# Patient Record
Sex: FEMALE | Race: WHITE | Employment: OTHER | URBAN - METROPOLITAN AREA
[De-identification: names, ages, dates, MRNs, and addresses within clinical notes are randomized per-mention and may not be internally consistent; named-entity substitution may affect disease eponyms.]

---

## 2017-01-02 ENCOUNTER — HOSPITAL ENCOUNTER (OUTPATIENT)
Dept: CARDIAC REHAB | Facility: CLINIC | Age: 72
End: 2017-01-02
Attending: INTERNAL MEDICINE
Payer: MEDICARE

## 2017-01-02 PROCEDURE — 93798 PHYS/QHP OP CAR RHAB W/ECG: CPT

## 2017-01-02 PROCEDURE — 40000116 ZZH STATISTIC OP CR VISIT

## 2017-01-04 ENCOUNTER — HOSPITAL ENCOUNTER (OUTPATIENT)
Dept: CARDIAC REHAB | Facility: CLINIC | Age: 72
End: 2017-01-04
Attending: INTERNAL MEDICINE
Payer: MEDICARE

## 2017-01-04 PROCEDURE — 40000116 ZZH STATISTIC OP CR VISIT

## 2017-01-04 PROCEDURE — 93798 PHYS/QHP OP CAR RHAB W/ECG: CPT

## 2017-01-05 ENCOUNTER — TELEPHONE (OUTPATIENT)
Dept: CARDIOLOGY | Facility: CLINIC | Age: 72
End: 2017-01-05

## 2017-01-05 DIAGNOSIS — Z98.61 STATUS POST CORONARY ANGIOPLASTY: Primary | ICD-10-CM

## 2017-01-05 NOTE — TELEPHONE ENCOUNTER
Called [facility] care carmen to inquire about brilinta fax that was sent to team 4. [facility] care [facility]men stated that it was approved.     Called pt to inform her that Brilinta was approved per [facility] care carmen. Pt verbalized understanding. Pt states she will call pharmacy to see if price has decreased. No further questions at this time.

## 2017-01-05 NOTE — TELEPHONE ENCOUNTER
[facility] care carmen called regarding a tier changefor brilinta. Per [facility] care [facility] pt is requesting that her co-pay is decreased for this medication. Per [facility] care carmen pt does not need a prior authorization for this process. Per [facility] care carmen and fax will be sent over to fill out in order to start this process.     Pt called wondering if [facility] was in contact with Dr. Lozada's office. Called pt back to inform that we are working on this process. Pt stated that she is leaving for [place] next week and would like this completed prior to then. Pt stated that [facility] care [facility] stated that it will take 72 hours. Writer instructed pt that we will try our hardest to get it completed. Pt verbalized understanding. No further questions.

## 2017-01-05 NOTE — TELEPHONE ENCOUNTER
Pt said she needs a new script for her brilinta sent to her [facility] pharmacy. She is trying to find out cost of it with her insurance this year and pharmacy requested new script. She will call 's nursing team if she has any issues with cost of medication. She is leaving for [place] for two months so is trying to get a 3 month supply of her medications prior to leaving. Refill for brilinta 90 mg BID sent to pharmacy. Alberto ESTEBAN

## 2017-01-06 ENCOUNTER — HOSPITAL ENCOUNTER (OUTPATIENT)
Dept: CARDIAC REHAB | Facility: CLINIC | Age: 72
End: 2017-01-06
Attending: INTERNAL MEDICINE
Payer: MEDICARE

## 2017-01-06 DIAGNOSIS — E78.5 HYPERLIPIDEMIA LDL GOAL <70: ICD-10-CM

## 2017-01-06 PROCEDURE — 84460 ALANINE AMINO (ALT) (SGPT): CPT | Performed by: NURSE PRACTITIONER

## 2017-01-06 PROCEDURE — 93798 PHYS/QHP OP CAR RHAB W/ECG: CPT | Performed by: OCCUPATIONAL THERAPIST

## 2017-01-06 PROCEDURE — 80061 LIPID PANEL: CPT | Performed by: NURSE PRACTITIONER

## 2017-01-06 PROCEDURE — 40000116 ZZH STATISTIC OP CR VISIT: Performed by: OCCUPATIONAL THERAPIST

## 2017-01-06 PROCEDURE — 36415 COLL VENOUS BLD VENIPUNCTURE: CPT | Performed by: NURSE PRACTITIONER

## 2017-01-07 LAB
ALT SERPL W P-5'-P-CCNC: 30 U/L (ref 0–50)
CHOLEST SERPL-MCNC: 164 MG/DL
HDLC SERPL-MCNC: 69 MG/DL
LDLC SERPL CALC-MCNC: 73 MG/DL
NONHDLC SERPL-MCNC: 95 MG/DL
TRIGL SERPL-MCNC: 109 MG/DL

## 2017-01-09 ENCOUNTER — HOSPITAL ENCOUNTER (OUTPATIENT)
Dept: CARDIAC REHAB | Facility: CLINIC | Age: 72
End: 2017-01-09
Attending: INTERNAL MEDICINE
Payer: MEDICARE

## 2017-01-09 DIAGNOSIS — E78.5 HYPERLIPIDEMIA LDL GOAL <70: Primary | ICD-10-CM

## 2017-01-09 PROCEDURE — 40000116 ZZH STATISTIC OP CR VISIT: Performed by: REHABILITATION PRACTITIONER

## 2017-01-09 PROCEDURE — 93798 PHYS/QHP OP CAR RHAB W/ECG: CPT | Performed by: REHABILITATION PRACTITIONER

## 2017-01-09 NOTE — PROGRESS NOTES
Notes Recorded by Aylin Acosta, NIKI CNP on 1/9/2017 at 9:25 AM  LDL is very close to goal, but not quite there. I think we can keep her on the Lipitor 40 mg daily and recheck when she returns from [place]. Thanks Aylin    LDL ordered for April when patient due for f/u

## 2017-01-10 ENCOUNTER — HOSPITAL ENCOUNTER (OUTPATIENT)
Dept: CARDIAC REHAB | Facility: CLINIC | Age: 72
End: 2017-01-10
Attending: INTERNAL MEDICINE
Payer: MEDICARE

## 2017-01-10 VITALS — HEIGHT: 60 IN | BODY MASS INDEX: 32.59 KG/M2 | WEIGHT: 166 LBS

## 2017-01-10 PROCEDURE — 40000116 ZZH STATISTIC OP CR VISIT: Performed by: REHABILITATION PRACTITIONER

## 2017-01-10 PROCEDURE — 93798 PHYS/QHP OP CAR RHAB W/ECG: CPT | Performed by: REHABILITATION PRACTITIONER

## 2017-01-10 PROCEDURE — 93797 PHYS/QHP OP CAR RHAB WO ECG: CPT | Mod: 59 | Performed by: REHABILITATION PRACTITIONER

## 2017-01-10 PROCEDURE — 40000575 ZZH STATISTIC OP CARDIAC VISIT #2: Performed by: REHABILITATION PRACTITIONER

## 2017-01-10 ASSESSMENT — 6 MINUTE WALK TEST (6MWT)
FEMALE CALC: 1348.65
TOTAL DISTANCE WALKED (FT): 1459
TOTAL DISTANCE WALKED (FT): 1150
MALE CALC: 1182.34
PREDICTED: 1189.54
GENDER SELECTION: FEMALE

## 2017-01-10 NOTE — PROGRESS NOTES
01/10/17 1500   Session  Physician cosignature/electronic signature indicates approval of this ITP document. I have established, reviewed and made necessary changes to the individualized treatment plan and exercise prescription for this patient.  Shantelle CYNTHIA Georges  1945         Session Discharge Note   Certified through this date 02/03/17   Cardiac Rehab Assessment   Cardiac Rehab Assessment Pt. has had significant medical issues starting in October 2016 with vaginal bleeding. Pt. had procedure to find bleeding, and physicians were concerned with her EKG rhythm while under for surgery. Pt. underwent a stress test on 11/22/16 which showed a large anterior ischemia. Pt. underwent an angiogram which showed severe proximal LAD stenosis which was treated with a stent. During recovery, Pt. developed sudden onset of chest pain and vomited, and EKG showed anterior ST elevation. Pt. 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 Pt. underwent a thrombectomy as well as angioplasty and was treated with another stent in the ostial LAD. Since discharge, pt. has felt well and has been chest pain free. Pt. and her  have a trip planned for 8 weeks in [place] starting on 1/12/17.12/5/2016. PT has been attending rehab for 4 sessions without any symptoms or complaints. PT reports a good appetite and is sleeping well. PT reports that she feels better than she did prior to PCI. PT is eager to be cleared to go to [employer] in January. PT will start walking at the mall 1-2 days per week for at least 30 minutes. PT also plans on going to the dietician classes and will possibly meet 1:1 with a dietician. PT is already watching fat and sodium intake as well as decreasing portion control. PT continues to benefit from structured, monitored exercise, and risk factor modification counseling to decrease cardiac risk.    12/19/16  ITP completed today.  PT reports that she has been more tired  "since her stent.  She reports SOB with stairs.  She desires to have approval to go to [place] in January.  PT continues to benefit from OPCR for ongoing progression of MET level to tolerate her trip to [place] walking on the beaches. She is benefiting from the discussions regarding dietary changes in order to control her BG and decrease risk of future heart events.  PT also is benefiting from Cardiac rehab for stress management.  She reports she is working on herself and her children are aware of this and are not \"dumping\" on her as often.  The PHQ9 redo she scored a 1 vs 11 on her initial evaluation. PT to see Dr. Lozada tomorrow and will give us feedback after that session. Pt made significant gains in exercise tolerance. Initially patient tolerated 30 minutes at 2.4 METs, now tolerating 35 minutes at 4.4 METs.  Your PT plans to continue with a home walking program 4 to 5 days per week. She will leave for [place] on 1/12/17 and will continue with a home walking program while she is there.    General Information   Treatment Diagnosis Stent   Date of Treatment Diagnosis 11/23/16   Significant Past CV History None   Comorbidities DM   Lead up symptoms chest pain during stress test   Hospital Location [facility]   Hospital Discharge Date 11/25/16   Signs and Symptoms Post Hospital Discharge None   Outpatient Cardiac Rehab Start Date 11/28/16   Primary Physician Dr. eVga   Primary Physician Follow Up Scheduled   Cardiologist Dr. Up   Cardiologist Follow Up Advised to schedule appointment   Ejection Fraction 55-60   Risk Stratification Low   Summary of Cath Report   Summary of Cath Report Available   Date Performed 11/23/16   LAD (filling defect in prox. LAD, 40-50% prox. diag., )   LCX 20%   Ramus 10-20%   Living and Work Status    Living Arrangements and Social Status house   Support System Live with an adult   Return to Employment Retired   Preventative Medications   CMS recommended medications " Antiplatelets;Beta Blocker;Lipid Lowering;Influenza vaccination;Pneumonia vaccination   Falls Screen   Have you fallen two or more times in the past year? No   Have you fallen and had an injury in the past year? No   Referral Initiated to Physical Therapy No   Pain   Patient Currently in Pain Denies   Physical Assessments   Incisions WNL   Edema None   Right Lung Sounds not assessed   Left Lung Sounds not assessed   Limitations No limitations   Individualized Treatment Plan   Monitored Sessions Scheduled 24   Monitored Sessions Attended 19   Oxygen   Supplemental Oxygen needed No   Nutrition Management - Weight Management   Assessment Re-assessment   Age 71   Weight 75.297 kg (166 lb)   Height 1.524 m (5')   BMI (Calculated) 32.49   Initial Rate Your Plate Score. Dietary tool to assess eating patterns. Scores range from 24 to 72. The higher the score the healthier the eating pattern. 48   Nutrition Management - Lipids   Lipids Labs Available   Date 12/05/16   Total Cholesterol 178   Triglycerides 157   HDL 64   LDL 83   Prescribed Lipid Medication Yes   Statin Intensity High Intensity   Lipid Comments 12/196/16  Did not discuss today.  Therapist found values for CHOL after patient had left will discuss next session.    Nutrition Management - Diabetes   Diabetes Type II   Do you Monitor BS at Home? Yes   Diabetes Medication Prescribed Yes, Oral Medication   Hb A1C Date: 11/24/16   Hb A1C Result: 6.4   Diabetes Comments 12/19/16  PT reports that she feels that she knows what she can eat and what she cant.  She states that her BG does not let her hide her mistakes in eating.    Nutrition Management Summary   Dietary Recommendations Low Sodium   Stages of Change for Diet Compliance Contemplation   Interventions Planned Attend Nutrition Education Class(es)   Interventions In Progress or Completed (gave patient DASH approaches handout. )   Nutrition Summary Comments 11/28/16 Pt. does not currently have any interest in  diet changes.12/5/2016. PT has decreased portions as well as sugar intake. PT is more ware of salt and fat in certain foods and has switched to whole grain bread, pasta, and rice. PT lives with daughter and son-in-law, so certain changes have been difficult, but has been self motivated to keep good habits. PT is planning on attending the Nutrition classes and possible meet 1:1 with a dietician.   12/19/16 PT was encouraged to do a 3 day food log if she were interested in meeting with dietician in order to use the best use of their time.    Nutrition Target Outcome Weight loss .5-1 lb/week (if BMI > 25);Total Chol < 150, HDL > 40 (M), HDL > 50 (W), LDL < 70, Trig < 150;Hb A1C < 7.0   Psychosocial Management   Psychosocial Assessment Re-assessment   Is there history of clinical depression or increased risk of depression? No previous history   Current Level of Stress per Patient Report Moderate    Current Coping Skills Uses Stress Management/Relaxation Techniques;Has Positive Support System   Initial Patient Health Questionnaire -9 Score (PHQ-9) for depression. 5-9 Minimal symptoms, 10-14 Minor depression, 15-19 Major depression, moderately severe, > 20 Major depression, severe  11   Reassessment PHQ-9 Score for Depression 0  (Repeated 12/19/16)   Initial [employer] COOP Survey score.  Quality of Life:   If total score > 25 review individual areas where patient rated a 4 or 5.  Consider patients current medical condition and what role that plays on the score.   Adjust treatment protocol to improve areas of concern.  Consider the following:  PHQ9 score, DASI, and re-assessment within the next 30 days to assist with developing treatments.  11   Stages of Change Preparation   Interventions Planned Patient to verbalize understanding of behavioral assessment results;Patient to verbalize understanding of negative impact of stress to personal health;Reassess PHQ-9 and/or [employer] COOP Surveys if outside of defined limits    Interventions In Progress or Completed Patient verbalizes understanding of behavioral assessment scores;Patient verbalizes understanding of negative impact of stress to personal health;Pt recognizes signs and symptoms of depression   Psychosocial Comments 12/5/2016. PT reports that her stress mainly comes from her children. PT has been incorporating relaxation techniques as well as responded to stress differently. PT is looking forward to going to [place] for 8 weeks as this is a big stress reliever.   12/19/16  PT reports that she is working on herself.  She reports telling her family that they cant dump on her.  She has found this to be a blessing.  She does admit to her health being of stress to her.  We discussed attendance at the relaxation class.    Psychosocial Target Outcome Identify absence or presence of depression using valid screening tool   Other Core Components - Hypertension   History of or Diagnosis of Hypertension Yes   Currently taking Anti-Hypertensive's Yes;Beta blocker   Hypertension Comments 12/5/2016. Blood pressure has been in well control.    Other Core Components - Tobacco   History of Tobacco Use Yes   Quit Date or Planned Quit Date 11/28/87   Tobacco Use Status Former (Quit > 6 mo ago)   Tobacco Habit Cigarettes   Years of Tobacco Use 15 years   Stages of Change Maintenance   Other Core Components Summary   Interventions Planned Attend education class(es) on Nutrition   Interventions In Progress or Completed Educated on importance of maintaining low sodium diet;Instructed on DASH diet   Activity/Exercise History   Activity/Exercise Assessment Re-assessment   Activity/Exercise Status prior to event? Sedentary   Number of Days Currently participating in Moderate Physical Activity? 3-4   Number of Days Currently performing  Aerobic Exercise (including rehab)? 3  (in rehab)   Number of Minutes per Session Currently of Aerobic Exercise (average)? 35   Current Stage of Change (Physical  Activity) Preparation   Current Stage of Change (Aerobic Exercise) Action   Patient Goals Goal #1   Goal #1 Description Pt. will regain strength by attending OPCR 3x per week and participating in weights/stretching.   Goal #1 Target Date 01/28/17   Goal #1 Date Met 01/10/17   Goal #1 Progress Towards Goal 12/5/2016. PT has been attending cardiac rehab 3 times per week and plans to incorporate mall walking for at least 30 minutes 1-2 additional days during the week. Due to wrist procedure site, PT will initiate weights next week when site has healed. PT reports improvement in endurance and energy every day. PT is pleased with progress and states she feels better than before.   12/19/16  PT has been walking at the mall 2x per week and then if she wants to shop she does that after her walk.  She does still need to stop and rest after one lap at the mall.  1/10/17 PT had initiated strength training, but discontinued due to pulling a muscle. She has been walking at the mall, and endurance continues to improve.    Activity/Exercise Target Outcome An Accumulation of 150  Minutes of Aerobic Activity per Week   Exercise Assessment   6 Minute Walk Predicted - Gender Selection Female   6 Minute Walk Predicted (Male) 1182.34   6 Minute Walk Predicted (Female) 1348.65   Initial 6 Minute Walk Distance (Feet) 1150 ft   Discharge 6 Minute Walk Distance (Feet) 1459   Resting HR 59 bpm   Exercise HR 92 bpm   Post Exercise HR 87 bpm   Resting /64 mmHg   Exercise /78 mmHg   Post Exercise /64 mmHg   Pre  mg/dL  (1 granola bar)   Post  mg/dL   Effort Rating 5   Current MET Level 4.4   MET Level Goal 4-4.5   ECG Rhythm Sinus rhythm   Ectopy PAC's  (frequent)   Current Symptoms Dyspnea  (with exercise)   Limitations/Restrictions None   Exercise Prescription   Mode Treadmill;NuStep   Duration/Time 30-45 min   Frequency 3 days week   THR (85% of age predicted max HR) 127.5   OMNI Effort Rating (0-10 Scale)  4-6/10   Progression Continuous bouts;Total exercise time of 20-30 minutes;Progress peak intensity by 1/2 MET per week;Aerobic exercise to OMNI rating of 6 or below and at or below THR   Recommended Home Exercise   Type of Exercise Walking   Frequency (days per week) 4 to 5 days per week   Duration (minutes per session) 30-45 min   Effort Rating Recommended 4-6/10   30 Day Exercise Plan Pt. was encouraged to begin an at home exercise program starting with 5-10 minutes. 12/5/2016. PT will be going to the mall to walk for at least 30 minutes 1-2 days per week.   12/19/16  PT to continue to exercise 4-6 days per week including her days in rehab.    Current Home Exercise   Type of Exercise Walking   Frequency (days per week) 2   Duration (minutes per session) 30   Follow-up/On-going Support   Provider follow-up needed on the following Other (see comments)  (PT to find a MD in [place])   Comments 11/28/16 Pt. will be meeting with an electrophysiologist on 12/1/16.  12/19/16  PT to see cardiologist tomorrow. Progress update sent.    Learning Assessment   Learner Patient   Primary Language English   Preferred Learning Style Listening;Reading;Demonstration;Pictures/Video   Barriers to Learning No barriers noted   Patient Education   Education recommended Medication Overview;Anatomy and Physiology of the Heart

## 2017-03-08 ENCOUNTER — HOSPITAL ENCOUNTER (OUTPATIENT)
Dept: MAMMOGRAPHY | Facility: CLINIC | Age: 72
Discharge: HOME | End: 2017-03-08
Attending: INTERNAL MEDICINE | Admitting: INTERNAL MEDICINE
Payer: MEDICARE

## 2017-03-08 DIAGNOSIS — Z12.31 VISIT FOR SCREENING MAMMOGRAM: ICD-10-CM

## 2017-03-08 PROCEDURE — G0202 SCR MAMMO BI INCL CAD: HCPCS

## 2017-03-09 ENCOUNTER — TRANSFERRED RECORDS (OUTPATIENT)
Dept: HEALTH INFORMATION MANAGEMENT | Facility: CLINIC | Age: 72
End: 2017-03-09

## 2017-03-16 ENCOUNTER — OFFICE VISIT (OUTPATIENT)
Dept: CARDIOLOGY | Facility: CLINIC | Age: 72
End: 2017-03-16
Attending: INTERNAL MEDICINE
Payer: COMMERCIAL

## 2017-03-16 VITALS
HEART RATE: 80 BPM | DIASTOLIC BLOOD PRESSURE: 75 MMHG | WEIGHT: 165 LBS | HEIGHT: 60 IN | OXYGEN SATURATION: 96 % | BODY MASS INDEX: 32.39 KG/M2 | SYSTOLIC BLOOD PRESSURE: 126 MMHG

## 2017-03-16 DIAGNOSIS — I25.10 ASCVD (ARTERIOSCLEROTIC CARDIOVASCULAR DISEASE): ICD-10-CM

## 2017-03-16 DIAGNOSIS — Z98.61 POSTSURGICAL PERCUTANEOUS TRANSLUMINAL CORONARY ANGIOPLASTY STATUS: ICD-10-CM

## 2017-03-16 DIAGNOSIS — I25.118 CORONARY ARTERY DISEASE OF NATIVE ARTERY OF NATIVE HEART WITH STABLE ANGINA PECTORIS (H): Primary | ICD-10-CM

## 2017-03-16 DIAGNOSIS — I47.10 PAROXYSMAL SUPRAVENTRICULAR TACHYCARDIA (H): ICD-10-CM

## 2017-03-16 DIAGNOSIS — E78.5 HYPERLIPIDEMIA LDL GOAL <70: ICD-10-CM

## 2017-03-16 LAB
ALT SERPL W P-5'-P-CCNC: <5 U/L (ref 5–30)
CHOLEST SERPL-MCNC: 160 MG/DL
HDLC SERPL-MCNC: 61 MG/DL
LDLC SERPL CALC-MCNC: 65 MG/DL
NONHDLC SERPL-MCNC: 99 MG/DL
TRIGL SERPL-MCNC: 168 MG/DL

## 2017-03-16 PROCEDURE — 36415 COLL VENOUS BLD VENIPUNCTURE: CPT | Performed by: NURSE PRACTITIONER

## 2017-03-16 PROCEDURE — 84460 ALANINE AMINO (ALT) (SGPT): CPT | Performed by: NURSE PRACTITIONER

## 2017-03-16 PROCEDURE — 99214 OFFICE O/P EST MOD 30 MIN: CPT | Performed by: INTERNAL MEDICINE

## 2017-03-16 PROCEDURE — 80061 LIPID PANEL: CPT | Performed by: NURSE PRACTITIONER

## 2017-03-16 NOTE — MR AVS SNAPSHOT
After Visit Summary   3/16/2017    Shantelle Su    MRN: [id number]           Patient Information     Date Of Birth          1945        Visit Information        Provider Department      3/16/2017 9:45 AM Gee Lozada MD [facility]        Today's Diagnoses     Coronary artery disease of native artery of native heart with stable angina pectoris (H)    -  1    ASCVD (arteriosclerotic cardiovascular disease)        Postsurgical percutaneous transluminal coronary angioplasty status        Paroxysmal supraventricular tachycardia (H)           Follow-ups after your visit        Additional Services     Follow-Up with Cardiologist                 Future tests that were ordered for you today     Open Future Orders        Priority Expected Expires Ordered    Follow-Up with Cardiologist Routine 9/12/2017 3/16/2018 3/16/2017    Exercise Stress Echocardiogram Routine 3/23/2017 3/16/2018 3/16/2017            Who to contact     If you have questions or need follow up information about today's clinic visit or your schedule please contact [facility] directly at [phone].  Normal or non-critical lab and imaging results will be communicated to you by [employer], letter or phone within 4 business days after the clinic has received the results. If you do not hear from us within 7 days, please contact the clinic through [employer] or phone. If you have a critical or abnormal lab result, we will notify you by phone as soon as possible.  Submit refill requests through [employer] or call your pharmacy and they will forward the refill request to us. Please allow 3 business days for your refill to be completed.          Additional Information About Your Visit        [employer] Information     [employer] gives you secure access to your electronic health record. If you see a primary care provider, you can also send messages to your care team and  make appointments. If you have questions, please call your primary care clinic.  If you do not have a primary care provider, please call [phone] and they will assist you.        Care EveryWhere ID     This is your Care EveryWhere ID. This could be used by other organizations to access your [place] medical records  [id number]        Your Vitals Were     Pulse Height BMI (Body Mass Index)             80 1.524 m (5') 32.22 kg/m2          Blood Pressure from Last 3 Encounters:   03/16/17 126/75   12/20/16 137/65   12/07/16 118/70    Weight from Last 3 Encounters:   03/16/17 74.8 kg (165 lb)   01/10/17 75.3 kg (166 lb)   12/20/16 75.3 kg (166 lb)              We Performed the Following     Follow-Up with Cardiologist          Today's Medication Changes          These changes are accurate as of: 3/16/17 10:41 AM.  If you have any questions, ask your nurse or doctor.               These medicines have changed or have updated prescriptions.        Dose/Directions    ACCU-CHEK VI Kit   This may have changed:    - when to take this  - additional instructions   Used for:  Hyperglycemia        2 times daily. With routine lancets as well as alcohol swabs, dispense 1 month   Quantity:  1 kit   Refills:  0       blood glucose monitoring lancets   This may have changed:    - when to take this  - additional instructions   Used for:  Hyperglycemia        3 times daily. BG testing 3 times a day   Quantity:  100 each   Refills:  11       blood glucose monitoring test strip   Commonly known as:  no brand specified   This may have changed:    - when to take this  - additional instructions   Used for:  Hyperglycemia        Accucheck Vi Plus  3 times daily   Quantity:  300 strip   Refills:  11                Primary Care Provider Office Phone # Fax #    Klever Vega [phone]       [facility] [address] [facility] [address]        Thank you!     Thank you for choosing [facility]  [facility] [place]  for your care. Our goal is always to provide you with excellent care. Hearing back from our patients is one way we can continue to improve our services. Please take a few minutes to complete the written survey that you may receive in the mail after your visit with us. Thank you!             Your Updated Medication List - Protect others around you: Learn how to safely use, store and throw away your medicines at www.disposemymeds.org.          This list is accurate as of: 3/16/17 10:41 AM.  Always use your most recent med list.                   Brand Name Dispense Instructions for use    ACCU-CHEK VI Kit     1 kit    2 times daily. With routine lancets as well as alcohol swabs, dispense 1 month       acetaminophen 325 MG tablet    TYLENOL    100 tablet    Take 2 tablets (650 mg) by mouth every 4 hours as needed for other (mild pain)       aspirin 81 MG EC tablet     90 tablet    Take 1 tablet (81 mg) by mouth daily       atorvastatin 40 MG tablet    LIPITOR    90 tablet    Take 1 tablet (40 mg) by mouth daily       blood glucose calibration solution     3 Bottle    Use as directed       blood glucose monitoring lancets     100 each    3 times daily. BG testing 3 times a day       blood glucose monitoring test strip    no brand specified    300 strip    Accucheck Vi Plus  3 times daily       metFORMIN 500 MG tablet    GLUCOPHAGE    90 tablet    Take 1 tablet (500 mg) by mouth daily (with dinner)       metoprolol 50 MG 24 hr tablet    TOPROL XL    90 tablet    Take 1 tablet (50 mg) by mouth daily       nitroglycerin 0.4 MG sublingual tablet    NITROSTAT    25 tablet    As needed for anginal chest pain. Maximum is 3 doses in 15 minutes (one every 5 minutes).       NORVASC 5 MG tablet   Generic drug:  amLODIPine      Take 5 mg by mouth daily       omeprazole 20 MG CR capsule    priLOSEC    90 capsule    Take 1 capsule (20 mg) by mouth daily       STOOL SOFTENER PO      Take 1  capsule by mouth 2 times daily       ticagrelor 90 MG tablet    BRILINTA    180 tablet    Take 1 tablet (90 mg) by mouth every 12 hours

## 2017-03-16 NOTE — LETTER
3/16/2017    Klever Vega MD  [facility]   [address] [facility] [address]    RE: Shantelle Su       Dear Colleague,    I again had the pleasure of seeing your patient, Shantelle Su, at [facility] for evaluation of coronary artery disease and right scapular back pain.  This patient is a delightful 72-year-old female accompanied by her  today who is status post percutaneous coronary intervention on 11/23/2016 after an abnormal stress test indicated cardiac ischemia.  The patient was originally seen by Dr. Camacho Up for palpitations.  She has a history of PAT and an ejection fraction of 50%-55% with grade I diastolic dysfunction.  In evaluation of her PACs and PAT a stress echocardiogram was performed with evidence of ischemia in the anteroseptal and apical wall accompanied by 4/10 chest burning.  Coronary angiography from 11/23 found a proximal 50% LAD stenosis followed by an 80% stenosis with a mid 40%-50% stenosis.  Successful PCI with drug-eluting stent was accomplished.  Two hours later the patient became symptomatic with an EKG concerning for acute stent thrombosis and anterior ST elevation.  OCT was performed showing a proximal stent edge dissection.  Thrombectomy was performed and an additional overlapping drug-eluting stent was placed near the ostium of the LAD.  The patient was placed on aspirin and Brilinta.  She participated in outpatient cardiac rehabilitation free of angina.  She denies any significant palpitations since she saw Dr. Jacobo in 12/2016.  He left her on her beta blocker and we have not pursued further evaluation for her arrhythmia.  While she wintered in [place] she noted some shortness of breath and right scapular burning back pain.  This has been intermittently present over the last several weeks especially while walking.  She feels quite weak and tired also.  She relates this as similar to her previous pain from 2001 when she had  pulmonary emboli.  She denies a cough.  She has shortness of breath at rest but no back pain at rest.  She denies peripheral edema.  No hemoptysis.  Her palpitations are present but fairly low key.  A fasting lipid profile today shows total cholesterol 160, HDL 61, LDL 65 and triglycerides 168.  We again discussed a weight loss diet and calories.      PHYSICAL EXAMINATION:   VITAL SIGNS:  Current blood pressure is 126/75, pulse is 80 and regular, weight is 165 pounds, BMI is 32.  Oxygen saturation on room air at rest is 96% with a pulse of 80.  With walking oxygen saturations are 98% on room air with a pulse of 96.   CHEST:  Clear to auscultation.  She is somewhat sore to palpation around the left scapula but not the right.   CARDIAC:  Regular rate and rhythm, normal S1 and S2 without gallop or murmur.  No JVD or HJR.  Pulses are all intact without bruits.   ABDOMEN:  Benign and mildly obese.  No bruits.   EXTREMITIES:  Without cyanosis, clubbing or edema or lower extremity cords.     Outpatient Encounter Prescriptions as of 3/16/2017   Medication Sig Dispense Refill     ticagrelor (BRILINTA) 90 MG tablet Take 1 tablet (90 mg) by mouth every 12 hours 180 tablet 3     metoprolol (TOPROL XL) 50 MG 24 hr tablet Take 1 tablet (50 mg) by mouth daily 90 tablet 3     atorvastatin (LIPITOR) 40 MG tablet Take 1 tablet (40 mg) by mouth daily 90 tablet 3     nitroglycerin (NITROSTAT) 0.4 MG SL tablet As needed for anginal chest pain. Maximum is 3 doses in 15 minutes (one every 5 minutes). 25 tablet 3     aspirin EC 81 MG EC tablet Take 1 tablet (81 mg) by mouth daily 90 tablet 3     amLODIPine (NORVASC) 5 MG tablet Take 5 mg by mouth daily       acetaminophen (TYLENOL) 325 MG tablet Take 2 tablets (650 mg) by mouth every 4 hours as needed for other (mild pain) 100 tablet 0     metFORMIN (GLUCOPHAGE) 500 MG tablet Take 1 tablet (500 mg) by mouth daily (with dinner) 90 tablet 3     omeprazole (PRILOSEC) 20 MG capsule Take 1  capsule (20 mg) by mouth daily 90 capsule 3     Docusate Calcium (STOOL SOFTENER PO) Take 1 capsule by mouth 2 times daily        glucose blood VI test strips strip Accucheck Kristine Plus  3 times daily (Patient taking differently: daily Accucheck Kristine Plus  3 times daily) 300 strip 11     ACCU-CHEK MULTICLIX LANCETS MISC 3 times daily. BG testing 3 times a day (Patient taking differently: daily 3 times daily. BG testing 3 times a day) 100 each 11     Blood Glucose Calibration (ACCU-CHEK KRISTINE) SOLN Use as directed 3 Bottle 11     Blood Glucose Monitoring Suppl (ACCU-CHEK KRISTINE) KIT 2 times daily. With routine lancets as well as alcohol swabs, dispense 1 month (Patient taking differently: daily With routine lancets as well as alcohol swabs, dispense 1 month) 1 kit 0     No facility-administered encounter medications on file as of 3/16/2017.       ASSESSMENT:   1.  Shantelle Su is a delightful 72-year-old female status post LAD angioplasty and stenting with acute in-stent thrombosis requiring a second stent placed for edge dissection.  Her back symptoms are mostly suggestive of musculoskeletal pain.  She has no sequelae of pulmonary emboli such as oxygen desaturation, tachypnea, tachycardia or hemoptysis.  There is no evidence of lower extremity DVT.  I cannot, however, rule out cardiac ischemia.  We would like to see how she does with a stress echocardiogram to be sure we are not dealing with cardiac ischemia.   2.  Hyperlipidemia, currently under excellent control.  Her triglycerides are a bit elevated and we talked about diet and exercise.   3.  Frequent premature atrial contractions which are currently reasonably well controlled.      It is my pleasure to assist in the care of Shantelle Su.  We will perform a stress echocardiogram in the next week.  It is not clear that her back pain represents aortic dissection, pulmonary emboli or other pulmonary issues.  I suspect that this is likely musculoskeletal.         It is my pleasure to assist in the care of Shantelle Su.  I will see her again in 6 months if her stress echocardiogram is normal.  All her questions were answered to her satisfaction.     Sincerely,    Gee Lozada MD     [facility]

## 2017-03-16 NOTE — PROGRESS NOTES
HISTORY OF PRESENT ILLNESS:  I again had the pleasure of seeing your patient, Shantelle Su, at [facility] for evaluation of coronary artery disease and right scapular back pain.  This patient is a delightful 72-year-old female accompanied by her  today who is status post percutaneous coronary intervention on 11/23/2016 after an abnormal stress test indicated cardiac ischemia.  The patient was originally seen by Dr. Camacho Up for palpitations.  She has a history of PAT and an ejection fraction of 50%-55% with grade I diastolic dysfunction.  In evaluation of her PACs and PAT a stress echocardiogram was performed with evidence of ischemia in the anteroseptal and apical wall accompanied by 4/10 chest burning.  Coronary angiography from 11/23 found a proximal 50% LAD stenosis followed by an 80% stenosis with a mid 40%-50% stenosis.  Successful PCI with drug-eluting stent was accomplished.  Two hours later the patient became symptomatic with an EKG concerning for acute stent thrombosis and anterior ST elevation.  OCT was performed showing a proximal stent edge dissection.  Thrombectomy was performed and an additional overlapping drug-eluting stent was placed near the ostium of the LAD.  The patient was placed on aspirin and Brilinta.  She participated in outpatient cardiac rehabilitation free of angina.  She denies any significant palpitations since she saw Dr. Jacobo in 12/2016.  He left her on her beta blocker and we have not pursued further evaluation for her arrhythmia.  While she wintered in [place] she noted some shortness of breath and right scapular burning back pain.  This has been intermittently present over the last several weeks especially while walking.  She feels quite weak and tired also.  She relates this as similar to her previous pain from 2001 when she had pulmonary emboli.  She denies a cough.  She has shortness of breath at rest but no back pain at rest.  She denies  peripheral edema.  No hemoptysis.  Her palpitations are present but fairly low key.  A fasting lipid profile today shows total cholesterol 160, HDL 61, LDL 65 and triglycerides 168.  We again discussed a weight loss diet and calories.      PHYSICAL EXAMINATION:   VITAL SIGNS:  Current blood pressure is 126/75, pulse is 80 and regular, weight is 165 pounds, BMI is 32.  Oxygen saturation on room air at rest is 96% with a pulse of 80.  With walking oxygen saturations are 98% on room air with a pulse of 96.   CHEST:  Clear to auscultation.  She is somewhat sore to palpation around the left scapula but not the right.   CARDIAC:  Regular rate and rhythm, normal S1 and S2 without gallop or murmur.  No JVD or HJR.  Pulses are all intact without bruits.   ABDOMEN:  Benign and mildly obese.  No bruits.   EXTREMITIES:  Without cyanosis, clubbing or edema or lower extremity cords.      ASSESSMENT:   1.  Shantelle Su is a delightful 72-year-old female status post LAD angioplasty and stenting with acute in-stent thrombosis requiring a second stent placed for edge dissection.  Her back symptoms are mostly suggestive of musculoskeletal pain.  She has no sequelae of pulmonary emboli such as oxygen desaturation, tachypnea, tachycardia or hemoptysis.  There is no evidence of lower extremity DVT.  I cannot, however, rule out cardiac ischemia.  We would like to see how she does with a stress echocardiogram to be sure we are not dealing with cardiac ischemia.   2.  Hyperlipidemia, currently under excellent control.  Her triglycerides are a bit elevated and we talked about diet and exercise.   3.  Frequent premature atrial contractions which are currently reasonably well controlled.      It is my pleasure to assist in the care of Shantelle uS.  We will perform a stress echocardiogram in the next week.  It is not clear that her back pain represents aortic dissection, pulmonary emboli or other pulmonary issues.  I suspect that this is  likely musculoskeletal.        It is my pleasure to assist in the care of Rodney Hoyos.  I will see her again in 6 months if her stress echocardiogram is normal.  All her questions were answered to her satisfaction.      Lilly Brian MD      cc:   Klever Vega MD   [address]  [address]         LILLY BRIAN MD, [facility]             D: 2017 11:01   T: 2017 14:57   MT: VD      Name:     RODNEY HOYOS   MRN:      [id number]        Account:      [id number]   :      1945           Service Date: 2017      Document: [id number]

## 2017-03-22 ENCOUNTER — TELEPHONE (OUTPATIENT)
Dept: CARDIOLOGY | Facility: CLINIC | Age: 72
End: 2017-03-22

## 2017-03-22 ENCOUNTER — HOSPITAL ENCOUNTER (OUTPATIENT)
Dept: CARDIOLOGY | Facility: CLINIC | Age: 72
Discharge: HOME | End: 2017-03-22
Attending: INTERNAL MEDICINE | Admitting: INTERNAL MEDICINE
Payer: MEDICARE

## 2017-03-22 DIAGNOSIS — I25.118 CORONARY ARTERY DISEASE OF NATIVE ARTERY OF NATIVE HEART WITH STABLE ANGINA PECTORIS (H): ICD-10-CM

## 2017-03-22 PROCEDURE — 93016 CV STRESS TEST SUPVJ ONLY: CPT | Performed by: INTERNAL MEDICINE

## 2017-03-22 PROCEDURE — 93325 DOPPLER ECHO COLOR FLOW MAPG: CPT | Mod: 26 | Performed by: INTERNAL MEDICINE

## 2017-03-22 PROCEDURE — 93018 CV STRESS TEST I&R ONLY: CPT | Performed by: INTERNAL MEDICINE

## 2017-03-22 PROCEDURE — 93350 STRESS TTE ONLY: CPT | Mod: 26 | Performed by: INTERNAL MEDICINE

## 2017-03-22 PROCEDURE — 93321 DOPPLER ECHO F-UP/LMTD STD: CPT | Mod: 26 | Performed by: INTERNAL MEDICINE

## 2017-03-22 PROCEDURE — 93325 DOPPLER ECHO COLOR FLOW MAPG: CPT | Mod: TC

## 2017-03-22 NOTE — TELEPHONE ENCOUNTER
Stress echo performed today.   Interpretation Summary  The patient exercised 10:45 mm:ss on standard mod Asael protocol.  The patient did not exhibit any symptoms during exercise.  A moderately-high workload was achieved.  There was a normal BP response to exercise.  The EKG portion of this stress test was negative for inducible ischemia (see  echo results below).  Normal resting wall motion and no stress-induced wall motion abnormality    Notes Recorded by Gee Lozada MD on 3/22/2017 at 2:44 PM  Normal stress echo.  F/U with me in 6 months.  Gee Lozada MD, [facility]  March 22, 2017 2:43 PM    Reviewed stress echo results and Dr. Lozada's recommendations with patient over the phone. Patient had no questions or concerns. Patient to f/u with Dr. Lozada in September.     TGarbers RN  [facility]

## 2017-03-28 ENCOUNTER — OFFICE VISIT (OUTPATIENT)
Dept: INTERNAL MEDICINE | Facility: CLINIC | Age: 72
End: 2017-03-28
Payer: COMMERCIAL

## 2017-03-28 ENCOUNTER — RADIANT APPOINTMENT (OUTPATIENT)
Dept: GENERAL RADIOLOGY | Facility: CLINIC | Age: 72
End: 2017-03-28
Attending: INTERNAL MEDICINE
Payer: COMMERCIAL

## 2017-03-28 VITALS
TEMPERATURE: 97.7 F | OXYGEN SATURATION: 98 % | DIASTOLIC BLOOD PRESSURE: 76 MMHG | WEIGHT: 165.7 LBS | HEART RATE: 77 BPM | SYSTOLIC BLOOD PRESSURE: 146 MMHG | BODY MASS INDEX: 32.36 KG/M2

## 2017-03-28 DIAGNOSIS — R07.9 RIGHT-SIDED CHEST PAIN: Primary | ICD-10-CM

## 2017-03-28 DIAGNOSIS — Z71.89 ADVANCED DIRECTIVES, COUNSELING/DISCUSSION: Chronic | ICD-10-CM

## 2017-03-28 DIAGNOSIS — R07.9 RIGHT-SIDED CHEST PAIN: ICD-10-CM

## 2017-03-28 LAB — D DIMER PPP FEU-MCNC: 0.3 UG/ML FEU (ref 0–0.5)

## 2017-03-28 PROCEDURE — 71020 XR CHEST 2 VW: CPT

## 2017-03-28 PROCEDURE — 99213 OFFICE O/P EST LOW 20 MIN: CPT | Performed by: INTERNAL MEDICINE

## 2017-03-28 PROCEDURE — 36415 COLL VENOUS BLD VENIPUNCTURE: CPT | Performed by: INTERNAL MEDICINE

## 2017-03-28 PROCEDURE — 85379 FIBRIN DEGRADATION QUANT: CPT | Performed by: INTERNAL MEDICINE

## 2017-03-28 NOTE — MR AVS SNAPSHOT
After Visit Summary   3/28/2017    Shantelle Su    MRN: [id number]           Patient Information     Date Of Birth          1945        Visit Information        Provider Department      3/28/2017 10:20 AM Ruben Hernandez MD [facility]        Today's Diagnoses     Right-sided chest pain    -  1    Advanced directives, counseling/discussion           Follow-ups after your visit        Future tests that were ordered for you today     Open Future Orders        Priority Expected Expires Ordered    XR Chest 2 Views Routine 3/28/2017 3/28/2018 3/28/2017            Who to contact     If you have questions or need follow up information about today's clinic visit or your schedule please contact [facility] directly at [phone].  Normal or non-critical lab and imaging results will be communicated to you by MyChart, letter or phone within 4 business days after the clinic has received the results. If you do not hear from us within 7 days, please contact the clinic through [facility] or phone. If you have a critical or abnormal lab result, we will notify you by phone as soon as possible.  Submit refill requests through [employer] or call your pharmacy and they will forward the refill request to us. Please allow 3 business days for your refill to be completed.          Additional Information About Your Visit        MyChart Information     [employer] gives you secure access to your electronic health record. If you see a primary care provider, you can also send messages to your care team and make appointments. If you have questions, please call your primary care clinic.  If you do not have a primary care provider, please call [phone] and they will assist you.        Care EveryWhere ID     This is your Care EveryWhere ID. This could be used by other organizations to access your [facility] medical records  [id number]        Your Vitals Were     Pulse  Temperature Pulse Oximetry BMI (Body Mass Index)          77 97.7  F (36.5  C) (Oral) 98% 32.36 kg/m2         Blood Pressure from Last 3 Encounters:   03/28/17 146/76   03/16/17 126/75   12/20/16 137/65    Weight from Last 3 Encounters:   03/28/17 165 lb 11.2 oz (75.2 kg)   03/16/17 165 lb (74.8 kg)   01/10/17 166 lb (75.3 kg)              We Performed the Following     D dimer, quantitative          Today's Medication Changes          These changes are accurate as of: 3/28/17 10:55 AM.  If you have any questions, ask your nurse or doctor.               These medicines have changed or have updated prescriptions.        Dose/Directions    ACCU-CHEK VI Kit   This may have changed:    - when to take this  - additional instructions   Used for:  Hyperglycemia        2 times daily. With routine lancets as well as alcohol swabs, dispense 1 month   Quantity:  1 kit   Refills:  0       acetaminophen 325 MG tablet   Commonly known as:  TYLENOL   This may have changed:  how much to take   Used for:  Post-operative state        Dose:  650 mg   Take 2 tablets (650 mg) by mouth every 4 hours as needed for other (mild pain)   Quantity:  100 tablet   Refills:  0       blood glucose monitoring lancets   This may have changed:    - when to take this  - additional instructions   Used for:  Hyperglycemia        3 times daily. BG testing 3 times a day   Quantity:  100 each   Refills:  11       blood glucose monitoring test strip   Commonly known as:  no brand specified   This may have changed:    - when to take this  - additional instructions   Used for:  Hyperglycemia        Accucheck Vi Plus  3 times daily   Quantity:  300 strip   Refills:  11                Primary Care Provider Office Phone # Fax #    Klever Vega [phone]       [facility] [address] [facility] [address]        Thank you!     Thank you for choosing [facility]  for your care. Our goal is  always to provide you with excellent care. Hearing back from our patients is one way we can continue to improve our services. Please take a few minutes to complete the written survey that you may receive in the mail after your visit with us. Thank you!             Your Updated Medication List - Protect others around you: Learn how to safely use, store and throw away your medicines at www.disposemymeds.org.          This list is accurate as of: 3/28/17 10:55 AM.  Always use your most recent med list.                   Brand Name Dispense Instructions for use    ACCU-CHEK VI Kit     1 kit    2 times daily. With routine lancets as well as alcohol swabs, dispense 1 month       acetaminophen 325 MG tablet    TYLENOL    100 tablet    Take 2 tablets (650 mg) by mouth every 4 hours as needed for other (mild pain)       aspirin 81 MG EC tablet     90 tablet    Take 1 tablet (81 mg) by mouth daily       atorvastatin 40 MG tablet    LIPITOR    90 tablet    Take 1 tablet (40 mg) by mouth daily       blood glucose calibration solution     3 Bottle    Use as directed       blood glucose monitoring lancets     100 each    3 times daily. BG testing 3 times a day       blood glucose monitoring test strip    no brand specified    300 strip    Accucheck Vi Plus  3 times daily       metFORMIN 500 MG tablet    GLUCOPHAGE    90 tablet    Take 1 tablet (500 mg) by mouth daily (with dinner)       metoprolol 50 MG 24 hr tablet    TOPROL XL    90 tablet    Take 1 tablet (50 mg) by mouth daily       nitroglycerin 0.4 MG sublingual tablet    NITROSTAT    25 tablet    As needed for anginal chest pain. Maximum is 3 doses in 15 minutes (one every 5 minutes).       NORVASC 5 MG tablet   Generic drug:  amLODIPine      Take 5 mg by mouth daily       omeprazole 20 MG CR capsule    priLOSEC    90 capsule    Take 1 capsule (20 mg) by mouth daily       STOOL SOFTENER PO      Take 1 capsule by mouth 2 times daily       ticagrelor 90 MG tablet     BRILINTA    180 tablet    Take 1 tablet (90 mg) by mouth every 12 hours

## 2017-03-28 NOTE — PROGRESS NOTES
SUBJECTIVE:                                                    Shantelle Su is a 72 year old female who presents to clinic today for the following health issues:    Concern - SOB - feels like it is her right lung has a history of PE     Onset: 2 months    Description:   Patient feels SOB sometimes and feels it is in her right lung and has a history of PE    Intensity: mild    Progression of Symptoms:  same    Accompanying Signs & Symptoms:  none       Previous history of similar problem:   none    Precipitating factors:   Worsened by: none    Alleviating factors:  Improved by: none       Therapies Tried and outcome: Has seen cardiology and was cleared    While she wintered in [place] she noted some shortness of breath and right scapular burning back pain. This has been intermittently present over the last several weeks especially while walking. She feels quite weak and tired also. She relates this as similar to her previous pain from 2001 when she had pulmonary emboli. She denies a cough. She has shortness of breath at rest but no back pain at rest. She denies peripheral edema.  She saw here cardiologist and just underwent a stress test that was normal a few days ago.    She just says it feels like there is something restricting her R lung when she breathes.    Problem list and histories reviewed & adjusted, as indicated.  Additional history: as documented    Labs reviewed in EPIC    Reviewed and updated as needed this visit by clinical staff  Tobacco  Allergies  Med Hx       Reviewed and updated as needed this visit by Provider  Med Hx         ROS:  Constitutional, HEENT, cardiovascular, pulmonary, gi and gu systems are negative, except as otherwise noted.    OBJECTIVE:                                                    /76  Pulse 77  Temp 97.7  F (36.5  C) (Oral)  Wt 165 lb 11.2 oz (75.2 kg)  SpO2 98%  BMI 32.36 kg/m2  Body mass index is 32.36 kg/(m^2).  GENERAL APPEARANCE: alert, no distress and  over weight  HENT: nose and mouth without ulcers or lesions  NECK: no adenopathy, no asymmetry, masses, or scars and thyroid normal to palpation  RESP: lungs clear to auscultation - no rales, rhonchi or wheezes  CV: regular rates and rhythm, normal S1 S2, no S3 or S4 and no murmur, click or rub    Diagnostic test results:  Results for orders placed or performed in visit on 03/28/17 (from the past 24 hour(s))   D dimer, quantitative   Result Value Ref Range    D Dimer 0.3 0.0 - 0.50 ug/ml FEU      CXR: normal     ASSESSMENT/PLAN:                                                    1. Right-sided chest pain  I'm extremely doubtful of any thromboemboli disease. W/above I don't think a CT is warranted  i recommended she consider PFTs given her smoking hx on a f/u w/her PCP if sx persist  - XR Chest 2 Views; Future  - D dimer, quantitative    Follow up with Provider - as above     Ruben Hernandez MD  [facility]

## 2017-03-28 NOTE — NURSING NOTE
Chief Complaint   Patient presents with     Shortness of Breath     Pain in right side lung       Initial /76  Pulse 77  Temp 97.7  F (36.5  C) (Oral)  Wt 165 lb 11.2 oz (75.2 kg)  SpO2 98%  BMI 32.36 kg/m2 Estimated body mass index is 32.36 kg/(m^2) as calculated from the following:    Height as of 3/16/17: 5' (1.524 m).    Weight as of this encounter: 165 lb 11.2 oz (75.2 kg).  Medication Reconciliation: complete

## 2017-03-28 NOTE — ASSESSMENT & PLAN NOTE
Advance Care Planning 3/28/2017: ACP Review of Chart / Resources Provided:  Reviewed chart for advance care plan.  Shantelle Su has been provided information and resources to begin or update their advance care plan.  Added by Leyla Pearce

## 2017-04-23 ENCOUNTER — APPOINTMENT (OUTPATIENT)
Dept: GENERAL RADIOLOGY | Facility: CLINIC | Age: 72
End: 2017-04-23
Attending: PHYSICIAN ASSISTANT
Payer: MEDICARE

## 2017-04-23 ENCOUNTER — HOSPITAL ENCOUNTER (OUTPATIENT)
Facility: CLINIC | Age: 72
Setting detail: OBSERVATION
Discharge: HOME | End: 2017-04-24
Attending: EMERGENCY MEDICINE | Admitting: INTERNAL MEDICINE
Payer: MEDICARE

## 2017-04-23 ENCOUNTER — TELEPHONE (OUTPATIENT)
Dept: NURSING | Facility: CLINIC | Age: 72
End: 2017-04-23

## 2017-04-23 ENCOUNTER — APPOINTMENT (OUTPATIENT)
Dept: CT IMAGING | Facility: CLINIC | Age: 72
End: 2017-04-23
Attending: EMERGENCY MEDICINE
Payer: MEDICARE

## 2017-04-23 DIAGNOSIS — R19.7 VOMITING AND DIARRHEA: ICD-10-CM

## 2017-04-23 DIAGNOSIS — R11.10 VOMITING AND DIARRHEA: ICD-10-CM

## 2017-04-23 DIAGNOSIS — R07.9 CHEST PAIN, UNSPECIFIED TYPE: ICD-10-CM

## 2017-04-23 DIAGNOSIS — R55 VASOVAGAL SYNCOPE: ICD-10-CM

## 2017-04-23 LAB
ALBUMIN SERPL-MCNC: 3.8 G/DL (ref 3.4–5)
ALBUMIN UR-MCNC: NEGATIVE MG/DL
ALP SERPL-CCNC: 101 U/L (ref 40–150)
ALT SERPL W P-5'-P-CCNC: 28 U/L (ref 0–50)
ANION GAP SERPL CALCULATED.3IONS-SCNC: 6 MMOL/L (ref 3–14)
APPEARANCE UR: ABNORMAL
AST SERPL W P-5'-P-CCNC: 21 U/L (ref 0–45)
BACTERIA #/AREA URNS HPF: ABNORMAL /HPF
BASOPHILS # BLD AUTO: 0 10E9/L (ref 0–0.2)
BASOPHILS NFR BLD AUTO: 0.1 %
BILIRUB DIRECT SERPL-MCNC: <0.1 MG/DL (ref 0–0.2)
BILIRUB SERPL-MCNC: 0.3 MG/DL (ref 0.2–1.3)
BILIRUB UR QL STRIP: NEGATIVE
BUN SERPL-MCNC: 17 MG/DL (ref 7–30)
CALCIUM SERPL-MCNC: 8.5 MG/DL (ref 8.5–10.1)
CHLORIDE SERPL-SCNC: 105 MMOL/L (ref 94–109)
CO2 SERPL-SCNC: 27 MMOL/L (ref 20–32)
COLOR UR AUTO: YELLOW
CREAT SERPL-MCNC: 0.62 MG/DL (ref 0.52–1.04)
D DIMER PPP FEU-MCNC: 0.7 UG/ML FEU (ref 0–0.5)
DIFFERENTIAL METHOD BLD: ABNORMAL
EOSINOPHIL # BLD AUTO: 0 10E9/L (ref 0–0.7)
EOSINOPHIL NFR BLD AUTO: 0.2 %
ERYTHROCYTE [DISTWIDTH] IN BLOOD BY AUTOMATED COUNT: 13.7 % (ref 10–15)
FLUAV+FLUBV AG SPEC QL: NEGATIVE
FLUAV+FLUBV AG SPEC QL: NORMAL
FLUAV+FLUBV RNA SPEC QL NAA+PROBE: ABNORMAL
FLUAV+FLUBV RNA SPEC QL NAA+PROBE: ABNORMAL
GFR SERPL CREATININE-BSD FRML MDRD: ABNORMAL ML/MIN/1.7M2
GLUCOSE BLDC GLUCOMTR-MCNC: 138 MG/DL (ref 70–99)
GLUCOSE SERPL-MCNC: 167 MG/DL (ref 70–99)
GLUCOSE UR STRIP-MCNC: NEGATIVE MG/DL
HBA1C MFR BLD: 6.8 % (ref 4.3–6)
HCT VFR BLD AUTO: 40.7 % (ref 35–47)
HGB BLD-MCNC: 13.2 G/DL (ref 11.7–15.7)
HGB UR QL STRIP: NEGATIVE
IMM GRANULOCYTES # BLD: 0.1 10E9/L (ref 0–0.4)
IMM GRANULOCYTES NFR BLD: 0.4 %
INR PPP: 0.9 (ref 0.86–1.14)
INTERPRETATION ECG - MUSE: NORMAL
INTERPRETATION ECG - MUSE: NORMAL
KETONES UR STRIP-MCNC: NEGATIVE MG/DL
LACTATE BLD-SCNC: 1.2 MMOL/L (ref 0.7–2.1)
LACTATE BLD-SCNC: 2.7 MMOL/L (ref 0.7–2.1)
LACTATE SERPL-SCNC: 3.2 MMOL/L (ref 0.4–2)
LEUKOCYTE ESTERASE UR QL STRIP: ABNORMAL
LIPASE SERPL-CCNC: 151 U/L (ref 73–393)
LYMPHOCYTES # BLD AUTO: 0.3 10E9/L (ref 0.8–5.3)
LYMPHOCYTES NFR BLD AUTO: 1.7 %
MCH RBC QN AUTO: 31 PG (ref 26.5–33)
MCHC RBC AUTO-ENTMCNC: 32.4 G/DL (ref 31.5–36.5)
MCV RBC AUTO: 96 FL (ref 78–100)
MONOCYTES # BLD AUTO: 1.2 10E9/L (ref 0–1.3)
MONOCYTES NFR BLD AUTO: 8 %
MUCOUS THREADS #/AREA URNS LPF: PRESENT /LPF
NEUTROPHILS # BLD AUTO: 13.9 10E9/L (ref 1.6–8.3)
NEUTROPHILS NFR BLD AUTO: 89.6 %
NITRATE UR QL: NEGATIVE
NRBC # BLD AUTO: 0 10*3/UL
NRBC BLD AUTO-RTO: 0 /100
PH UR STRIP: 5 PH (ref 5–7)
PLATELET # BLD AUTO: 306 10E9/L (ref 150–450)
POTASSIUM SERPL-SCNC: 4.5 MMOL/L (ref 3.4–5.3)
PROT SERPL-MCNC: 7.3 G/DL (ref 6.8–8.8)
RBC # BLD AUTO: 4.26 10E12/L (ref 3.8–5.2)
RBC #/AREA URNS AUTO: 2 /HPF (ref 0–2)
RSV RNA SPEC NAA+PROBE: ABNORMAL
SODIUM SERPL-SCNC: 138 MMOL/L (ref 133–144)
SP GR UR STRIP: 1.02 (ref 1–1.03)
SPECIMEN SOURCE: ABNORMAL
SPECIMEN SOURCE: NORMAL
SQUAMOUS #/AREA URNS AUTO: 5 /HPF (ref 0–1)
TROPONIN I SERPL-MCNC: NORMAL UG/L (ref 0–0.04)
URN SPEC COLLECT METH UR: ABNORMAL
UROBILINOGEN UR STRIP-MCNC: 0 MG/DL (ref 0–2)
WBC # BLD AUTO: 15.6 10E9/L (ref 4–11)
WBC #/AREA URNS AUTO: 3 /HPF (ref 0–2)

## 2017-04-23 PROCEDURE — 84484 ASSAY OF TROPONIN QUANT: CPT | Performed by: EMERGENCY MEDICINE

## 2017-04-23 PROCEDURE — 85610 PROTHROMBIN TIME: CPT | Performed by: EMERGENCY MEDICINE

## 2017-04-23 PROCEDURE — 71020 XR CHEST 2 VW: CPT

## 2017-04-23 PROCEDURE — 36415 COLL VENOUS BLD VENIPUNCTURE: CPT | Performed by: PHYSICIAN ASSISTANT

## 2017-04-23 PROCEDURE — 96361 HYDRATE IV INFUSION ADD-ON: CPT

## 2017-04-23 PROCEDURE — 83605 ASSAY OF LACTIC ACID: CPT | Performed by: INTERNAL MEDICINE

## 2017-04-23 PROCEDURE — 96374 THER/PROPH/DIAG INJ IV PUSH: CPT

## 2017-04-23 PROCEDURE — 25000128 H RX IP 250 OP 636: Performed by: EMERGENCY MEDICINE

## 2017-04-23 PROCEDURE — 87804 INFLUENZA ASSAY W/OPTIC: CPT | Performed by: PHYSICIAN ASSISTANT

## 2017-04-23 PROCEDURE — 80048 BASIC METABOLIC PNL TOTAL CA: CPT | Performed by: EMERGENCY MEDICINE

## 2017-04-23 PROCEDURE — 83036 HEMOGLOBIN GLYCOSYLATED A1C: CPT | Performed by: EMERGENCY MEDICINE

## 2017-04-23 PROCEDURE — 85379 FIBRIN DEGRADATION QUANT: CPT | Performed by: EMERGENCY MEDICINE

## 2017-04-23 PROCEDURE — 87631 RESP VIRUS 3-5 TARGETS: CPT | Performed by: PHYSICIAN ASSISTANT

## 2017-04-23 PROCEDURE — 83690 ASSAY OF LIPASE: CPT | Performed by: EMERGENCY MEDICINE

## 2017-04-23 PROCEDURE — 25000128 H RX IP 250 OP 636: Performed by: PHYSICIAN ASSISTANT

## 2017-04-23 PROCEDURE — 99220 ZZC INITIAL OBSERVATION CARE,LEVL III: CPT | Performed by: PHYSICIAN ASSISTANT

## 2017-04-23 PROCEDURE — 83605 ASSAY OF LACTIC ACID: CPT | Mod: 91 | Performed by: PHYSICIAN ASSISTANT

## 2017-04-23 PROCEDURE — 70450 CT HEAD/BRAIN W/O DYE: CPT

## 2017-04-23 PROCEDURE — 93005 ELECTROCARDIOGRAM TRACING: CPT | Mod: 76

## 2017-04-23 PROCEDURE — 25000132 ZZH RX MED GY IP 250 OP 250 PS 637: Mod: GY | Performed by: EMERGENCY MEDICINE

## 2017-04-23 PROCEDURE — 87040 BLOOD CULTURE FOR BACTERIA: CPT | Mod: 91 | Performed by: PHYSICIAN ASSISTANT

## 2017-04-23 PROCEDURE — 85025 COMPLETE CBC W/AUTO DIFF WBC: CPT | Performed by: EMERGENCY MEDICINE

## 2017-04-23 PROCEDURE — A9270 NON-COVERED ITEM OR SERVICE: HCPCS | Mod: GY | Performed by: PHYSICIAN ASSISTANT

## 2017-04-23 PROCEDURE — 80076 HEPATIC FUNCTION PANEL: CPT | Performed by: EMERGENCY MEDICINE

## 2017-04-23 PROCEDURE — 25000132 ZZH RX MED GY IP 250 OP 250 PS 637: Mod: GY | Performed by: PHYSICIAN ASSISTANT

## 2017-04-23 PROCEDURE — 25000125 ZZHC RX 250: Performed by: EMERGENCY MEDICINE

## 2017-04-23 PROCEDURE — 72125 CT NECK SPINE W/O DYE: CPT

## 2017-04-23 PROCEDURE — 93005 ELECTROCARDIOGRAM TRACING: CPT

## 2017-04-23 PROCEDURE — 81001 URINALYSIS AUTO W/SCOPE: CPT | Performed by: PHYSICIAN ASSISTANT

## 2017-04-23 PROCEDURE — A9270 NON-COVERED ITEM OR SERVICE: HCPCS | Mod: GY | Performed by: EMERGENCY MEDICINE

## 2017-04-23 PROCEDURE — 36415 COLL VENOUS BLD VENIPUNCTURE: CPT | Performed by: INTERNAL MEDICINE

## 2017-04-23 PROCEDURE — 87086 URINE CULTURE/COLONY COUNT: CPT | Performed by: PHYSICIAN ASSISTANT

## 2017-04-23 PROCEDURE — G0378 HOSPITAL OBSERVATION PER HR: HCPCS

## 2017-04-23 PROCEDURE — 00000146 ZZHCL STATISTIC GLUCOSE BY METER IP

## 2017-04-23 PROCEDURE — 99285 EMERGENCY DEPT VISIT HI MDM: CPT | Mod: 25

## 2017-04-23 RX ORDER — LIDOCAINE 40 MG/G
CREAM TOPICAL
Status: DISCONTINUED | OUTPATIENT
Start: 2017-04-23 | End: 2017-04-23

## 2017-04-23 RX ORDER — SODIUM CHLORIDE 9 MG/ML
1000 INJECTION, SOLUTION INTRAVENOUS CONTINUOUS
Status: DISCONTINUED | OUTPATIENT
Start: 2017-04-23 | End: 2017-04-23

## 2017-04-23 RX ORDER — DOCUSATE SODIUM 100 MG/1
100 CAPSULE, LIQUID FILLED ORAL 2 TIMES DAILY
COMMUNITY
End: 2020-07-15

## 2017-04-23 RX ORDER — PROCHLORPERAZINE 25 MG
12.5 SUPPOSITORY, RECTAL RECTAL EVERY 12 HOURS PRN
Status: DISCONTINUED | OUTPATIENT
Start: 2017-04-23 | End: 2017-04-24 | Stop reason: HOSPADM

## 2017-04-23 RX ORDER — SODIUM CHLORIDE 9 MG/ML
INJECTION, SOLUTION INTRAVENOUS CONTINUOUS
Status: DISCONTINUED | OUTPATIENT
Start: 2017-04-23 | End: 2017-04-24

## 2017-04-23 RX ORDER — AMOXICILLIN 250 MG
1-2 CAPSULE ORAL 2 TIMES DAILY
Status: DISCONTINUED | OUTPATIENT
Start: 2017-04-23 | End: 2017-04-24 | Stop reason: HOSPADM

## 2017-04-23 RX ORDER — NALOXONE HYDROCHLORIDE 0.4 MG/ML
.1-.4 INJECTION, SOLUTION INTRAMUSCULAR; INTRAVENOUS; SUBCUTANEOUS
Status: DISCONTINUED | OUTPATIENT
Start: 2017-04-23 | End: 2017-04-24 | Stop reason: HOSPADM

## 2017-04-23 RX ORDER — METOPROLOL SUCCINATE 25 MG/1
50 TABLET, EXTENDED RELEASE ORAL DAILY
Status: DISCONTINUED | OUTPATIENT
Start: 2017-04-24 | End: 2017-04-24 | Stop reason: HOSPADM

## 2017-04-23 RX ORDER — PROCHLORPERAZINE MALEATE 5 MG
5 TABLET ORAL EVERY 6 HOURS PRN
Status: DISCONTINUED | OUTPATIENT
Start: 2017-04-23 | End: 2017-04-24 | Stop reason: HOSPADM

## 2017-04-23 RX ORDER — OXYCODONE HYDROCHLORIDE 5 MG/1
5 TABLET ORAL
Status: DISCONTINUED | OUTPATIENT
Start: 2017-04-23 | End: 2017-04-24 | Stop reason: HOSPADM

## 2017-04-23 RX ORDER — DEXTROSE MONOHYDRATE 25 G/50ML
25-50 INJECTION, SOLUTION INTRAVENOUS
Status: DISCONTINUED | OUTPATIENT
Start: 2017-04-23 | End: 2017-04-24 | Stop reason: HOSPADM

## 2017-04-23 RX ORDER — DOCUSATE SODIUM 100 MG/1
100 CAPSULE, LIQUID FILLED ORAL 2 TIMES DAILY
Status: DISCONTINUED | OUTPATIENT
Start: 2017-04-23 | End: 2017-04-24 | Stop reason: HOSPADM

## 2017-04-23 RX ORDER — ASPIRIN 81 MG/1
81 TABLET ORAL DAILY
Status: DISCONTINUED | OUTPATIENT
Start: 2017-04-24 | End: 2017-04-24 | Stop reason: HOSPADM

## 2017-04-23 RX ORDER — AMLODIPINE BESYLATE 5 MG/1
5 TABLET ORAL DAILY
Status: DISCONTINUED | OUTPATIENT
Start: 2017-04-24 | End: 2017-04-24 | Stop reason: HOSPADM

## 2017-04-23 RX ORDER — NITROGLYCERIN 0.4 MG/1
0.4 TABLET SUBLINGUAL ONCE
Status: COMPLETED | OUTPATIENT
Start: 2017-04-23 | End: 2017-04-23

## 2017-04-23 RX ORDER — ATORVASTATIN CALCIUM 40 MG/1
40 TABLET, FILM COATED ORAL EVERY EVENING
Status: DISCONTINUED | OUTPATIENT
Start: 2017-04-23 | End: 2017-04-24 | Stop reason: HOSPADM

## 2017-04-23 RX ORDER — ONDANSETRON 2 MG/ML
4 INJECTION INTRAMUSCULAR; INTRAVENOUS EVERY 6 HOURS PRN
Status: DISCONTINUED | OUTPATIENT
Start: 2017-04-23 | End: 2017-04-24 | Stop reason: HOSPADM

## 2017-04-23 RX ORDER — ACETAMINOPHEN 325 MG/1
650 TABLET ORAL EVERY 4 HOURS PRN
Status: DISCONTINUED | OUTPATIENT
Start: 2017-04-23 | End: 2017-04-23

## 2017-04-23 RX ORDER — ACETAMINOPHEN 325 MG/1
975 TABLET ORAL EVERY 8 HOURS PRN
Status: DISCONTINUED | OUTPATIENT
Start: 2017-04-23 | End: 2017-04-24 | Stop reason: HOSPADM

## 2017-04-23 RX ORDER — ONDANSETRON 2 MG/ML
4 INJECTION INTRAMUSCULAR; INTRAVENOUS EVERY 30 MIN PRN
Status: DISCONTINUED | OUTPATIENT
Start: 2017-04-23 | End: 2017-04-23

## 2017-04-23 RX ORDER — NITROGLYCERIN 0.4 MG/1
0.4 TABLET SUBLINGUAL EVERY 5 MIN PRN
Status: DISCONTINUED | OUTPATIENT
Start: 2017-04-23 | End: 2017-04-24 | Stop reason: HOSPADM

## 2017-04-23 RX ORDER — NICOTINE POLACRILEX 4 MG
15-30 LOZENGE BUCCAL
Status: DISCONTINUED | OUTPATIENT
Start: 2017-04-23 | End: 2017-04-24 | Stop reason: HOSPADM

## 2017-04-23 RX ORDER — ONDANSETRON 4 MG/1
4 TABLET, ORALLY DISINTEGRATING ORAL EVERY 6 HOURS PRN
Status: DISCONTINUED | OUTPATIENT
Start: 2017-04-23 | End: 2017-04-24 | Stop reason: HOSPADM

## 2017-04-23 RX ADMIN — SODIUM CHLORIDE 1000 ML: 9 INJECTION, SOLUTION INTRAVENOUS at 08:12

## 2017-04-23 RX ADMIN — NITROGLYCERIN 0.4 MG: 0.4 TABLET SUBLINGUAL at 09:56

## 2017-04-23 RX ADMIN — TICAGRELOR 90 MG: 90 TABLET ORAL at 21:09

## 2017-04-23 RX ADMIN — SODIUM CHLORIDE: 9 INJECTION, SOLUTION INTRAVENOUS at 21:58

## 2017-04-23 RX ADMIN — ONDANSETRON 4 MG: 2 INJECTION INTRAMUSCULAR; INTRAVENOUS at 08:12

## 2017-04-23 RX ADMIN — ATORVASTATIN CALCIUM 40 MG: 40 TABLET, FILM COATED ORAL at 21:09

## 2017-04-23 RX ADMIN — SODIUM CHLORIDE 500 ML: 9 INJECTION, SOLUTION INTRAVENOUS at 14:45

## 2017-04-23 RX ADMIN — LIDOCAINE HYDROCHLORIDE 30 ML: 20 SOLUTION ORAL; TOPICAL at 09:24

## 2017-04-23 RX ADMIN — SODIUM CHLORIDE 1000 ML: 9 INJECTION, SOLUTION INTRAVENOUS at 17:12

## 2017-04-23 RX ADMIN — ACETAMINOPHEN 975 MG: 325 TABLET, FILM COATED ORAL at 16:08

## 2017-04-23 RX ADMIN — SODIUM CHLORIDE 500 ML: 9 INJECTION, SOLUTION INTRAVENOUS at 12:20

## 2017-04-23 RX ADMIN — OXYCODONE HYDROCHLORIDE 5 MG: 5 TABLET ORAL at 21:17

## 2017-04-23 ASSESSMENT — PAIN DESCRIPTION - DESCRIPTORS
DESCRIPTORS: ACHING
DESCRIPTORS: ACHING

## 2017-04-23 ASSESSMENT — ENCOUNTER SYMPTOMS
VOMITING: 1
BLOOD IN STOOL: 0
WOUND: 1
NECK PAIN: 1
DIARRHEA: 1
WEAKNESS: 1
LIGHT-HEADEDNESS: 0
DIZZINESS: 0

## 2017-04-23 NOTE — PLAN OF CARE
Problem: Discharge Planning  Goal: Discharge Planning (Adult, OB, Behavioral, Peds)  PRIMARY DIAGNOSIS: SYNCOPE/Nausea, vomiting, diarrhea   OUTPATIENT/OBSERVATION GOALS TO BE MET BEFORE DISCHARGE:     1. Diagnostic testing complete & at baseline neurologic function:   2. Cleared by consultants (if involved): N/A  3. ADLs back to baseline? No - SBA d/t syncopal episode  4. Activity and level of assistance: Up with standby assistance.  5. Pain status: Pain free.  6. Barriers to discharge noted: No  7. Orthostatic symptoms (BP decrease or HR increase with patient upright)? No - not orthostatic  8. Documented urine output: Yes  9. Tolerating PO fluid: No - will try clears and advance diet as tolerated  10. Nausea/Vomiting/Diarrhea (if present) symptoms improved: Yes - no diarrhea or vomiting since 6am  11. Tele per tele tech: SR with inverted Ts and HR 90s     Pt A&Ox4. Pt reports int nausea after jell-o. No vomiting or diarrhea - last diarrhea 0230 this AM. Pts orthostatic negative. Lactic acid 2.7->3.2 after 500cc bolus. Temp 102.3, and pt c/o 5/10 HA, PRN tylenol given. Bruising on R side of face. Pt experiencing some shakes and chills. Will continue to monitor.

## 2017-04-23 NOTE — ED NOTES
Bed: ED01  Expected date: 4/23/17  Expected time: 7:26 AM  Means of arrival: Ambulance  Comments:  James 593- 72y, F, syncope

## 2017-04-23 NOTE — IP AVS SNAPSHOT
MRN:[id number]                      After Visit Summary   4/23/2017    Shantelle Su    MRN: [id number]           Thank you!     Thank you for choosing [facility] for your care. Our goal is always to provide you with excellent care. Hearing back from our patients is one way we can continue to improve our services. Please take a few minutes to complete the written survey that you may receive in the mail after you visit. If you would like to speak to someone directly about your visit please contact Patient Relations at [phone]. Thank you!          Patient Information     Date Of Birth          1945        About your hospital stay     You were admitted on:  April 23, 2017 You last received care in the:  [facility] Observation Department    You were discharged on:  April 24, 2017        Reason for your hospital stay       You were admitted for concerns of syncope. We suspect you passed out because of the diarrhea and dehydration. We monitored your heart overnight with no abnormal findings. You did not have an echocardiogram done because you just had one done recently that was normal. You did not have a bowel movement while here so we were unable to test you for a stool infection, but our suspicion is very low given you aren't having any more.    We recommend you drink lots of fluid, use Zofran for nausea, and take it easy for a couple of days.                  Who to Call     For medical emergencies, please call 911.  For non-urgent questions about your medical care, please call your primary care provider or clinic, [phone]          Attending Provider     Provider Specialty    Kris Morejon MD Emergency Medicine    Carli Cruz DO Internal Medicine       Primary Care Provider Office Phone # Fax #    Klever Vega [phone]       [facility] [address] [facility] [address]        After Care  Instructions     Activity       Your activity upon discharge: activity as tolerated            Diet       Follow this diet upon discharge: Advance diet as tolerated                  Follow-up Appointments     Follow-up and recommended labs and tests        Follow up as needed if you still have symptoms.                             Pending Results     Date and Time Order Name Status Description    4/23/2017 1639 Urine Culture Aerobic Bacterial Preliminary     4/23/2017 1203 Blood culture Preliminary     4/23/2017 1203 Blood culture Preliminary             Statement of Approval     Ordered          04/24/17 0943  I have reviewed and agree with all the recommendations and orders detailed in this document.  EFFECTIVE NOW     Approved and electronically signed by:  Emeli Su PA-C             Admission Information     Date & Time Provider Department Dept. Phone    4/23/2017 Carli Cruz DO [facility] Observation Department [phone]      Your Vitals Were     Blood Pressure Pulse Temperature Respirations Height Weight    129/40 (BP Location: Left arm) 87 99.7  F (37.6  C) (Oral) 16 1.524 m (5') 73.9 kg (163 lb)    Pulse Oximetry BMI (Body Mass Index)                98% 31.83 kg/m2          [facility] Information     [facility] gives you secure access to your electronic health record. If you see a primary care provider, you can also send messages to your care team and make appointments. If you have questions, please call your primary care clinic.  If you do not have a primary care provider, please call [phone] and they will assist you.        Care EveryWhere ID     This is your Care EveryWhere ID. This could be used by other organizations to access your [facility] medical records  [id number]           Review of your medicines      START taking        Dose / Directions    ondansetron 4 MG ODT tab   Commonly known as:  ZOFRAN-ODT        Dose:  4 mg   Take 1 tablet (4 mg) by mouth  every 6 hours as needed for nausea   Quantity:  20 tablet   Refills:  0         CONTINUE these medicines which may have CHANGED, or have new prescriptions. If we are uncertain of the size of tablets/capsules you have at home, strength may be listed as something that might have changed.        Dose / Directions    ACCU-CHEK VI Kit   This may have changed:    - when to take this  - additional instructions   Used for:  Hyperglycemia        2 times daily. With routine lancets as well as alcohol swabs, dispense 1 month   Quantity:  1 kit   Refills:  0       acetaminophen 325 MG tablet   Commonly known as:  TYLENOL   This may have changed:  how much to take   Used for:  Post-operative state        Dose:  650 mg   Take 2 tablets (650 mg) by mouth every 4 hours as needed for other (mild pain)   Quantity:  100 tablet   Refills:  0       blood glucose monitoring lancets   This may have changed:    - when to take this  - additional instructions   Used for:  Hyperglycemia        3 times daily. BG testing 3 times a day   Quantity:  100 each   Refills:  11       blood glucose monitoring test strip   Commonly known as:  no brand specified   This may have changed:    - when to take this  - additional instructions   Used for:  Hyperglycemia        Accucheck Vi Plus  3 times daily   Quantity:  300 strip   Refills:  11         CONTINUE these medicines which have NOT CHANGED        Dose / Directions    aspirin 81 MG EC tablet   Used for:  Angina pectoris, unspecified (H), Status post coronary angioplasty        Dose:  81 mg   Take 1 tablet (81 mg) by mouth daily   Quantity:  90 tablet   Refills:  3       atorvastatin 40 MG tablet   Commonly known as:  LIPITOR   Used for:  Hyperlipidemia LDL goal <130        Dose:  40 mg   Take 1 tablet (40 mg) by mouth daily   Quantity:  90 tablet   Refills:  3       blood glucose calibration solution   Used for:  Metabolic syndrome        Use as directed   Quantity:  3 Bottle   Refills:  11        docusate sodium 100 MG capsule   Commonly known as:  COLACE        Dose:  100 mg   Take 100 mg by mouth 2 times daily   Refills:  0       metFORMIN 500 MG tablet   Commonly known as:  GLUCOPHAGE        Dose:  500 mg   Take 1 tablet (500 mg) by mouth daily (with dinner)   Quantity:  90 tablet   Refills:  3       metoprolol 50 MG 24 hr tablet   Commonly known as:  TOPROL XL   Used for:  Paroxysmal supraventricular tachycardia (H), Abnormal electrocardiogram        Dose:  50 mg   Take 1 tablet (50 mg) by mouth daily   Quantity:  90 tablet   Refills:  3       nitroglycerin 0.4 MG sublingual tablet   Commonly known as:  NITROSTAT   Used for:  Status post coronary angioplasty        As needed for anginal chest pain. Maximum is 3 doses in 15 minutes (one every 5 minutes).   Quantity:  25 tablet   Refills:  3       NORVASC 5 MG tablet   Generic drug:  amLODIPine        Dose:  5 mg   Take 5 mg by mouth daily   Refills:  0       omeprazole 20 MG CR capsule   Commonly known as:  priLOSEC   Used for:  Gastroesophageal reflux disease without esophagitis        Dose:  20 mg   Take 1 capsule (20 mg) by mouth daily   Quantity:  90 capsule   Refills:  3       ticagrelor 90 MG tablet   Commonly known as:  BRILINTA   Used for:  Status post coronary angioplasty        Dose:  90 mg   Take 1 tablet (90 mg) by mouth every 12 hours   Quantity:  180 tablet   Refills:  3            Where to get your medicines      Some of these will need a paper prescription and others can be bought over the counter. Ask your nurse if you have questions.     Bring a paper prescription for each of these medications     ondansetron 4 MG ODT tab                Protect others around you: Learn how to safely use, store and throw away your medicines at www.disposemymeds.org.             Medication List: This is a list of all your medications and when to take them. Check marks below indicate your daily home schedule. Keep this list as a reference.       Medications           Morning Afternoon Evening Bedtime As Needed    ACCU-CHEK VI Kit   2 times daily. With routine lancets as well as alcohol swabs, dispense 1 month                                acetaminophen 325 MG tablet   Commonly known as:  TYLENOL   Take 2 tablets (650 mg) by mouth every 4 hours as needed for other (mild pain)   Last time this was given:  975 mg on 4/24/2017  8:21 AM                                aspirin 81 MG EC tablet   Take 1 tablet (81 mg) by mouth daily   Last time this was given:  81 mg on 4/24/2017  8:09 AM                                atorvastatin 40 MG tablet   Commonly known as:  LIPITOR   Take 1 tablet (40 mg) by mouth daily   Last time this was given:  40 mg on 4/23/2017  9:09 PM                                blood glucose calibration solution   Use as directed                                blood glucose monitoring lancets   3 times daily. BG testing 3 times a day                                blood glucose monitoring test strip   Commonly known as:  no brand specified   Accucheck Vi Plus  3 times daily                                docusate sodium 100 MG capsule   Commonly known as:  COLACE   Take 100 mg by mouth 2 times daily   Last time this was given:  100 mg on 4/24/2017  8:10 AM                                metFORMIN 500 MG tablet   Commonly known as:  GLUCOPHAGE   Take 1 tablet (500 mg) by mouth daily (with dinner)                                metoprolol 50 MG 24 hr tablet   Commonly known as:  TOPROL XL   Take 1 tablet (50 mg) by mouth daily   Last time this was given:  50 mg on 4/24/2017  8:08 AM                                nitroglycerin 0.4 MG sublingual tablet   Commonly known as:  NITROSTAT   As needed for anginal chest pain. Maximum is 3 doses in 15 minutes (one every 5 minutes).   Last time this was given:  0.4 mg on 4/23/2017  9:56 AM                                NORVASC 5 MG tablet   Take 5 mg by mouth daily   Last time this was given:  5  mg on 4/24/2017  8:09 AM   Generic drug:  amLODIPine                                omeprazole 20 MG CR capsule   Commonly known as:  priLOSEC   Take 1 capsule (20 mg) by mouth daily   Last time this was given:  20 mg on 4/24/2017  8:09 AM                                ondansetron 4 MG ODT tab   Commonly known as:  ZOFRAN-ODT   Take 1 tablet (4 mg) by mouth every 6 hours as needed for nausea                                ticagrelor 90 MG tablet   Commonly known as:  BRILINTA   Take 1 tablet (90 mg) by mouth every 12 hours   Last time this was given:  90 mg on 4/24/2017  8:10 AM

## 2017-04-23 NOTE — ED NOTES
Observation Brochure and Video   Patient informed of observation status based on provider's order. Observation Brochure was given and video watched.  Betina Montelongo RN  .

## 2017-04-23 NOTE — PLAN OF CARE
Problem: Discharge Planning  Goal: Discharge Planning (Adult, OB, Behavioral, Peds)  Outcome: No Change  PRIMARY DIAGNOSIS: SYNCOPE/Nausea, vomiting, diarrhea   OUTPATIENT/OBSERVATION GOALS TO BE MET BEFORE DISCHARGE:     1. Diagnostic testing complete & at baseline neurologic function: No - waiting for chest xray results, and flu PCR  2. Cleared by consultants (if involved): N/A  3. ADLs back to baseline?  No - SBA because of syncope  4. Activity and level of assistance: Up with standby assistance.  5. Pain status: Pain free.  6. Barriers to discharge noted No  7. Orthostatic symptoms (BP decrease or HR increase with patient upright)?  No - not orthostatic  8. Documented urine output: Yes  9. Tolerating PO fluid: No - will try clears and advance diet as tolerated  10. Nausea/Vomiting/Diarrhea (if present) symptoms improved: Yes - no diarrhea or vomiting since 6am  11. Tele per tele tech: SR with inverted Ts and HR 90s     Pt. Denies nausea.  No vomiting or diarrhea - last diarrhea since 0600. Pt.s orthostatic negative. Lactic acid 2.7, 500cc bolus given, will get another and recheck at 1600

## 2017-04-23 NOTE — ED PROVIDER NOTES
History     Chief Complaint:  Syncope       HPI   Shantelle Su is a 72 year old female who presents to the emergency department today for evaluation of syncope.  The patient woke up at 0230 this morning with severe diarrhea and vomiting. She reports dark, non-bloody loose stool. Patient experienced 1 episode of syncope while in the bathroom. Patient was able to stand and ambulate after episode. She did not feel dizzy or lightheaded before the episode. Patient obtained an abrasion to the right side of her face. Patient reports feeling weak. She also reports that her vomiting has stopped but she now has dry heaves and neck pain. Last known well time was yesterday. Patient denies recent antibiotic use or travel.      Allergies:  Lisinopril    Medications:    ticagrelor (BRILINTA) 90 MG tablet  metoprolol (TOPROL XL) 50 MG 24 hr tablet  atorvastatin (LIPITOR) 40 MG tablet  nitroglycerin (NITROSTAT) 0.4 MG SL tablet  aspirin EC 81 MG EC tablet  amLODIPine (NORVASC) 5 MG tablet  metFORMIN (GLUCOPHAGE) 500 MG tablet  omeprazole (PRILOSEC) 20 MG capsule  glucose blood VI test strips strip  ACCU-CHEK MULTICLIX LANCETS MISC  Blood Glucose Calibration (ACCU-CHEK VI) SOLN  Blood Glucose Monitoring Suppl (ACCU-CHEK VI) KIT  Docusate Calcium (STOOL SOFTENER PO)     Past Medical History:    Acute upper respiratory infection   CAD (coronary artery disease)   Diabetes mellitus (H)   Enterocele   Essential hypertension, benign   GERD (gastroesophageal reflux disease)   Hiatal hernia   Hyperlipidemia LDL goal <130   Other pulmonary embolism and infarction   PAC (premature atrial contraction)   Rectocele    Past Surgical History:    ABDOMEN SURGERY   C NONSPECIFIC PROCEDURE    COLONOSCOPY   COLPORRHAPY POSTERIOR, CYSTOSCOPY, COMBINED   Procedure:COMBINED COLPORRHAPY POSTERIOR, CYSTOSCOPY; POSTERIOR MESH AUGMENTED REPAIR WITH ELEVATE MESH , cystoscopy; Surgeon:DIANDRA,  DILATION AND CURETTAGE, HYSTEROSCOPY DIAGNOSTIC,  COMBINED   DIAGNOSTIC;  Surgeon: Andre Up MD;  Location: [facility] OR  HC DILATION/CURETTAGE DIAG/THER NON OB   HC LEFT HEART CATHETERIZATION-  HC TOOTH EXTRACTION W/FORCEP    Family History:    Daughter: Gynecology  Father: Heart disease, CAD, Hyperlipidemia   Mother: Heart disease, Diabetes, CAD, HTN, Hyperlipidemia   Brother: Heart disease  Sister: Cerebrovascular disease, Depression     Social History:  The patient was accompanied to the ED by EMS.  Smoking Status: Former smoker  Smokeless Tobacco: Never used  Alcohol Use: Yes  Marital Status:   [2]     Review of Systems   Gastrointestinal: Positive for diarrhea and vomiting. Negative for blood in stool.   Musculoskeletal: Positive for neck pain.   Skin: Positive for wound.   Neurological: Positive for syncope and weakness. Negative for dizziness and light-headedness.   All other systems reviewed and are negative.    Physical Exam   Vitals:  Patient Vitals for the past 24 hrs:   BP Temp Temp src Pulse Resp SpO2 Height Weight   04/23/17 0740 131/69 98.7  F (37.1  C) Oral 100 20 93 % 1.524 m (5') 73.9 kg (163 lb)       Physical Exam   Constitutional: She is oriented to person, place, and time. She appears well-developed.   HENT:   Head: Normocephalic and atraumatic.   Right Ear: External ear normal.   Mouth/Throat: Oropharynx is clear and moist.   Eyes: Conjunctivae and EOM are normal. Pupils are equal, round, and reactive to light.   Neck: Normal range of motion. Neck supple. No JVD present.   Cardiovascular: Normal rate, regular rhythm and normal heart sounds.    Pulmonary/Chest: Effort normal and breath sounds normal.   Abdominal: Soft. Bowel sounds are normal. She exhibits no distension. There is no tenderness. There is no rebound.   Musculoskeletal: Normal range of motion.   Lymphadenopathy:     She has no cervical adenopathy.   Neurological: She is alert and oriented to person, place, and time. She displays normal reflexes. No cranial nerve deficit.  She exhibits normal muscle tone. Coordination normal.   Skin: Skin is warm and dry.   Psychiatric: She has a normal mood and affect. Her behavior is normal. Judgment normal.   Nursing note and vitals reviewed.    Emergency Department Course     ECG:  ECG taken at 0742, ECG read at 0742  Normal sinus rhythm  Nonspecific ST and T wave abnormality   Abnormal ECG  Rate 99 bpm. TX interval 176. QRS duration 80. QT/QTc 346/444. P-R-T axes 75 -17 66.    ECG taken at 0919, ECG read at 0932  Sinus tachycardia   Low voltage QRS  Nonspecific T wave abnormality   Abnormal ECG  Rate 104 bpm. TX interval *. QRS duration 76. QT/QTc 524/689. P-R-T axes * -26 55.    Imaging:  Radiology findings were communicated with the patient who voiced understanding of the findings.    Cervical spine CT w/o Contrast:  Degenerative changes. No evidence for fracture or  Malalignment.  Reading per radiology      Head CT w/o Contrast:   Mild cerebral atrophy. No evidence for intracranial  hemorrhage or any acute process.  Reading per radiology      Laboratory:  Laboratory findings were communicated with the patient who voiced understanding of the findings.    CBC: WBC: 15.6(H) o/w WNL. (HGB 13.2, )     INR: 0.90    BMP: Glucose: 167(H)    Troponin (Collected 0800): <0.015    D Dimer (Collected 0800): 0.7(H)    Interventions:  0812- NS 1000 mL IV   0812- Zofran 4 mg IV  0924- Xylocaine 30 mL Oral   0956- Nitrostat 0.4 mg Sublingual     Emergency Department Course:  Nursing notes and vitals reviewed.  I performed an exam of the patient as documented above.       IV was inserted and blood was drawn for laboratory testing, results above.    The patient was sent for a CT while in the emergency department, results above.     At 0910 the patient was rechecked and she complained of chest pressure. Nausea has improved.    I discussed the treatment plan with the patient. They expressed understanding of this plan and consented to admission. I discussed  the patient with Dr. Cruz, who will admit the patient to a monitored bed for further evaluation and treatment.    I personally reviewed the laboratory results with the Patient and answered all related questions prior to admission .    Impression & Plan      Medical Decision Making:  Patient presents with syncope. Initially patient did mention that she felt there was no dizziness prior but then as she's been here she is mentioned that maybe she thinks there was. The lack of prodrome initailly concerned me though then patient developed chest pain while she was here in the ED. She has a history of significant CAD. Her EKG and troponin are negative at this time. Pain improved after Nitroglycerin. I will however recommend admission for observation telemetry monitoring and serial cardiac enzymes. Of note, patient did have a slightly elevated d dimer of 0.7 but with age adjusted d dimer, I would not recommend CT angio of the chest as my clinical suspicion for PE are low and that her chest pain likely related to vomiting.      Diagnosis:    ICD-10-CM    1. Vasovagal syncope R55    2. Chest pain, unspecified type R07.9    3. Vomiting and diarrhea R11.10     R19.7          Disposition:   The patient was admitted to telemetry bed.    Scribe Disclosure:  Isa BERNARD, am serving as a scribe at 7:37 AM on 4/23/2017 to document services personally performed by Kris Morejon MD, based on my observations and the provider's statements to me.    4/23/2017   [facility] EMERGENCY DEPARTMENT       Kris Morejon MD  04/28/17 2042

## 2017-04-23 NOTE — IP AVS SNAPSHOT
[facility] Observation Department    [address]    [facility] [address]    Phone:  [phone]                                       After Visit Summary   4/23/2017    Shantelle Su    MRN: [id number]           After Visit Summary Signature Page     I have received my discharge instructions, and my questions have been answered. I have discussed any challenges I see with this plan with the nurse or doctor.    ..........................................................................................................................................  Patient/Patient Representative Signature      ..........................................................................................................................................  Patient Representative Print Name and Relationship to Patient    ..................................................               ................................................  Date                                            Time    ..........................................................................................................................................  Reviewed by Signature/Title    ...................................................              ..............................................  Date                                                            Time

## 2017-04-23 NOTE — PHARMACY-ADMISSION MEDICATION HISTORY
Admission medication history interview status for this patient is complete. See [facility] admission navigator for allergy information, prior to admission medications and immunization status.     Medication history interview source(s):Patient  Medication history resources (including written lists, pill bottles, clinic record): Weekly pill box.  Primary pharmacy: [facility] in [place], [place]    Changes made to PTA medication list:  Added: None  Deleted: None  Changed: None    Actions taken by pharmacist (provider contacted, etc):  Used pill identification tools to identify the medications within the patient pill box.      Additional medication history information:None    Medication reconciliation/reorder completed by provider prior to medication history? Yes    For patients on insulin therapy: No (Yes/No)  Lantus/levemir/NPH/Mix 70/30 dose:  _____   in AM/PM  or twice daily   Sliding scale Novolog Y/N  If Yes, do you have a baseline novolog pre-meal dose:  ______units with meals   Patients eat three meals a day:   Y/N     Any Barriers to therapy:  cost of medications/comfortable with giving injections (if applicable)/ comfortable and confident with current diabetes regimen       Prior to Admission medications    Medication Sig Last Dose Taking? Auth Provider   docusate sodium (COLACE) 100 MG capsule Take 100 mg by mouth 2 times daily 4/22/2017 at 2200 Yes Unknown, Entered By History   ticagrelor (BRILINTA) 90 MG tablet Take 1 tablet (90 mg) by mouth every 12 hours 4/23/2017 at 0900 Yes Gee Lozada MD   metoprolol (TOPROL XL) 50 MG 24 hr tablet Take 1 tablet (50 mg) by mouth daily 4/23/2017 at 0900 Yes Gee Lozada MD   atorvastatin (LIPITOR) 40 MG tablet Take 1 tablet (40 mg) by mouth daily 4/22/2017 at 2200 Yes Aylin Acosta APRN CNP   aspirin EC 81 MG EC tablet Take 1 tablet (81 mg) by mouth daily 4/23/2017 at 0900 Yes Gama Bradley MD   amLODIPine (NORVASC) 5 MG tablet Take 5 mg by mouth  daily 4/23/2017 at 0900 Yes Reported, Patient   metFORMIN (GLUCOPHAGE) 500 MG tablet Take 1 tablet (500 mg) by mouth daily (with dinner) 4/22/2017 at 1900 Yes Klever Vega MD   omeprazole (PRILOSEC) 20 MG capsule Take 1 capsule (20 mg) by mouth daily 4/23/2017 at 0900 Yes Klever Vega MD   nitroglycerin (NITROSTAT) 0.4 MG SL tablet As needed for anginal chest pain. Maximum is 3 doses in 15 minutes (one every 5 minutes).   Klever Vega MD   acetaminophen (TYLENOL) 325 MG tablet Take 2 tablets (650 mg) by mouth every 4 hours as needed for other (mild pain)  Patient taking differently: Take 325 mg by mouth every 4 hours as needed for other (mild pain)  4/21/2017 at Unknown time  Andre Up MD   glucose blood VI test strips strip Accucheck Kristine Plus  3 times daily  Patient taking differently: daily Accucheck Kristine Plus  3 times daily   Klever Vega MD   ACCU-CHEK MULTICLIX LANCETS MISC 3 times daily. BG testing 3 times a day  Patient taking differently: daily 3 times daily. BG testing 3 times a day   Klever Vega MD   Blood Glucose Calibration (ACCU-CHEK KRISTINE) SOLN Use as directed   Klever Vega MD   Blood Glucose Monitoring Suppl (ACCU-CHEK KRISTINE) KIT 2 times daily. With routine lancets as well as alcohol swabs, dispense 1 month  Patient taking differently: daily With routine lancets as well as alcohol swabs, dispense 1 month   Klever Vega MD

## 2017-04-23 NOTE — PROGRESS NOTES
[facility]    Sepsis Evaluation Progress Note    Date of Service: 04/23/2017    I was called to see Shantelle Su due to abnormal vital signs triggering the Sepsis SIRS screening alert. She is not known to have an infection.     Physical Exam    Vital Signs:  Temp: 102.3  F (39.1  C) Temp src: Axillary BP: 129/42 Pulse: 100 Heart Rate: 98 Resp: 16 SpO2: 96 % O2 Device: None (Room air)      Lab:  Lactic Acid   Date Value Ref Range Status   04/23/2017 3.2 (H) 0.4 - 2.0 mmol/L Final       The patient is at baseline mental status.    The rest of their physical exam is significant for right head contusion after fall, mild sinus congestion but clear lungs, normal heart exam. No rash. No abd pain. O/w see H and p from 30 mins ago.     Assessment and Plan    The SIRS and exam findings are likely due to   sepsis.     ID: The patient is currently on the following antibiotics:  Anti-infectives     None        Current antibiotic coverage no antibiotics are indicated at this time as there are no clinical signs of infection.  So far work up all NTD. Will add LFTs, lipase for completeness sake but doubt intra-abd source given no pain. C  C/o nasal congestion and chills after I saw her initially, so raising suspicion more for viral etiology vs influenza.   Influenza PCR pending.     Fluid: Fluid bolus ordered.    Lab: Repeat lactic acid ordered for 2 hours from now.    Disposition: The patient will remain on the current unit. We will continue to monitor this patient closely.    Vesta Ríos PA-C

## 2017-04-23 NOTE — H&P
Full H and P Dictated    71 yo here after a syncopal episode when she was in the bathroom for diarrhea and dry heaving. She hit her head and has contusion of the right forehead. While in ED pt c/o left sided CP, initial EKG and Trop all negative.   Upon arrival to floor pt spiked fever of 101, and LA elevated at 2.4. UA and CXR all negative. Influenza rapid negative PCR pending.     Brethren to OBS for suspected viral illness, syndrome. No source of infection found.   Follow trops for completeness sake but suspect CP 2/2 falling to the floor  Treat supportively   Likely d/c tomorrow if sxs improves or unless something declares itself.

## 2017-04-23 NOTE — H&P
PRIMARY CARE PROVIDER:  Klever Vega MD.      CHIEF COMPLAINT:  Syncopal episode.      HISTORY OF PRESENT ILLNESS:  Ms. Shantelle Su is a very pleasant 72-year-old female with a past medical history significant for coronary artery disease status post stenting, hypertension, hyperlipidemia, diabetes mellitus and GERD who presents to the emergency room after a syncopal episode.  History is obtained by speaking with the ER physician, the patient as well as chart review.  The patient has been in normal state of health yesterday, she had eaten a new taco at [employer] along with some leftovers.  In the middle of night around 3 or 4 o'clock she started having severe abdominal pain with nausea.  She subsequently had 4 episodes of profuse diarrhea and dry heaving.  On her last visit to the bathroom she was sitting on the toilet having a bowel movement and dry heaving when she then later on realized she ended up on the floor.  She did not remember any specific prodrome except that she was feeling quite ill.  When she came to she was lying on the floor of the bathroom, she must have hit her head as when she looked at the mirror she had a contusion of her left forehead.  She also had loose stools scattered along the bathroom floor.  She was able to clean herself back up and then subsequently came into the emergency room for further evaluation.  By the time she came to the emergency room her abdominal symptoms have improved.  She was not vomiting but she did feel somewhat nauseated.  She also described some mild chest pressure on the left side.  Initial laboratory results showed a negative troponin.  EKG performed showed sinus rhythm with nonspecific ST-T wave changes but no signs of ischemia.  The rest of her labs are fairly unremarkable with the exception of mild leukocytosis at 15.6000 thought likely related to the trauma of her fall.  She was recommended for admission to the hospital under observation for fluid support as  well as rule out with troponin.  However upon arrival to the murmur to the floor she became febrile, she had a fever of 101.2, blood pressure remained stable in the 110s to 120s.  Orthostatics performed was negative, lactic acid performed was elevated at 2.7, rapid influenza screen was negative although PCR is currently pending, blood cultures x2 were obtained along with a urinalysis that did not show any obvious infection.  Chest x-ray performed was also negative for any acute infiltrate.  Note that she did get a CT scan of the head and cervical spine in the emergency room after her fall that was also negative.  She was recommended for admission to the hospital for further evaluation and treatment.      PAST MEDICAL HISTORY:   1.  History of coronary artery disease.  She is status post stenting in the proximal LAD complicated by acute stent thrombosis and anterior ST elevation, she subsequently underwent thrombectomy and additional overlapping drug-eluting stent in the ostium of the LAD.  She has been maintained on aspirin and Brilinta, this was a 11/2016.  She was most recently seen by Dr. Lozada about a month ago, at that time had a repeat stress echocardiogram that was negative for acute ischemia.   2.  History of premature atrial contraction.     3.  History of PEs with infarction back in 2002 after starting hormone therapy.   4.  Hyperlipidemia.     5.  Hiatal hernia.   6.  GERD.   7.  Hypertension.   8.  Diabetes mellitus.      PAST SURGICAL HISTORY:  Includes;   1.  Left heart catheterization as described above, in 11/2016.   2.  D&C.   3.  Colonoscopy in 2011.      MEDICATIONS:   1.  Colace 100 mg p.o. b.i.d.   2.  Brilinta 90 mg b.i.d.   3.  Toprol-XL 50 mg p.o. q. day.   4.  Atorvastatin 40 mg p.o. q. day.   5.  Aspirin 81 mg daily.   6.  Norvasc 5 mg daily.   7.  Metformin 500 mg each day at bedtime with dinner.   8.  Prilosec 20 mg p.o. daily.   9.  Nitroglycerin as needed.   10.  Tylenol as needed  for pain.      ALLERGIES TO MEDICATIONS:  Includes lisinopril.      FAMILY HISTORY:  Mother with heart disease, diabetes, coronary artery disease.  Father also with coronary artery disease, hyperlipidemia.  She had a sister with a history of blood clot, stroke.  A brother with coronary artery disease.      SOCIAL HISTORY:  The patient is , she lives independently in her own home with her .  Her daughter is currently beside her.  She is a nonsmoker, nonalcohol user.      REVIEW OF SYSTEMS:  Negative for any recent fevers or chills, no nausea, no vomiting, no complaints of chest pain, shortness of breath.  She was completely in normal state of health prior to in the middle of night when she started with diarrhea.  She did travel to [place] recently on 4/10 through 4/13.  She was very active, he was able to walk approximately 15 miles a day.  She had no complaints of chest pain, shortness of breath, no orthopnea, PND or complaints of lower extremity swelling.  Otherwise 12-point system reviewed and all are negative beyond those stated in HPI.      PHYSICAL EXAMINATION:   VITAL SIGNS:  T-max of 102.3 again, heart rate is 98, blood pressure 129/42, respirations 16 and saturating 96% on room air.   GENERAL APPEARANCE:  The patient is alert, oriented, she appears to be somewhat fatigued, but nontoxic appearing.   HEENT:  Pupils round, react to light, extraocular movements are intact, sclerae are nonicteric,  conjunctiva is pink.  Oral mucosa is pink and moist.   NECK:  Supple with no cervical lymphadenopathy or thyromegaly.  Trachea is midline.   CARDIAC EXAM:  Regular rate and rhythm, normal S1, S2 with no significant murmur, rubs, gallops appreciated.   PULMONARY:  Exam is clear to auscultation bilaterally, no wheezing, rales or rhonchi, no use of accessory muscles or intercostal retraction.   ABDOMEN:  Bowel sounds are present, soft, nontender, nondistended, no hepatomegaly.   EXTREMITIES:  Reveals no  clubbing, cyanosis or edema, she has some bruising over her right cheeks as well as her right temple but no open skin.   NEURO EXAM:  Cranial nerves II-XII is intact, she has bilateral symmetric upper and lower extremity strength, sensation is intact distally, she has no focal deficits.   PSYCH:  Mood and affect are appropriate.      Laboratory results again as described.      ASSESSMENT AND PLAN:  Ms. Shantelle Su is a 72-year-old female with a past medical history significant for coronary artery disease, hypertension, hyperlipidemia and diabetes mellitus who presents to the emergency room today after a syncopal episode in the setting of nausea, her labs shows a fairly normal BMP, she does have some mild leukocytosis suspect related to fall.  She have very minimal elevation in her D-dimer of 0.7 although with no pulmonary complaints.  Given her elevated lactic acid at 2.7 upon arrival to the floor and fever, she has been started on IV fluid hydration and further supportive care.   1.  Syncopal episode -- Certainly this sounds vasovagal in relation to her intractable nausea and diarrhea.  She had a recent stress echo that showed no significant cardiac anomaly or significant valvular issues, initial orthostatics were negative.  We will continue her on telemetry for completeness sake but she will be continued on IV fluid hydration, I will keep her on a clear liquid diet and advance as tolerated.   2.  Chest pain -- Initial troponin was not detected, the chest pain is likely related to her fall onto the bathroom floor.  Given her cardiac history I will get another troponin for completeness sake but I do not think her pain represents a ACS.   3.  Febrile illness -- So far no obvious infectious etiology identified.  She had a negative chest x-ray, negative urinalysis, negative rapid influenza although her PCR is currently pending.  She has no abdominal pain currently, no localized abdominal pain to suggest an  intraabdominal source.  No neck tenderness or stiffness to suggest meningitis, I suspect this is likely of syndrome.  We will treat her supportively with IV fluids, antiemetics and antipyretics; we follow her with lactic acid.  Blood culture and urine culture are currently pending, I will also add hepatic and lipase panel just for completeness sake.  I will hold off on potential Tamiflu or antibiotics for now, I suspect this is just a viral; we will continue to monitor and start her on medication as her illness presents itself.   4.  Diabetes mellitus -- This is stable, I will hold her metformin for dinner for now given decreased p.o. intake; we will assess her blood glucose b.i.d. while she is here in the hospital.   5.  History of coronary artery disease -- Stable, she had a recent visit with Dr. Lozada a month ago and a recent stress echocardiogram that was negative for any inducible ischemia.  Continue home medications including aspirin and Brilinta, Toprol-XL, her statin and Norvasc.      This patient will be admitted under observation status.         BRIANNA GARCIA DO       As dictated by CHRISTIAN DANIEL            D: 2017 16:41   T: 2017 18:46   MT: [id number]      Name:     RODNEY HOYOS   MRN:      [id number]        Account:      [id number]   :      1945           Admitted:     [id number]      Document: [id number]

## 2017-04-23 NOTE — ED NOTES
ABCs intact. Pt c/o n/v/d since last night. Pt had a + LOC. Unknown time. Pt has bruising to R side of face. Pt is on Brillinta d/t stents placed in November. Pt BIBA. Pt given Zofran ODT en route to hospital

## 2017-04-23 NOTE — TELEPHONE ENCOUNTER
"Call Type: Triage Call    Presenting Problem: Onset 0200 hrs of \"Vomting and diarrhea.\" Patien  states she was in the bathroom and fainted, hitting her head.  Triage Note:  Guideline Title: Head Injury  Recommended Disposition: Activate   Original Inclination: Would have called ED  Override Disposition:  Intended Action: Call 911  Physician Contacted: No  Unconscious now or within last hour OR for more than 5 minutes at time of injury ?  YES  Physician Instructions:  Care Advice: Do not give the patient anything to eat or drink.  IMMEDIATE ACTION  Write down provider's name. List or place the following in a bag for  transport with the patient: current prescription and/or nonprescription  medications  alternative treatments, therapies and medications  and street drugs.  An adult should stay with the patient, preferably one trained in CPR. If  the person is not trained in CPR, then he or she should provide hands-only  (compression-only) CPR as recommended by the [employer] Association.  "

## 2017-04-24 VITALS
SYSTOLIC BLOOD PRESSURE: 111 MMHG | OXYGEN SATURATION: 96 % | HEIGHT: 60 IN | WEIGHT: 163 LBS | BODY MASS INDEX: 32 KG/M2 | TEMPERATURE: 98.4 F | RESPIRATION RATE: 16 BRPM | DIASTOLIC BLOOD PRESSURE: 65 MMHG | HEART RATE: 87 BPM

## 2017-04-24 LAB
ANION GAP SERPL CALCULATED.3IONS-SCNC: 6 MMOL/L (ref 3–14)
BACTERIA SPEC CULT: NORMAL
BUN SERPL-MCNC: 7 MG/DL (ref 7–30)
CALCIUM SERPL-MCNC: 7.3 MG/DL (ref 8.5–10.1)
CHLORIDE SERPL-SCNC: 110 MMOL/L (ref 94–109)
CO2 SERPL-SCNC: 23 MMOL/L (ref 20–32)
CREAT SERPL-MCNC: 0.57 MG/DL (ref 0.52–1.04)
ERYTHROCYTE [DISTWIDTH] IN BLOOD BY AUTOMATED COUNT: 13.9 % (ref 10–15)
GFR SERPL CREATININE-BSD FRML MDRD: ABNORMAL ML/MIN/1.7M2
GLUCOSE BLDC GLUCOMTR-MCNC: 110 MG/DL (ref 70–99)
GLUCOSE BLDC GLUCOMTR-MCNC: 124 MG/DL (ref 70–99)
GLUCOSE SERPL-MCNC: 115 MG/DL (ref 70–99)
HCT VFR BLD AUTO: 33.4 % (ref 35–47)
HGB BLD-MCNC: 10.4 G/DL (ref 11.7–15.7)
INTERPRETATION ECG - MUSE: NORMAL
Lab: NORMAL
MCH RBC QN AUTO: 30.2 PG (ref 26.5–33)
MCHC RBC AUTO-ENTMCNC: 31.1 G/DL (ref 31.5–36.5)
MCV RBC AUTO: 97 FL (ref 78–100)
MICRO REPORT STATUS: NORMAL
PLATELET # BLD AUTO: 204 10E9/L (ref 150–450)
POTASSIUM SERPL-SCNC: 3.6 MMOL/L (ref 3.4–5.3)
RBC # BLD AUTO: 3.44 10E12/L (ref 3.8–5.2)
SODIUM SERPL-SCNC: 139 MMOL/L (ref 133–144)
SPECIMEN SOURCE: NORMAL
WBC # BLD AUTO: 8.8 10E9/L (ref 4–11)

## 2017-04-24 PROCEDURE — 00000146 ZZHCL STATISTIC GLUCOSE BY METER IP

## 2017-04-24 PROCEDURE — 80048 BASIC METABOLIC PNL TOTAL CA: CPT | Performed by: PHYSICIAN ASSISTANT

## 2017-04-24 PROCEDURE — 40000275 ZZH STATISTIC RCP TIME EA 10 MIN

## 2017-04-24 PROCEDURE — 25000128 H RX IP 250 OP 636: Performed by: PHYSICIAN ASSISTANT

## 2017-04-24 PROCEDURE — A9270 NON-COVERED ITEM OR SERVICE: HCPCS | Mod: GY | Performed by: INTERNAL MEDICINE

## 2017-04-24 PROCEDURE — A9270 NON-COVERED ITEM OR SERVICE: HCPCS | Mod: GY | Performed by: PHYSICIAN ASSISTANT

## 2017-04-24 PROCEDURE — G0378 HOSPITAL OBSERVATION PER HR: HCPCS

## 2017-04-24 PROCEDURE — 93005 ELECTROCARDIOGRAM TRACING: CPT

## 2017-04-24 PROCEDURE — 25000132 ZZH RX MED GY IP 250 OP 250 PS 637: Mod: GY | Performed by: PHYSICIAN ASSISTANT

## 2017-04-24 PROCEDURE — 99217 ZZC OBSERVATION CARE DISCHARGE: CPT | Performed by: PHYSICIAN ASSISTANT

## 2017-04-24 PROCEDURE — 36415 COLL VENOUS BLD VENIPUNCTURE: CPT | Performed by: PHYSICIAN ASSISTANT

## 2017-04-24 PROCEDURE — 96361 HYDRATE IV INFUSION ADD-ON: CPT

## 2017-04-24 PROCEDURE — 93010 ELECTROCARDIOGRAM REPORT: CPT | Performed by: INTERNAL MEDICINE

## 2017-04-24 PROCEDURE — 85027 COMPLETE CBC AUTOMATED: CPT | Performed by: PHYSICIAN ASSISTANT

## 2017-04-24 PROCEDURE — 25000132 ZZH RX MED GY IP 250 OP 250 PS 637: Mod: GY | Performed by: INTERNAL MEDICINE

## 2017-04-24 RX ORDER — ONDANSETRON 4 MG/1
4 TABLET, ORALLY DISINTEGRATING ORAL EVERY 6 HOURS PRN
Qty: 20 TABLET | Refills: 0 | Status: SHIPPED | OUTPATIENT
Start: 2017-04-24 | End: 2017-05-03

## 2017-04-24 RX ORDER — CALCIUM CARBONATE 500 MG/1
500-1000 TABLET, CHEWABLE ORAL
Status: DISCONTINUED | OUTPATIENT
Start: 2017-04-24 | End: 2017-04-24 | Stop reason: HOSPADM

## 2017-04-24 RX ADMIN — CALCIUM CARBONATE (ANTACID) CHEW TAB 500 MG 1000 MG: 500 CHEW TAB at 00:23

## 2017-04-24 RX ADMIN — DOCUSATE SODIUM 100 MG: 100 CAPSULE, LIQUID FILLED ORAL at 08:10

## 2017-04-24 RX ADMIN — TICAGRELOR 90 MG: 90 TABLET ORAL at 08:10

## 2017-04-24 RX ADMIN — AMLODIPINE BESYLATE 5 MG: 5 TABLET ORAL at 08:09

## 2017-04-24 RX ADMIN — ACETAMINOPHEN 975 MG: 325 TABLET, FILM COATED ORAL at 08:21

## 2017-04-24 RX ADMIN — OMEPRAZOLE 20 MG: 20 CAPSULE, DELAYED RELEASE ORAL at 08:09

## 2017-04-24 RX ADMIN — SODIUM CHLORIDE: 9 INJECTION, SOLUTION INTRAVENOUS at 08:21

## 2017-04-24 RX ADMIN — ONDANSETRON 4 MG: 2 INJECTION INTRAMUSCULAR; INTRAVENOUS at 00:18

## 2017-04-24 RX ADMIN — METOPROLOL SUCCINATE 50 MG: 25 TABLET, EXTENDED RELEASE ORAL at 08:08

## 2017-04-24 RX ADMIN — ASPIRIN 81 MG: 81 TABLET, COATED ORAL at 08:09

## 2017-04-24 ASSESSMENT — PAIN DESCRIPTION - DESCRIPTORS: DESCRIPTORS: DULL

## 2017-04-24 NOTE — PLAN OF CARE
Problem: Goal Outcome Summary  Goal: Goal Outcome Summary  Outcome: No Change  Outcome: Improving  PRIMARY DIAGNOSIS: SYNCOPE/Nausea, vomiting, diarrhea   OUTPATIENT/OBSERVATION GOALS TO BE MET BEFORE DISCHARGE:      1. Diagnostic testing complete & at baseline neurologic function: n/a  2. Cleared by consultants (if involved): N/A  3. ADLs back to baseline? No - SBA d/t syncopal episode  4. Activity and level of assistance: SBA  5. Pain status: Pt c/o pain to low right back.  Denies need for pharmacological interventions.  Ice applied.   6. Barriers to discharge noted: No  7. Orthostatic symptoms (BP decrease or HR increase with patient upright)? No - not orthostatic  8. Documented urine output: Yes  9. Tolerating PO fluid:Yes, will try clears, applesauce, saltines.    10. Nausea/Vomiting/Diarrhea (if present) symptoms improved: Yes.  Did c/o some indigestion / gerd discomfort tonight that was causing nausea.  Resolved with tums and Zofran.   11. Tele per tele tech: SR with inverted / flat  Ts and HR 90s      Pt A&Ox4. No nausea or emesis since zofran and tums given.  Pt tolerating clears as well as crackers and applesauce. No vomiting or diarrhea - last diarrhea 0230 this AM. Lactic acid at 2330 1.2.  Temp max 100.8. Bruising on R side of face. Will continue to monitor.

## 2017-04-24 NOTE — PLAN OF CARE
Problem: Discharge Planning  Goal: Discharge Planning (Adult, OB, Behavioral, Peds)  Outcome: Adequate for Discharge Date Met:  04/24/17  Patient's After Visit Summary was reviewed with patient.  Patient verbalized understanding of After Visit Summary, recommended follow up and was given an opportunity to ask questions.   Discharge medications sent home with patient/family: YES, script sent with patient   Discharged with significant other       OBSERVATION patient END time: 1048     Patient was stable at discharge. Patient was provided wheelchair transportation at discharge.  provided transportation.

## 2017-04-24 NOTE — PLAN OF CARE
Problem: Discharge Planning  Goal: Discharge Planning (Adult, OB, Behavioral, Peds)  Outcome: Improving  PRIMARY DIAGNOSIS: SYNCOPE  OUTPATIENT/OBSERVATION GOALS TO BE MET BEFORE DISCHARGE:     1. Diagnostic testing complete & at baseline neurologic function: Yes  2. Cleared by consultants (if involved): N/A  3. ADLs back to baseline?  Yes  4. Activity and level of assistance: Ambulating independently.  5. Pain status: Improved-controlled with oral pain medications.  6. Barriers to discharge noted No     Patient is alert and oriented. Patient's neuros are intact. VSS except low grade temp 99F, Patient's denies dizziness/lightheadedness. Patient's flu neg but PCR pending. Patient was running SR 1st degree-ST depression, with occasional PVCs rate 86. Patient is on droplet and enteric precautions d/t n/v/d. Patient says she has not had any GI symptoms since yesterday morning. Patient has labs pending. Patient is up independent in room. Will continue to monitor.

## 2017-04-24 NOTE — PROGRESS NOTES
PRIMARY DIAGNOSIS: SYNCOPE/Nausea, vomiting, diarrhea   OUTPATIENT/OBSERVATION GOALS TO BE MET BEFORE DISCHARGE:      1. Diagnostic testing complete & at baseline neurologic function: n/a  2. Cleared by consultants (if involved): N/A  3. ADLs back to baseline? No - SBA d/t syncopal episode  4. Activity and level of assistance: Up with standby assistance.  5. Pain status: Pain to right low back / side.  Denies need for pharmacological interventions.  Ice applied.   6. Barriers to discharge noted: No  7. Orthostatic symptoms (BP decrease or HR increase with patient upright)? NA  8. Documented urine output: Yes  9. Tolerating PO fluid:Yes  10. Nausea/Vomiting/Diarrhea (if present) symptoms improved: Yes - no diarrhea or vomiting since 6am yesterday.   11. Tele per tele tech: SR with inverted / flat Ts and HR 90s

## 2017-04-24 NOTE — DISCHARGE SUMMARY
[facility] Outpatient / Observation Unit  Discharge Summary        Shantelle Su MRN# [id number]   YOB: 1945 Age: 72 year old     Date of Admission:  4/23/2017  Date of Discharge:  4/24/2017 10:54 AM  Admitting Physician:  Carli Cruz, DO  Discharge Physician: Emeli Su PA-C  Discharging Service: Hospitalist      Primary Provider: Klever Vega  Primary Care Physician Phone Number: [phone]         Primary Discharge Diagnoses:    Shantelle Su was admitted on 4/23/2017 for episode of syncope.     1. Syncopal Episode: Suspect vaso-vagal in nature related to acute onset of vomiting and diarrhea. Became febrile here with elevated lactic acid but no source of infection could be found. Lactic acid improved with fluids. No further stool to collect sample, and blood cultures pending. Symptoms now improved with no recurrence even after eating. Recently had stress echo done with normal echocardiogram so this was not repeated. Patient was well enough to return home with prescription for zofran.    2. DM II- Restarted home meds.          Secondary Discharge Diagnoses:     Past Medical History:   Diagnosis Date     Acute upper respiratory infection 9/30/2002     CAD (coronary artery disease) 11/23/2016    sp proximal LAD stenting     Diabetes mellitus (H)      Enterocele 9/12/2011     Essential hypertension, benign      GERD (gastroesophageal reflux disease) 10/13/2008     Hiatal hernia      Hyperlipidemia LDL goal <130 10/31/2010     Other pulmonary embolism and infarction 9/30/2002    after starting hormone therapy     PAC (premature atrial contraction)      Rectocele 10/15/2007                Code Status:      Full Code        Brief Hospital Summary:        Reason for your hospital stay       You were admitted for concerns of syncope. We suspect you passed out   because of the diarrhea and dehydration. We monitored your heart overnight   with no abnormal findings. You did not have  an echocardiogram done because   you just had one done recently that was normal. You did not have a bowel   movement while here so we were unable to test you for a stool infection,   but our suspicion is very low given you aren't having any more.    We recommend you drink lots of fluid, use Zofran for nausea, and take it   easy for a couple of days.                    Please refer to initial admission history and physical for further details.   Briefly, Shantelle Su was admitted on 4/23/2017 for episode of syncope.  Initial work up in the ED did not reveal evidence of significant cardiac arrhthymias or neurologic abnormalities.  Pt was registered to the Observation Unit for further evaluation.      Pt was placed on telemetry and started on IVF hydration.  Labs reviewed and significant results addressed. On the day of discharge, pt has not had any further episodes of syncope, no significant arrhythmias detected. Vitals were stable and pt was deemed safe for discharge. Medications were reviewed and adjustments made as necessary. Pt is instructed to follow up as below.           Significant Lab During Hospitalization:        Recent Labs  Lab 04/24/17  0545 04/23/17  0800   WBC 8.8 15.6*   HGB 10.4* 13.2   HCT 33.4* 40.7   MCV 97 96    306       Recent Labs  Lab 04/24/17  0545 04/23/17  0800    138   POTASSIUM 3.6 4.5   CHLORIDE 110* 105   CO2 23 27   ANIONGAP 6 6   * 167*   BUN 7 17   CR 0.57 0.62   GFRESTIMATED >90Non  GFR Calc >90Non  GFR Calc   GFRESTBLACK >90African American GFR Calc >90African American GFR Calc   RAFAEL 7.3* 8.5       Recent Labs  Lab 04/23/17  2325 04/23/17  1604 04/23/17  1218   LACT 1.2 3.2* 2.7*       Recent Labs  Lab 04/23/17  1455   COLOR Yellow   APPEARANCE Slightly Cloudy   URINEGLC Negative   URINEBILI Negative   URINEKETONE Negative   SG 1.018   UBLD Negative   URINEPH 5.0   PROTEIN Negative   NITRITE Negative   LEUKEST Trace*   RBCU 2    WBCU 3*                Significant Imaging During Hospitalization:      No results found for this or any previous visit (from the past 24 hour(s)).           Pending Results:        Unresulted Labs Ordered in the Past 30 Days of this Admission     Date and Time Order Name Status Description    4/23/2017 1639 Urine Culture Aerobic Bacterial Preliminary     4/23/2017 1203 Blood culture Preliminary     4/23/2017 1203 Blood culture Preliminary               Consultations This Hospital Stay:      No consultations were requested during this admission         Discharge Instructions and Follow-Up:      Follow-up Appointments     Follow-up and recommended labs and tests        Follow up as needed if you still have symptoms.                          Discharge Disposition:      Discharged to home         Discharge Medications:        Current Discharge Medication List      START taking these medications    Details   ondansetron (ZOFRAN-ODT) 4 MG ODT tab Take 1 tablet (4 mg) by mouth every 6 hours as needed for nausea  Qty: 20 tablet, Refills: 0    Associated Diagnoses: Vomiting and diarrhea         CONTINUE these medications which have NOT CHANGED    Details   docusate sodium (COLACE) 100 MG capsule Take 100 mg by mouth 2 times daily      ticagrelor (BRILINTA) 90 MG tablet Take 1 tablet (90 mg) by mouth every 12 hours  Qty: 180 tablet, Refills: 3    Associated Diagnoses: Status post coronary angioplasty      metoprolol (TOPROL XL) 50 MG 24 hr tablet Take 1 tablet (50 mg) by mouth daily  Qty: 90 tablet, Refills: 3    Associated Diagnoses: Paroxysmal supraventricular tachycardia (H); Abnormal electrocardiogram      atorvastatin (LIPITOR) 40 MG tablet Take 1 tablet (40 mg) by mouth daily  Qty: 90 tablet, Refills: 3    Associated Diagnoses: Hyperlipidemia LDL goal <130      aspirin EC 81 MG EC tablet Take 1 tablet (81 mg) by mouth daily  Qty: 90 tablet, Refills: 3    Associated Diagnoses: Angina pectoris, unspecified (H); Status  post coronary angioplasty      amLODIPine (NORVASC) 5 MG tablet Take 5 mg by mouth daily      metFORMIN (GLUCOPHAGE) 500 MG tablet Take 1 tablet (500 mg) by mouth daily (with dinner)  Qty: 90 tablet, Refills: 3      omeprazole (PRILOSEC) 20 MG capsule Take 1 capsule (20 mg) by mouth daily  Qty: 90 capsule, Refills: 3    Associated Diagnoses: Gastroesophageal reflux disease without esophagitis      nitroglycerin (NITROSTAT) 0.4 MG SL tablet As needed for anginal chest pain. Maximum is 3 doses in 15 minutes (one every 5 minutes).  Qty: 25 tablet, Refills: 3    Associated Diagnoses: Status post coronary angioplasty      acetaminophen (TYLENOL) 325 MG tablet Take 2 tablets (650 mg) by mouth every 4 hours as needed for other (mild pain)  Qty: 100 tablet, Refills: 0    Associated Diagnoses: Post-operative state      glucose blood VI test strips strip Accucheck Kristine Plus  3 times daily  Qty: 300 strip, Refills: 11    Associated Diagnoses: Hyperglycemia      ACCU-CHEK MULTICLIX LANCETS MISC 3 times daily. BG testing 3 times a day  Qty: 100 each, Refills: 11    Associated Diagnoses: Hyperglycemia      Blood Glucose Calibration (ACCU-CHEK KRISTINE) SOLN Use as directed  Qty: 3 Bottle, Refills: 11    Associated Diagnoses: Metabolic syndrome      Blood Glucose Monitoring Suppl (ACCU-CHEK KRISTINE) KIT 2 times daily. With routine lancets as well as alcohol swabs, dispense 1 month  Qty: 1 kit, Refills: 0    Associated Diagnoses: Hyperglycemia                 Allergies:         Allergies   Allergen Reactions     Lisinopril      bp increased           Condition and Physical on Discharge:      Discharge condition: Stable   Vitals: Blood pressure 129/40, pulse 87, temperature 99.7  F (37.6  C), temperature source Oral, resp. rate 16, height 1.524 m (5'), weight 73.9 kg (163 lb), SpO2 98 %.  163 lbs 0 oz      GENERAL:  Comfortable.  PSYCH: pleasant, oriented, No acute distress.  HEENT:  PERRLA. Normal conjunctiva, normal hearing, nasal  mucosa and Oropharynx are normal.  NECK:  Supple, no neck vein distention, adenopathy or bruits, normal thyroid.  HEART:  Normal S1, S2 with no murmur, no pericardial rub, gallops or S3 or S4.  LUNGS:  Clear to auscultation, normal Respiratory effort. No wheezing, rales or ronchi.  ABDOMEN:  Soft, no hepatosplenomegaly, normal bowel sounds. Non-tender, non distended.   EXTREMITIES:  No pedal edema, +2 pulses bilateral and equal.  SKIN:  Dry to touch, No rash, wound or ulcerations.  NEUROLOGIC:  CN 2-12 grossly intact,  sensation is intact with no focal deficits.

## 2017-04-24 NOTE — PLAN OF CARE
Problem: Discharge Planning  Goal: Discharge Planning (Adult, OB, Behavioral, Peds)  Outcome: Improving  PRIMARY DIAGNOSIS: SYNCOPE/Nausea, vomiting, diarrhea   OUTPATIENT/OBSERVATION GOALS TO BE MET BEFORE DISCHARGE:      1. Diagnostic testing complete & at baseline neurologic function: n/a  2. Cleared by consultants (if involved): N/A  3. ADLs back to baseline? No - SBA d/t syncopal episode  4. Activity and level of assistance: Up with standby assistance.  5. Pain status: Pain free.  6. Barriers to discharge noted: No  7. Orthostatic symptoms (BP decrease or HR increase with patient upright)? No - not orthostatic  8. Documented urine output: Yes  9. Tolerating PO fluid:Yes, will try clears, applesauce, saltines  10. Nausea/Vomiting/Diarrhea (if present) symptoms improved: Yes - no diarrhea or vomiting since 6am  11. Tele per tele tech: SR with inverted Ts and HR 90s      Pt A&Ox4. Pt denies nausea at this time, has tolerated crackers and applesauce this evening. No vomiting or diarrhea - last diarrhea 0230 this AM. Lactic acid 2.7->3.2, provider aware. Temp 98.1. Pt c/o 4/10 HA, PRN oxy given. Bruising on R side of face. Pt reports she is feeling better. Will continue to monitor.

## 2017-04-25 ENCOUNTER — TELEPHONE (OUTPATIENT)
Dept: INTERNAL MEDICINE | Facility: CLINIC | Age: 72
End: 2017-04-25

## 2017-04-25 NOTE — TELEPHONE ENCOUNTER
"Hospital/TCU/ED for chronic condition Discharge Protocol    \"Hi, my name is Kristy Pacheco, a registered nurse, and I am calling from [facility].  I am calling to follow up and see how things are going for you after your recent emergency visit/hospital/TCU stay.\"    Tell me how you are doing now that you are home?\" Patient stated, \"I'm feeling better, stronger.\"        Discharge Instructions    \"Let's review your discharge instructions.  What is/are the follow-up recommendations?  Pt. Response: Follow up with Dr. Vega     \"Has an appointment with your primary care provider been scheduled?\"   No (schedule appointment)  Appointment made for 4/27/17 at 1140     \"When you see the provider, I would recommend that you bring your medications with you.\"    Medications    \"Tell me what changed about your medicines when you discharged?\"    Changes to chronic meds?    0-1    \"What questions do you have about your medications?\"    None     New diagnoses of heart failure, COPD, diabetes, or MI?    No        Medication reconciliation completed? Yes    Was MTM referral placed (*Make sure to put transitions as reason for referral)?   No    Call Summary    \"What questions or concerns do you have about your recent visit and your follow-up care?\"     none    \"If you have questions or things don't continue to improve, we encourage you contact us through the main clinic number (give number).  Even if the clinic is not open, triage nurses are available 24/7 to help you.     We would like you to know that our clinic has extended hours (provide information).  We also have urgent care (provide details on closest location and hours/contact info)\"      \"Thank you for your time and take care!\"             "

## 2017-04-27 ENCOUNTER — OFFICE VISIT (OUTPATIENT)
Dept: INTERNAL MEDICINE | Facility: CLINIC | Age: 72
End: 2017-04-27
Payer: COMMERCIAL

## 2017-04-27 VITALS
WEIGHT: 163.8 LBS | OXYGEN SATURATION: 97 % | SYSTOLIC BLOOD PRESSURE: 124 MMHG | BODY MASS INDEX: 31.99 KG/M2 | HEART RATE: 75 BPM | TEMPERATURE: 97.9 F | DIASTOLIC BLOOD PRESSURE: 76 MMHG

## 2017-04-27 DIAGNOSIS — R55 VASOVAGAL SYNCOPE: Primary | ICD-10-CM

## 2017-04-27 DIAGNOSIS — I10 ESSENTIAL HYPERTENSION, BENIGN: ICD-10-CM

## 2017-04-27 PROCEDURE — 99214 OFFICE O/P EST MOD 30 MIN: CPT | Performed by: INTERNAL MEDICINE

## 2017-04-27 NOTE — MR AVS SNAPSHOT
After Visit Summary   4/27/2017    Shantelle Su    MRN: [id number]           Patient Information     Date Of Birth          1945        Visit Information        Provider Department      4/27/2017 11:40 AM Klever Vega MD [facility]        Today's Diagnoses     Vasovagal syncope    -  1    Essential hypertension, benign           Follow-ups after your visit        Who to contact     If you have questions or need follow up information about today's clinic visit or your schedule please contact [facility] directly at [phone].  Normal or non-critical lab and imaging results will be communicated to you by Yieldrhart, letter or phone within 4 business days after the clinic has received the results. If you do not hear from us within 7 days, please contact the clinic through Yieldrhart or phone. If you have a critical or abnormal lab result, we will notify you by phone as soon as possible.  Submit refill requests through AnaBios or call your pharmacy and they will forward the refill request to us. Please allow 3 business days for your refill to be completed.          Additional Information About Your Visit        MyChart Information     AnaBios gives you secure access to your electronic health record. If you see a primary care provider, you can also send messages to your care team and make appointments. If you have questions, please call your primary care clinic.  If you do not have a primary care provider, please call [phone] and they will assist you.        Care EveryWhere ID     This is your Care EveryWhere ID. This could be used by other organizations to access your [facility] medical records  [id number]        Your Vitals Were     Pulse Temperature Pulse Oximetry BMI (Body Mass Index)          75 97.9  F (36.6  C) (Oral) 97% 31.99 kg/m2         Blood Pressure from Last 3 Encounters:   04/27/17 124/76   04/24/17 111/65   03/28/17 146/76     Weight from Last 3 Encounters:   04/27/17 163 lb 12.8 oz (74.3 kg)   04/23/17 163 lb (73.9 kg)   03/28/17 165 lb 11.2 oz (75.2 kg)              Today, you had the following     No orders found for display         Today's Medication Changes          These changes are accurate as of: 4/27/17 11:57 AM.  If you have any questions, ask your nurse or doctor.               These medicines have changed or have updated prescriptions.        Dose/Directions    ACCU-CHEK VI Kit   This may have changed:    - when to take this  - additional instructions   Used for:  Hyperglycemia        2 times daily. With routine lancets as well as alcohol swabs, dispense 1 month   Quantity:  1 kit   Refills:  0       acetaminophen 325 MG tablet   Commonly known as:  TYLENOL   This may have changed:  how much to take   Used for:  Post-operative state        Dose:  650 mg   Take 2 tablets (650 mg) by mouth every 4 hours as needed for other (mild pain)   Quantity:  100 tablet   Refills:  0       blood glucose monitoring lancets   This may have changed:    - when to take this  - additional instructions   Used for:  Hyperglycemia        3 times daily. BG testing 3 times a day   Quantity:  100 each   Refills:  11       blood glucose monitoring test strip   Commonly known as:  no brand specified   This may have changed:    - when to take this  - additional instructions   Used for:  Hyperglycemia        Accucheck Vi Plus  3 times daily   Quantity:  300 strip   Refills:  11                Primary Care Provider Office Phone # Fax #    Klever Vega [phone]       [facility] [address] [place] [phone]        Thank you!     Thank you for choosing [facility]  for your care. Our goal is always to provide you with excellent care. Hearing back from our patients is one way we can continue to improve our services. Please take a few minutes to complete the written survey that you may  receive in the mail after your visit with us. Thank you!             Your Updated Medication List - Protect others around you: Learn how to safely use, store and throw away your medicines at www.disposemymeds.org.          This list is accurate as of: 4/27/17 11:57 AM.  Always use your most recent med list.                   Brand Name Dispense Instructions for use    ACCU-CHEK VI Kit     1 kit    2 times daily. With routine lancets as well as alcohol swabs, dispense 1 month       acetaminophen 325 MG tablet    TYLENOL    100 tablet    Take 2 tablets (650 mg) by mouth every 4 hours as needed for other (mild pain)       aspirin 81 MG EC tablet     90 tablet    Take 1 tablet (81 mg) by mouth daily       atorvastatin 40 MG tablet    LIPITOR    90 tablet    Take 1 tablet (40 mg) by mouth daily       blood glucose calibration solution     3 Bottle    Use as directed       blood glucose monitoring lancets     100 each    3 times daily. BG testing 3 times a day       blood glucose monitoring test strip    no brand specified    300 strip    Accucheck Vi Plus  3 times daily       docusate sodium 100 MG capsule    COLACE     Take 100 mg by mouth 2 times daily       metFORMIN 500 MG tablet    GLUCOPHAGE    90 tablet    Take 1 tablet (500 mg) by mouth daily (with dinner)       metoprolol 50 MG 24 hr tablet    TOPROL XL    90 tablet    Take 1 tablet (50 mg) by mouth daily       nitroglycerin 0.4 MG sublingual tablet    NITROSTAT    25 tablet    As needed for anginal chest pain. Maximum is 3 doses in 15 minutes (one every 5 minutes).       NORVASC 5 MG tablet   Generic drug:  amLODIPine      Take 5 mg by mouth daily       omeprazole 20 MG CR capsule    priLOSEC    90 capsule    Take 1 capsule (20 mg) by mouth daily       ondansetron 4 MG ODT tab    ZOFRAN-ODT    20 tablet    Take 1 tablet (4 mg) by mouth every 6 hours as needed for nausea       ticagrelor 90 MG tablet    BRILINTA    180 tablet    Take 1 tablet (90 mg) by  mouth every 12 hours

## 2017-04-27 NOTE — PROGRESS NOTES
SUBJECTIVE:                                                    Shantelle Su is a 72 year old female who presents to clinic today for the following health issues:    Hospital Follow-up Visit:    Hospital/Nursing Home/IP Rehab Facility: [facility]  Date of Admission: 4/23/17  Date of Discharge: 4/24/17  Reason(s) for Admission: Syncope             Problems taking medications regularly:  None       Medication changes since discharge: None       Problems adhering to non-medication therapy:  None    Summary of hospitalization:  [facility] discharge summary reviewed  Diagnostic Tests/Treatments reviewed.  Follow up needed: none  Other Healthcare Providers Involved in Patient s Care:         None  Update since discharge: stable.     Post Discharge Medication Reconciliation: discharge medications reconciled, continue medications without change.  Plan of care communicated with patient     Coding guidelines for this visit:  Type of Medical   Decision Making Face-to-Face Visit       within 7 Days of discharge Face-to-Face Visit        within 14 days of discharge   Moderate Complexity [id number]   High Complexity [id number]          Primary Discharge Diagnoses:    Shantelle Su was admitted on 4/23/2017 for episode of syncope.      1. Syncopal Episode: Suspect vaso-vagal in nature related to acute onset of vomiting and diarrhea. Became febrile here with elevated lactic acid but no source of infection could be found. Lactic acid improved with fluids. No further stool to collect sample, and blood cultures pending. Symptoms now improved with no recurrence even after eating. Recently had stress echo done with normal echocardiogram so this was not repeated. Patient was well enough to return home with prescription for zofran.     2. DM II- Restarted home meds.       Problem list and histories reviewed & adjusted, as indicated.  Additional history: as documented    Patient Active Problem List   Diagnosis      Pulmonary embolism and infarction (H)     Essential hypertension, benign     Rectocele     Metabolic syndrome     Advanced directives, counseling/discussion     Gastroesophageal reflux disease without esophagitis     Fatty liver     Postmenopausal bleeding     Heart palpitations     Angina pectoris, unspecified (H)     ASCVD (arteriosclerotic cardiovascular disease)     Postsurgical percutaneous transluminal coronary angioplasty status     Paroxysmal supraventricular tachycardia (H)     Abnormal electrocardiogram     Hyperlipidemia LDL goal <70     Coronary artery disease of native artery of native heart with stable angina pectoris (H)     Syncope     Past Surgical History:   Procedure Laterality Date     ABDOMEN SURGERY      Bladder sling     C NONSPECIFIC PROCEDURE  2009    [place] mesh augmented anterior colporrhaphy and vault suspension, tension-free vaginal tape sling with a transobturator approach usingthe Obtryx device and cystoscopy.       COLONOSCOPY      Scheduled October 2016     COLPORRHAPY POSTERIOR, CYSTOSCOPY, COMBINED  12/7/2011    Procedure:COMBINED COLPORRHAPY POSTERIOR, CYSTOSCOPY; POSTERIOR MESH AUGMENTED REPAIR WITH ELEVATE MESH , cystoscopy; Surgeon:ANDRE UP; Location:[facility] OR     DILATION AND CURETTAGE, HYSTEROSCOPY DIAGNOSTIC, COMBINED N/A 10/4/2016    Procedure: COMBINED DILATION AND CURETTAGE, HYSTEROSCOPY DIAGNOSTIC;  Surgeon: Andre Up MD;  Location: [facility] OR     HC DILATION/CURETTAGE DIAG/THER NON OB      after sab     HC LEFT HEART CATHETERIZATION  11/23/16    PCI with drug-eluting stent placement in the proximal LAD     HC TOOTH EXTRACTION W/FORCEP         Social History   Substance Use Topics     Smoking status: Former Smoker     Packs/day: 0.75     Years: 20.00     Quit date: 11/5/1987     Smokeless tobacco: Never Used     Alcohol use Yes      Comment: 2 per month     Family History   Problem Relation Age of Onset     Gynecology Daughter      HEART DISEASE Father      D  AGE 50     Coronary Artery Disease Father      Hyperlipidemia Father      HEART DISEASE Mother      D AGE 70     DIABETES Mother      Coronary Artery Disease Mother      Hypertension Mother      Hyperlipidemia Mother      HEART DISEASE Brother      B AGE 52 HEART SURGERY     Family History Negative Brother      B AGE 47     Hypertension Brother      CEREBROVASCULAR DISEASE Sister      B AGE 54 BLOOD CLOTS     Family History Negative Sister      B AGE 60     Depression Sister          Current Outpatient Prescriptions   Medication Sig Dispense Refill     ondansetron (ZOFRAN-ODT) 4 MG ODT tab Take 1 tablet (4 mg) by mouth every 6 hours as needed for nausea 20 tablet 0     docusate sodium (COLACE) 100 MG capsule Take 100 mg by mouth 2 times daily       ticagrelor (BRILINTA) 90 MG tablet Take 1 tablet (90 mg) by mouth every 12 hours 180 tablet 3     metoprolol (TOPROL XL) 50 MG 24 hr tablet Take 1 tablet (50 mg) by mouth daily 90 tablet 3     atorvastatin (LIPITOR) 40 MG tablet Take 1 tablet (40 mg) by mouth daily 90 tablet 3     nitroglycerin (NITROSTAT) 0.4 MG SL tablet As needed for anginal chest pain. Maximum is 3 doses in 15 minutes (one every 5 minutes). 25 tablet 3     aspirin EC 81 MG EC tablet Take 1 tablet (81 mg) by mouth daily 90 tablet 3     amLODIPine (NORVASC) 5 MG tablet Take 5 mg by mouth daily       acetaminophen (TYLENOL) 325 MG tablet Take 2 tablets (650 mg) by mouth every 4 hours as needed for other (mild pain) (Patient taking differently: Take 325 mg by mouth every 4 hours as needed for other (mild pain) ) 100 tablet 0     metFORMIN (GLUCOPHAGE) 500 MG tablet Take 1 tablet (500 mg) by mouth daily (with dinner) 90 tablet 3     omeprazole (PRILOSEC) 20 MG capsule Take 1 capsule (20 mg) by mouth daily 90 capsule 3     glucose blood VI test strips strip Accucheck Kristine Plus  3 times daily (Patient taking differently: daily Accucheck Kristine Plus  3 times daily) 300 strip 11     ACCU-CHEK MULTICLIX LANCETS  MISC 3 times daily. BG testing 3 times a day (Patient taking differently: daily 3 times daily. BG testing 3 times a day) 100 each 11     Blood Glucose Calibration (ACCU-CHEK VI) SOLN Use as directed 3 Bottle 11     Blood Glucose Monitoring Suppl (ACCU-CHEK VI) KIT 2 times daily. With routine lancets as well as alcohol swabs, dispense 1 month (Patient taking differently: daily With routine lancets as well as alcohol swabs, dispense 1 month) 1 kit 0     Allergies   Allergen Reactions     Lisinopril      bp increased     BP Readings from Last 3 Encounters:   04/24/17 111/65   03/28/17 146/76   03/16/17 126/75    Wt Readings from Last 3 Encounters:   04/23/17 163 lb (73.9 kg)   03/28/17 165 lb 11.2 oz (75.2 kg)   03/16/17 165 lb (74.8 kg)           Reviewed and updated as needed this visit by clinical staff  Tobacco  Allergies  Med Hx  Surg Hx  Fam Hx  Soc Hx      Reviewed and updated as needed this visit by Provider       ROS:  C: NEGATIVE for fever, chills, change in weight  E/M: NEGATIVE for ear, mouth and throat problems  R: NEGATIVE for significant cough or SOB  CV: NEGATIVE for chest pain, palpitations or peripheral edema  GI: NEGATIVE for nausea, abdominal pain, heartburn, or change in bowel habits  : NEGATIVE for frequency, dysuria, or hematuria  M: NEGATIVE for significant arthralgias or myalgia  H: NEGATIVE for bleeding problems  P: NEGATIVE for changes in mood or affect    OBJECTIVE:                                                    /76  Pulse 75  Temp 97.9  F (36.6  C) (Oral)  Wt 163 lb 12.8 oz (74.3 kg)  SpO2 97%  BMI 31.99 kg/m2  Body mass index is 31.99 kg/(m^2).  GENERAL: healthy, alert and no distress with bruise to right forehead  EYES: Eyes grossly normal to inspection, extraocular movements - intact, and PERRL  HENT: ear canals- normal; TMs- normal; Nose- normal; Mouth- no ulcers, no lesions  NECK: no tenderness, no adenopathy, no asymmetry, no masses, no stiffness; thyroid-  normal to palpation  RESP: lungs clear to auscultation - no rales, no rhonchi, no wheezes  CV: regular rates and rhythm, normal S1 S2, no S3 or S4 and no click or rub -  MS: extremities- no gross deformities noted  NEURO: no focal changes  PSYCH: Alert and oriented times 3; speech- coherent , normal rate and volume; able to articulate logical thoughts, able to abstract reason, no tangential thoughts, no hallucinations or delusions, affect- normal     ASSESSMENT/PLAN:                                                      (R55) Vasovagal syncope  (primary encounter diagnosis)  Comment: appears stable, no current complaints, resolving clinical course  Plan:     (I10) Essential hypertension, benign  Comment: at goal on therapy  Plan: no changes to therapy      See Patient Instructions    Klever Vega MD  [facility]    THE MEDICATION LIST HAS BEEN FULLY RECONCILED BY THE M.D. AND THE NURSING STAFF.

## 2017-04-27 NOTE — NURSING NOTE
Chief Complaint   Patient presents with     Hospital F/U       Initial /76  Pulse 75  Temp 97.9  F (36.6  C) (Oral)  Wt 163 lb 12.8 oz (74.3 kg)  SpO2 97%  BMI 31.99 kg/m2 Estimated body mass index is 31.99 kg/(m^2) as calculated from the following:    Height as of 4/23/17: 5' (1.524 m).    Weight as of this encounter: 163 lb 12.8 oz (74.3 kg).  Medication Reconciliation: complete   Laisha Trevizo, CMA

## 2017-04-29 LAB
BACTERIA SPEC CULT: NO GROWTH
BACTERIA SPEC CULT: NO GROWTH
Lab: NORMAL
MICRO REPORT STATUS: NORMAL
MICRO REPORT STATUS: NORMAL
SPECIMEN SOURCE: NORMAL
SPECIMEN SOURCE: NORMAL

## 2017-05-01 ENCOUNTER — TELEPHONE (OUTPATIENT)
Dept: INTERNAL MEDICINE | Facility: CLINIC | Age: 72
End: 2017-05-01

## 2017-05-01 NOTE — TELEPHONE ENCOUNTER
Called patient and advised per Dr. Vega's recommendations.  Patient stated understanding.  Advised patient to call back if symptoms persist or worsen.

## 2017-05-01 NOTE — TELEPHONE ENCOUNTER
Etiology unclear, suggest aggressive hydration but only suggest clear liquids, if symptoms persist then SBS for another look

## 2017-05-01 NOTE — TELEPHONE ENCOUNTER
Pt states calling and states that she was admitted to hospital recently for vomiting and diarrhea, pt has passed out and fell and hit head.  Pt was seen by PCP last week and symptoms had resolved. Pt is now concerned, states starting last night, had vomiting and diarrhea again.  Has been about one hour since last episode of vomiting.  Pt is trying to keep hydrated.  Denies any other symptoms.  Afebrile.    Questions what could be causing symptoms.

## 2017-05-03 ENCOUNTER — APPOINTMENT (OUTPATIENT)
Dept: GENERAL RADIOLOGY | Facility: CLINIC | Age: 72
End: 2017-05-03
Attending: PHYSICIAN ASSISTANT
Payer: MEDICARE

## 2017-05-03 ENCOUNTER — HOSPITAL ENCOUNTER (OUTPATIENT)
Facility: CLINIC | Age: 72
Setting detail: OBSERVATION
Discharge: HOME | End: 2017-05-04
Attending: EMERGENCY MEDICINE | Admitting: HOSPITALIST
Payer: MEDICARE

## 2017-05-03 DIAGNOSIS — R19.7 NAUSEA VOMITING AND DIARRHEA: ICD-10-CM

## 2017-05-03 DIAGNOSIS — R11.2 NAUSEA VOMITING AND DIARRHEA: ICD-10-CM

## 2017-05-03 PROBLEM — A08.11 ENTERITIS DUE TO NOROVIRUS: Status: ACTIVE | Noted: 2017-05-03

## 2017-05-03 LAB
ALBUMIN SERPL-MCNC: 3.7 G/DL (ref 3.4–5)
ALP SERPL-CCNC: 88 U/L (ref 40–150)
ALT SERPL W P-5'-P-CCNC: 67 U/L (ref 0–50)
ANION GAP SERPL CALCULATED.3IONS-SCNC: 10 MMOL/L (ref 3–14)
AST SERPL W P-5'-P-CCNC: 38 U/L (ref 0–45)
BASOPHILS # BLD AUTO: 0 10E9/L (ref 0–0.2)
BASOPHILS NFR BLD AUTO: 0.3 %
BILIRUB SERPL-MCNC: 0.4 MG/DL (ref 0.2–1.3)
BUN SERPL-MCNC: 10 MG/DL (ref 7–30)
C DIFF TOX B STL QL: NORMAL
CALCIUM SERPL-MCNC: 8.8 MG/DL (ref 8.5–10.1)
CAMPYLOBACTER GROUP BY NAT: NOT DETECTED
CHLORIDE SERPL-SCNC: 107 MMOL/L (ref 94–109)
CO2 SERPL-SCNC: 23 MMOL/L (ref 20–32)
CREAT SERPL-MCNC: 0.58 MG/DL (ref 0.52–1.04)
DIFFERENTIAL METHOD BLD: NORMAL
ENTERIC PATHOGEN COMMENT: ABNORMAL
EOSINOPHIL # BLD AUTO: 0 10E9/L (ref 0–0.7)
EOSINOPHIL NFR BLD AUTO: 0.3 %
ERYTHROCYTE [DISTWIDTH] IN BLOOD BY AUTOMATED COUNT: 14.4 % (ref 10–15)
GFR SERPL CREATININE-BSD FRML MDRD: ABNORMAL ML/MIN/1.7M2
GLUCOSE BLDC GLUCOMTR-MCNC: 82 MG/DL (ref 70–99)
GLUCOSE BLDC GLUCOMTR-MCNC: 85 MG/DL (ref 70–99)
GLUCOSE SERPL-MCNC: 124 MG/DL (ref 70–99)
HCT VFR BLD AUTO: 40.8 % (ref 35–47)
HGB BLD-MCNC: 13.1 G/DL (ref 11.7–15.7)
IMM GRANULOCYTES # BLD: 0 10E9/L (ref 0–0.4)
IMM GRANULOCYTES NFR BLD: 0.5 %
LACTATE SERPL-SCNC: 1.5 MMOL/L (ref 0.4–2)
LIPASE SERPL-CCNC: 144 U/L (ref 73–393)
LYMPHOCYTES # BLD AUTO: 1.2 10E9/L (ref 0.8–5.3)
LYMPHOCYTES NFR BLD AUTO: 19.5 %
MCH RBC QN AUTO: 30.6 PG (ref 26.5–33)
MCHC RBC AUTO-ENTMCNC: 32.1 G/DL (ref 31.5–36.5)
MCV RBC AUTO: 95 FL (ref 78–100)
MONOCYTES # BLD AUTO: 1 10E9/L (ref 0–1.3)
MONOCYTES NFR BLD AUTO: 16.2 %
NEUTROPHILS # BLD AUTO: 3.9 10E9/L (ref 1.6–8.3)
NEUTROPHILS NFR BLD AUTO: 63.2 %
NOROVIRUS I AND II BY NAT: ABNORMAL
NRBC # BLD AUTO: 0 10*3/UL
NRBC BLD AUTO-RTO: 0 /100
PLATELET # BLD AUTO: 317 10E9/L (ref 150–450)
POTASSIUM SERPL-SCNC: 3.7 MMOL/L (ref 3.4–5.3)
PROT SERPL-MCNC: 7.3 G/DL (ref 6.8–8.8)
RBC # BLD AUTO: 4.28 10E12/L (ref 3.8–5.2)
ROTAVIRUS A BY NAT: NOT DETECTED
SALMONELLA SPECIES BY NAT: NOT DETECTED
SHIGA TOXIN 1 GENE BY NAT: NOT DETECTED
SHIGA TOXIN 2 GENE BY NAT: NOT DETECTED
SHIGELLA SP+EIEC IPAH STL QL NAA+PROBE: NOT DETECTED
SODIUM SERPL-SCNC: 140 MMOL/L (ref 133–144)
SPECIMEN SOURCE: NORMAL
VIBRIO GROUP BY NAT: NOT DETECTED
WBC # BLD AUTO: 6.1 10E9/L (ref 4–11)
YERSINIA ENTEROCOLITICA BY NAT: NOT DETECTED

## 2017-05-03 PROCEDURE — 96361 HYDRATE IV INFUSION ADD-ON: CPT

## 2017-05-03 PROCEDURE — 83690 ASSAY OF LIPASE: CPT | Performed by: EMERGENCY MEDICINE

## 2017-05-03 PROCEDURE — 85025 COMPLETE CBC W/AUTO DIFF WBC: CPT | Performed by: EMERGENCY MEDICINE

## 2017-05-03 PROCEDURE — 87493 C DIFF AMPLIFIED PROBE: CPT | Mod: 91 | Performed by: EMERGENCY MEDICINE

## 2017-05-03 PROCEDURE — 80053 COMPREHEN METABOLIC PANEL: CPT | Performed by: EMERGENCY MEDICINE

## 2017-05-03 PROCEDURE — 87506 IADNA-DNA/RNA PROBE TQ 6-11: CPT | Performed by: EMERGENCY MEDICINE

## 2017-05-03 PROCEDURE — 25800025 ZZH RX 258: Performed by: PHYSICIAN ASSISTANT

## 2017-05-03 PROCEDURE — G0378 HOSPITAL OBSERVATION PER HR: HCPCS

## 2017-05-03 PROCEDURE — 74000 XR ABDOMEN 1 VW: CPT

## 2017-05-03 PROCEDURE — 25000132 ZZH RX MED GY IP 250 OP 250 PS 637: Mod: GY | Performed by: PHYSICIAN ASSISTANT

## 2017-05-03 PROCEDURE — 83605 ASSAY OF LACTIC ACID: CPT | Performed by: EMERGENCY MEDICINE

## 2017-05-03 PROCEDURE — 00000146 ZZHCL STATISTIC GLUCOSE BY METER IP

## 2017-05-03 PROCEDURE — A9270 NON-COVERED ITEM OR SERVICE: HCPCS | Mod: GY | Performed by: PHYSICIAN ASSISTANT

## 2017-05-03 PROCEDURE — 99220 ZZC INITIAL OBSERVATION CARE,LEVL III: CPT | Performed by: PHYSICIAN ASSISTANT

## 2017-05-03 PROCEDURE — 25000128 H RX IP 250 OP 636: Performed by: EMERGENCY MEDICINE

## 2017-05-03 PROCEDURE — 96360 HYDRATION IV INFUSION INIT: CPT

## 2017-05-03 PROCEDURE — 99285 EMERGENCY DEPT VISIT HI MDM: CPT | Mod: 25

## 2017-05-03 PROCEDURE — 36415 COLL VENOUS BLD VENIPUNCTURE: CPT | Performed by: EMERGENCY MEDICINE

## 2017-05-03 RX ORDER — ONDANSETRON 4 MG/1
4 TABLET, ORALLY DISINTEGRATING ORAL EVERY 6 HOURS PRN
Status: DISCONTINUED | OUTPATIENT
Start: 2017-05-03 | End: 2017-05-04 | Stop reason: HOSPADM

## 2017-05-03 RX ORDER — NICOTINE POLACRILEX 4 MG
15-30 LOZENGE BUCCAL
Status: DISCONTINUED | OUTPATIENT
Start: 2017-05-03 | End: 2017-05-04 | Stop reason: HOSPADM

## 2017-05-03 RX ORDER — ACETAMINOPHEN 325 MG/1
325 TABLET ORAL EVERY 4 HOURS PRN
Status: DISCONTINUED | OUTPATIENT
Start: 2017-05-03 | End: 2017-05-04 | Stop reason: HOSPADM

## 2017-05-03 RX ORDER — SODIUM CHLORIDE, SODIUM LACTATE, POTASSIUM CHLORIDE, CALCIUM CHLORIDE 600; 310; 30; 20 MG/100ML; MG/100ML; MG/100ML; MG/100ML
INJECTION, SOLUTION INTRAVENOUS CONTINUOUS
Status: DISCONTINUED | OUTPATIENT
Start: 2017-05-03 | End: 2017-05-04 | Stop reason: HOSPADM

## 2017-05-03 RX ORDER — DEXTROSE MONOHYDRATE 25 G/50ML
25-50 INJECTION, SOLUTION INTRAVENOUS
Status: DISCONTINUED | OUTPATIENT
Start: 2017-05-03 | End: 2017-05-04 | Stop reason: HOSPADM

## 2017-05-03 RX ORDER — ATORVASTATIN CALCIUM 40 MG/1
40 TABLET, FILM COATED ORAL DAILY
Status: DISCONTINUED | OUTPATIENT
Start: 2017-05-03 | End: 2017-05-04 | Stop reason: HOSPADM

## 2017-05-03 RX ORDER — NITROGLYCERIN 0.4 MG/1
0.4 TABLET SUBLINGUAL EVERY 5 MIN PRN
Status: DISCONTINUED | OUTPATIENT
Start: 2017-05-03 | End: 2017-05-04 | Stop reason: HOSPADM

## 2017-05-03 RX ORDER — ASPIRIN 81 MG/1
81 TABLET ORAL DAILY
Status: DISCONTINUED | OUTPATIENT
Start: 2017-05-04 | End: 2017-05-04 | Stop reason: HOSPADM

## 2017-05-03 RX ORDER — NALOXONE HYDROCHLORIDE 0.4 MG/ML
.1-.4 INJECTION, SOLUTION INTRAMUSCULAR; INTRAVENOUS; SUBCUTANEOUS
Status: DISCONTINUED | OUTPATIENT
Start: 2017-05-03 | End: 2017-05-04 | Stop reason: HOSPADM

## 2017-05-03 RX ORDER — METOPROLOL SUCCINATE 25 MG/1
50 TABLET, EXTENDED RELEASE ORAL DAILY
Status: DISCONTINUED | OUTPATIENT
Start: 2017-05-04 | End: 2017-05-04 | Stop reason: HOSPADM

## 2017-05-03 RX ORDER — AMLODIPINE BESYLATE 5 MG/1
5 TABLET ORAL DAILY
Status: DISCONTINUED | OUTPATIENT
Start: 2017-05-04 | End: 2017-05-04 | Stop reason: HOSPADM

## 2017-05-03 RX ORDER — ONDANSETRON 2 MG/ML
4 INJECTION INTRAMUSCULAR; INTRAVENOUS EVERY 6 HOURS PRN
Status: DISCONTINUED | OUTPATIENT
Start: 2017-05-03 | End: 2017-05-04 | Stop reason: HOSPADM

## 2017-05-03 RX ADMIN — TICAGRELOR 90 MG: 90 TABLET ORAL at 23:42

## 2017-05-03 RX ADMIN — ATORVASTATIN CALCIUM 40 MG: 40 TABLET, FILM COATED ORAL at 21:28

## 2017-05-03 RX ADMIN — SODIUM CHLORIDE, POTASSIUM CHLORIDE, SODIUM LACTATE AND CALCIUM CHLORIDE: 600; 310; 30; 20 INJECTION, SOLUTION INTRAVENOUS at 16:43

## 2017-05-03 RX ADMIN — SODIUM CHLORIDE 1000 ML: 9 INJECTION, SOLUTION INTRAVENOUS at 10:01

## 2017-05-03 ASSESSMENT — ENCOUNTER SYMPTOMS
VOMITING: 1
DIZZINESS: 0
BLOOD IN STOOL: 0
FEVER: 1
NAUSEA: 1
ABDOMINAL PAIN: 1
LIGHT-HEADEDNESS: 0
DIARRHEA: 1

## 2017-05-03 NOTE — ED PROVIDER NOTES
History     Chief Complaint:  Nausea, Vomiting, & Diarrhea    HPI   Shantelle Su is a 72 year old female who presents with nausea, vomiting and diarrhea. This has been ongoing for greater than one week, and this is her second presentation to the ED for the same symptoms in the past 10 days. She was admitted to observation for symptom control and fluids, and was discharged home. Two days ago, the patient began to experience severe abdominal pain, nausea, and diarrhea. Yesterday, her symptoms were more mild, though flared again this morning. She has noted intermittent fevers throughout this time. No recent antibiotic use or travel.     Allergies:  Lisinopril     Medications:    ondansetron (ZOFRAN-ODT) 4 MG ODT tab   ticagrelor (BRILINTA) 90 MG tablet   metoprolol (TOPROL XL) 50 MG 24 hr tablet   atorvastatin (LIPITOR) 40 MG tablet   nitroglycerin (NITROSTAT) 0.4 MG SL tablet   aspirin EC 81 MG EC tablet   amLODIPine (NORVASC) 5 MG tablet   acetaminophen (TYLENOL) 325 MG tablet   metFORMIN (GLUCOPHAGE) 500 MG tablet   omeprazole (PRILOSEC) 20 MG capsule   glucose blood VI test strips strip   ACCU-CHEK MULTICLIX LANCETS MISC   Blood Glucose Calibration (ACCU-CHEK VI) SOLN   Blood Glucose Monitoring Suppl (ACCU-CHEK VI) KIT   docusate sodium (COLACE) 100 MG capsule     Past Medical History:    Acute URI   CAD  DM  Enterocele  HTN  GERD  Hiatal hernia  Lipids  PE and Infarct  PAC   Rectocele     Past Surgical History:    Bladder sling  Colonoscopy  Coprorrhaphy posterior with cystoscopy  D and C  Left heart cath  Tooth extraction    Family History:    GYN  Heart disease  CAD  Lipids  Diabetes  HTN  CVD  Depression    Social History:  The patient was accompanied to the ED by .  Smoking Status: Former smoker  Smokeless Tobacco: Never used  Alcohol Use: Yes   Marital Status:   [2]     Review of Systems   Constitutional: Positive for fever.   Gastrointestinal: Positive for abdominal pain, diarrhea,  nausea and vomiting. Negative for blood in stool.   Neurological: Negative for dizziness, syncope and light-headedness.   All other systems reviewed and are negative.    Physical Exam   Vitals:  Patient Vitals for the past 24 hrs:   BP Temp Temp src Pulse Heart Rate Resp SpO2 Height Weight   05/03/17 1230 136/62 - - - - - 93 % - -   05/03/17 1215 134/63 - - - - - 96 % - -   05/03/17 1200 139/65 - - - - - 97 % - -   05/03/17 1145 145/66 - - - - - 90 % - -   05/03/17 1130 92/83 - - - - - 98 % - -   05/03/17 0945 145/68 - - - - - 96 % - -   05/03/17 0930 106/57 - - - - - 95 % - -   05/03/17 0915 122/73 - - - - - 94 % - -   05/03/17 0900 131/69 - - - - - 96 % - -   05/03/17 0847 133/74 97.3  F (36.3  C) Temporal 87 87 20 100 % 1.524 m (5') 73.9 kg (163 lb)     Physical Exam  Nursing note and vitals reviewed.  Constitutional: Cooperative.   HENT:   Mouth/Throat: Moist mucous membranes.   Eyes: EOMI, nonicteric sclera  Cardiovascular: Normal rate, regular rhythm, no murmurs, rubs, or gallops  Pulmonary/Chest: Effort normal and breath sounds normal. No respiratory distress. No wheezes. No rales.   Abdominal: Soft. Nontender, nondistended, no guarding or rigidity. BS present.   Musculoskeletal: Normal range of motion.   Neurological: Alert. Moves all extremities spontaneously.   Skin: Skin is warm and dry. No rash noted.   Psychiatric: Normal mood and affect.     Emergency Department Course     Laboratory:  Laboratory findings were communicated with the patient who voiced understanding of the findings.  CBC: AWNL. (WBC 6.1, HGB 13.1, )   CMP: Glucose: 124 (H), ALT: 67 (H) o/w WNL (Creatinine 0.58)   Lipase: 144  Lactic Acid: 1.5   Enteric Bacteria Stool: Pending  C. Diff: Pending    Interventions:  1001 Normal Saline 1000 mL IV      Emergency Department Course:  Nursing notes and vitals reviewed.  I performed an exam of the patient as documented above.   IV was inserted and blood was drawn for laboratory testing,  results above.   1300 the patient states that she has continued to feel unwell and have loose, watery stools while in the ED. She would feel more comfortable with admission.     I discussed the treatment plan with the patient. They expressed understanding of this plan and consented to admission. I discussed the patient with the PA for Dr. Tineo, who will admit the patient to a monitored bed for further evaluation and treatment.    I personally reviewed the laboratory results with the Patient and answered all related questions prior to admission.    Impression & Plan      Medical Decision Making:  Shantelle Su is a 72 year old female who presents to the emergency department today with nausea, vomiting a and diarrhea. Patient was recently admitted to the hospital for the same. Labs appear better today, though patient reports that her symptoms are much worse. Labs are overall unremarkable. We were able to obtain stool cultures today, however they did not yet return. I did have a long talk with the patient concerning home and waiting for labs to return, versus hospital admission. Patient had already called her insurance company to make sure observation status admissions are covered. She prefers to stay in the hospital given the severity of her symptoms. Ultimately, I contacted the hospitalist PA for Dr. Tineo who accept the patient for admission. Patient was in stable condition at time of admission, all of her questions were answered and she is in agreement with the plan.     Diagnosis:    ICD-10-CM    1. Nausea vomiting and diarrhea R11.2     R19.7       Disposition:   Admission    Scribe Disclosure:  I, Sid Luna, am serving as a scribe at 9:18 AM on 5/3/2017 to document services personally performed by Chcae Preciado MD, based on my observations and the provider's statements to me.   5/3/2017   [facility] EMERGENCY DEPARTMENT       Chace Preciado MD  05/03/17 3108

## 2017-05-03 NOTE — IP AVS SNAPSHOT
[facility] Observation Department    [address]    [facility] [address]    Phone:  [phone]                                       After Visit Summary   5/3/2017    Shantelle Su    MRN: [id number]           After Visit Summary Signature Page     I have received my discharge instructions, and my questions have been answered. I have discussed any challenges I see with this plan with the nurse or doctor.    ..........................................................................................................................................  Patient/Patient Representative Signature      ..........................................................................................................................................  Patient Representative Print Name and Relationship to Patient    ..................................................               ................................................  Date                                            Time    ..........................................................................................................................................  Reviewed by Signature/Title    ...................................................              ..............................................  Date                                                            Time

## 2017-05-03 NOTE — PHARMACY-ADMISSION MEDICATION HISTORY
Admission medication history interview status for this patient is complete. See [facility] admission navigator for allergy information, prior to admission medications and immunization status.     Medication history interview source(s):Patient  Medication history resources (including written lists, pill bottles, clinic record):epic med list and patient's med list  Primary pharmacy:[facility] on [facility]    Changes made to PTA medication list:  Added: none  Deleted: ondansetron  Changed: none    Actions taken by pharmacist (provider contacted, etc):None     Additional medication history information:None    Medication reconciliation/reorder completed by provider prior to medication history? No    For patients on insulin therapy: NO (Yes/No)  Lantus/levemir/NPH/Mix 70/30 dose:  _____   in AM/PM  or twice daily   Sliding scale Novolog Y/N  If Yes, do you have a baseline novolog pre-meal dose:  ______units with meals   Patients eat three meals a day:   Y/N     Any Barriers to therapy:  cost of medications/comfortable with giving injections (if applicable)/ comfortable and confident with current diabetes regimen       Prior to Admission medications    Medication Sig Last Dose Taking? Auth Provider   docusate sodium (COLACE) 100 MG capsule Take 100 mg by mouth 2 times daily 5/2/2017 at Unknown time Yes Unknown, Entered By History   ticagrelor (BRILINTA) 90 MG tablet Take 1 tablet (90 mg) by mouth every 12 hours 5/3/2017 at 1 dose Yes Gee Lozada MD   metoprolol (TOPROL XL) 50 MG 24 hr tablet Take 1 tablet (50 mg) by mouth daily 5/3/2017 at Unknown time Yes Gee Lozada MD   atorvastatin (LIPITOR) 40 MG tablet Take 1 tablet (40 mg) by mouth daily 5/2/2017 at evening Yes Aylin Acosta APRN CNP   nitroglycerin (NITROSTAT) 0.4 MG SL tablet As needed for anginal chest pain. Maximum is 3 doses in 15 minutes (one every 5 minutes).  Yes Klever Vega MD   aspirin EC 81 MG EC tablet Take 1 tablet (81 mg) by  mouth daily 5/3/2017 at Unknown time Yes Gama Bradley MD   amLODIPine (NORVASC) 5 MG tablet Take 5 mg by mouth daily 5/3/2017 at Unknown time Yes Reported, Patient   acetaminophen (TYLENOL) 325 MG tablet Take 2 tablets (650 mg) by mouth every 4 hours as needed for other (mild pain)  Patient taking differently: Take 325 mg by mouth every 4 hours as needed for other (mild pain)   Yes Andre Up MD   metFORMIN (GLUCOPHAGE) 500 MG tablet Take 1 tablet (500 mg) by mouth daily (with dinner) 5/2/2017 at dinner Yes Klever Vega MD   omeprazole (PRILOSEC) 20 MG capsule Take 1 capsule (20 mg) by mouth daily 5/3/2017 at Unknown time Yes Klever Vega MD   glucose blood VI test strips strip Accucheck Kristine Plus  3 times daily  Patient taking differently: daily Accucheck Kristine Plus  3 times daily  Yes Klever Vega MD   ACCU-CHEK MULTICLIX LANCETS MISC 3 times daily. BG testing 3 times a day  Patient taking differently: daily 3 times daily. BG testing 3 times a day  Yes Klever Vega MD   Blood Glucose Calibration (ACCU-CHEK KRISTINE) SOLN Use as directed  Yes Klever Vega MD   Blood Glucose Monitoring Suppl (ACCU-CHEK KRISTINE) KIT 2 times daily. With routine lancets as well as alcohol swabs, dispense 1 month  Patient taking differently: daily With routine lancets as well as alcohol swabs, dispense 1 month  Yes Klever Vega MD

## 2017-05-03 NOTE — IP AVS SNAPSHOT
MRN:[id number]                      After Visit Summary   5/3/2017    Shantelle Su    MRN: [id number]           Thank you!     Thank you for choosing [facility] for your care. Our goal is always to provide you with excellent care. Hearing back from our patients is one way we can continue to improve our services. Please take a few minutes to complete the written survey that you may receive in the mail after you visit. If you would like to speak to someone directly about your visit please contact Patient Relations at [phone]. Thank you!          Patient Information     Date Of Birth          1945        About your hospital stay     You were admitted on:  May 3, 2017 You last received care in the:  [facility] Observation Department    You were discharged on:  May 4, 2017        Reason for your hospital stay       You were admitted for a diarrheal illness that we identified as norovirus. This could have been the same virus that brought you here a week ago. You are doing better now and keeping down fluid and food so you can go home. You are most contagious through tomorrow. We recommend you bleach surfaces at your home and not cook food for everyone for a four weeks. You are still slightly contagious for up to 4 weeks so should avoid contact with infants and immunocompromised people. You should start a probiotic which could be picked up at the pharmacy (over the counter) or you could start Activia yogurt.    You should not take your metformin until you are eating regularly.                  Who to Call     For medical emergencies, please call 911.  For non-urgent questions about your medical care, please call your primary care provider or clinic, [phone]          Attending Provider     Provider Specialty    Chace Preciado MD Emergency Medicine    Noman, Angel Carter MD Internal Medicine       Primary Care Provider Office Phone # Fax #    Klever Vega,  [phone]       [facility] [address] [facility] [address]        After Care Instructions     Activity       Your activity upon discharge: activity as tolerated            Diet       Follow this diet upon discharge: Advance diet as tolerated                  Follow-up Appointments     Follow-up and recommended labs and tests        Follow up with primary care provider as needed.                             Pending Results     No orders found for last 3 day(s).            Statement of Approval     Ordered          05/04/17 1043  I have reviewed and agree with all the recommendations and orders detailed in this document.  EFFECTIVE NOW     Approved and electronically signed by:  Emeli Su PA-C             Admission Information     Date & Time Provider Department Dept. Phone    5/3/2017 Angel Tineo MD [facility] Observation Department [phone]      Your Vitals Were     Blood Pressure Pulse Temperature Respirations Height Weight    135/61 68 97.8  F (36.6  C) (Oral) 20 1.524 m (5') 72.1 kg (159 lb)    Pulse Oximetry BMI (Body Mass Index)                97% 31.05 kg/m2          [employer] Information     [employer] gives you secure access to your electronic health record. If you see a primary care provider, you can also send messages to your care team and make appointments. If you have questions, please call your primary care clinic.  If you do not have a primary care provider, please call [phone] and they will assist you.        Care EveryWhere ID     This is your Care EveryWhere ID. This could be used by other organizations to access your [place] medical records  [id number]           Review of your medicines      START taking        Dose / Directions    prochlorperazine 5 MG tablet   Commonly known as:  COMPAZINE   Used for:  Nausea vomiting and diarrhea        Dose:  5 mg   Take 1 tablet (5 mg) by mouth every 6 hours as needed for nausea  or vomiting   Quantity:  20 tablet   Refills:  0         CONTINUE these medicines which may have CHANGED, or have new prescriptions. If we are uncertain of the size of tablets/capsules you have at home, strength may be listed as something that might have changed.        Dose / Directions    ACCU-CHEK VI Kit   This may have changed:    - when to take this  - additional instructions   Used for:  Hyperglycemia        2 times daily. With routine lancets as well as alcohol swabs, dispense 1 month   Quantity:  1 kit   Refills:  0       acetaminophen 325 MG tablet   Commonly known as:  TYLENOL   This may have changed:  how much to take   Used for:  Post-operative state        Dose:  650 mg   Take 2 tablets (650 mg) by mouth every 4 hours as needed for other (mild pain)   Quantity:  100 tablet   Refills:  0       blood glucose monitoring lancets   This may have changed:    - when to take this  - additional instructions   Used for:  Hyperglycemia        3 times daily. BG testing 3 times a day   Quantity:  100 each   Refills:  11       blood glucose monitoring test strip   Commonly known as:  no brand specified   This may have changed:    - when to take this  - additional instructions   Used for:  Hyperglycemia        Accucheck Vi Plus  3 times daily   Quantity:  300 strip   Refills:  11         CONTINUE these medicines which have NOT CHANGED        Dose / Directions    aspirin 81 MG EC tablet   Used for:  Angina pectoris, unspecified (H), Status post coronary angioplasty        Dose:  81 mg   Take 1 tablet (81 mg) by mouth daily   Quantity:  90 tablet   Refills:  3       atorvastatin 40 MG tablet   Commonly known as:  LIPITOR   Used for:  Hyperlipidemia LDL goal <130        Dose:  40 mg   Take 1 tablet (40 mg) by mouth daily   Quantity:  90 tablet   Refills:  3       blood glucose calibration solution   Used for:  Metabolic syndrome        Use as directed   Quantity:  3 Bottle   Refills:  11       docusate sodium 100  MG capsule   Commonly known as:  COLACE        Dose:  100 mg   Take 100 mg by mouth 2 times daily   Refills:  0       metFORMIN 500 MG tablet   Commonly known as:  GLUCOPHAGE        Dose:  500 mg   Take 1 tablet (500 mg) by mouth daily (with dinner)   Quantity:  90 tablet   Refills:  3       metoprolol 50 MG 24 hr tablet   Commonly known as:  TOPROL XL   Used for:  Paroxysmal supraventricular tachycardia (H), Abnormal electrocardiogram        Dose:  50 mg   Take 1 tablet (50 mg) by mouth daily   Quantity:  90 tablet   Refills:  3       nitroglycerin 0.4 MG sublingual tablet   Commonly known as:  NITROSTAT   Used for:  Status post coronary angioplasty        As needed for anginal chest pain. Maximum is 3 doses in 15 minutes (one every 5 minutes).   Quantity:  25 tablet   Refills:  3       NORVASC 5 MG tablet   Generic drug:  amLODIPine        Dose:  5 mg   Take 5 mg by mouth daily   Refills:  0       omeprazole 20 MG CR capsule   Commonly known as:  priLOSEC   Used for:  Gastroesophageal reflux disease without esophagitis        Dose:  20 mg   Take 1 capsule (20 mg) by mouth daily   Quantity:  90 capsule   Refills:  3       ticagrelor 90 MG tablet   Commonly known as:  BRILINTA   Used for:  Status post coronary angioplasty        Dose:  90 mg   Take 1 tablet (90 mg) by mouth every 12 hours   Quantity:  180 tablet   Refills:  3            Where to get your medicines      These medications were sent to [facility]/pharmacy #[id number] - [place], [place] - [address]  [address] [address] [address]    Hours:  [facility] to [employer] Phone:  [phone]     prochlorperazine 5 MG tablet                Protect others around you: Learn how to safely use, store and throw away your medicines at www.disposemymeds.org.             Medication List: This is a list of all your medications and when to take them. Check marks below indicate your daily home schedule. Keep this list as a reference.      Medications            Morning Afternoon Evening Bedtime As Needed    ACCU-CHEK VI Kit   2 times daily. With routine lancets as well as alcohol swabs, dispense 1 month                                acetaminophen 325 MG tablet   Commonly known as:  TYLENOL   Take 2 tablets (650 mg) by mouth every 4 hours as needed for other (mild pain)                                aspirin 81 MG EC tablet   Take 1 tablet (81 mg) by mouth daily   Last time this was given:  81 mg on 5/4/2017  8:12 AM                                atorvastatin 40 MG tablet   Commonly known as:  LIPITOR   Take 1 tablet (40 mg) by mouth daily   Last time this was given:  40 mg on 5/3/2017  9:28 PM                                blood glucose calibration solution   Use as directed                                blood glucose monitoring lancets   3 times daily. BG testing 3 times a day                                blood glucose monitoring test strip   Commonly known as:  no brand specified   Accucheck Vi Plus  3 times daily                                docusate sodium 100 MG capsule   Commonly known as:  COLACE   Take 100 mg by mouth 2 times daily                                metFORMIN 500 MG tablet   Commonly known as:  GLUCOPHAGE   Take 1 tablet (500 mg) by mouth daily (with dinner)                                metoprolol 50 MG 24 hr tablet   Commonly known as:  TOPROL XL   Take 1 tablet (50 mg) by mouth daily   Last time this was given:  50 mg on 5/4/2017  8:12 AM                                nitroglycerin 0.4 MG sublingual tablet   Commonly known as:  NITROSTAT   As needed for anginal chest pain. Maximum is 3 doses in 15 minutes (one every 5 minutes).                                NORVASC 5 MG tablet   Take 5 mg by mouth daily   Last time this was given:  5 mg on 5/4/2017  8:12 AM   Generic drug:  amLODIPine                                omeprazole 20 MG CR capsule   Commonly known as:  priLOSEC   Take 1 capsule (20 mg) by mouth daily   Last time this was  given:  20 mg on 5/4/2017  8:12 AM                                prochlorperazine 5 MG tablet   Commonly known as:  COMPAZINE   Take 1 tablet (5 mg) by mouth every 6 hours as needed for nausea or vomiting                                ticagrelor 90 MG tablet   Commonly known as:  BRILINTA   Take 1 tablet (90 mg) by mouth every 12 hours   Last time this was given:  90 mg on 5/4/2017 10:45 AM

## 2017-05-03 NOTE — PLAN OF CARE
Problem: Discharge Planning  Goal: Discharge Planning (Adult, OB, Behavioral, Peds)  PRIMARY DIAGNOSIS: GASTROENTERITIS  Primary Symptoms: nausea, vomiting and diarrhea     OUTPATIENT/OBSERVATION GOALS TO BE MET BEFORE DISCHARGE:     1. Orthostatic symptoms (BP decrease or HR increase with patient upright)? N/A  2. Documented urine output: Yes  3. Tolerating PO fluid: No  4. Nausea/Vomiting/Diarrhea (if present) symptoms improved: No     Pt A&Ox4, VSS. Pt denies abd pain at this time, though feels bloated. Pt reports not eating much today. Last BM 30 min ago, still loose. Sample sent in ER, neg for c diff, enteric panel still pending. Pt denies nausea at this time, last vomit on 5/1. Will try clear liquids this evening. Up ind in room. IVF infusing. Family at bedside. Will continue to monitor.

## 2017-05-03 NOTE — ED NOTES
[facility]  ED Nurse Handoff Report    Shantelle Su is a 72 year old female   ED Chief complaint: Nausea, Vomiting, & Diarrhea  . ED Diagnosis:   Final diagnoses:   Nausea vomiting and diarrhea     Allergies:   Allergies   Allergen Reactions     Lisinopril      bp increased       Code Status: PRIOR  Activity level - Baseline/Home:  INDEPENDENT. Activity Level - Current:   ONE PERSON ASSIST. Lift room needed: NO. Bariatric: NO   Needed: NO  Isolation: YES, ENTERIC. Infection: WAITING FOR C-DIFF RESULTS    Vital Signs:   Vitals:    05/03/17 1145 05/03/17 1200 05/03/17 1215 05/03/17 1230   BP: 145/66 139/65 134/63 136/62   Pulse:       Resp:       Temp:       TempSrc:       SpO2: 90% 97% 96% 93%   Weight:       Height:         Cardiac Rhythm:   Pain level: 0-10 Pain Scale: 0  Patient confused: NO. Patient Falls Risk: NO    Patient Report - Initial Complaint: n/v/d. Focused Assessment: PT WITH DIFFUSE LOWER ABD PAIN, EQUAL BILAT.  FREQUENT LOOSE STOOLS  Tests Performed: C-DIFF, LABS. Abnormal Results:   Lactic acid Resulted Lactic Acid: 1.5 mmol/L [Ref Range: 0.4 - 2.0]  Collected: 5/3/2017 10:20  Last updated: 5/3/2017 10:51 FI     10:27 Comprehensive metabolic panel Resulted Abnormal Result -   Sodium: 140 mmol/L [Ref Range: 133 - 144]  Potassium: 3.7 mmol/L [Ref Range: 3.4 - 5.3]  Chloride: 107 mmol/L [Ref Range: 94 - 109]  Carbon Dioxide: 23 mmol/L [Ref Range: 20 - 32]  Anion Gap: 10 mmol/L [Ref Range: 3 - 14]  Glucose: 124 mg/dL [Ref Range: 70 - 99]  Urea Nitrogen: 10 mg/dL [Ref Range: 7 - 30]  Creatinine: 0.58 mg/dL [Ref Range: 0.52 - 1.04]  GFR Estimate: >90   Non African American GFR Calc   mL/min/1.7m2 [Ref Range: >60]  GFR Estimate If Black: >90   African American GFR Calc   mL/min/1.7m2 [Ref Range: >60]  Calcium: 8.8 mg/dL [Ref Range: 8.5 - 10.1]  Bilirubin Total: 0.4 mg/dL [Ref Range: 0.2 - 1.3]  Albumin: 3.7 g/dL [Ref Range: 3.4 - 5.0]  Protein Total: 7.3 g/dL [Ref Range: 6.8 -  8.8]  Alkaline Phosphatase: 88 U/L [Ref Range: 40 - 150]  ALT: 67 U/L [Ref Range: 0 - 50]  AST: 38 U/L [Ref Range: 0 - 45]  Collected: 5/3/2017 09:29  Last updated: 5/3/2017 10:27 FI    10:26 Lipase Resulted Lipase: 144 U/L [Ref Range: 73 - 393]  Collected: 5/3/2017 09:29  Last updated: 5/3/2017 10:26         Treatments provided: FLUIDS  Family Comments:   OBS brochure/video discussed/provided to patient:    ED Medications:   Medications   0.9% sodium chloride BOLUS (1,000 mLs Intravenous New Bag 5/3/17 1001)     Drips infusing:  NO     ED Nurse Name/Phone Number: Lou Sarita,   2:17 PM    RECEIVING UNIT ED HANDOFF REVIEW    Above ED Nurse Handoff Report was reviewed: Yes  Reviewed by: Suzie Dow on May 3, 2017 at 3:37 PM

## 2017-05-03 NOTE — ED NOTES
Pt. With nausea/ vomiting/diarrhea intermittent for the past couple weeks.  .Pt. Weak and dizzy.  Patient alert and oriented x3.  Airway, breathing and circulation intact.

## 2017-05-03 NOTE — PLAN OF CARE
Problem: Discharge Planning  Goal: Discharge Planning (Adult, OB, Behavioral, Peds)  ROOM # 203     Living Situation (if not independent, order SW consult): ind with family (, daughter and son-in-law)  Facility name: n/a  : Oliverio () [phone], Josias (daughter) [phone]     Activity level at baseline: ind  Activity level on admit: SBA        Patient registered to observation; given Patient Bill of Rights; given the opportunity to ask questions about observation status and their plan of care.  Patient has been oriented to the observation room, bathroom and call light is in place.     Discussed discharge goals and expectations with patient/family.

## 2017-05-03 NOTE — PROGRESS NOTES
Pt just informed that a friend that she visited at a nursing facility on Friday (4/28) was admitted to the hospital on Saturday (4/29) with the same symptoms.

## 2017-05-04 VITALS
OXYGEN SATURATION: 97 % | DIASTOLIC BLOOD PRESSURE: 61 MMHG | HEART RATE: 68 BPM | RESPIRATION RATE: 20 BRPM | BODY MASS INDEX: 31.22 KG/M2 | TEMPERATURE: 97.8 F | HEIGHT: 60 IN | SYSTOLIC BLOOD PRESSURE: 135 MMHG | WEIGHT: 159 LBS

## 2017-05-04 LAB — GLUCOSE BLDC GLUCOMTR-MCNC: 94 MG/DL (ref 70–99)

## 2017-05-04 PROCEDURE — G0378 HOSPITAL OBSERVATION PER HR: HCPCS

## 2017-05-04 PROCEDURE — 96361 HYDRATE IV INFUSION ADD-ON: CPT

## 2017-05-04 PROCEDURE — 25000132 ZZH RX MED GY IP 250 OP 250 PS 637: Mod: GY | Performed by: PHYSICIAN ASSISTANT

## 2017-05-04 PROCEDURE — 00000146 ZZHCL STATISTIC GLUCOSE BY METER IP

## 2017-05-04 PROCEDURE — 25800025 ZZH RX 258: Performed by: PHYSICIAN ASSISTANT

## 2017-05-04 PROCEDURE — A9270 NON-COVERED ITEM OR SERVICE: HCPCS | Mod: GY | Performed by: PHYSICIAN ASSISTANT

## 2017-05-04 PROCEDURE — 99217 ZZC OBSERVATION CARE DISCHARGE: CPT | Performed by: PHYSICIAN ASSISTANT

## 2017-05-04 RX ORDER — PROCHLORPERAZINE MALEATE 5 MG
5 TABLET ORAL EVERY 6 HOURS PRN
Qty: 20 TABLET | Refills: 0 | Status: SHIPPED | OUTPATIENT
Start: 2017-05-04 | End: 2017-12-07

## 2017-05-04 RX ADMIN — OMEPRAZOLE 20 MG: 20 CAPSULE, DELAYED RELEASE ORAL at 08:12

## 2017-05-04 RX ADMIN — SODIUM CHLORIDE, POTASSIUM CHLORIDE, SODIUM LACTATE AND CALCIUM CHLORIDE: 600; 310; 30; 20 INJECTION, SOLUTION INTRAVENOUS at 02:46

## 2017-05-04 RX ADMIN — TICAGRELOR 90 MG: 90 TABLET ORAL at 10:45

## 2017-05-04 RX ADMIN — ASPIRIN 81 MG: 81 TABLET, COATED ORAL at 08:12

## 2017-05-04 RX ADMIN — METOPROLOL SUCCINATE 50 MG: 25 TABLET, EXTENDED RELEASE ORAL at 08:12

## 2017-05-04 RX ADMIN — AMLODIPINE BESYLATE 5 MG: 5 TABLET ORAL at 08:12

## 2017-05-04 NOTE — PLAN OF CARE
Problem: Discharge Planning  Goal: Discharge Planning (Adult, OB, Behavioral, Peds)  PRIMARY DIAGNOSIS: GASTROENTERITIS  Primary Symptoms: nausea, vomiting and diarrhea      OUTPATIENT/OBSERVATION GOALS TO BE MET BEFORE DISCHARGE:      1. Orthostatic symptoms (BP decrease or HR increase with patient upright)? N/A  2. Documented urine output: Yes  3. Tolerating PO fluid: No  4. Nausea/Vomiting/Diarrhea (if present) symptoms improved: No      Pt A&Ox4, VSS. Pt denies abd pain at this time, though feels bloated. Pt reports not eating much today. Last BM 30 min ago, still loose. Pt positive for norovirus, pt informed. Pt denies nausea at this time, last vomit on 5/1.Tolerating clear liquids this evening. Up ind in room. IVF infusing. Will continue to monitor.

## 2017-05-04 NOTE — PLAN OF CARE
Problem: Discharge Planning  Goal: Discharge Planning (Adult, OB, Behavioral, Peds)  Outcome: Improving  RIMARY DIAGNOSIS: GASTROENTERITIS  Primary Symptoms: nausea, vomiting and diarrhea      OUTPATIENT/OBSERVATION GOALS TO BE MET BEFORE DISCHARGE:      1. Orthostatic symptoms (BP decrease or HR increase with patient upright)? N/A  2. Documented urine output: Yes  3. Tolerating PO fluid: Yes, clear liquids  4. Nausea/Vomiting/Diarrhea (if present) symptoms improved: last Diarrhea episode was in the around 8:30pm       Pt is A&Ox4, VSS. Pt denies abd pain at this time.  Patient became very agitated d/t being woke up multiple times while sleeping. Pt is positive for norovirus, last BM was in the evening shift. Denies nausea.Tolerating clear liquids. Up ind in room. IVF infusing.  Will continue to monitor, assess, and offer supportive cares.

## 2017-05-04 NOTE — PLAN OF CARE
Problem: Discharge Planning  Goal: Discharge Planning (Adult, OB, Behavioral, Peds)  Outcome: Improving  RIMARY DIAGNOSIS: GASTROENTERITIS  Primary Symptoms: nausea, vomiting and diarrhea      OUTPATIENT/OBSERVATION GOALS TO BE MET BEFORE DISCHARGE:      1. Orthostatic symptoms (BP decrease or HR increase with patient upright)? N/A  2. Documented urine output: Yes  3. Tolerating PO fluid: Yes  4. Nausea/Vomiting/Diarrhea (if present) symptoms improved: Yes      Pt is A&Ox4, VSS. Pt denies abdominal pain, nausea and vomiting. Pt has not had diarrhea since last evening. Pt tolerating regular diet. Will continue to monitor.

## 2017-05-04 NOTE — PLAN OF CARE
Problem: Discharge Planning  Goal: Discharge Planning (Adult, OB, Behavioral, Peds)  Outcome: Adequate for Discharge Date Met:  05/04/17  Patient's After Visit Summary was reviewed with patient and spouse  Patient verbalized understanding of After Visit Summary, recommended follow up and was given an opportunity to ask questions.   Discharge medications sent home with patient/family: No-Meds sent to home pharmacy   Discharged with spouse

## 2017-05-04 NOTE — H&P
[facility]    History and Physical  Hospitalist       Date of Admission:  5/3/2017  Date of Service (when I saw the patient): 5/3/17    Assessment & Plan   Shantelle Su is a 72 year old female with a hx of CAD, HTN, HLD, GERD and DM who presents with non-bloody diarrhea that started early this morning.     Summary:     1.  Gastroenteritis - potentially viral, unclear if this is a new episode or relates to prior admission, though she ceased to have stools during that time.  Stool culture and Clostridium difficile sent from the emergency room, will await those and treat pending those results.  She did have some left lower quadrant pain when i saw her, therefore AXR obtained. There were some air fluid levels, ?ileus vs early SBO, but clinically her exam is inconsistent with either as she is passing stool and is without distension. LLQ pain, fever and hx of diverticulosis noted on her prior colonoscopy makes diverticulitis a possibility. Joelle repeat a CBC if she has significant worsening of pain in addition to serial AXR or obtain CT. If she seems to be progressing to SBO, consider NG and surg consult. IVF and clears this evening. Can adv diet tomorrow if improving. Consider holding PPI a few days, as this can contribute to diarrhea.     2.  Hypertension.  Continue home cardiac medications.     3.  Diabetes - place on sliding scale insulin during admission.  Hold p.o. meds.      DVT Prophylaxis: Low Risk/Ambulatory with no VTE prophylaxis indicated  Code Status: Full Code    Disposition: Expected discharge in 1 day.    Ata Charlton PA-C    Primary Care Physician   Klever Vega    Chief Complaint   Diarrhea    History is obtained from the patient    History of Present Illness   Shantelle Su is a 72-year-old female who presents today with nausea, vomiting, and some diarrhea that started early this morning.  She had a recent hospitalization on 04/23/2017 for a vasovagal episode thought related to  some nausea, vomiting, and diarrhea she had at that time.  When she was admitted at that time, she ended up having no further stools, improved with IV fluids and was sent home.  During that admission, she had a negative head CT, negative C-spine, negative flu and her blood and urine cultures were also negative.  She states she did well for about 6 days at home before recurrence of some nausea, vomiting and diarrhea overnight and this morning.  She states she has had two large loose stools at home and two while in the ER that have been nonbloody and mostly watery. She had a temperature two days ago, she says was 100.5.  She states she had a recent colonoscopy that showed a polyp.  She has not had any issues since that time.  She states her only recent travel was to [place].  She denies any exposures.  She also denies any recent antibiotic use.  No one else that she is in contact with has been sick.  She also denies any recent medication changes.  She states she has had decreased urine output over the last day.  She says her intake has been poor overall as well    Past Medical History    I have reviewed this patient's medical history and updated it with pertinent information if needed.   Past Medical History:   Diagnosis Date     Acute upper respiratory infection 9/30/2002     CAD (coronary artery disease) 11/23/2016    sp proximal LAD stenting     Diabetes mellitus (H)      Enterocele 9/12/2011     Essential hypertension, benign      GERD (gastroesophageal reflux disease) 10/13/2008     Hiatal hernia      Hyperlipidemia LDL goal <130 10/31/2010     Other pulmonary embolism and infarction 9/30/2002    after starting hormone therapy     PAC (premature atrial contraction)      Rectocele 10/15/2007       Past Surgical History   I have reviewed this patient's surgical history and updated it with pertinent information if needed.  Past Surgical History:   Procedure Laterality Date     ABDOMEN SURGERY      Bladder sling      C NONSPECIFIC PROCEDURE      Largo mesh augmented anterior colporrhaphy and vault suspension, tension-free vaginal tape sling with a transobturator approach usingthe Obtryx device and cystoscopy.       COLONOSCOPY      Scheduled 2016     COLPORRHAPY POSTERIOR, CYSTOSCOPY, COMBINED  2011    Procedure:COMBINED COLPORRHAPY POSTERIOR, CYSTOSCOPY; POSTERIOR MESH AUGMENTED REPAIR WITH ELEVATE MESH , cystoscopy; Surgeon:ANDRE UP; Location:[facility] OR     DILATION AND CURETTAGE, HYSTEROSCOPY DIAGNOSTIC, COMBINED N/A 10/4/2016    Procedure: COMBINED DILATION AND CURETTAGE, HYSTEROSCOPY DIAGNOSTIC;  Surgeon: Andre Up MD;  Location: [facility] OR     HC DILATION/CURETTAGE DIAG/THER NON OB      after sab     HC LEFT HEART CATHETERIZATION  16    PCI with drug-eluting stent placement in the proximal LAD     HC TOOTH EXTRACTION W/FORCEP         Prior to Admission Medications   Prior to Admission Medications   Prescriptions Last Dose Informant Patient Reported? Taking?   ACCU-CHEK MULTICLIX LANCETS MISC  Self No Yes   Sig: 3 times daily. BG testing 3 times a day   Patient taking differently: daily 3 times daily. BG testing 3 times a day   Blood Glucose Calibration (ACCU-CHEK VI) SOLN  Self No Yes   Sig: Use as directed   Blood Glucose Monitoring Suppl (ACCU-CHEK VI) KIT  Self No Yes   Si times daily. With routine lancets as well as alcohol swabs, dispense 1 month   Patient taking differently: daily With routine lancets as well as alcohol swabs, dispense 1 month   acetaminophen (TYLENOL) 325 MG tablet  Self No Yes   Sig: Take 2 tablets (650 mg) by mouth every 4 hours as needed for other (mild pain)   Patient taking differently: Take 325 mg by mouth every 4 hours as needed for other (mild pain)    amLODIPine (NORVASC) 5 MG tablet 5/3/2017 at Unknown time Self Yes Yes   Sig: Take 5 mg by mouth daily   aspirin EC 81 MG EC tablet 5/3/2017 at Unknown time Self No Yes   Sig: Take 1 tablet (81 mg) by  mouth daily   atorvastatin (LIPITOR) 40 MG tablet 5/2/2017 at evening Self No Yes   Sig: Take 1 tablet (40 mg) by mouth daily   docusate sodium (COLACE) 100 MG capsule 5/2/2017 at Unknown time Self Yes Yes   Sig: Take 100 mg by mouth 2 times daily   glucose blood VI test strips strip  Self No Yes   Sig: Accucheck Kristine Plus  3 times daily   Patient taking differently: daily Accucheck Kritsine Plus  3 times daily   metFORMIN (GLUCOPHAGE) 500 MG tablet 5/2/2017 at dinner Self No Yes   Sig: Take 1 tablet (500 mg) by mouth daily (with dinner)   metoprolol (TOPROL XL) 50 MG 24 hr tablet 5/3/2017 at Unknown time Self No Yes   Sig: Take 1 tablet (50 mg) by mouth daily   nitroglycerin (NITROSTAT) 0.4 MG SL tablet  Self No Yes   Sig: As needed for anginal chest pain. Maximum is 3 doses in 15 minutes (one every 5 minutes).   omeprazole (PRILOSEC) 20 MG capsule 5/3/2017 at Unknown time Self No Yes   Sig: Take 1 capsule (20 mg) by mouth daily   ticagrelor (BRILINTA) 90 MG tablet 5/3/2017 at 1 dose Self No Yes   Sig: Take 1 tablet (90 mg) by mouth every 12 hours      Facility-Administered Medications: None     Allergies   Allergies   Allergen Reactions     Lisinopril      bp increased       Social History   I have reviewed this patient's social history and updated it with pertinent information if needed. Shantelle Su  reports that she quit smoking about 29 years ago. She has a 15.00 pack-year smoking history. She has never used smokeless tobacco. She reports that she drinks alcohol. She reports that she does not use illicit drugs.    Family History   I have reviewed this patient's family history and updated it with pertinent information if needed.   Family History   Problem Relation Age of Onset     Gynecology Daughter      HEART DISEASE Father      D AGE 50     Coronary Artery Disease Father      Hyperlipidemia Father      HEART DISEASE Mother      D AGE 70     DIABETES Mother      Coronary Artery Disease Mother       Hypertension Mother      Hyperlipidemia Mother      HEART DISEASE Brother      B AGE 52 HEART SURGERY     Family History Negative Brother      B AGE 47     Hypertension Brother      CEREBROVASCULAR DISEASE Sister      B AGE 54 BLOOD CLOTS     Family History Negative Sister      B AGE 60     Depression Sister        Review of Systems   The 10 point Review of Systems is negative other than noted in the HPI or here.     Physical Exam   Temp: 93.7  F (34.3  C) Temp src: Oral BP: 119/50 Pulse: 68 Heart Rate: 62 Resp: 16 SpO2: 95 % O2 Device: None (Room air)    Vital Signs with Ranges  Temp:  [93.7  F (34.3  C)-98.1  F (36.7  C)] 93.7  F (34.3  C)  Pulse:  [68-87] 68  Heart Rate:  [62-87] 62  Resp:  [16-20] 16  BP: ()/(41-83) 119/50  SpO2:  [90 %-100 %] 95 %  159 lbs 0 oz      General:  Patient appears comfortable and in no acute distress.   HEENT:  Head is atraumatic, normocephalic.  Pupils are equal, round and reactive to light.  No scleral icterus. Conjunctiva are without injection.   Neck: Neck is supple    Lymphatic: There is no cervical, supraclavicular adenopathy or tenderness to palpation.   Respiratory: Lungs are clear to auscultation and percussion bilaterally.   Cardiovascular:  Regular rate and rhythm.  Normal S1 and S2.  No murmurs, rubs, or gallops.  Radial, dorsalis pedis and posterior tibialis pulses are 2+ bilaterally.  No jugular venous distention present.  No pretibial edema noted.    Abdomen:   Normal to visual inspection.  Normoactive bowel sounds, no high-pitched sounds c/w SBO.  LLQ tender to palpation.  No masses or hepatosplenomegaly are appreciated.   Skin: No skin rashes or lesions to inspection or palpation.   Neurologic:  Cranial nerves II through XII are grossly intact and symmetric.   Sensation is intact to light touch in the upper and lower extremities bilaterally.   Musculoskeletal:  There is full range of motion in the upper and lower extremities bilaterally.      Psychiatric: The  patient is alert and oriented times 3.  Affect is not blunted and mood is appropriate.          Data   Data reviewed today:  I personally reviewed the abdominal x-ray image(s) showing some air fluid levels.    Recent Labs  Lab 17  0929   WBC 6.1   HGB 13.1   MCV 95         POTASSIUM 3.7   CHLORIDE 107   CO2 23   BUN 10   CR 0.58   ANIONGAP 10   RAFAEL 8.8   *   ALBUMIN 3.7   PROTTOTAL 7.3   BILITOTAL 0.4   ALKPHOS 88   ALT 67*   AST 38   LIPASE 144       Imaging:    Recent Results (from the past 24 hour(s))   XR Abdomen 1 View    Narrative    XR ABDOMEN 1 VW 5/3/2017 4:07 PM    COMPARISON: None.    HISTORY: Left lower quadrant pain.      Impression    IMPRESSION: Nonspecific bowel gas pattern with scattered air-fluid  levels, but no distended bowel, findings may represent ileus or very  early obstruction. No free air.    BRITTANY TRIMBLE MD       As dictated by DAYNA SULLIVAN PA-C            D: 2017 15:35   T: 2017 17:31   MT: [id number]      Name:     RODNEY HOYOS   MRN:      [id number]        Account:      [id number]   :      1945           Admitted:     [id number]      Document: [id number]

## 2017-05-04 NOTE — DISCHARGE SUMMARY
[facility] Outpatient / Observation Unit  Discharge Summary        Shantelle Su MRN# [id number]   YOB: 1945 Age: 72 year old     Date of Admission:  5/3/2017  Date of Discharge:  5/4/2017 11:36 AM  Admitting Physician:  Angel Tineo MD  Discharge Physician: Emeli Su PA-C  Discharging Service: Hospitalist      Primary Provider: Klever Vega  Primary Care Physician Phone Number: [phone]         Primary Discharge Diagnoses:    Shantelle Su was admitted on 5/3/2017 for concerns of nausea, vomiting and diarrhea. Consistent with acute gastroenteritis which tested positive for norovirus.     1. Acute Gastroenteritis: Norovirus positive. Interestingly, was admitted approximately 1 week ago with similar symptoms but we were unable to collect stool sample to confirm diagnosis. Patient doing better today and able to tolerate eating/drinking. Discharged home with instructions to not prepare food or encounter immunocompromised people x 4 weeks. Also, instructed to clean home with bleach, wash hands frequently, and that she should stay home through tomorrow while she is most contagious.     2. DM II- Resume metformin once eating well        Secondary Discharge Diagnoses:     Past Medical History:   Diagnosis Date     Acute upper respiratory infection 9/30/2002     CAD (coronary artery disease) 11/23/2016    sp proximal LAD stenting     Diabetes mellitus (H)      Enterocele 9/12/2011     Essential hypertension, benign      GERD (gastroesophageal reflux disease) 10/13/2008     Hiatal hernia      Hyperlipidemia LDL goal <130 10/31/2010     Other pulmonary embolism and infarction 9/30/2002    after starting hormone therapy     PAC (premature atrial contraction)      Rectocele 10/15/2007                Code Status:      Full Code        Brief Hospital Summary:        Reason for your hospital stay       You were admitted for a diarrheal illness that we identified as   norovirus. This could have  been the same virus that brought you here a   week ago. You are doing better now and keeping down fluid and food so you   can go home. You are most contagious through tomorrow. We recommend you   bleach surfaces at your home and not cook food for everyone for a four   weeks. You are still slightly contagious for up to 4 weeks so should avoid   contact with infants and immunocompromised people. You should start a   probiotic which could be picked up at the pharmacy (over the counter) or   you could start Activia yogurt.    You should not take your metformin until you are eating regularly.                    Please refer to initial admission history and physical for further details.   Briefly, Shantelle Su was admitted on 5/3/2017 for concerns of acute nausea, vomiting and diarrhea. Work up in ED did not show any evidence of bowel obstruction. Pt was registered to the Observation Unit for continued supportive therapy for acute gastroenteritis..     Pt was resuscitated with IV fluids and continued on supportive measures including anti-emetics and pain control as needed. Labs were reviewed and significant results addressed.  On the day of discharge, symptoms were resolving and pt was able to tolerate PO intake. Vitals were stable, without evidence of orthostasis. Medications were reviewed and adjustments made as necessary. Pt is instructed to follow up as below.            Significant Lab During Hospitalization:        Recent Labs  Lab 05/03/17  0929   WBC 6.1   HGB 13.1   HCT 40.8   MCV 95          Recent Labs  Lab 05/03/17  0929      POTASSIUM 3.7   CHLORIDE 107   CO2 23   ANIONGAP 10   *   BUN 10   CR 0.58   GFRESTIMATED >90Non  GFR Calc   GFRESTBLACK >90African American GFR Calc   RAFAEL 8.8   PROTTOTAL 7.3   ALBUMIN 3.7   BILITOTAL 0.4   ALKPHOS 88   AST 38   ALT 67*     No results for input(s): COLOR, APPEARANCE, URINEGLC, URINEBILI, URINEKETONE, SG, UBLD, URINEPH, PROTEIN,  UROBILINOGEN, NITRITE, LEUKEST, RBCU, WBCU in the last 168 hours.             Significant Imaging During Hospitalization:      Recent Results (from the past 24 hour(s))   XR Abdomen 1 View    Narrative    XR ABDOMEN 1 VW 5/3/2017 4:07 PM    COMPARISON: None.    HISTORY: Left lower quadrant pain.      Impression    IMPRESSION: Nonspecific bowel gas pattern with scattered air-fluid  levels, but no distended bowel, findings may represent ileus or very  early obstruction. No free air.    BRITTANY DUDLEY              Pending Results:        Unresulted Labs Ordered in the Past 30 Days of this Admission     No orders found for last 61 day(s).              Consultations This Hospital Stay:      No consultations were requested during this admission         Discharge Instructions and Follow-Up:      Follow-up Appointments     Follow-up and recommended labs and tests        Follow up with primary care provider as needed.                          Discharge Disposition:      Discharged to home         Discharge Medications:        Current Discharge Medication List      START taking these medications    Details   prochlorperazine (COMPAZINE) 5 MG tablet Take 1 tablet (5 mg) by mouth every 6 hours as needed for nausea or vomiting  Qty: 20 tablet, Refills: 0    Associated Diagnoses: Nausea vomiting and diarrhea         CONTINUE these medications which have NOT CHANGED    Details   docusate sodium (COLACE) 100 MG capsule Take 100 mg by mouth 2 times daily      ticagrelor (BRILINTA) 90 MG tablet Take 1 tablet (90 mg) by mouth every 12 hours  Qty: 180 tablet, Refills: 3    Associated Diagnoses: Status post coronary angioplasty      metoprolol (TOPROL XL) 50 MG 24 hr tablet Take 1 tablet (50 mg) by mouth daily  Qty: 90 tablet, Refills: 3    Associated Diagnoses: Paroxysmal supraventricular tachycardia (H); Abnormal electrocardiogram      atorvastatin (LIPITOR) 40 MG tablet Take 1 tablet (40 mg) by mouth daily  Qty: 90 tablet, Refills: 3     Associated Diagnoses: Hyperlipidemia LDL goal <130      nitroglycerin (NITROSTAT) 0.4 MG SL tablet As needed for anginal chest pain. Maximum is 3 doses in 15 minutes (one every 5 minutes).  Qty: 25 tablet, Refills: 3    Associated Diagnoses: Status post coronary angioplasty      aspirin EC 81 MG EC tablet Take 1 tablet (81 mg) by mouth daily  Qty: 90 tablet, Refills: 3    Associated Diagnoses: Angina pectoris, unspecified (H); Status post coronary angioplasty      amLODIPine (NORVASC) 5 MG tablet Take 5 mg by mouth daily      acetaminophen (TYLENOL) 325 MG tablet Take 2 tablets (650 mg) by mouth every 4 hours as needed for other (mild pain)  Qty: 100 tablet, Refills: 0    Associated Diagnoses: Post-operative state      metFORMIN (GLUCOPHAGE) 500 MG tablet Take 1 tablet (500 mg) by mouth daily (with dinner)  Qty: 90 tablet, Refills: 3      omeprazole (PRILOSEC) 20 MG capsule Take 1 capsule (20 mg) by mouth daily  Qty: 90 capsule, Refills: 3    Associated Diagnoses: Gastroesophageal reflux disease without esophagitis      glucose blood VI test strips strip Accucheck Kristine Plus  3 times daily  Qty: 300 strip, Refills: 11    Associated Diagnoses: Hyperglycemia      ACCU-CHEK MULTICLIX LANCETS MISC 3 times daily. BG testing 3 times a day  Qty: 100 each, Refills: 11    Associated Diagnoses: Hyperglycemia      Blood Glucose Calibration (ACCU-CHEK KRISTINE) SOLN Use as directed  Qty: 3 Bottle, Refills: 11    Associated Diagnoses: Metabolic syndrome      Blood Glucose Monitoring Suppl (ACCU-CHEK KRISTINE) KIT 2 times daily. With routine lancets as well as alcohol swabs, dispense 1 month  Qty: 1 kit, Refills: 0    Associated Diagnoses: Hyperglycemia                 Allergies:         Allergies   Allergen Reactions     Lisinopril      bp increased           Condition and Physical on Discharge:      Discharge condition: Stable   Vitals: Blood pressure 128/53, pulse 68, temperature 97.9  F (36.6  C), temperature source Oral, resp.  rate 16, height 1.524 m (5'), weight 72.1 kg (159 lb), SpO2 96 %.  159 lbs 0 oz      GENERAL:  Comfortable.  PSYCH: pleasant, oriented, No acute distress.  HEENT:  PERRLA. Normal conjunctiva, normal hearing, nasal mucosa and Oropharynx are normal.  NECK:  Supple, no neck vein distention, adenopathy or bruits, normal thyroid.  HEART:  Normal S1, S2 with no murmur, no pericardial rub, gallops or S3 or S4.  LUNGS:  Clear to auscultation, normal Respiratory effort. No wheezing, rales or ronchi.  ABDOMEN:  Soft, no hepatosplenomegaly, normal bowel sounds. Mild diffuse tenderness, non distended.   EXTREMITIES:  No pedal edema, +2 pulses bilateral and equal.  SKIN:  Dry to touch, No rash, wound or ulcerations.  NEUROLOGIC:  CN 2-12 grossly intact,  sensation is intact with no focal deficits.

## 2017-05-04 NOTE — PROGRESS NOTES
Complete H+P dictated and pending.      In short, a 71 yo female who presents for non-bloody diarrhea that started today. She had a negative C diff anf her sayra cx was + for norovirus. Her AXR suggested ileus and/or early SBO, however, she does not have any clinical findings supportive of either. Plan for IVF overnight and clears. Expect she can adv diet tomorrow and return home as this should be a self limited illness.

## 2017-05-05 ENCOUNTER — TELEPHONE (OUTPATIENT)
Dept: INTERNAL MEDICINE | Facility: CLINIC | Age: 72
End: 2017-05-05

## 2017-05-05 NOTE — TELEPHONE ENCOUNTER
ED / Discharge Outreach Protocol    Patient Contact    Attempt # 1    Was call answered?  No.  Left message on voicemail with information to call back.

## 2017-05-08 NOTE — TELEPHONE ENCOUNTER
"Hospital/TCU/ED for chronic condition Discharge Protocol    \"Hi, my name is Kristy Pacheco, a registered nurse, and I am calling from [facility].  I am calling to follow up and see how things are going for you after your recent emergency visit/hospital/TCU stay.\"    Tell me how you are doing now that you are home?\" Patient stated, \"I'm much better each day.\"      Discharge Instructions    \"Let's review your discharge instructions.  What is/are the follow-up recommendations?  Pt. Response: Follow up with Dr. Vega and lab work    \"Has an appointment with your primary care provider been scheduled?\"   No (schedule appointment)   Patient wished to call back to schedule an appointment    \"When you see the provider, I would recommend that you bring your medications with you.\"    Medications    \"Tell me what changed about your medicines when you discharged?\"    Changes to chronic meds?    0-1    \"What questions do you have about your medications?\"    None     New diagnoses of heart failure, COPD, diabetes, or MI?    No              Medication reconciliation completed? Yes  Was MTM referral placed (*Make sure to put transitions as reason for referral)?   No    Call Summary    \"What questions or concerns do you have about your recent visit and your follow-up care?\"     none    \"If you have questions or things don't continue to improve, we encourage you contact us through the main clinic number (give number).  Even if the clinic is not open, triage nurses are available 24/7 to help you.     We would like you to know that our clinic has extended hours (provide information).  We also have urgent care (provide details on closest location and hours/contact info)\"      \"Thank you for your time and take care!\"                      "

## 2017-06-19 ENCOUNTER — TELEPHONE (OUTPATIENT)
Dept: CARDIOLOGY | Facility: CLINIC | Age: 72
End: 2017-06-19

## 2017-06-19 NOTE — TELEPHONE ENCOUNTER
Pt called stating that she had a stent placed in November 2016 and is on Brilinta. Pt is going to the dentist for a cleaning. Pt wondering if she needs to stop the Brilinta. Writer informed patient that we do not like to interrupt the Brilinta for 1 year post stents unless it is emergent. Writer informed patient to let the dentist know she is on Brilinta and they may still be able to do the cleaning. If they need to do any interventions that require stopping the Brilinta, the patient will need to call team 4 back to discuss with Dr. Lozada. Pt verbalized understandng.     Pt stated that she was due for a colonoscopy in April but postponed it d/t Brilinta but is now having issues with constipations. Pt has tried laxatives. Pt stated that she is ok waiting till September when she is seeing Dr. Lozada to ask about holding the Brilinta unless things worsen. Writer agreed with plan.

## 2017-09-29 ENCOUNTER — OFFICE VISIT (OUTPATIENT)
Dept: CARDIOLOGY | Facility: CLINIC | Age: 72
End: 2017-09-29
Attending: INTERNAL MEDICINE
Payer: COMMERCIAL

## 2017-09-29 VITALS
SYSTOLIC BLOOD PRESSURE: 126 MMHG | BODY MASS INDEX: 32.06 KG/M2 | WEIGHT: 163.3 LBS | DIASTOLIC BLOOD PRESSURE: 70 MMHG | HEIGHT: 60 IN | HEART RATE: 64 BPM

## 2017-09-29 DIAGNOSIS — I25.118 CORONARY ARTERY DISEASE OF NATIVE ARTERY OF NATIVE HEART WITH STABLE ANGINA PECTORIS (H): Primary | ICD-10-CM

## 2017-09-29 DIAGNOSIS — E78.5 HYPERLIPIDEMIA LDL GOAL <70: ICD-10-CM

## 2017-09-29 DIAGNOSIS — Z98.61 POSTSURGICAL PERCUTANEOUS TRANSLUMINAL CORONARY ANGIOPLASTY STATUS: ICD-10-CM

## 2017-09-29 DIAGNOSIS — I47.10 PAROXYSMAL SUPRAVENTRICULAR TACHYCARDIA (H): ICD-10-CM

## 2017-09-29 PROCEDURE — 99214 OFFICE O/P EST MOD 30 MIN: CPT | Performed by: INTERNAL MEDICINE

## 2017-09-29 NOTE — LETTER
9/29/2017    Klever Vega MD  [address] [facility] [address]    RE: Shantelle Su       Dear Colleague,    I had the pleasure of seeing Shantelle Su in the [facility].    PRIMARY CARE PHYSICIAN:  Dr. Klever Vega.      I again had the pleasure of seeing your patient, Shantelle Su, at [facility] for evaluation of coronary artery disease.  The patient is a delightful 72-year-old female accompanied by her  today.  She is status post intracoronary stenting of her LAD on 11/23/2016 after an abnormal stress test.  She has a history of PAT with an ejection fraction generally of 50%-55% and grade I diastolic dysfunction.  Her stress echocardiogram showed ischemia in the anteroseptal and apical wall accompanied by 4/10 chest burning.  On 11/23/2016 she was found to have a 50% LAD stenosis followed by an 80% stenosis and a midvessel 40%-50% stenosis.  Two hours after her stent was placed.  EKG demonstrated possible acute myocardial infarction.  OCT was performed showing a proximal stent edge dissection.  Thrombectomy was performed and an additional overlapping drug-eluting stent was placed near the ostium of the LAD.  The patient was placed on aspirin and Brilinta and has done well.  She has reduced her exercise since she was in [place] last winter.  She denies palpitations, syncope or presyncope.  She has some shortness of breath with activities.  She denies peripheral edema.  Her lipids on 03/16/2017 showed triglycerides 168, LDL 65, HDL 61 and total cholesterol 160.  She has type 2 diabetes mellitus.  She has not had a hemoglobin A1c since last April.  We again discussed weight loss and diet.      PHYSICAL EXAMINATION:  As below.     Outpatient Encounter Prescriptions as of 9/29/2017   Medication Sig Dispense Refill     prochlorperazine (COMPAZINE) 5 MG tablet Take 1 tablet (5 mg) by mouth every 6 hours as needed for nausea or vomiting 20  tablet 0     docusate sodium (COLACE) 100 MG capsule Take 100 mg by mouth 2 times daily       ticagrelor (BRILINTA) 90 MG tablet Take 1 tablet (90 mg) by mouth every 12 hours 180 tablet 3     metoprolol (TOPROL XL) 50 MG 24 hr tablet Take 1 tablet (50 mg) by mouth daily 90 tablet 3     atorvastatin (LIPITOR) 40 MG tablet Take 1 tablet (40 mg) by mouth daily 90 tablet 3     nitroglycerin (NITROSTAT) 0.4 MG SL tablet As needed for anginal chest pain. Maximum is 3 doses in 15 minutes (one every 5 minutes). 25 tablet 3     aspirin EC 81 MG EC tablet Take 1 tablet (81 mg) by mouth daily 90 tablet 3     amLODIPine (NORVASC) 5 MG tablet Take 5 mg by mouth daily       acetaminophen (TYLENOL) 325 MG tablet Take 2 tablets (650 mg) by mouth every 4 hours as needed for other (mild pain) (Patient taking differently: Take 325 mg by mouth every 4 hours as needed for other (mild pain) ) 100 tablet 0     metFORMIN (GLUCOPHAGE) 500 MG tablet Take 1 tablet (500 mg) by mouth daily (with dinner) 90 tablet 3     omeprazole (PRILOSEC) 20 MG capsule Take 1 capsule (20 mg) by mouth daily 90 capsule 3     glucose blood VI test strips strip Accucheck Kristine Plus  3 times daily (Patient taking differently: daily Accucheck Kristine Plus  3 times daily) 300 strip 11     ACCU-CHEK MULTICLIX LANCETS MISC 3 times daily. BG testing 3 times a day (Patient taking differently: daily 3 times daily. BG testing 3 times a day) 100 each 11     Blood Glucose Calibration (ACCU-CHEK KRISTINE) SOLN Use as directed 3 Bottle 11     Blood Glucose Monitoring Suppl (ACCU-CHEK KRISTINE) KIT 2 times daily. With routine lancets as well as alcohol swabs, dispense 1 month (Patient taking differently: daily With routine lancets as well as alcohol swabs, dispense 1 month) 1 kit 0     No facility-administered encounter medications on file as of 9/29/2017.       ASSESSMENT:   1Wesly Su is a delightful 72-year-old female status post LAD angioplasty and stenting with acute  in-stent thrombosis requiring a second stent placed for edge dissection.  The patient had some back discomfort that prompted us to perform a stress echo in March of this year.  This was normal without evidence of ischemia.  She has not had recurrent angina.  We will continue to treat medically but as of 11/01 she can stop her Brilinta.  She wishes to undergo a colonoscopy in January and I think this is fine and should not be a problem off her Brilinta.   2.  Hyperlipidemia currently reasonably well controlled.  Her triglycerides are a bit elevated and we again talked about diet and exercise.   3.  History of premature atrial contractions which are well controlled at this time.      It is my pleasure to assist in the care of Shantelle Su.  I will plan on seeing her again in 1 year.  All her questions were answered to her satisfaction.   Gee Lozada MD      Sincerely,    Gee Lozada MD     [facility]

## 2017-09-29 NOTE — PROGRESS NOTES
PRIMARY CARE PHYSICIAN:  Dr. Klever Vega.      HISTORY OF PRESENT ILLNESS:  I again had the pleasure of seeing your patient, Shantelle Su, at [facility] for evaluation of coronary artery disease.  The patient is a delightful 72-year-old female accompanied by her  today.  She is status post intracoronary stenting of her LAD on 11/23/2016 after an abnormal stress test.  She has a history of PAT with an ejection fraction generally of 50%-55% and grade I diastolic dysfunction.  Her stress echocardiogram showed ischemia in the anteroseptal and apical wall accompanied by 4/10 chest burning.  On 11/23/2016 she was found to have a 50% LAD stenosis followed by an 80% stenosis and a midvessel 40%-50% stenosis.  Two hours after her stent was placed.  EKG demonstrated possible acute myocardial infarction.  OCT was performed showing a proximal stent edge dissection.  Thrombectomy was performed and an additional overlapping drug-eluting stent was placed near the ostium of the LAD.  The patient was placed on aspirin and Brilinta and has done well.  She has reduced her exercise since she was in [place] last winter.  She denies palpitations, syncope or presyncope.  She has some shortness of breath with activities.  She denies peripheral edema.  Her lipids on 03/16/2017 showed triglycerides 168, LDL 65, HDL 61 and total cholesterol 160.  She has type 2 diabetes mellitus.  She has not had a hemoglobin A1c since last April.  We again discussed weight loss and diet.      PHYSICAL EXAMINATION:  As below.      ASSESSMENT:   1.  Shantelle Su is a delightful 72-year-old female status post LAD angioplasty and stenting with acute in-stent thrombosis requiring a second stent placed for edge dissection.  The patient had some back discomfort that prompted us to perform a stress echo in March of this year.  This was normal without evidence of ischemia.  She has not had recurrent angina.  We will continue to treat  medically but as of  she can stop her Brilinta.  She wishes to undergo a colonoscopy in January and I think this is fine and should not be a problem off her Brilinta.   2.  Hyperlipidemia currently reasonably well controlled.  Her triglycerides are a bit elevated and we again talked about diet and exercise.   3.  History of premature atrial contractions which are well controlled at this time.      It is my pleasure to assist in the care of Rodney Hoyos.  I will plan on seeing her again in 1 year.  All her questions were answered to her satisfaction.   Lilly Brian MD       cc:   Klever Vega MD    [address]  [address]         LILLY BRIAN MD, [facility]             D: 2017 14:58   T: 2017 16:31   MT: YVETTE      Name:     RODNEY HOYOS   MRN:      [id number]        Account:      [id number]   :      1945           Service Date: 2017      Document: [id number]

## 2017-09-29 NOTE — MR AVS SNAPSHOT
After Visit Summary   9/29/2017    Shantelle Su    MRN: [id number]           Patient Information     Date Of Birth          1945        Visit Information        Provider Department      9/29/2017 2:15 PM Gee Lozada MD [facility]        Today's Diagnoses     Coronary artery disease of native artery of native heart with stable angina pectoris (H)        Postsurgical percutaneous transluminal coronary angioplasty status        Paroxysmal supraventricular tachycardia (H)           Follow-ups after your visit        Additional Services     Follow-Up with Cardiologist                 Future tests that were ordered for you today     Open Future Orders        Priority Expected Expires Ordered    Follow-Up with Cardiologist Routine 9/29/2018 9/30/2018 9/29/2017            Who to contact     If you have questions or need follow up information about today's clinic visit or your schedule please contact [facility] directly at [phone].  Normal or non-critical lab and imaging results will be communicated to you by Leetchihart, letter or phone within 4 business days after the clinic has received the results. If you do not hear from us within 7 days, please contact the clinic through Leetchihart or phone. If you have a critical or abnormal lab result, we will notify you by phone as soon as possible.  Submit refill requests through Petrabytes or call your pharmacy and they will forward the refill request to us. Please allow 3 business days for your refill to be completed.          Additional Information About Your Visit        MyChart Information     Petrabytes gives you secure access to your electronic health record. If you see a primary care provider, you can also send messages to your care team and make appointments. If you have questions, please call your primary care clinic.  If you do not have a primary care provider,  please call [phone] and they will assist you.        Care EveryWhere ID     This is your Care EveryWhere ID. This could be used by other organizations to access your [facility] medical records  [id number]        Your Vitals Were     Pulse Height Breastfeeding? BMI (Body Mass Index)          64 1.524 m (5') No 31.89 kg/m2         Blood Pressure from Last 3 Encounters:   09/29/17 126/70   05/04/17 135/61   04/27/17 124/76    Weight from Last 3 Encounters:   09/29/17 74.1 kg (163 lb 4.8 oz)   05/03/17 72.1 kg (159 lb)   04/27/17 74.3 kg (163 lb 12.8 oz)              We Performed the Following     Follow-Up with Cardiologist          Today's Medication Changes          These changes are accurate as of: 9/29/17  2:50 PM.  If you have any questions, ask your nurse or doctor.               These medicines have changed or have updated prescriptions.        Dose/Directions    ACCU-CHEK VI Kit   This may have changed:    - when to take this  - additional instructions   Used for:  Hyperglycemia        2 times daily. With routine lancets as well as alcohol swabs, dispense 1 month   Quantity:  1 kit   Refills:  0       acetaminophen 325 MG tablet   Commonly known as:  TYLENOL   This may have changed:  how much to take   Used for:  Post-operative state        Dose:  650 mg   Take 2 tablets (650 mg) by mouth every 4 hours as needed for other (mild pain)   Quantity:  100 tablet   Refills:  0       blood glucose monitoring lancets   This may have changed:    - when to take this  - additional instructions   Used for:  Hyperglycemia        3 times daily. BG testing 3 times a day   Quantity:  100 each   Refills:  11       blood glucose monitoring test strip   Commonly known as:  no brand specified   This may have changed:    - when to take this  - additional instructions   Used for:  Hyperglycemia        Accucheck Vi Plus  3 times daily   Quantity:  300 strip   Refills:  11                Primary Care Provider Office Phone #  Fax #    Klever Vega [phone]       [address] [place] [id number]        Equal Access to Services     LIT VASQUEZ : Prisca lilia rehman adarsh Moreauisabela, wanolanda luqrocío, qaalenata kaalmada lloyd, mireya roblero satinderkylah storm lagildardojessica ascencio. So [facility] [phone].    ATENCIÓN: Si habla español, tiene a herbert disposición servicios gratuitos de asistencia lingüística. Llame al [phone].    We comply with applicable federal civil rights laws and [place] laws. We do not discriminate on the basis of race, color, national origin, age, disability, sex, sexual orientation, or gender identity.            Thank you!     Thank you for choosing [facility] [place]  for your care. Our goal is always to provide you with excellent care. Hearing back from our patients is one way we can continue to improve our services. Please take a few minutes to complete the written survey that you may receive in the mail after your visit with us. Thank you!             Your Updated Medication List - Protect others around you: Learn how to safely use, store and throw away your medicines at www.disposemymeds.org.          This list is accurate as of: 9/29/17  2:50 PM.  Always use your most recent med list.                   Brand Name Dispense Instructions for use Diagnosis    ACCU-CHEK VI Kit     1 kit    2 times daily. With routine lancets as well as alcohol swabs, dispense 1 month    Hyperglycemia       acetaminophen 325 MG tablet    TYLENOL    100 tablet    Take 2 tablets (650 mg) by mouth every 4 hours as needed for other (mild pain)    Post-operative state       aspirin 81 MG EC tablet     90 tablet    Take 1 tablet (81 mg) by mouth daily    Angina pectoris, unspecified (H), Status post coronary angioplasty       atorvastatin 40 MG tablet    LIPITOR    90 tablet    Take 1 tablet (40 mg) by mouth daily    Hyperlipidemia LDL goal <130       blood glucose calibration solution      3 Bottle    Use as directed    Metabolic syndrome       blood glucose monitoring lancets     100 each    3 times daily. BG testing 3 times a day    Hyperglycemia       blood glucose monitoring test strip    no brand specified    300 strip    Accucheck Kristine Plus  3 times daily    Hyperglycemia       docusate sodium 100 MG capsule    COLACE     Take 100 mg by mouth 2 times daily        metFORMIN 500 MG tablet    GLUCOPHAGE    90 tablet    Take 1 tablet (500 mg) by mouth daily (with dinner)        metoprolol 50 MG 24 hr tablet    TOPROL XL    90 tablet    Take 1 tablet (50 mg) by mouth daily    Paroxysmal supraventricular tachycardia (H), Abnormal electrocardiogram       nitroGLYcerin 0.4 MG sublingual tablet    NITROSTAT    25 tablet    As needed for anginal chest pain. Maximum is 3 doses in 15 minutes (one every 5 minutes).    Status post coronary angioplasty       NORVASC 5 MG tablet   Generic drug:  amLODIPine      Take 5 mg by mouth daily        omeprazole 20 MG CR capsule    priLOSEC    90 capsule    Take 1 capsule (20 mg) by mouth daily    Gastroesophageal reflux disease without esophagitis       prochlorperazine 5 MG tablet    COMPAZINE    20 tablet    Take 1 tablet (5 mg) by mouth every 6 hours as needed for nausea or vomiting    Nausea vomiting and diarrhea       ticagrelor 90 MG tablet    BRILINTA    180 tablet    Take 1 tablet (90 mg) by mouth every 12 hours    Status post coronary angioplasty

## 2017-09-29 NOTE — PROGRESS NOTES
HPI and Plan:   See dictation:[id number]    Orders Placed This Encounter   Procedures     Follow-Up with Cardiologist       No orders of the defined types were placed in this encounter.      There are no discontinued medications.      Encounter Diagnoses   Name Primary?     Coronary artery disease of native artery of native heart with stable angina pectoris (H)      Postsurgical percutaneous transluminal coronary angioplasty status      Paroxysmal supraventricular tachycardia (H)        CURRENT MEDICATIONS:  Current Outpatient Prescriptions   Medication Sig Dispense Refill     prochlorperazine (COMPAZINE) 5 MG tablet Take 1 tablet (5 mg) by mouth every 6 hours as needed for nausea or vomiting 20 tablet 0     docusate sodium (COLACE) 100 MG capsule Take 100 mg by mouth 2 times daily       ticagrelor (BRILINTA) 90 MG tablet Take 1 tablet (90 mg) by mouth every 12 hours 180 tablet 3     metoprolol (TOPROL XL) 50 MG 24 hr tablet Take 1 tablet (50 mg) by mouth daily 90 tablet 3     atorvastatin (LIPITOR) 40 MG tablet Take 1 tablet (40 mg) by mouth daily 90 tablet 3     nitroglycerin (NITROSTAT) 0.4 MG SL tablet As needed for anginal chest pain. Maximum is 3 doses in 15 minutes (one every 5 minutes). 25 tablet 3     aspirin EC 81 MG EC tablet Take 1 tablet (81 mg) by mouth daily 90 tablet 3     amLODIPine (NORVASC) 5 MG tablet Take 5 mg by mouth daily       acetaminophen (TYLENOL) 325 MG tablet Take 2 tablets (650 mg) by mouth every 4 hours as needed for other (mild pain) (Patient taking differently: Take 325 mg by mouth every 4 hours as needed for other (mild pain) ) 100 tablet 0     metFORMIN (GLUCOPHAGE) 500 MG tablet Take 1 tablet (500 mg) by mouth daily (with dinner) 90 tablet 3     omeprazole (PRILOSEC) 20 MG capsule Take 1 capsule (20 mg) by mouth daily 90 capsule 3     glucose blood VI test strips strip Accucheck Kristine Plus  3 times daily (Patient taking differently: daily Accucheck Kristine Plus  3 times daily) 300  strip 11     ACCU-CHEK MULTICLIX LANCETS MISC 3 times daily. BG testing 3 times a day (Patient taking differently: daily 3 times daily. BG testing 3 times a day) 100 each 11     Blood Glucose Calibration (ACCU-CHEK VI) SOLN Use as directed 3 Bottle 11     Blood Glucose Monitoring Suppl (ACCU-CHEK VI) KIT 2 times daily. With routine lancets as well as alcohol swabs, dispense 1 month (Patient taking differently: daily With routine lancets as well as alcohol swabs, dispense 1 month) 1 kit 0       ALLERGIES     Allergies   Allergen Reactions     Lisinopril      bp increased       PAST MEDICAL HISTORY:  Past Medical History:   Diagnosis Date     Acute upper respiratory infection 9/30/2002     CAD (coronary artery disease) 11/23/2016    sp proximal LAD stenting     Diabetes mellitus (H)      Enterocele 9/12/2011     Essential hypertension, benign      GERD (gastroesophageal reflux disease) 10/13/2008     Hiatal hernia      Hyperlipidemia LDL goal <130 10/31/2010     Other pulmonary embolism and infarction 9/30/2002    after starting hormone therapy     PAC (premature atrial contraction)      Rectocele 10/15/2007       PAST SURGICAL HISTORY:  Past Surgical History:   Procedure Laterality Date     ABDOMEN SURGERY      Bladder sling     C NONSPECIFIC PROCEDURE  2009    Parker mesh augmented anterior colporrhaphy and vault suspension, tension-free vaginal tape sling with a transobturator approach usingthe Obtryx device and cystoscopy.       COLONOSCOPY      Scheduled October 2016     COLPORRHAPY POSTERIOR, CYSTOSCOPY, COMBINED  12/7/2011    Procedure:COMBINED COLPORRHAPY POSTERIOR, CYSTOSCOPY; POSTERIOR MESH AUGMENTED REPAIR WITH ELEVATE MESH , cystoscopy; Surgeon:HEDY UP; Location:[facility] OR     DILATION AND CURETTAGE, HYSTEROSCOPY DIAGNOSTIC, COMBINED N/A 10/4/2016    Procedure: COMBINED DILATION AND CURETTAGE, HYSTEROSCOPY DIAGNOSTIC;  Surgeon: Hedy Up MD;  Location: [facility] OR     HC DILATION/CURETTAGE  DIAG/THER NON OB      after sab     HC LEFT HEART CATHETERIZATION  11/23/16    PCI with drug-eluting stent placement in the proximal LAD     HC TOOTH EXTRACTION W/FORCEP         FAMILY HISTORY:  Family History   Problem Relation Age of Onset     Gynecology Daughter      HEART DISEASE Father      D AGE 50     Coronary Artery Disease Father      Hyperlipidemia Father      HEART DISEASE Mother      D AGE 70     DIABETES Mother      Coronary Artery Disease Mother      Hypertension Mother      Hyperlipidemia Mother      HEART DISEASE Brother      B AGE 52 HEART SURGERY     Family History Negative Brother      B AGE 47     Hypertension Brother      CEREBROVASCULAR DISEASE Sister      B AGE 54 BLOOD CLOTS     Family History Negative Sister      B AGE 60     Depression Sister        SOCIAL HISTORY:  Social History     Social History     Marital status:      Spouse name: N/A     Number of children: N/A     Years of education: N/A     Social History Main Topics     Smoking status: Former Smoker     Packs/day: 0.75     Years: 20.00     Quit date: 11/5/1987     Smokeless tobacco: Never Used     Alcohol use Yes      Comment: 2 per month     Drug use: No     Sexual activity: No     Other Topics Concern     Parent/Sibling W/ Cabg, Mi Or Angioplasty Before 65f 55m? Yes     Father, brother     Caffeine Concern No     decaf coffee      Special Diet No     Exercise No     cardio rehab     Social History Narrative       Review of Systems:  Skin:  Negative       Eyes:  Positive for glasses    ENT:  Positive for nasal congestion    Respiratory:  Positive for shortness of breath feels sob since starting brilinta   Cardiovascular:  Negative      Gastroenterology: Negative   controlled with meds   Genitourinary:  Negative      Musculoskeletal:  Negative      Neurologic:  Positive for numbness or tingling of feet toe on R foot has been numb  Psychiatric:  Negative      Heme/Lymph/Imm:  Positive for easy bruising    Endocrine:   Positive for diabetes pre- diabetic     Physical Exam:  Vitals: /70 (BP Location: Right arm, Patient Position: Sitting, Cuff Size: Adult Regular)  Pulse 64  Ht 1.524 m (5')  Wt 74.1 kg (163 lb 4.8 oz)  Breastfeeding? No  BMI 31.89 kg/m2    Constitutional:  cooperative, alert and oriented, well developed, well nourished, in no acute distress overweight      Skin:  warm and dry to the touch, no apparent skin lesions or masses noted        Head:  normocephalic, no masses or lesions        Eyes:  pupils equal and round, conjunctivae and lids unremarkable, sclera white, no xanthalasma, EOMS intact, no nystagmus        ENT:  no pallor or cyanosis, dentition good        Neck:  carotid pulses are full and equal bilaterally, JVP normal, no carotid bruit, no thyromegaly        Chest:  normal breath sounds, clear to auscultation, normal A-P diameter, normal symmetry, normal respiratory excursion, no use of accessory muscles          Cardiac: regular rhythm, normal S1/S2, no S3 or S4, apical impulse not displaced, no murmurs, gallops or rubs                  Abdomen:  abdomen soft, non-tender, BS normoactive, no mass, no HSM, no bruits        Vascular: pulses full and equal, no bruits auscultated                                        Extremities and Back:  no deformities, clubbing, cyanosis, erythema observed;no edema              Neurological:  affect appropriate, oriented to time, person and place;no gross motor deficits              CC  Gee Lozada MD  [address]  [place] [id number]

## 2017-10-17 ENCOUNTER — ALLIED HEALTH/NURSE VISIT (OUTPATIENT)
Dept: NURSING | Facility: CLINIC | Age: 72
End: 2017-10-17
Payer: COMMERCIAL

## 2017-10-17 DIAGNOSIS — Z23 NEED FOR PROPHYLACTIC VACCINATION AND INOCULATION AGAINST INFLUENZA: Primary | ICD-10-CM

## 2017-10-17 PROCEDURE — G0008 ADMIN INFLUENZA VIRUS VAC: HCPCS

## 2017-10-17 PROCEDURE — 90662 IIV NO PRSV INCREASED AG IM: CPT

## 2017-10-17 NOTE — PROGRESS NOTES
Injectable Influenza Immunization Documentation    1.  Is the person to be vaccinated sick today?   No    2. Does the person to be vaccinated have an allergy to a component   of the vaccine?   No    3. Has the person to be vaccinated ever had a serious reaction   to influenza vaccine in the past?   No    4. Has the person to be vaccinated ever had Guillain-Barré syndrome?   No    Form completed by Xenia Goldsmith MA

## 2017-10-17 NOTE — MR AVS SNAPSHOT
After Visit Summary   10/17/2017    Shantelle Su    MRN: [id number]           Patient Information     Date Of Birth          1945        Visit Information        Provider Department      10/17/2017 3:00 PM [facility] - NURSE [facility]        Today's Diagnoses     Need for prophylactic vaccination and inoculation against influenza    -  1       Follow-ups after your visit        Who to contact     If you have questions or need follow up information about today's clinic visit or your schedule please contact [facility] directly at [phone].  Normal or non-critical lab and imaging results will be communicated to you by [employer], letter or phone within 4 business days after the clinic has received the results. If you do not hear from us within 7 days, please contact the clinic through [employer] or phone. If you have a critical or abnormal lab result, we will notify you by phone as soon as possible.  Submit refill requests through [employer] or call your pharmacy and they will forward the refill request to us. Please allow 3 business days for your refill to be completed.          Additional Information About Your Visit        MyChart Information     [employer] gives you secure access to your electronic health record. If you see a primary care provider, you can also send messages to your care team and make appointments. If you have questions, please call your primary care clinic.  If you do not have a primary care provider, please call [phone] and they will assist you.        Care EveryWhere ID     This is your Care EveryWhere ID. This could be used by other organizations to access your [place] medical records  [id number]         Blood Pressure from Last 3 Encounters:   09/29/17 126/70   05/04/17 135/61   04/27/17 124/76    Weight from Last 3 Encounters:   09/29/17 163 lb 4.8 oz (74.1 kg)   05/03/17 159 lb (72.1 kg)   04/27/17 163 lb 12.8 oz (74.3  kg)              We Performed the Following     ADMIN INFLUENZA (For MEDICARE Patients ONLY) []     FLU VACCINE, INCREASED ANTIGEN, PRESV FREE, AGE 65+ [47052]     Vaccine Administration, Initial [52089]          Today's Medication Changes          These changes are accurate as of: 10/17/17  3:08 PM.  If you have any questions, ask your nurse or doctor.               These medicines have changed or have updated prescriptions.        Dose/Directions    ACCU-CHEK KRISTINE Kit   This may have changed:    - when to take this  - additional instructions   Used for:  Hyperglycemia        2 times daily. With routine lancets as well as alcohol swabs, dispense 1 month   Quantity:  1 kit   Refills:  0       acetaminophen 325 MG tablet   Commonly known as:  TYLENOL   This may have changed:  how much to take   Used for:  Post-operative state        Dose:  650 mg   Take 2 tablets (650 mg) by mouth every 4 hours as needed for other (mild pain)   Quantity:  100 tablet   Refills:  0       blood glucose monitoring lancets   This may have changed:    - when to take this  - additional instructions   Used for:  Hyperglycemia        3 times daily. BG testing 3 times a day   Quantity:  100 each   Refills:  11       blood glucose monitoring test strip   Commonly known as:  no brand specified   This may have changed:    - when to take this  - additional instructions   Used for:  Hyperglycemia        Accucheck Kristine Plus  3 times daily   Quantity:  300 strip   Refills:  11                Primary Care Provider Office Phone # Fax #    Klever Vega [phone]       [address] [place] [phone]        Equal Access to Services     [facility]: Hadii lilia rehman hadasho Soevansali, waaxda luqadaha, qaybta kaalmada adeegyada, mireya ha . So [facility] [phone].    ATENCIÓN: Si habla español, tiene a herbert disposición servicios gratuitos de asistencia lingüística. Llame al [phone].    We  comply with applicable federal civil rights laws and [place] laws. We do not discriminate on the basis of race, color, national origin, age, disability, sex, sexual orientation, or gender identity.            Thank you!     Thank you for choosing [facility]  for your care. Our goal is always to provide you with excellent care. Hearing back from our patients is one way we can continue to improve our services. Please take a few minutes to complete the written survey that you may receive in the mail after your visit with us. Thank you!             Your Updated Medication List - Protect others around you: Learn how to safely use, store and throw away your medicines at www.disposemymeds.org.          This list is accurate as of: 10/17/17  3:08 PM.  Always use your most recent med list.                   Brand Name Dispense Instructions for use Diagnosis    ACCU-CHEK VI Kit     1 kit    2 times daily. With routine lancets as well as alcohol swabs, dispense 1 month    Hyperglycemia       acetaminophen 325 MG tablet    TYLENOL    100 tablet    Take 2 tablets (650 mg) by mouth every 4 hours as needed for other (mild pain)    Post-operative state       aspirin 81 MG EC tablet     90 tablet    Take 1 tablet (81 mg) by mouth daily    Angina pectoris, unspecified, Status post coronary angioplasty       atorvastatin 40 MG tablet    LIPITOR    90 tablet    Take 1 tablet (40 mg) by mouth daily    Hyperlipidemia LDL goal <130       blood glucose calibration solution     3 Bottle    Use as directed    Metabolic syndrome       blood glucose monitoring lancets     100 each    3 times daily. BG testing 3 times a day    Hyperglycemia       blood glucose monitoring test strip    no brand specified    300 strip    Accucheck Vi Plus  3 times daily    Hyperglycemia       docusate sodium 100 MG capsule    COLACE     Take 100 mg by mouth 2 times daily        metFORMIN 500 MG tablet    GLUCOPHAGE    90 tablet     Take 1 tablet (500 mg) by mouth daily (with dinner)        metoprolol 50 MG 24 hr tablet    TOPROL XL    90 tablet    Take 1 tablet (50 mg) by mouth daily    Paroxysmal supraventricular tachycardia (H), Abnormal electrocardiogram       nitroGLYcerin 0.4 MG sublingual tablet    NITROSTAT    25 tablet    As needed for anginal chest pain. Maximum is 3 doses in 15 minutes (one every 5 minutes).    Status post coronary angioplasty       NORVASC 5 MG tablet   Generic drug:  amLODIPine      Take 5 mg by mouth daily        omeprazole 20 MG CR capsule    priLOSEC    90 capsule    Take 1 capsule (20 mg) by mouth daily    Gastroesophageal reflux disease without esophagitis       prochlorperazine 5 MG tablet    COMPAZINE    20 tablet    Take 1 tablet (5 mg) by mouth every 6 hours as needed for nausea or vomiting    Nausea vomiting and diarrhea       ticagrelor 90 MG tablet    BRILINTA    180 tablet    Take 1 tablet (90 mg) by mouth every 12 hours    Status post coronary angioplasty

## 2017-11-03 DIAGNOSIS — I10 ESSENTIAL HYPERTENSION, BENIGN: Primary | ICD-10-CM

## 2017-11-06 RX ORDER — AMLODIPINE BESYLATE 5 MG/1
TABLET ORAL
Qty: 90 TABLET | Refills: 1 | Status: SHIPPED | OUTPATIENT
Start: 2017-11-06 | End: 2018-05-02

## 2017-11-16 ENCOUNTER — OFFICE VISIT (OUTPATIENT)
Dept: FAMILY MEDICINE | Facility: CLINIC | Age: 72
End: 2017-11-16
Payer: COMMERCIAL

## 2017-11-16 VITALS
SYSTOLIC BLOOD PRESSURE: 126 MMHG | DIASTOLIC BLOOD PRESSURE: 70 MMHG | HEART RATE: 71 BPM | HEIGHT: 60 IN | WEIGHT: 165 LBS | BODY MASS INDEX: 32.39 KG/M2 | TEMPERATURE: 97.9 F

## 2017-11-16 DIAGNOSIS — L60.3 LONGITUDINAL SPLIT NAIL: Primary | ICD-10-CM

## 2017-11-16 DIAGNOSIS — R09.82 POST-NASAL DRIP: ICD-10-CM

## 2017-11-16 LAB
KOH PREP SPEC: NORMAL
KOH PREP SPEC: NORMAL
SPECIMEN SOURCE: NORMAL

## 2017-11-16 PROCEDURE — 87101 SKIN FUNGI CULTURE: CPT | Performed by: FAMILY MEDICINE

## 2017-11-16 PROCEDURE — 87107 FUNGI IDENTIFICATION MOLD: CPT | Performed by: FAMILY MEDICINE

## 2017-11-16 PROCEDURE — 87220 TISSUE EXAM FOR FUNGI: CPT | Performed by: FAMILY MEDICINE

## 2017-11-16 PROCEDURE — 99213 OFFICE O/P EST LOW 20 MIN: CPT | Performed by: FAMILY MEDICINE

## 2017-11-16 NOTE — NURSING NOTE
Chief Complaint   Patient presents with     Finger     thumb nail right side     Throat Problem       Initial /70 (BP Location: Right arm, Patient Position: Sitting, Cuff Size: Adult Large)  Pulse 71  Temp 97.9  F (36.6  C) (Oral)  Ht 5' (1.524 m)  Wt 165 lb (74.8 kg)  Breastfeeding? No  BMI 32.22 kg/m2 Estimated body mass index is 32.22 kg/(m^2) as calculated from the following:    Height as of this encounter: 5' (1.524 m).    Weight as of this encounter: 165 lb (74.8 kg).  Medication Reconciliation: complete     Health maintenance- a1c and microalbumin pending    Andrew Moran CMA

## 2017-11-16 NOTE — MR AVS SNAPSHOT
After Visit Summary   11/16/2017    Shantelle Su    MRN: [id number]           Patient Information     Date Of Birth          1945        Visit Information        Provider Department      11/16/2017 2:00 PM Monique Jack MD [facility]        Today's Diagnoses     Longitudinal split nail    -  1    Post-nasal drip           Follow-ups after your visit        Who to contact     If you have questions or need follow up information about today's clinic visit or your schedule please contact [facility] directly at [phone].  Normal or non-critical lab and imaging results will be communicated to you by [employer], letter or phone within 4 business days after the clinic has received the results. If you do not hear from us within 7 days, please contact the clinic through [employer] or phone. If you have a critical or abnormal lab result, we will notify you by phone as soon as possible.  Submit refill requests through [employer] or call your pharmacy and they will forward the refill request to us. Please allow 3 business days for your refill to be completed.          Additional Information About Your Visit        MyChart Information     [employer] gives you secure access to your electronic health record. If you see a primary care provider, you can also send messages to your care team and make appointments. If you have questions, please call your primary care clinic.  If you do not have a primary care provider, please call [phone] and they will assist you.        Care EveryWhere ID     This is your Care EveryWhere ID. This could be used by other organizations to access your [place] medical records  [id number]        Your Vitals Were     Pulse Temperature Height Breastfeeding? BMI (Body Mass Index)       71 97.9  F (36.6  C) (Oral) 5' (1.524 m) No 32.22 kg/m2        Blood Pressure from Last 3 Encounters:   11/16/17 126/70   09/29/17 126/70   05/04/17 135/61    Weight  from Last 3 Encounters:   11/16/17 165 lb (74.8 kg)   09/29/17 163 lb 4.8 oz (74.1 kg)   05/03/17 159 lb (72.1 kg)              We Performed the Following     Fungus Culture,  skin, hair, or nail     KOH prep (skin, hair or nails only)          Today's Medication Changes          These changes are accurate as of: 11/16/17  2:55 PM.  If you have any questions, ask your nurse or doctor.               These medicines have changed or have updated prescriptions.        Dose/Directions    ACCU-CHEK KRISTINE Kit   This may have changed:    - when to take this  - additional instructions   Used for:  Hyperglycemia        2 times daily. With routine lancets as well as alcohol swabs, dispense 1 month   Quantity:  1 kit   Refills:  0       acetaminophen 325 MG tablet   Commonly known as:  TYLENOL   This may have changed:  how much to take   Used for:  Post-operative state        Dose:  650 mg   Take 2 tablets (650 mg) by mouth every 4 hours as needed for other (mild pain)   Quantity:  100 tablet   Refills:  0       blood glucose monitoring lancets   This may have changed:    - when to take this  - additional instructions   Used for:  Hyperglycemia        3 times daily. BG testing 3 times a day   Quantity:  100 each   Refills:  11       blood glucose monitoring test strip   Commonly known as:  no brand specified   This may have changed:    - when to take this  - additional instructions   Used for:  Hyperglycemia        Accucheck Kristine Plus  3 times daily   Quantity:  300 strip   Refills:  11                Primary Care Provider Office Phone # Fax #    Klever Vega [phone] [address] [place] [phone]        Equal Access to Services     [facility] AH: Hadii lilia candelariao Soevansali, waaxda luqadaha, qaybta kaalmada adeegyada, mireya ascencio. So [facility] [phone].    ATENCIÓN: Si habla español, tiene a herbert disposición servicios gratuitos de asistencia lingüística.  Martinez duff [phone].    We comply with applicable federal civil rights laws and [place] laws. We do not discriminate on the basis of race, color, national origin, age, disability, sex, sexual orientation, or gender identity.            Thank you!     Thank you for choosing [facility]  for your care. Our goal is always to provide you with excellent care. Hearing back from our patients is one way we can continue to improve our services. Please take a few minutes to complete the written survey that you may receive in the mail after your visit with us. Thank you!             Your Updated Medication List - Protect others around you: Learn how to safely use, store and throw away your medicines at www.disposemymeds.org.          This list is accurate as of: 11/16/17  2:55 PM.  Always use your most recent med list.                   Brand Name Dispense Instructions for use Diagnosis    ACCU-CHEK VI Kit     1 kit    2 times daily. With routine lancets as well as alcohol swabs, dispense 1 month    Hyperglycemia       acetaminophen 325 MG tablet    TYLENOL    100 tablet    Take 2 tablets (650 mg) by mouth every 4 hours as needed for other (mild pain)    Post-operative state       amLODIPine 5 MG tablet    NORVASC    90 tablet    TAKE 1 TABLET BY MOUTH EVERY DAY    Essential hypertension, benign       aspirin 81 MG EC tablet     90 tablet    Take 1 tablet (81 mg) by mouth daily    Angina pectoris, unspecified, Status post coronary angioplasty       atorvastatin 40 MG tablet    LIPITOR    90 tablet    Take 1 tablet (40 mg) by mouth daily    Hyperlipidemia LDL goal <130       blood glucose calibration solution     3 Bottle    Use as directed    Metabolic syndrome       blood glucose monitoring lancets     100 each    3 times daily. BG testing 3 times a day    Hyperglycemia       blood glucose monitoring test strip    no brand specified    300 strip    Accucheck Vi Plus  3 times daily    Hyperglycemia        docusate sodium 100 MG capsule    COLACE     Take 100 mg by mouth 2 times daily        metFORMIN 500 MG tablet    GLUCOPHAGE    90 tablet    Take 1 tablet (500 mg) by mouth daily (with dinner)        metoprolol 50 MG 24 hr tablet    TOPROL XL    90 tablet    Take 1 tablet (50 mg) by mouth daily    Paroxysmal supraventricular tachycardia (H), Abnormal electrocardiogram       nitroGLYcerin 0.4 MG sublingual tablet    NITROSTAT    25 tablet    As needed for anginal chest pain. Maximum is 3 doses in 15 minutes (one every 5 minutes).    Status post coronary angioplasty       omeprazole 20 MG CR capsule    priLOSEC    90 capsule    Take 1 capsule (20 mg) by mouth daily    Gastroesophageal reflux disease without esophagitis       prochlorperazine 5 MG tablet    COMPAZINE    20 tablet    Take 1 tablet (5 mg) by mouth every 6 hours as needed for nausea or vomiting    Nausea vomiting and diarrhea       ticagrelor 90 MG tablet    BRILINTA    180 tablet    Take 1 tablet (90 mg) by mouth every 12 hours    Status post coronary angioplasty

## 2017-11-16 NOTE — PROGRESS NOTES
SUBJECTIVE:   Shantelle Su is a 72 year old female who presents to clinic today for the following health issues:      Right thumb nail problem - removed gel nail polish last week, noted the right thumbnail seemed to be split following this, unsure when it occurred however because she had nail polish on.     No known trauma to the nail.     Gets nails done in a salon.     Having bit of a sore throat. Recent crown placement, having dental pain related to this. When looking in her mouth at it, saw the back of her throat seemed red. No fevers. No sick contacts.         Problem list and histories reviewed & adjusted, as indicated.  Additional history: none    Patient Active Problem List   Diagnosis     Pulmonary embolism and infarction (H)     Essential hypertension, benign     Rectocele     Metabolic syndrome     Advanced directives, counseling/discussion     Gastroesophageal reflux disease without esophagitis     Fatty liver     Postmenopausal bleeding     Heart palpitations     Angina pectoris, unspecified     ASCVD (arteriosclerotic cardiovascular disease)     Postsurgical percutaneous transluminal coronary angioplasty status     Paroxysmal supraventricular tachycardia (H)     Abnormal electrocardiogram     Hyperlipidemia LDL goal <70     Coronary artery disease of native artery of native heart with stable angina pectoris (H)     Diarrhea     Enteritis due to Norovirus     Past Surgical History:   Procedure Laterality Date     ABDOMEN SURGERY      Bladder sling     C NONSPECIFIC PROCEDURE  2009    [employer]on mesh augmented anterior colporrhaphy and vault suspension, tension-free vaginal tape sling with a transobturator approach usingthe Obtryx device and cystoscopy.       COLONOSCOPY      Scheduled October 2016     COLPORRHAPY POSTERIOR, CYSTOSCOPY, COMBINED  12/7/2011    Procedure:COMBINED COLPORRHAPY POSTERIOR, CYSTOSCOPY; POSTERIOR MESH AUGMENTED REPAIR WITH ELEVATE MESH , cystoscopy; Surgeon:HEDY JIM;  Location:[facility] OR     DILATION AND CURETTAGE, HYSTEROSCOPY DIAGNOSTIC, COMBINED N/A 10/4/2016    Procedure: COMBINED DILATION AND CURETTAGE, HYSTEROSCOPY DIAGNOSTIC;  Surgeon: Andre Up MD;  Location: [facility] OR     HC DILATION/CURETTAGE DIAG/THER NON OB      after sab     HC LEFT HEART CATHETERIZATION  11/23/16    PCI with drug-eluting stent placement in the proximal LAD     HC TOOTH EXTRACTION W/FORCEP         Social History   Substance Use Topics     Smoking status: Former Smoker     Packs/day: 0.75     Years: 20.00     Quit date: 11/5/1987     Smokeless tobacco: Never Used     Alcohol use Yes      Comment: 2 per month     Family History   Problem Relation Age of Onset     Gynecology Daughter      HEART DISEASE Father      D AGE 50     Coronary Artery Disease Father      Hyperlipidemia Father      HEART DISEASE Mother      D AGE 70     DIABETES Mother      Coronary Artery Disease Mother      Hypertension Mother      Hyperlipidemia Mother      HEART DISEASE Brother      B AGE 52 HEART SURGERY     Family History Negative Brother      B AGE 47     Hypertension Brother      CEREBROVASCULAR DISEASE Sister      B AGE 54 BLOOD CLOTS     Family History Negative Sister      B AGE 60     Depression Sister              Reviewed and updated as needed this visit by clinical staff       Reviewed and updated as needed this visit by Provider         ROS:  Constitutional, HEENT, cardiovascular, pulmonary, gi and gu systems are negative, except as otherwise noted.      OBJECTIVE:   /70 (BP Location: Right arm, Patient Position: Sitting, Cuff Size: Adult Large)  Pulse 71  Temp 97.9  F (36.6  C) (Oral)  Ht 5' (1.524 m)  Wt 165 lb (74.8 kg)  Breastfeeding? No  BMI 32.22 kg/m2  Body mass index is 32.22 kg/(m^2).  GENERAL: healthy, alert and no distress  HENT: mild pharyngitis, no redness or apparent abscess surrounding dental crown  NECK: no adenopathy  SKIN: longitudinal split nail 1st digit right hand, although does not  extend through top layers of nail    Diagnostic Test Results:  none     ASSESSMENT/PLAN:     1. Longitudinal split nail - discussed likely traumatic - thin nails and recent removal of gel nail polish, will grow out. She has concern for onychomycosis, so will get fungal culture today.   - KOH prep (skin, hair or nails only)    2. Post-nasal drip - discussed likely cause of sore throat, will subside with time.      Monique Jack MD  [facility]

## 2017-11-22 DIAGNOSIS — E78.5 HYPERLIPIDEMIA LDL GOAL <130: ICD-10-CM

## 2017-11-22 RX ORDER — ATORVASTATIN CALCIUM 40 MG/1
40 TABLET, FILM COATED ORAL DAILY
Qty: 90 TABLET | Refills: 3 | Status: SHIPPED | OUTPATIENT
Start: 2017-11-22 | End: 2018-11-21

## 2017-11-25 DIAGNOSIS — E88.810 METABOLIC SYNDROME: Primary | ICD-10-CM

## 2017-11-25 DIAGNOSIS — K21.9 GASTROESOPHAGEAL REFLUX DISEASE WITHOUT ESOPHAGITIS: ICD-10-CM

## 2017-11-25 DIAGNOSIS — R73.9 HYPERGLYCEMIA: ICD-10-CM

## 2017-11-25 NOTE — TELEPHONE ENCOUNTER
LAST OFFICE VISIT: 4/27/17.    omeprazole (PRILOSEC) 20 MG capsule    LAST OFFICE VISIT: 4/27/17.    metFORMIN (GLUCOPHAGE) 500 MG tablet

## 2017-11-27 DIAGNOSIS — I47.10 PAROXYSMAL SUPRAVENTRICULAR TACHYCARDIA (H): ICD-10-CM

## 2017-11-27 DIAGNOSIS — R94.31 ABNORMAL ELECTROCARDIOGRAM: ICD-10-CM

## 2017-11-27 RX ORDER — METOPROLOL SUCCINATE 50 MG/1
50 TABLET, EXTENDED RELEASE ORAL DAILY
Qty: 90 TABLET | Refills: 3 | Status: SHIPPED | OUTPATIENT
Start: 2017-11-27 | End: 2018-11-21

## 2017-11-27 NOTE — TELEPHONE ENCOUNTER
Medication Refilled: Metoprolol Succinate ER 50mg   Last office visit: 9/29/17 w/ Dr. Lozada   Last Labs/EKG: n/a  Next office visit: 9/2018  Pharmacy sent to: JAYLENE Richardson RN

## 2017-11-28 NOTE — TELEPHONE ENCOUNTER
Routing refill request to provider for review/approval because:  Labs not current:  a1c  Needs diagnosis associated

## 2017-11-28 NOTE — TELEPHONE ENCOUNTER
Pt would like pcp to put orders in for her labs and she will go have her labs drawn in [place], She will than make an appt with pcp.  She does not want to come in to see pcp fasting and than have to come back again to go over labs. Please call pt back and let her know if pcp is ok with her having labs drawn fist.

## 2017-12-04 DIAGNOSIS — R73.9 HYPERGLYCEMIA: ICD-10-CM

## 2017-12-04 DIAGNOSIS — E88.810 METABOLIC SYNDROME: ICD-10-CM

## 2017-12-04 LAB
ALBUMIN SERPL-MCNC: 3.7 G/DL (ref 3.4–5)
ALP SERPL-CCNC: 109 U/L (ref 40–150)
ALT SERPL W P-5'-P-CCNC: 31 U/L (ref 0–50)
AST SERPL W P-5'-P-CCNC: 15 U/L (ref 0–45)
BILIRUB DIRECT SERPL-MCNC: 0.1 MG/DL (ref 0–0.2)
BILIRUB SERPL-MCNC: 0.5 MG/DL (ref 0.2–1.3)
HBA1C MFR BLD: 6.4 % (ref 4.3–6)
PROT SERPL-MCNC: 7.1 G/DL (ref 6.8–8.8)

## 2017-12-04 PROCEDURE — 83036 HEMOGLOBIN GLYCOSYLATED A1C: CPT | Performed by: INTERNAL MEDICINE

## 2017-12-04 PROCEDURE — 80076 HEPATIC FUNCTION PANEL: CPT | Performed by: INTERNAL MEDICINE

## 2017-12-04 PROCEDURE — 36415 COLL VENOUS BLD VENIPUNCTURE: CPT | Performed by: INTERNAL MEDICINE

## 2017-12-07 ENCOUNTER — OFFICE VISIT (OUTPATIENT)
Dept: INTERNAL MEDICINE | Facility: CLINIC | Age: 72
End: 2017-12-07
Payer: COMMERCIAL

## 2017-12-07 VITALS
OXYGEN SATURATION: 98 % | BODY MASS INDEX: 32.22 KG/M2 | WEIGHT: 165 LBS | TEMPERATURE: 97.6 F | DIASTOLIC BLOOD PRESSURE: 82 MMHG | HEART RATE: 68 BPM | SYSTOLIC BLOOD PRESSURE: 130 MMHG

## 2017-12-07 DIAGNOSIS — I10 ESSENTIAL HYPERTENSION, BENIGN: ICD-10-CM

## 2017-12-07 DIAGNOSIS — E88.810 METABOLIC SYNDROME: Primary | ICD-10-CM

## 2017-12-07 DIAGNOSIS — Z98.61 STATUS POST CORONARY ANGIOPLASTY: ICD-10-CM

## 2017-12-07 DIAGNOSIS — I47.10 PAROXYSMAL SUPRAVENTRICULAR TACHYCARDIA (H): ICD-10-CM

## 2017-12-07 PROBLEM — A08.11 ENTERITIS DUE TO NOROVIRUS: Status: RESOLVED | Noted: 2017-05-03 | Resolved: 2017-12-07

## 2017-12-07 PROBLEM — R19.7 DIARRHEA: Status: RESOLVED | Noted: 2017-05-03 | Resolved: 2017-12-07

## 2017-12-07 PROCEDURE — 99214 OFFICE O/P EST MOD 30 MIN: CPT | Performed by: INTERNAL MEDICINE

## 2017-12-07 NOTE — NURSING NOTE
Chief Complaint   Patient presents with     Recheck Medication       Initial /82  Pulse 68  Temp 97.6  F (36.4  C) (Oral)  Wt 165 lb (74.8 kg)  SpO2 98%  BMI 32.22 kg/m2 Estimated body mass index is 32.22 kg/(m^2) as calculated from the following:    Height as of 11/16/17: 5' (1.524 m).    Weight as of this encounter: 165 lb (74.8 kg).  Medication Reconciliation: complete   Laisha Trevizo, CMA

## 2017-12-07 NOTE — PROGRESS NOTES
SUBJECTIVE:   Shantelle Su is a 72 year old female who presents to clinic today for the following health issues:      Pre-Diabetes Follow-up    Patient is checking blood sugars: once daily.  Results are as follows:       am - 110   Diabetic concerns: None     Symptoms of hypoglycemia (low blood sugar): none     Paresthesias (numbness or burning in feet) or sores: No     Date of last diabetic eye exam: 2017      BP Readings from Last 2 Encounters:   11/16/17 126/70   09/29/17 126/70     Hemoglobin A1C (%)   Date Value   12/04/2017 6.4 (H)   04/23/2017 6.8 (H)     LDL Cholesterol Calculated (mg/dL)   Date Value   03/16/2017 65   01/06/2017 73         Amount of exercise or physical activity: None    Problems taking medications regularly: No    Medication side effects: none    Diet: regular (no restrictions)    Other concerns:  1. Excessive flatulence x 1 month   2. Follow up on longitudinal nail crack of R thumb. Concerned about onychomycosis. Specimen collected 11/16 and then cancelled   3. Decreased hearing- c/o ear wax  4. Requesting heart check despite no symptoms    Problem list and histories reviewed & adjusted, as indicated.  Additional history: as documented    Patient Active Problem List   Diagnosis     Pulmonary embolism and infarction (H)     Essential hypertension, benign     Rectocele     Metabolic syndrome     Advanced directives, counseling/discussion     Gastroesophageal reflux disease without esophagitis     Fatty liver     Postmenopausal bleeding     Heart palpitations     Angina pectoris, unspecified     ASCVD (arteriosclerotic cardiovascular disease)     Postsurgical percutaneous transluminal coronary angioplasty status     Paroxysmal supraventricular tachycardia (H)     Abnormal electrocardiogram     Hyperlipidemia LDL goal <70     Coronary artery disease of native artery of native heart with stable angina pectoris (H)     Past Surgical History:   Procedure Laterality Date     ABDOMEN SURGERY       Bladder sling     C NONSPECIFIC PROCEDURE  2009    Riner mesh augmented anterior colporrhaphy and vault suspension, tension-free vaginal tape sling with a transobturator approach usingthe Obtryx device and cystoscopy.       COLONOSCOPY      Scheduled October 2016     COLPORRHAPY POSTERIOR, CYSTOSCOPY, COMBINED  12/7/2011    Procedure:COMBINED COLPORRHAPY POSTERIOR, CYSTOSCOPY; POSTERIOR MESH AUGMENTED REPAIR WITH ELEVATE MESH , cystoscopy; Surgeon:ANDRE UP; Location:[facility] OR     DILATION AND CURETTAGE, HYSTEROSCOPY DIAGNOSTIC, COMBINED N/A 10/4/2016    Procedure: COMBINED DILATION AND CURETTAGE, HYSTEROSCOPY DIAGNOSTIC;  Surgeon: Andre Up MD;  Location: [facility] OR     HC DILATION/CURETTAGE DIAG/THER NON OB      after sab     HC LEFT HEART CATHETERIZATION  11/23/16    PCI with drug-eluting stent placement in the proximal LAD     HC TOOTH EXTRACTION W/FORCEP         Social History   Substance Use Topics     Smoking status: Former Smoker     Packs/day: 0.75     Years: 20.00     Quit date: 11/5/1987     Smokeless tobacco: Never Used     Alcohol use Yes      Comment: 2 per month     Family History   Problem Relation Age of Onset     Gynecology Daughter      HEART DISEASE Father      D AGE 50     Coronary Artery Disease Father      Hyperlipidemia Father      HEART DISEASE Mother      D AGE 70     DIABETES Mother      Coronary Artery Disease Mother      Hypertension Mother      Hyperlipidemia Mother      HEART DISEASE Brother      B AGE 52 HEART SURGERY     Family History Negative Brother      B AGE 47     Hypertension Brother      CEREBROVASCULAR DISEASE Sister      B AGE 54 BLOOD CLOTS     Family History Negative Sister      B AGE 60     Depression Sister          Current Outpatient Prescriptions   Medication Sig Dispense Refill     metFORMIN (GLUCOPHAGE) 500 MG tablet Take 1 tablet (500 mg) by mouth daily (with dinner) 90 tablet 3     omeprazole (PRILOSEC) 20 MG CR capsule TAKE ONE CAPSULE BY MOUTH EVERY DAY  90 capsule 1     metoprolol (TOPROL XL) 50 MG 24 hr tablet Take 1 tablet (50 mg) by mouth daily 90 tablet 3     atorvastatin (LIPITOR) 40 MG tablet Take 1 tablet (40 mg) by mouth daily 90 tablet 3     amLODIPine (NORVASC) 5 MG tablet TAKE 1 TABLET BY MOUTH EVERY DAY 90 tablet 1     docusate sodium (COLACE) 100 MG capsule Take 100 mg by mouth 2 times daily       nitroglycerin (NITROSTAT) 0.4 MG SL tablet As needed for anginal chest pain. Maximum is 3 doses in 15 minutes (one every 5 minutes). 25 tablet 3     aspirin EC 81 MG EC tablet Take 1 tablet (81 mg) by mouth daily 90 tablet 3     acetaminophen (TYLENOL) 325 MG tablet Take 2 tablets (650 mg) by mouth every 4 hours as needed for other (mild pain) (Patient taking differently: Take 325 mg by mouth every 4 hours as needed for other (mild pain) ) 100 tablet 0     glucose blood VI test strips strip Accucheck Kristine Plus  3 times daily (Patient taking differently: daily Accucheck Kristine Plus  3 times daily) 300 strip 11     ACCU-CHEK MULTICLIX LANCETS MISC 3 times daily. BG testing 3 times a day (Patient taking differently: daily 3 times daily. BG testing 3 times a day) 100 each 11     Blood Glucose Calibration (ACCU-CHEK KRISTINE) SOLN Use as directed 3 Bottle 11     Blood Glucose Monitoring Suppl (ACCU-CHEK KRISTINE) KIT 2 times daily. With routine lancets as well as alcohol swabs, dispense 1 month (Patient taking differently: daily With routine lancets as well as alcohol swabs, dispense 1 month) 1 kit 0     [DISCONTINUED] metFORMIN (GLUCOPHAGE) 500 MG tablet Take 1 tablet (500 mg) by mouth daily (with dinner) 90 tablet 3     Allergies   Allergen Reactions     Lisinopril      bp increased     BP Readings from Last 3 Encounters:   11/16/17 126/70   09/29/17 126/70   05/04/17 135/61    Wt Readings from Last 3 Encounters:   11/16/17 165 lb (74.8 kg)   09/29/17 163 lb 4.8 oz (74.1 kg)   05/03/17 159 lb (72.1 kg)         Reviewed and updated as needed this visit by clinical  staffTobacco  Allergies  Med Hx  Surg Hx  Fam Hx  Soc Hx      Reviewed and updated as needed this visit by Provider         ROS:  C: NEGATIVE for fever, chills, change in weight  E/M: NEGATIVE for ear, mouth and throat problems  R: NEGATIVE for significant cough or SOB  CV: NEGATIVE for chest pain, palpitations or peripheral edema  GI: NEGATIVE for nausea, abdominal pain, heartburn, or change in bowel habits  : NEGATIVE for frequency, dysuria, or hematuria  M: NEGATIVE for significant arthralgias or myalgia  H: NEGATIVE for bleeding problems  P: NEGATIVE for changes in mood or affect    OBJECTIVE:                                                    /82  Pulse 68  Temp 97.6  F (36.4  C) (Oral)  Wt 165 lb (74.8 kg)  SpO2 98%  BMI 32.22 kg/m2  Body mass index is 32.22 kg/(m^2).  GENERAL: healthy, alert and no distress  EYES: Eyes grossly normal to inspection, extraocular movements - intact, and PERRL  HENT: ear canals- normal; TMs- normal; Nose- normal; Mouth- no ulcers, no lesions  NECK: no tenderness, no adenopathy, no asymmetry, no masses, no stiffness; thyroid- normal to palpation  RESP: lungs clear to auscultation - no rales, no rhonchi, no wheezes  CV: regular rates and rhythm, normal S1 S2, no S3 or S4 and no click or rub   MS: extremities- no gross deformities noted  PSYCH: Alert and oriented times 3; speech- coherent , normal rate and volume; able to articulate logical thoughts, able to abstract reason, no tangential thoughts, no hallucinations or delusions, affect- normal     ASSESSMENT/PLAN:                                                      (E88.81) Metabolic syndrome  (primary encounter diagnosis)  Comment: stable on therapy, no changes  Plan: metFORMIN (GLUCOPHAGE) 500 MG tablet        A1c in months    (I10) Essential hypertension, benign  Comment: stable at goal on therapy  Plan:     (I47.1) Paroxysmal supraventricular tachycardia (H)  Comment: controlled on therapy w/o  recurrence  Plan:     (Z98.61) Status post coronary angioplasty  Comment: stable and medically managed  Plan:     See Patient Instructions    Klever Vega MD  [facility]    25 minutes spent with this patient, face to face, discussing treatment options for listed problems above as well as side effects of appropriate medications.  Counseling time extended beyond 50% of the clinic visit.  Medication dosing, treatment plan and follow-up were discussed. Also reviewed need for primary care testing for patient.

## 2017-12-07 NOTE — MR AVS SNAPSHOT
After Visit Summary   12/7/2017    Shantelle Su    MRN: [id number]           Patient Information     Date Of Birth          1945        Visit Information        Provider Department      12/7/2017 2:20 PM Klever Vega MD [facility]        Today's Diagnoses     Metabolic syndrome    -  1    Essential hypertension, benign        Paroxysmal supraventricular tachycardia (H)        Status post coronary angioplasty           Follow-ups after your visit        Follow-up notes from your care team     Return in about 6 months (around 6/7/2018) for Lab Work.      Who to contact     If you have questions or need follow up information about today's clinic visit or your schedule please contact [facility] directly at [phone].  Normal or non-critical lab and imaging results will be communicated to you by Social[employer], letter or phone within 4 business days after the clinic has received the results. If you do not hear from us within 7 days, please contact the clinic through [employer] or phone. If you have a critical or abnormal lab result, we will notify you by phone as soon as possible.  Submit refill requests through [employer] or call your pharmacy and they will forward the refill request to us. Please allow 3 business days for your refill to be completed.          Additional Information About Your Visit        MyChart Information     [employer] gives you secure access to your electronic health record. If you see a primary care provider, you can also send messages to your care team and make appointments. If you have questions, please call your primary care clinic.  If you do not have a primary care provider, please call [phone] and they will assist you.        Care EveryWhere ID     This is your Care EveryWhere ID. This could be used by other organizations to access your [facility] medical records  [id number]        Your Vitals Were     Pulse Temperature  Pulse Oximetry BMI (Body Mass Index)          68 97.6  F (36.4  C) (Oral) 98% 32.22 kg/m2         Blood Pressure from Last 3 Encounters:   12/07/17 130/82   11/16/17 126/70   09/29/17 126/70    Weight from Last 3 Encounters:   12/07/17 165 lb (74.8 kg)   11/16/17 165 lb (74.8 kg)   09/29/17 163 lb 4.8 oz (74.1 kg)              Today, you had the following     No orders found for display         Today's Medication Changes          These changes are accurate as of: 12/7/17  3:02 PM.  If you have any questions, ask your nurse or doctor.               These medicines have changed or have updated prescriptions.        Dose/Directions    ACCU-CHEK VI Kit   This may have changed:    - when to take this  - additional instructions   Used for:  Hyperglycemia        2 times daily. With routine lancets as well as alcohol swabs, dispense 1 month   Quantity:  1 kit   Refills:  0       acetaminophen 325 MG tablet   Commonly known as:  TYLENOL   This may have changed:  how much to take   Used for:  Post-operative state        Dose:  650 mg   Take 2 tablets (650 mg) by mouth every 4 hours as needed for other (mild pain)   Quantity:  100 tablet   Refills:  0       blood glucose monitoring lancets   This may have changed:    - when to take this  - additional instructions   Used for:  Hyperglycemia        3 times daily. BG testing 3 times a day   Quantity:  100 each   Refills:  11       blood glucose monitoring test strip   Commonly known as:  no brand specified   This may have changed:    - when to take this  - additional instructions   Used for:  Hyperglycemia        Accucheck Vi Plus  3 times daily   Quantity:  300 strip   Refills:  11            Where to get your medicines      These medications were sent to [facility]/pharmacy #[id number] - [place], [place] - [address]  [address] [address] [address]    Hours:  Old ann drug converted to [employer] Phone:  [phone]     metFORMIN 500 MG tablet                Primary Care  Provider Office Phone # Fax #    Klever Vega [phone]       [address] [facility] [address]        Equal Access to Services     LIT VASQUEZ : Prisca lilia rehman adarsh Hernandes, waaxda luqadaha, qaybta kaalmada lloyd, mireya de pazgordon silva. So [facility] [phone].    ATENCIÓN: Si habla español, tiene a herbert disposición servicios gratuitos de asistencia lingüística. Llame al [phone].    We comply with applicable federal civil rights laws and [place] laws. We do not discriminate on the basis of race, color, national origin, age, disability, sex, sexual orientation, or gender identity.            Thank you!     Thank you for choosing [facility]  for your care. Our goal is always to provide you with excellent care. Hearing back from our patients is one way we can continue to improve our services. Please take a few minutes to complete the written survey that you may receive in the mail after your visit with us. Thank you!             Your Updated Medication List - Protect others around you: Learn how to safely use, store and throw away your medicines at www.disposemymeds.org.          This list is accurate as of: 12/7/17  3:02 PM.  Always use your most recent med list.                   Brand Name Dispense Instructions for use Diagnosis    ACCU-CHEK VI Kit     1 kit    2 times daily. With routine lancets as well as alcohol swabs, dispense 1 month    Hyperglycemia       acetaminophen 325 MG tablet    TYLENOL    100 tablet    Take 2 tablets (650 mg) by mouth every 4 hours as needed for other (mild pain)    Post-operative state       amLODIPine 5 MG tablet    NORVASC    90 tablet    TAKE 1 TABLET BY MOUTH EVERY DAY    Essential hypertension, benign       aspirin 81 MG EC tablet     90 tablet    Take 1 tablet (81 mg) by mouth daily    Angina pectoris, unspecified, Status post coronary angioplasty       atorvastatin 40 MG tablet    LIPITOR     90 tablet    Take 1 tablet (40 mg) by mouth daily    Hyperlipidemia LDL goal <130       blood glucose calibration solution     3 Bottle    Use as directed    Metabolic syndrome       blood glucose monitoring lancets     100 each    3 times daily. BG testing 3 times a day    Hyperglycemia       blood glucose monitoring test strip    no brand specified    300 strip    Accucheck Kristine Plus  3 times daily    Hyperglycemia       docusate sodium 100 MG capsule    COLACE     Take 100 mg by mouth 2 times daily        metFORMIN 500 MG tablet    GLUCOPHAGE    90 tablet    Take 1 tablet (500 mg) by mouth daily (with dinner)    Metabolic syndrome       metoprolol 50 MG 24 hr tablet    TOPROL XL    90 tablet    Take 1 tablet (50 mg) by mouth daily    Paroxysmal supraventricular tachycardia (H), Abnormal electrocardiogram       nitroGLYcerin 0.4 MG sublingual tablet    NITROSTAT    25 tablet    As needed for anginal chest pain. Maximum is 3 doses in 15 minutes (one every 5 minutes).    Status post coronary angioplasty       omeprazole 20 MG CR capsule    priLOSEC    90 capsule    TAKE ONE CAPSULE BY MOUTH EVERY DAY    Gastroesophageal reflux disease without esophagitis

## 2017-12-14 LAB
BACTERIA SPEC CULT: ABNORMAL
BACTERIA SPEC CULT: ABNORMAL
SPECIMEN SOURCE: ABNORMAL

## 2017-12-18 ENCOUNTER — TELEPHONE (OUTPATIENT)
Dept: FAMILY MEDICINE | Facility: CLINIC | Age: 72
End: 2017-12-18

## 2017-12-18 NOTE — TELEPHONE ENCOUNTER
Patient calling for results.  Does she need treatment, could she spread to other people.  States was told to just keep clipping.  Discussed treatment sometimes not covered by insurance and expensive and states her other doctor she saw did tell her that.  Any home remedies?  Pharmacy T'd up if you want to send RX.  Call her back at [phone].  Melody Emmanuel RN    Component      Latest Ref Rng & Units 11/16/2017           2:44 PM   Specimen Description       Fingernail   Culture Micro       Nonsporulating dematiaceous fungus (A)     Component      Latest Ref Rng & Units 11/16/2017           2:44 PM   Specimen Description          Culture Micro       No additional fungi cultured after 4 weeks incubation

## 2017-12-20 NOTE — TELEPHONE ENCOUNTER
Not contagious from person to person, spread using nail tools. She should get rid of her own and stop going to the salon.     I can send in treatment - terbinafine - 6 weeks to treat this. Not generally too expensive. Can be harsh on the liver but most tolerate well. Please call her and ask. JH

## 2017-12-20 NOTE — TELEPHONE ENCOUNTER
Pt is worried about taking a new medication interfering her liver and other medications.  She is going out of town for about 8 weeks and is wondering if this will go away if she just keeps trimming the nail as it grows in.    Please advise    Aisha Miller RN, BSN

## 2017-12-20 NOTE — TELEPHONE ENCOUNTER
It probably wont go away on it's own. She can try tea tree oil at the cuticle and under the nail as an alternative option. JH

## 2017-12-26 DIAGNOSIS — Z98.61 STATUS POST CORONARY ANGIOPLASTY: ICD-10-CM

## 2017-12-26 RX ORDER — NITROGLYCERIN 0.4 MG/1
TABLET SUBLINGUAL
Qty: 25 TABLET | Refills: 3 | Status: SHIPPED | OUTPATIENT
Start: 2017-12-26 | End: 2019-07-30

## 2018-03-14 ENCOUNTER — HOSPITAL ENCOUNTER (OUTPATIENT)
Dept: MAMMOGRAPHY | Facility: CLINIC | Age: 73
Discharge: HOME | End: 2018-03-14
Attending: INTERNAL MEDICINE | Admitting: INTERNAL MEDICINE
Payer: MEDICARE

## 2018-03-14 DIAGNOSIS — Z12.31 VISIT FOR SCREENING MAMMOGRAM: ICD-10-CM

## 2018-03-14 PROCEDURE — 77067 SCR MAMMO BI INCL CAD: CPT

## 2018-03-23 ENCOUNTER — OFFICE VISIT (OUTPATIENT)
Dept: INTERNAL MEDICINE | Facility: CLINIC | Age: 73
End: 2018-03-23
Payer: COMMERCIAL

## 2018-03-23 VITALS
DIASTOLIC BLOOD PRESSURE: 72 MMHG | OXYGEN SATURATION: 98 % | HEIGHT: 60 IN | TEMPERATURE: 98.2 F | BODY MASS INDEX: 32.71 KG/M2 | RESPIRATION RATE: 16 BRPM | HEART RATE: 74 BPM | WEIGHT: 166.6 LBS | SYSTOLIC BLOOD PRESSURE: 120 MMHG

## 2018-03-23 DIAGNOSIS — B35.1 ONYCHOMYCOSIS: Primary | ICD-10-CM

## 2018-03-23 PROCEDURE — 99213 OFFICE O/P EST LOW 20 MIN: CPT | Performed by: PHYSICIAN ASSISTANT

## 2018-03-23 RX ORDER — TERBINAFINE HYDROCHLORIDE 250 MG/1
250 TABLET ORAL DAILY
Qty: 30 TABLET | Refills: 2 | Status: SHIPPED | OUTPATIENT
Start: 2018-03-23 | End: 2018-10-22

## 2018-03-23 ASSESSMENT — PAIN SCALES - GENERAL: PAINLEVEL: NO PAIN (0)

## 2018-03-23 NOTE — MR AVS SNAPSHOT
After Visit Summary   3/23/2018    Shantelle Su    MRN: [id number]           Patient Information     Date Of Birth          1945        Visit Information        Provider Department      3/23/2018 3:00 PM Dolores Thayer PA-C [facility]        Today's Diagnoses     Onychomycosis    -  1       Follow-ups after your visit        Future tests that were ordered for you today     Open Future Orders        Priority Expected Expires Ordered    Hepatic panel Routine 4/23/2018 3/23/2019 3/23/2018            Who to contact     If you have questions or need follow up information about today's clinic visit or your schedule please contact [facility] directly at [phone].  Normal or non-critical lab and imaging results will be communicated to you by Appointuithart, letter or phone within 4 business days after the clinic has received the results. If you do not hear from us within 7 days, please contact the clinic through Appointuithart or phone. If you have a critical or abnormal lab result, we will notify you by phone as soon as possible.  Submit refill requests through "[employer] or call your pharmacy and they will forward the refill request to us. Please allow 3 business days for your refill to be completed.          Additional Information About Your Visit        MyChart Information     "[employer] gives you secure access to your electronic health record. If you see a primary care provider, you can also send messages to your care team and make appointments. If you have questions, please call your primary care clinic.  If you do not have a primary care provider, please call [phone] and they will assist you.        Care EveryWhere ID     This is your Care EveryWhere ID. This could be used by other organizations to access your [facility] medical records  [id number]        Your Vitals Were     Pulse Temperature Respirations Height Pulse Oximetry BMI (Body Mass  Index)    74 98.2  F (36.8  C) (Oral) 16 5' (1.524 m) 98% 32.54 kg/m2       Blood Pressure from Last 3 Encounters:   03/23/18 120/72   12/07/17 130/82   11/16/17 126/70    Weight from Last 3 Encounters:   03/23/18 166 lb 9.6 oz (75.6 kg)   12/07/17 165 lb (74.8 kg)   11/16/17 165 lb (74.8 kg)                 Today's Medication Changes          These changes are accurate as of 3/23/18  3:28 PM.  If you have any questions, ask your nurse or doctor.               Start taking these medicines.        Dose/Directions    terbinafine 250 MG tablet   Commonly known as:  lamISIL   Used for:  Onychomycosis   Started by:  Dolores Thayer PA-C        Dose:  250 mg   Take 1 tablet (250 mg) by mouth daily   Quantity:  30 tablet   Refills:  2         These medicines have changed or have updated prescriptions.        Dose/Directions    ACCU-CHEK VI Kit   This may have changed:    - when to take this  - additional instructions   Used for:  Hyperglycemia        2 times daily. With routine lancets as well as alcohol swabs, dispense 1 month   Quantity:  1 kit   Refills:  0       blood glucose monitoring lancets   This may have changed:    - when to take this  - additional instructions   Used for:  Hyperglycemia        3 times daily. BG testing 3 times a day   Quantity:  100 each   Refills:  11       blood glucose monitoring test strip   Commonly known as:  no brand specified   This may have changed:    - when to take this  - additional instructions   Used for:  Hyperglycemia        Accucheck Vi Plus  3 times daily   Quantity:  300 strip   Refills:  11            Where to get your medicines      These medications were sent to [facility]/pharmacy #[id number] - [place], [place] - [address] [address]  [address] [facility] [address]    Hours:  Old ann drug converted to [employer] Phone:  [phone]     terbinafine 250 MG tablet                Primary Care Provider Office Phone # Fax #    Klever Vega [phone]        [address] [facility] [address]        Equal Access to Services     JANINEPHONG CHRISTINA : Hadii lilia rehman adarsh Soevansali, wanolanda luqadaha, qaalenata kajakida sangeethachristisammie, mireya puente elinorjessica kahnalfredogloria ascencio. So [facility] [phone].    ATENCIÓN: Si habla español, tiene a herbert disposición servicios gratuitos de asistencia lingüística. Llame al [phone].    We comply with applicable federal civil rights laws and [place] laws. We do not discriminate on the basis of race, color, national origin, age, disability, sex, sexual orientation, or gender identity.            Thank you!     Thank you for choosing [facility]  for your care. Our goal is always to provide you with excellent care. Hearing back from our patients is one way we can continue to improve our services. Please take a few minutes to complete the written survey that you may receive in the mail after your visit with us. Thank you!             Your Updated Medication List - Protect others around you: Learn how to safely use, store and throw away your medicines at www.disposemymeds.org.          This list is accurate as of 3/23/18  3:28 PM.  Always use your most recent med list.                   Brand Name Dispense Instructions for use Diagnosis    ACCU-CHEK VI Kit     1 kit    2 times daily. With routine lancets as well as alcohol swabs, dispense 1 month    Hyperglycemia       acetaminophen 325 MG tablet    TYLENOL    100 tablet    Take 2 tablets (650 mg) by mouth every 4 hours as needed for other (mild pain)    Post-operative state       amLODIPine 5 MG tablet    NORVASC    90 tablet    TAKE 1 TABLET BY MOUTH EVERY DAY    Essential hypertension, benign       aspirin 81 MG EC tablet     90 tablet    Take 1 tablet (81 mg) by mouth daily    Angina pectoris, unspecified, Status post coronary angioplasty       atorvastatin 40 MG tablet    LIPITOR    90 tablet    Take 1 tablet (40 mg) by mouth daily    Hyperlipidemia LDL goal <130        blood glucose calibration solution     3 Bottle    Use as directed    Metabolic syndrome       blood glucose monitoring lancets     100 each    3 times daily. BG testing 3 times a day    Hyperglycemia       blood glucose monitoring test strip    no brand specified    300 strip    Accucheck Kristine Plus  3 times daily    Hyperglycemia       docusate sodium 100 MG capsule    COLACE     Take 100 mg by mouth 2 times daily        metFORMIN 500 MG tablet    GLUCOPHAGE    90 tablet    Take 1 tablet (500 mg) by mouth daily (with dinner)    Metabolic syndrome       metoprolol succinate 50 MG 24 hr tablet    TOPROL XL    90 tablet    Take 1 tablet (50 mg) by mouth daily    Paroxysmal supraventricular tachycardia (H), Abnormal electrocardiogram       nitroGLYcerin 0.4 MG sublingual tablet    NITROSTAT    25 tablet    DISSOLVE 1 TAB UNDER TONGUE AS NEEDED FOR CHEST PAIN. MAXIMUM IS 3 DOSES EVERY 5 MIN OVER 15 MINUTES    Status post coronary angioplasty       omeprazole 20 MG CR capsule    priLOSEC    90 capsule    TAKE ONE CAPSULE BY MOUTH EVERY DAY    Gastroesophageal reflux disease without esophagitis       terbinafine 250 MG tablet    lamISIL    30 tablet    Take 1 tablet (250 mg) by mouth daily    Onychomycosis

## 2018-03-23 NOTE — PROGRESS NOTES
SUBJECTIVE:   Shantelle Su is a 73 year old female who presents to clinic today for the following health issues:      Pt is here today to look at her right hand thumb. She states it is tender to the touch, no redness or swollen. She has had this in the past and has seen Dr. Vega for the same issue. States that he told her to clip the nails short and trimmed.   Patient seen for dystrophic nail in 11/2017 fungal culture done and positive. She did not do oral treatment. Has been clipping nail only.  States also with two toenails that are infected as well.       -------------------------------------    Problem list and histories reviewed & adjusted, as indicated.  Additional history: as documented    Labs reviewed in EPIC    Reviewed and updated as needed this visit by clinical staff  Tobacco  Allergies  Meds       Reviewed and updated as needed this visit by Provider  Allergies  Meds         ROS:  Constitutional, HEENT, cardiovascular, pulmonary, gi and gu systems are negative, except as otherwise noted.    OBJECTIVE:     /72 (BP Location: Left arm, Patient Position: Chair, Cuff Size: Adult Regular)  Pulse 74  Temp 98.2  F (36.8  C) (Oral)  Resp 16  Ht 5' (1.524 m)  Wt 166 lb 9.6 oz (75.6 kg)  SpO2 98%  BMI 32.54 kg/m2  Body mass index is 32.54 kg/(m^2).  GENERAL: healthy, alert and no distress  RESP: lungs clear to auscultation - no rales, rhonchi or wheezes  CV: regular rates and rhythm  SKIN: nail.  Dystrophy right thumb nail with debris noted under the nail  Dystrophic right and left great toenails.     Diagnostic Test Results:  none     ASSESSMENT/PLAN:             1. Onychomycosis    - terbinafine (LAMISIL) 250 MG tablet; Take 1 tablet (250 mg) by mouth daily  Dispense: 30 tablet; Refill: 2  - Hepatic panel; Future    Treatment for 12 weeks given toenail involvement.  Hepatic panel in 4 weeks.  Reviewed expectation for regrowth of new nails.         NANCIE Pastrana  [facility]

## 2018-04-24 DIAGNOSIS — B35.1 ONYCHOMYCOSIS: ICD-10-CM

## 2018-04-24 PROCEDURE — 36415 COLL VENOUS BLD VENIPUNCTURE: CPT | Performed by: FAMILY MEDICINE

## 2018-04-24 PROCEDURE — 80076 HEPATIC FUNCTION PANEL: CPT | Performed by: FAMILY MEDICINE

## 2018-04-25 LAB
ALBUMIN SERPL-MCNC: 3.9 G/DL (ref 3.4–5)
ALP SERPL-CCNC: 103 U/L (ref 40–150)
ALT SERPL W P-5'-P-CCNC: 32 U/L (ref 0–50)
AST SERPL W P-5'-P-CCNC: 17 U/L (ref 0–45)
BILIRUB DIRECT SERPL-MCNC: <0.1 MG/DL (ref 0–0.2)
BILIRUB SERPL-MCNC: 0.3 MG/DL (ref 0.2–1.3)
PROT SERPL-MCNC: 7.3 G/DL (ref 6.8–8.8)

## 2018-05-02 DIAGNOSIS — I10 ESSENTIAL HYPERTENSION, BENIGN: ICD-10-CM

## 2018-05-02 RX ORDER — AMLODIPINE BESYLATE 5 MG/1
TABLET ORAL
Qty: 30 TABLET | Refills: 0 | Status: SHIPPED | OUTPATIENT
Start: 2018-05-02 | End: 2018-05-17

## 2018-05-02 NOTE — TELEPHONE ENCOUNTER
Medication is being filled for 1 time refill only due to:  Patient needs labs Cr..Letter sent.

## 2018-05-02 NOTE — LETTER
[facility]  [address] [id number] [phone]            Shantelle Su  [address] [facility] [address]        May 2, 2018    Dear Shantelle,    While refilling your prescription today, we noticed that you are due to have labs drawn.  We will refill your prescription for 30 days, but a follow-up appointment must be made before any additional refills can be approved.     Taking care of your health is important to us and we look forward to seeing you in the near future.  Please call us at [phone] or [phone] (or use [email or web address]) to schedule an appointment.     Please disregard this notice if you have already made an appointment.    Sincerely,        [facility]

## 2018-05-09 DIAGNOSIS — I10 ESSENTIAL HYPERTENSION, BENIGN: ICD-10-CM

## 2018-05-09 PROCEDURE — 82565 ASSAY OF CREATININE: CPT | Performed by: INTERNAL MEDICINE

## 2018-05-09 PROCEDURE — 36415 COLL VENOUS BLD VENIPUNCTURE: CPT | Performed by: INTERNAL MEDICINE

## 2018-05-10 LAB
CREAT SERPL-MCNC: 0.71 MG/DL (ref 0.52–1.04)
GFR SERPL CREATININE-BSD FRML MDRD: 81 ML/MIN/1.7M2

## 2018-05-14 DIAGNOSIS — K21.9 GASTROESOPHAGEAL REFLUX DISEASE WITHOUT ESOPHAGITIS: ICD-10-CM

## 2018-05-14 NOTE — TELEPHONE ENCOUNTER
"Requested Prescriptions   Pending Prescriptions Disp Refills     omeprazole (PRILOSEC) 20 MG CR capsule [Pharmacy Med Name: OMEPRAZOLE DR 20 MG CAPSULE]  Last Written Prescription Date:  11/28/2017  Last Fill Quantity: 90,  # refills: 1   Last office visit: 3/23/2018 with prescribing provider:  3/23/2018   Future Office Visit:     90 capsule 1     Sig: TAKE ONE CAPSULE BY MOUTH EVERY DAY    PPI Protocol Passed    5/14/2018 12:20 PM       Passed - Not on Clopidogrel (unless Pantoprazole ordered)       Passed - No diagnosis of osteoporosis on record       Passed - Recent (12 mo) or future (30 days) visit within the authorizing provider's specialty    Patient had office visit in the last 12 months or has a visit in the next 30 days with authorizing provider or within the authorizing provider's specialty.  See \"Patient Info\" tab in inbasket, or \"Choose Columns\" in Meds & Orders section of the refill encounter.           Passed - Patient is age 18 or older       Passed - No active pregnacy on record       Passed - No positive pregnancy test in past 12 months          "

## 2018-05-17 ENCOUNTER — OFFICE VISIT (OUTPATIENT)
Dept: INTERNAL MEDICINE | Facility: CLINIC | Age: 73
End: 2018-05-17
Payer: COMMERCIAL

## 2018-05-17 VITALS
HEART RATE: 86 BPM | SYSTOLIC BLOOD PRESSURE: 112 MMHG | RESPIRATION RATE: 15 BRPM | BODY MASS INDEX: 32.83 KG/M2 | HEIGHT: 60 IN | DIASTOLIC BLOOD PRESSURE: 66 MMHG | TEMPERATURE: 98 F | WEIGHT: 167.2 LBS | OXYGEN SATURATION: 96 %

## 2018-05-17 DIAGNOSIS — I10 ESSENTIAL HYPERTENSION, BENIGN: Primary | ICD-10-CM

## 2018-05-17 DIAGNOSIS — F43.21 GRIEF REACTION: ICD-10-CM

## 2018-05-17 DIAGNOSIS — E78.5 HYPERLIPIDEMIA LDL GOAL <70: ICD-10-CM

## 2018-05-17 DIAGNOSIS — M25.562 LEFT KNEE PAIN, UNSPECIFIED CHRONICITY: ICD-10-CM

## 2018-05-17 DIAGNOSIS — E88.810 METABOLIC SYNDROME: ICD-10-CM

## 2018-05-17 DIAGNOSIS — I25.118 CORONARY ARTERY DISEASE OF NATIVE ARTERY OF NATIVE HEART WITH STABLE ANGINA PECTORIS (H): ICD-10-CM

## 2018-05-17 PROCEDURE — 99214 OFFICE O/P EST MOD 30 MIN: CPT | Performed by: INTERNAL MEDICINE

## 2018-05-17 RX ORDER — LORAZEPAM 0.5 MG/1
0.5 TABLET ORAL EVERY 8 HOURS PRN
Qty: 5 TABLET | Refills: 0 | Status: SHIPPED | OUTPATIENT
Start: 2018-05-17 | End: 2018-12-05

## 2018-05-17 RX ORDER — AMLODIPINE BESYLATE 5 MG/1
5 TABLET ORAL DAILY
Qty: 90 TABLET | Refills: 3 | Status: SHIPPED | OUTPATIENT
Start: 2018-05-17 | End: 2019-05-02

## 2018-05-17 ASSESSMENT — PAIN SCALES - GENERAL: PAINLEVEL: MODERATE PAIN (5)

## 2018-05-17 NOTE — MR AVS SNAPSHOT
After Visit Summary   5/17/2018    Shantelle Su    MRN: [id number]           Patient Information     Date Of Birth          1945        Visit Information        Provider Department      5/17/2018 11:20 AM Klever Vega MD [facility]        Today's Diagnoses     Essential hypertension, benign    -  1    Coronary artery disease of native artery of native heart with stable angina pectoris (H)        Hyperlipidemia LDL goal <70        Left knee pain, unspecified chronicity        Metabolic syndrome        Grief reaction           Follow-ups after your visit        Follow-up notes from your care team     Return if symptoms worsen or fail to improve.      Your next 10 appointments already scheduled     May 21, 2018  9:45 AM CDT   LAB with [facility]   [facility]    [address]  [facility] [id number] [phone]           Please do not eat 10-12 hours before your appointment if you are coming in fasting for labs on lipids, cholesterol, or glucose (sugar). This does not apply to pregnant women. Water, hot tea and black coffee (with nothing added) are okay. Do not drink other fluids, diet soda or chew gum.              Future tests that were ordered for you today     Open Future Orders        Priority Expected Expires Ordered    Hemoglobin A1c Routine 5/17/2018 5/31/2018 5/17/2018    Lipid Profile Routine 5/17/2018 5/31/2018 5/17/2018    Basic metabolic panel Routine 5/17/2018 5/31/2018 5/17/2018            Who to contact     If you have questions or need follow up information about today's clinic visit or your schedule please contact [facility] directly at [phone].  Normal or non-critical lab and imaging results will be communicated to you by MyChart, letter or phone within 4 business days after the clinic has received the results. If you do not hear from us within 7 days, please  contact the clinic through [employer] or phone. If you have a critical or abnormal lab result, we will notify you by phone as soon as possible.  Submit refill requests through [employer] or call your pharmacy and they will forward the refill request to us. Please allow 3 business days for your refill to be completed.          Additional Information About Your Visit        [employer] Information     [employer] gives you secure access to your electronic health record. If you see a primary care provider, you can also send messages to your care team and make appointments. If you have questions, please call your primary care clinic.  If you do not have a primary care provider, please call [phone] and they will assist you.        Care EveryWhere ID     This is your Care EveryWhere ID. This could be used by other organizations to access your [facility] medical records  [id number]        Your Vitals Were     Pulse Temperature Respirations Height Pulse Oximetry BMI (Body Mass Index)    86 98  F (36.7  C) (Oral) 15 5' (1.524 m) 96% 32.65 kg/m2       Blood Pressure from Last 3 Encounters:   05/17/18 112/66   03/23/18 120/72   12/07/17 130/82    Weight from Last 3 Encounters:   05/17/18 167 lb 3.2 oz (75.8 kg)   03/23/18 166 lb 9.6 oz (75.6 kg)   12/07/17 165 lb (74.8 kg)                 Today's Medication Changes          These changes are accurate as of 5/17/18 12:07 PM.  If you have any questions, ask your nurse or doctor.               Start taking these medicines.        Dose/Directions    LORazepam 0.5 MG tablet   Commonly known as:  ATIVAN   Used for:  Grief reaction   Started by:  Klever Vega MD        Dose:  0.5 mg   Take 1 tablet (0.5 mg) by mouth every 8 hours as needed for anxiety   Quantity:  5 tablet   Refills:  0         These medicines have changed or have updated prescriptions.        Dose/Directions    ACCU-CHEK VI Kit   This may have changed:    - when to take this  - additional instructions   Used for:   Hyperglycemia        2 times daily. With routine lancets as well as alcohol swabs, dispense 1 month   Quantity:  1 kit   Refills:  0       amLODIPine 5 MG tablet   Commonly known as:  NORVASC   This may have changed:  See the new instructions.   Used for:  Essential hypertension, benign   Changed by:  Klever Vega MD        Dose:  5 mg   Take 1 tablet (5 mg) by mouth daily   Quantity:  90 tablet   Refills:  3       blood glucose monitoring lancets   This may have changed:    - when to take this  - additional instructions   Used for:  Hyperglycemia        3 times daily. BG testing 3 times a day   Quantity:  100 each   Refills:  11       blood glucose monitoring test strip   Commonly known as:  no brand specified   This may have changed:    - when to take this  - additional instructions   Used for:  Hyperglycemia        Accucheck Kristine Plus  3 times daily   Quantity:  300 strip   Refills:  11            Where to get your medicines      These medications were sent to [facility]/pharmacy #[id number] - [place], [place] - [address] [facility]  [address] [facility] [address]    Hours:  Old ann drug converted to [facility] Phone:  [phone]     amLODIPine 5 MG tablet         Some of these will need a paper prescription and others can be bought over the counter.  Ask your nurse if you have questions.     Bring a paper prescription for each of these medications     LORazepam 0.5 MG tablet                Primary Care Provider Office Phone # Fax #    Klever Vega [phone]       [address] [place] [id number]        Equal Access to Services     [facility]NTHIA AH: Hadii aad ku hadasho Soomaali, waaxda luqadaha, qaybta kaalmada adeegyada, waxay idiin hayaan sangeetha kharagloria ha . So [facility] [phone].    ATENCIÓN: Si habla español, tiene a herbert disposición servicios gratuitos de asistencia lingüística. Llame al [phone].    We comply with applicable federal civil rights laws and [place] laws. We do not  discriminate on the basis of race, color, national origin, age, disability, sex, sexual orientation, or gender identity.            Thank you!     Thank you for choosing [facility]  for your care. Our goal is always to provide you with excellent care. Hearing back from our patients is one way we can continue to improve our services. Please take a few minutes to complete the written survey that you may receive in the mail after your visit with us. Thank you!             Your Updated Medication List - Protect others around you: Learn how to safely use, store and throw away your medicines at www.disposemymeds.org.          This list is accurate as of 5/17/18 12:07 PM.  Always use your most recent med list.                   Brand Name Dispense Instructions for use Diagnosis    ACCU-CHEK VI Kit     1 kit    2 times daily. With routine lancets as well as alcohol swabs, dispense 1 month    Hyperglycemia       amLODIPine 5 MG tablet    NORVASC    90 tablet    Take 1 tablet (5 mg) by mouth daily    Essential hypertension, benign       aspirin 81 MG EC tablet     90 tablet    Take 1 tablet (81 mg) by mouth daily    Angina pectoris, unspecified, Status post coronary angioplasty       atorvastatin 40 MG tablet    LIPITOR    90 tablet    Take 1 tablet (40 mg) by mouth daily    Hyperlipidemia LDL goal <130       blood glucose calibration solution     3 Bottle    Use as directed    Metabolic syndrome       blood glucose monitoring lancets     100 each    3 times daily. BG testing 3 times a day    Hyperglycemia       blood glucose monitoring test strip    no brand specified    300 strip    Accucheck Vi Plus  3 times daily    Hyperglycemia       docusate sodium 100 MG capsule    COLACE     Take 100 mg by mouth 2 times daily        LORazepam 0.5 MG tablet    ATIVAN    5 tablet    Take 1 tablet (0.5 mg) by mouth every 8 hours as needed for anxiety    Grief reaction       metFORMIN 500 MG tablet     GLUCOPHAGE    90 tablet    Take 1 tablet (500 mg) by mouth daily (with dinner)    Metabolic syndrome       metoprolol succinate 50 MG 24 hr tablet    TOPROL XL    90 tablet    Take 1 tablet (50 mg) by mouth daily    Paroxysmal supraventricular tachycardia (H), Abnormal electrocardiogram       nitroGLYcerin 0.4 MG sublingual tablet    NITROSTAT    25 tablet    DISSOLVE 1 TAB UNDER TONGUE AS NEEDED FOR CHEST PAIN. MAXIMUM IS 3 DOSES EVERY 5 MIN OVER 15 MINUTES    Status post coronary angioplasty       omeprazole 20 MG CR capsule    priLOSEC    90 capsule    TAKE ONE CAPSULE BY MOUTH EVERY DAY    Gastroesophageal reflux disease without esophagitis       terbinafine 250 MG tablet    lamISIL    30 tablet    Take 1 tablet (250 mg) by mouth daily    Onychomycosis

## 2018-05-17 NOTE — PROGRESS NOTES
SUBJECTIVE:   Shantelle Su is a 73 year old female who presents to clinic today for the following health issues:    Hypertension Follow-up      Outpatient blood pressures are not being checked.    Low Salt Diet: low salt    Hyperlipidemia Follow-Up      Rate your low fat/cholesterol diet?: fair    Taking statin?  Yes, possible muscle aches from statin- cramping in legs     Other lipid medications/supplements?:  none      Amount of exercise or physical activity: None    Problems taking medications regularly: No    Medication side effects: none    Diet: regular (no restrictions)    Other concerns:  1. L knee injury- fell while making bed. Experiencing swelling, burning in low leg    Problem list and histories reviewed & adjusted, as indicated.  Additional history: as documented    Patient Active Problem List   Diagnosis     Pulmonary embolism and infarction (H)     Essential hypertension, benign     Rectocele     Metabolic syndrome     Advanced directives, counseling/discussion     Gastroesophageal reflux disease without esophagitis     Fatty liver     Postmenopausal bleeding     Heart palpitations     Angina pectoris, unspecified     ASCVD (arteriosclerotic cardiovascular disease)     Postsurgical percutaneous transluminal coronary angioplasty status     Paroxysmal supraventricular tachycardia (H)     Abnormal electrocardiogram     Hyperlipidemia LDL goal <70     Coronary artery disease of native artery of native heart with stable angina pectoris (H)     Past Surgical History:   Procedure Laterality Date     ABDOMEN SURGERY      Bladder sling     C NONSPECIFIC PROCEDURE  2009    [address] mesh augmented anterior colporrhaphy and vault suspension, tension-free vaginal tape sling with a transobturator approach usingthe Obtryx device and cystoscopy.       COLONOSCOPY      Scheduled October 2016     COLPORRHAPY POSTERIOR, CYSTOSCOPY, COMBINED  12/7/2011    Procedure:COMBINED COLPORRHAPY POSTERIOR, CYSTOSCOPY;  POSTERIOR MESH AUGMENTED REPAIR WITH ELEVATE MESH , cystoscopy; Surgeon:ANDRE UP; Location:[facility] OR     DILATION AND CURETTAGE, HYSTEROSCOPY DIAGNOSTIC, COMBINED N/A 10/4/2016    Procedure: COMBINED DILATION AND CURETTAGE, HYSTEROSCOPY DIAGNOSTIC;  Surgeon: Andre Up MD;  Location: [facility] OR     HC DILATION/CURETTAGE DIAG/THER NON OB      after sab     HC LEFT HEART CATHETERIZATION  11/23/16    PCI with drug-eluting stent placement in the proximal LAD     HC TOOTH EXTRACTION W/FORCEP         Social History   Substance Use Topics     Smoking status: Former Smoker     Packs/day: 0.75     Years: 20.00     Quit date: 11/5/1987     Smokeless tobacco: Never Used     Alcohol use Yes      Comment: 2 per month     Family History   Problem Relation Age of Onset     Gynecology Daughter      HEART DISEASE Father      D AGE 50     Coronary Artery Disease Father      Hyperlipidemia Father      HEART DISEASE Mother      D AGE 70     DIABETES Mother      Coronary Artery Disease Mother      Hypertension Mother      Hyperlipidemia Mother      HEART DISEASE Brother      B AGE 52 HEART SURGERY     Family History Negative Brother      B AGE 47     Hypertension Brother      CEREBROVASCULAR DISEASE Sister      B AGE 54 BLOOD CLOTS     Family History Negative Sister      B AGE 60     Depression Sister          Current Outpatient Prescriptions   Medication Sig Dispense Refill     ACCU-CHEK MULTICLIX LANCETS MISC 3 times daily. BG testing 3 times a day (Patient taking differently: daily 3 times daily. BG testing 3 times a day) 100 each 11     acetaminophen (TYLENOL) 325 MG tablet Take 2 tablets (650 mg) by mouth every 4 hours as needed for other (mild pain) (Patient not taking: Reported on 3/23/2018) 100 tablet 0     amLODIPine (NORVASC) 5 MG tablet TAKE 1 TABLET BY MOUTH EVERY DAY 30 tablet 0     aspirin EC 81 MG EC tablet Take 1 tablet (81 mg) by mouth daily 90 tablet 3     atorvastatin (LIPITOR) 40 MG tablet Take 1 tablet (40 mg) by  mouth daily 90 tablet 3     Blood Glucose Calibration (ACCU-CHEK VI) SOLN Use as directed 3 Bottle 11     Blood Glucose Monitoring Suppl (ACCU-CHEK VI) KIT 2 times daily. With routine lancets as well as alcohol swabs, dispense 1 month (Patient taking differently: daily With routine lancets as well as alcohol swabs, dispense 1 month) 1 kit 0     docusate sodium (COLACE) 100 MG capsule Take 100 mg by mouth 2 times daily       glucose blood VI test strips strip Accucheck Vi Plus  3 times daily (Patient taking differently: daily Accucheck Vi Plus  3 times daily) 300 strip 11     metFORMIN (GLUCOPHAGE) 500 MG tablet Take 1 tablet (500 mg) by mouth daily (with dinner) 90 tablet 3     metoprolol (TOPROL XL) 50 MG 24 hr tablet Take 1 tablet (50 mg) by mouth daily 90 tablet 3     nitroGLYcerin (NITROSTAT) 0.4 MG sublingual tablet DISSOLVE 1 TAB UNDER TONGUE AS NEEDED FOR CHEST PAIN. MAXIMUM IS 3 DOSES EVERY 5 MIN OVER 15 MINUTES 25 tablet 3     omeprazole (PRILOSEC) 20 MG CR capsule TAKE ONE CAPSULE BY MOUTH EVERY DAY 90 capsule 3     terbinafine (LAMISIL) 250 MG tablet Take 1 tablet (250 mg) by mouth daily 30 tablet 2     Allergies   Allergen Reactions     Lisinopril      bp increased     BP Readings from Last 3 Encounters:   03/23/18 120/72   12/07/17 130/82   11/16/17 126/70    Wt Readings from Last 3 Encounters:   03/23/18 166 lb 9.6 oz (75.6 kg)   12/07/17 165 lb (74.8 kg)   11/16/17 165 lb (74.8 kg)            Reviewed and updated as needed this visit by clinical staff  Tobacco  Allergies  Meds  Med Hx  Surg Hx  Fam Hx  Soc Hx      Reviewed and updated as needed this visit by Provider         ROS:  CONSTITUTIONAL: NEGATIVE for fever, chills, change in weight  ENT/MOUTH: NEGATIVE for ear, mouth and throat problems  RESP: NEGATIVE for significant cough or SOB  CV: NEGATIVE for chest pain, palpitations or peripheral edema  GI: NEGATIVE for nausea, abdominal pain, heartburn, or change in bowel habits  :  NEGATIVE for frequency, dysuria, or hematuria  MUSCULOSKELETAL: NEGATIVE for significant arthralgias or myalgia  HEME: NEGATIVE for bleeding problems  PSYCHIATRIC: NEGATIVE for changes in mood or affect less grief reaction death of sister and she wonders if she may need something for the upcoming  as she is worried that she may not be able to handle the stress.    OBJECTIVE:                                                    /66  Pulse 86  Temp 98  F (36.7  C) (Oral)  Resp 15  Ht 5' (1.524 m)  Wt 167 lb 3.2 oz (75.8 kg)  SpO2 96%  BMI 32.65 kg/m2  Body mass index is 32.65 kg/(m^2).  GENERAL: alert and no distress  EYES: Eyes grossly normal to inspection, extraocular movements - intact, and PERRL  HENT: ear canals- normal; TMs- normal; Nose- normal; Mouth- no ulcers, no lesions  NECK: no tenderness, no adenopathy, no asymmetry, no masses, no stiffness; thyroid- normal to palpation  RESP: lungs clear to auscultation - no rales, no rhonchi, no wheezes  CV: regular rates and rhythm, normal S1 S2, no S3 or S4 and no murmur, no click or rub -  MS: extremities- no gross deformities noted, no edema, the left knee is without effusion or ecchymosis demonstrates full range of motion.  PSYCH: Alert and oriented times 3 at times tearful due to the loss of her sister; speech- coherent , normal rate and volume; able to articulate logical thoughts, able to abstract reason, no tangential thoughts, no hallucinations or delusions, affect- normal     ASSESSMENT/PLAN:                                                      (I10) Essential hypertension, benign  (primary encounter diagnosis)  Comment: Stable on therapy continue with meds as ordered  Plan: amLODIPine (NORVASC) 5 MG tablet, Basic         metabolic panel            (I25.118) Coronary artery disease of native artery of native heart with stable angina pectoris (H)  Comment: No recurrent symptoms associated with angina per  Plan:     (E78.5) Hyperlipidemia LDL  goal <70  Comment:   LDL Cholesterol Calculated   Date Value Ref Range Status   03/16/2017 65 <100 mg/dL Final     Comment:     Desirable:       <100 mg/dl   ]  Plan: Lipid Profile        Labs ordered as fasting routine    (M25.562) Left knee pain, unspecified chronicity  Comment: Suspect soft tissue injury with resolution over to  Plan:     (E88.81) Metabolic syndrome  Comment: Repeat A1c fasting  Plan: Hemoglobin A1c, CANCELED: Hemoglobin A1c            (F43.20) Grief reaction  Comment: As needed use of been advised the risks were discussed with the patient.  Plan: LORazepam (ATIVAN) 0.5 MG tablet          See Patient Instructions    Klever Vega MD  [facility]    25 minutes spent with this patient, face to face, discussing treatment options for listed problems above as well as side effects of appropriate medications.  Counseling time extended beyond 50% of the clinic visit.  Medication dosing, treatment plan and follow-up were discussed. Also reviewed need for primary care testing for patient.

## 2018-05-21 DIAGNOSIS — I10 ESSENTIAL HYPERTENSION, BENIGN: ICD-10-CM

## 2018-05-21 DIAGNOSIS — E88.810 METABOLIC SYNDROME: ICD-10-CM

## 2018-05-21 DIAGNOSIS — E78.5 HYPERLIPIDEMIA LDL GOAL <70: ICD-10-CM

## 2018-05-21 LAB
ANION GAP SERPL CALCULATED.3IONS-SCNC: 11 MMOL/L (ref 3–14)
BUN SERPL-MCNC: 12 MG/DL (ref 7–30)
CALCIUM SERPL-MCNC: 8.7 MG/DL (ref 8.5–10.1)
CHLORIDE SERPL-SCNC: 105 MMOL/L (ref 94–109)
CHOLEST SERPL-MCNC: 154 MG/DL
CO2 SERPL-SCNC: 23 MMOL/L (ref 20–32)
CREAT SERPL-MCNC: 0.6 MG/DL (ref 0.52–1.04)
GFR SERPL CREATININE-BSD FRML MDRD: >90 ML/MIN/1.7M2
GLUCOSE SERPL-MCNC: 106 MG/DL (ref 70–99)
HBA1C MFR BLD: 6.2 % (ref 0–5.6)
HDLC SERPL-MCNC: 60 MG/DL
LDLC SERPL CALC-MCNC: 68 MG/DL
NONHDLC SERPL-MCNC: 94 MG/DL
POTASSIUM SERPL-SCNC: 4 MMOL/L (ref 3.4–5.3)
SODIUM SERPL-SCNC: 139 MMOL/L (ref 133–144)
TRIGL SERPL-MCNC: 128 MG/DL

## 2018-05-21 PROCEDURE — 80061 LIPID PANEL: CPT | Performed by: INTERNAL MEDICINE

## 2018-05-21 PROCEDURE — 36415 COLL VENOUS BLD VENIPUNCTURE: CPT | Performed by: INTERNAL MEDICINE

## 2018-05-21 PROCEDURE — 80048 BASIC METABOLIC PNL TOTAL CA: CPT | Performed by: INTERNAL MEDICINE

## 2018-05-21 PROCEDURE — 83036 HEMOGLOBIN GLYCOSYLATED A1C: CPT | Performed by: INTERNAL MEDICINE

## 2018-05-29 ENCOUNTER — APPOINTMENT (OUTPATIENT)
Dept: GENERAL RADIOLOGY | Facility: CLINIC | Age: 73
End: 2018-05-29
Attending: EMERGENCY MEDICINE
Payer: MEDICARE

## 2018-05-29 ENCOUNTER — HOSPITAL ENCOUNTER (EMERGENCY)
Facility: CLINIC | Age: 73
Discharge: HOME | End: 2018-05-29
Attending: EMERGENCY MEDICINE | Admitting: EMERGENCY MEDICINE
Payer: MEDICARE

## 2018-05-29 VITALS
DIASTOLIC BLOOD PRESSURE: 60 MMHG | SYSTOLIC BLOOD PRESSURE: 127 MMHG | HEART RATE: 77 BPM | TEMPERATURE: 98.2 F | RESPIRATION RATE: 18 BRPM | OXYGEN SATURATION: 97 %

## 2018-05-29 DIAGNOSIS — R11.2 NAUSEA AND VOMITING, INTRACTABILITY OF VOMITING NOT SPECIFIED, UNSPECIFIED VOMITING TYPE: ICD-10-CM

## 2018-05-29 DIAGNOSIS — N39.0 ACUTE UTI: ICD-10-CM

## 2018-05-29 DIAGNOSIS — R07.89 ATYPICAL CHEST PAIN: ICD-10-CM

## 2018-05-29 DIAGNOSIS — T36.95XA ANTIBIOTICS CAUSING ADVERSE EFFECT IN THERAPEUTIC USE, INITIAL ENCOUNTER: ICD-10-CM

## 2018-05-29 LAB
ALBUMIN SERPL-MCNC: 3.3 G/DL (ref 3.4–5)
ALBUMIN UR-MCNC: 30 MG/DL
ALP SERPL-CCNC: 75 U/L (ref 40–150)
ALT SERPL W P-5'-P-CCNC: 22 U/L (ref 0–50)
ANION GAP SERPL CALCULATED.3IONS-SCNC: 9 MMOL/L (ref 3–14)
APPEARANCE UR: ABNORMAL
AST SERPL W P-5'-P-CCNC: 16 U/L (ref 0–45)
BASOPHILS # BLD AUTO: 0 10E9/L (ref 0–0.2)
BASOPHILS NFR BLD AUTO: 0.3 %
BILIRUB SERPL-MCNC: 0.3 MG/DL (ref 0.2–1.3)
BILIRUB UR QL STRIP: NEGATIVE
BUN SERPL-MCNC: 10 MG/DL (ref 7–30)
CALCIUM SERPL-MCNC: 8.5 MG/DL (ref 8.5–10.1)
CHLORIDE SERPL-SCNC: 102 MMOL/L (ref 94–109)
CO2 BLDCOV-SCNC: 24 MMOL/L (ref 21–28)
CO2 SERPL-SCNC: 22 MMOL/L (ref 20–32)
COLOR UR AUTO: YELLOW
CREAT SERPL-MCNC: 0.75 MG/DL (ref 0.52–1.04)
DIFFERENTIAL METHOD BLD: ABNORMAL
EOSINOPHIL # BLD AUTO: 0 10E9/L (ref 0–0.7)
EOSINOPHIL NFR BLD AUTO: 0.1 %
ERYTHROCYTE [DISTWIDTH] IN BLOOD BY AUTOMATED COUNT: 13.4 % (ref 10–15)
GFR SERPL CREATININE-BSD FRML MDRD: 76 ML/MIN/1.7M2
GLUCOSE SERPL-MCNC: 144 MG/DL (ref 70–99)
GLUCOSE UR STRIP-MCNC: NEGATIVE MG/DL
HCT VFR BLD AUTO: 36.6 % (ref 35–47)
HGB BLD-MCNC: 11.8 G/DL (ref 11.7–15.7)
HGB UR QL STRIP: NEGATIVE
HYALINE CASTS #/AREA URNS LPF: 1 /LPF (ref 0–2)
IMM GRANULOCYTES # BLD: 0 10E9/L (ref 0–0.4)
IMM GRANULOCYTES NFR BLD: 0.4 %
INTERPRETATION ECG - MUSE: NORMAL
INTERPRETATION ECG - MUSE: NORMAL
KETONES UR STRIP-MCNC: 5 MG/DL
LACTATE BLD-SCNC: 0.9 MMOL/L (ref 0.7–2.1)
LEUKOCYTE ESTERASE UR QL STRIP: ABNORMAL
LIPASE SERPL-CCNC: 118 U/L (ref 73–393)
LYMPHOCYTES # BLD AUTO: 0.7 10E9/L (ref 0.8–5.3)
LYMPHOCYTES NFR BLD AUTO: 6.3 %
MCH RBC QN AUTO: 31.1 PG (ref 26.5–33)
MCHC RBC AUTO-ENTMCNC: 32.2 G/DL (ref 31.5–36.5)
MCV RBC AUTO: 96 FL (ref 78–100)
MONOCYTES # BLD AUTO: 1.3 10E9/L (ref 0–1.3)
MONOCYTES NFR BLD AUTO: 12.2 %
MUCOUS THREADS #/AREA URNS LPF: PRESENT /LPF
NEUTROPHILS # BLD AUTO: 8.8 10E9/L (ref 1.6–8.3)
NEUTROPHILS NFR BLD AUTO: 80.7 %
NITRATE UR QL: NEGATIVE
NRBC # BLD AUTO: 0 10*3/UL
NRBC BLD AUTO-RTO: 0 /100
PCO2 BLDV: 41 MM HG (ref 40–50)
PH BLDV: 7.37 PH (ref 7.32–7.43)
PH UR STRIP: 5 PH (ref 5–7)
PLATELET # BLD AUTO: 238 10E9/L (ref 150–450)
PO2 BLDV: 16 MM HG (ref 25–47)
POTASSIUM SERPL-SCNC: 4.2 MMOL/L (ref 3.4–5.3)
PROT SERPL-MCNC: 7.4 G/DL (ref 6.8–8.8)
RBC # BLD AUTO: 3.8 10E12/L (ref 3.8–5.2)
RBC #/AREA URNS AUTO: 2 /HPF (ref 0–2)
SAO2 % BLDV FROM PO2: 20 %
SODIUM SERPL-SCNC: 133 MMOL/L (ref 133–144)
SOURCE: ABNORMAL
SP GR UR STRIP: 1.02 (ref 1–1.03)
SQUAMOUS #/AREA URNS AUTO: 1 /HPF (ref 0–1)
TROPONIN I SERPL-MCNC: <0.015 UG/L (ref 0–0.04)
TROPONIN I SERPL-MCNC: <0.015 UG/L (ref 0–0.04)
UROBILINOGEN UR STRIP-MCNC: 4 MG/DL (ref 0–2)
WBC # BLD AUTO: 10.8 10E9/L (ref 4–11)
WBC #/AREA URNS AUTO: 41 /HPF (ref 0–5)

## 2018-05-29 PROCEDURE — 93005 ELECTROCARDIOGRAM TRACING: CPT | Mod: 76

## 2018-05-29 PROCEDURE — 84484 ASSAY OF TROPONIN QUANT: CPT | Performed by: EMERGENCY MEDICINE

## 2018-05-29 PROCEDURE — 82803 BLOOD GASES ANY COMBINATION: CPT

## 2018-05-29 PROCEDURE — 81001 URINALYSIS AUTO W/SCOPE: CPT | Performed by: EMERGENCY MEDICINE

## 2018-05-29 PROCEDURE — 87086 URINE CULTURE/COLONY COUNT: CPT | Performed by: EMERGENCY MEDICINE

## 2018-05-29 PROCEDURE — 71046 X-RAY EXAM CHEST 2 VIEWS: CPT

## 2018-05-29 PROCEDURE — 93005 ELECTROCARDIOGRAM TRACING: CPT

## 2018-05-29 PROCEDURE — 83690 ASSAY OF LIPASE: CPT | Performed by: EMERGENCY MEDICINE

## 2018-05-29 PROCEDURE — 96361 HYDRATE IV INFUSION ADD-ON: CPT

## 2018-05-29 PROCEDURE — 96375 TX/PRO/DX INJ NEW DRUG ADDON: CPT

## 2018-05-29 PROCEDURE — 83605 ASSAY OF LACTIC ACID: CPT

## 2018-05-29 PROCEDURE — 96374 THER/PROPH/DIAG INJ IV PUSH: CPT

## 2018-05-29 PROCEDURE — 85025 COMPLETE CBC W/AUTO DIFF WBC: CPT | Performed by: EMERGENCY MEDICINE

## 2018-05-29 PROCEDURE — 80053 COMPREHEN METABOLIC PANEL: CPT | Performed by: EMERGENCY MEDICINE

## 2018-05-29 PROCEDURE — 25000125 ZZHC RX 250: Performed by: EMERGENCY MEDICINE

## 2018-05-29 PROCEDURE — 99285 EMERGENCY DEPT VISIT HI MDM: CPT | Mod: 25

## 2018-05-29 PROCEDURE — 25000128 H RX IP 250 OP 636: Performed by: EMERGENCY MEDICINE

## 2018-05-29 RX ORDER — ONDANSETRON 2 MG/ML
4 INJECTION INTRAMUSCULAR; INTRAVENOUS EVERY 30 MIN PRN
Status: DISCONTINUED | OUTPATIENT
Start: 2018-05-29 | End: 2018-05-29 | Stop reason: HOSPADM

## 2018-05-29 RX ORDER — CEPHALEXIN 500 MG/1
500 CAPSULE ORAL 2 TIMES DAILY
Qty: 14 CAPSULE | Refills: 0 | Status: SHIPPED | OUTPATIENT
Start: 2018-05-29 | End: 2018-06-05

## 2018-05-29 RX ORDER — ONDANSETRON 4 MG/1
4 TABLET, ORALLY DISINTEGRATING ORAL EVERY 8 HOURS PRN
Qty: 10 TABLET | Refills: 0 | Status: SHIPPED | OUTPATIENT
Start: 2018-05-29 | End: 2018-06-01

## 2018-05-29 RX ADMIN — FAMOTIDINE 20 MG: 10 INJECTION, SOLUTION INTRAVENOUS at 02:27

## 2018-05-29 RX ADMIN — SODIUM CHLORIDE 1000 ML: 9 INJECTION, SOLUTION INTRAVENOUS at 03:10

## 2018-05-29 RX ADMIN — ONDANSETRON 4 MG: 2 INJECTION INTRAMUSCULAR; INTRAVENOUS at 02:27

## 2018-05-29 ASSESSMENT — ENCOUNTER SYMPTOMS
APPETITE CHANGE: 1
CHEST TIGHTNESS: 1
PALPITATIONS: 1
FREQUENCY: 0
DYSURIA: 0
VOMITING: 1
NAUSEA: 1
ABDOMINAL PAIN: 0
FLANK PAIN: 0

## 2018-05-29 NOTE — ED AVS SNAPSHOT
[facility] Emergency Department    [address] [place] [address]    Phone:  [phone]    Fax:  [phone]                                       Shantelle Su   MRN: [id number]    Department:  [facility] Emergency Department   Date of Visit:  5/29/2018           Patient Information     Date Of Birth          1945        Your diagnoses for this visit were:     Nausea and vomiting, intractability of vomiting not specified, unspecified vomiting type     Atypical chest pain     Antibiotics causing adverse effect in therapeutic use, initial encounter     Acute UTI        You were seen by Sunil Hill MD.      Follow-up Information     Follow up with Klever Vega MD. Call in 3 days.    Specialty:  Internal Medicine    Contact information:    [address] [place] [id number] [phone]          Discharge Instructions       Stop the bactrim      Discharge Instructions  Vomiting    You have been seen today for vomiting. This is usually caused by a virus, but some bacteria, parasites, medicines or other medical conditions can cause similar symptoms. At this time your doctor does not find that your vomiting is a sign of anything dangerous or life-threatening. However, sometimes the signs of serious illness do not show up right away. If you have new or worse symptoms, you may need to be seen again in the emergency department or by your primary doctor. Remember that serious problems like appendicitis can start as vomiting.     Return to the Emergency Department if:    You keep throwing up and you are not able to keep liquids down.     You feel you are getting dehydrated, such as being very thirsty, not urinating at least every 8-12 hours, or feeling faint or lightheaded.     You develop a new fever, or your fever continues for more than 2 days.     You have belly pain that seems worse than cramps, is in one spot, or is getting worse over time.     You  have blood in your vomit or stools.     You feel very weak.    You are not starting to improve within 24 hours of your visit here.     What can I do to help myself?    The most important thing to do is to drink clear liquids. If you have been vomiting a lot, it is best to have only small, frequent sips of liquids. Drinking too much at once may cause more vomiting. If you are vomiting often, you must replace minerals, sodium and potassium lost with your illness. Pedialyte  and sports drinks can help you replace these minerals. You can also drink clear liquids such as water, weak tea, apple juice, and 7-Up . Avoid acid liquids (orange), caffeine (coffee) or alcohol. Do not drink milk until you no longer have diarrhea.     After liquids are staying down, you may start eating mild foods. Soda crackers, toast, plain noodles, gelatin, applesauce and bananas are good first choices. Avoid foods that have acid, are spicy, fatty or have a lot of fiber (such as meats, coarse grains, vegetables). You may start eating these foods again in about 3 days when you are better.     Sometimes treatment includes prescription medicine to prevent nausea and vomiting. If your doctor prescribes these for you, take them as directed.     Don t take ibuprofen, or other nonsteroidal anti-inflammatory medicines without checking with your healthcare provider.   If you were given a prescription for medicine here today, be sure to read all of the information (including the package insert) that comes with your prescription.  This will include important information about the medicine, its side effects, and any warnings that you need to know about.  The pharmacist who fills the prescription can provide more information and answer questions you may have about the medicine.  If you have questions or concerns that the pharmacist cannot address, please call or return to the Emergency Department.       Opioid Medication Information    Pain medications are  among the most commonly prescribed medicines, so we are including this information for all our patients. If you did not receive pain medication or get a prescription for pain medicine, you can ignore it.     You may have been given a prescription for an opioid (narcotic) pain medicine and/or have received a pain medicine while here in the Emergency Department. These medicines can make you drowsy or impaired. You must not drive, operate dangerous equipment, or engage in any other dangerous activities while taking these medications. If you drive while taking these medications, you could be arrested for DUI, or driving under the influence. Do not drink any alcohol while you are taking these medications.     Opioid pain medications can cause addiction. If you have a history of chemical dependency of any type, you are at a higher risk of becoming addicted to pain medications.  Only take these prescribed medications to treat your pain when all other options have been tried. Take it for as short a time and as few doses as possible. Store your pain pills in a secure place, as they are frequently stolen and provide a dangerous opportunity for children or visitors in your house to start abusing these powerful medications. We will not replace any lost or stolen medicine.  As soon as your pain is better, you should flush all your remaining medication.     Many prescription pain medications contain Tylenol  (acetaminophen), including Vicodin , Tylenol #3 , Norco , Lortab , and Percocet .  You should not take any extra pills of Tylenol  if you are using these prescription medications or you can get very sick.  Do not ever take more than 3000 mg of acetaminophen in any 24 hour period.    All opioids tend to cause constipation. Drink plenty of water and eat foods that have a lot of fiber, such as fruits, vegetables, prune juice, apple juice and high fiber cereal.  Take a laxative if you don t move your bowels at least every other  day. Miralax , Milk of Magnesia, Colace , or Senna  can be used to keep you regular.      Remember that you can always come back to the Emergency Department if you are not able to see your regular doctor in the amount of time listed above, if you get any new symptoms, or if there is anything that worries you.      Discharge Instructions  Chest Pain    You have been seen today for chest pain or discomfort.  At this time, your doctor has found no signs that your chest pain is due to a serious or life-threatening condition, (or you have declined more testing and/or admission to the hospital). However, sometimes there is a serious problem that does not show up right away. Your evaluation today may not be complete and you may need further testing and evaluation.     You need to follow-up with your regular doctor within 3 days.    Return to the Emergency Department if:    Your chest pain changes, gets worse, starts to happen more often, or comes with less activity.    You are short of breath.    You get very weak or tired.    You pass out or faint.    You have any new symptoms, like fever, cough, numb legs, or you cough up blood.    You have anything else that worries you.    Until you follow-up with your regular doctor please do the following:    Take one aspirin daily unless you have an allergy or are told not to by your doctor.    If a stress test appointment has been made, go to the appointment.    If you have questions, contact your regular doctor.    If your doctor today has told you to follow-up with your regular doctor, it is very important that you make an appointment with your clinic and go to the appointment.  If you do not follow-up with your primary doctor, it may result in missing an important development which could result in permanent injury or disability and/or lasting pain.  If there is any problem keeping your appointment, call your doctor or return to the Emergency Department.    If you were given a  prescription for medicine here today, be sure to read all of the information (including the package insert) that comes with your prescription.  This will include important information about the medicine, its side effects, and any warnings that you need to know about.  The pharmacist who fills the prescription can provide more information and answer questions you may have about the medicine.  If you have questions or concerns that the pharmacist cannot address, please call or return to the Emergency Department.     Opioid Medication Information    Pain medications are among the most commonly prescribed medicines, so we are including this information for all our patients. If you did not receive pain medication or get a prescription for pain medicine, you can ignore it.     You may have been given a prescription for an opioid (narcotic) pain medicine and/or have received a pain medicine while here in the Emergency Department. These medicines can make you drowsy or impaired. You must not drive, operate dangerous equipment, or engage in any other dangerous activities while taking these medications. If you drive while taking these medications, you could be arrested for DUI, or driving under the influence. Do not drink any alcohol while you are taking these medications.     Opioid pain medications can cause addiction. If you have a history of chemical dependency of any type, you are at a higher risk of becoming addicted to pain medications.  Only take these prescribed medications to treat your pain when all other options have been tried. Take it for as short a time and as few doses as possible. Store your pain pills in a secure place, as they are frequently stolen and provide a dangerous opportunity for children or visitors in your house to start abusing these powerful medications. We will not replace any lost or stolen medicine.  As soon as your pain is better, you should flush all your remaining medication.     Many  prescription pain medications contain Tylenol  (acetaminophen), including Vicodin , Tylenol #3 , Norco , Lortab , and Percocet .  You should not take any extra pills of Tylenol  if you are using these prescription medications or you can get very sick.  Do not ever take more than 3000 mg of acetaminophen in any 24 hour period.    All opioids tend to cause constipation. Drink plenty of water and eat foods that have a lot of fiber, such as fruits, vegetables, prune juice, apple juice and high fiber cereal.  Take a laxative if you don t move your bowels at least every other day. Miralax , Milk of Magnesia, Colace , or Senna  can be used to keep you regular.      Remember that you can always come back to the Emergency Department if you are not able to see your regular doctor in the amount of time listed above, if you get any new symptoms, or if there is anything that worries you.          Reaction to Medicine (Other Type)  You are having a reaction to a medicine you have taken. This may not be the same as an allergic reaction. It is an undesired, unfavorable reaction, or a side effect of a medicine. This can cause a variety of symptoms, including:    Dizziness or headache    Rash    Flushing or hot sensation    Nausea, vomiting, or stomach pain    Diarrhea or constipation    Trouble breathing    High or low blood pressure  A reaction can be an upset stomach from something like aspirin or ibuprofen, feeling faint after taking a blood pressure medicine, feeling anxious, and many other things. Symptoms of a medicine reaction can range from very mild to very severe.  In most cases, the reaction goes away within 1 to 12 hours. But it will probably occur again if you take this same medicine. Your healthcare provider will advise you whether to change how much, when, or how often you take this medicine. He or she may also advise you to discontinue this medicine or switch to another one.   Home care    Another medicine may be  recommended to reduce your symptoms until the medicine s effect wears off. Follow your healthcare provider s advice.    When the medicine s effect has worn off, there should be no further problem as long as you don't take the same medicine again.     Ask your healthcare provider if you should also avoid similar medicines. Write down the information so you will remember it.    Make certain the medicine reaction is documented in your medical record.  Follow-up care  Follow up with your healthcare provider, or as advised if your symptoms are not better within 24 hours.  When to seek medical advice  Call your healthcare provider right away if any of these occur.    New symptoms that concern you    Worsening of your current symptoms, including rash or facial swelling    Symptoms that are not relieved by the treatment advised    Fever of 100.4 F (38 C) or higher, or as advised by your healthcare provider  Call 911  Call 911 if any of these occur:    Trouble breathing or swallowing, or wheezing    Hoarse voice or trouble speaking    Confusion    Extreme drowsiness or trouble awakening    Fainting or loss of consciousness    Rapid heart rate or slow heart rate    Very low or very high blood pressure    Vomiting blood, or large amounts of blood in stool    Seizure  Date Last Reviewed: 4/1/2017 2000-2017 [employer] [address] [place] [id number]. All rights reserved. This information is not intended as a substitute for professional medical care. Always follow your healthcare professional's instructions.      Discharge Instructions  Urinary Tract Infection  You have urinary tract infection, or UTI. The urinary tract includes the kidneys (which make urine), ureters (the tubes that carry urine from the kidneys to the bladder), the bladder (which stores urine), and urethra (the tube that carries urine out of the bladder).  Urinary tract infections occur when bacteria travel up the urethra into the  bladder. We suspect a UTI based on chemical and microscopic findings in your urine, but if there is a question about your findings, we will do a culture to see if bacteria grow. A urine culture takes several days. You should always follow-up with your primary physician to find out about results of your culture if one was done.   Return to the Emergency Department if:    You have severe back pain.    You are vomiting so that you can t take your medicine, or have signs of dehydration (such as urinating less than 3 times per day).    You have fever over 101.5 degrees F.    You have significant confusion or are very weak, or feel very ill.    Your child seems much more ill, won t wake up, won t respond right, or is crying for a long time and won t calm down.    Your child is showing signs of dehydration, Signs of dehydration can be:  o Your infant has had no wet diapers in 4-5 hours.  o Your older child has not passed urine in 6-8 hours.  o Your infant or child starts to have dry mouth and lips, or no saliva or tears.    Follow-up with your doctor:     Children under 24 months need to be seen by their regular doctor within one week after a diagnosis of a UTI. It may be necessary to do some imaging tests to look at the child s kidney or bladder.    You should begin to feel better within 24 - 48 hours of starting your antibiotic.  If you do not, you need to be seen again.      Treatment:     You will be treated with an antibiotic to kill the bacteria. We have to make an educated guess as to which antibiotic will work for your infection. In most healthy people, we can guess right almost all of the time. Sometimes a culture is done to show which antibiotics will work. This usually takes 2-3 days. When the culture is done, we may have to contact you to put you on a different antibiotic.    Take a pain medication such as Tylenol  (acetaminophen), Advil  (ibuprofen), Nuprin  (ibuprofen), or Aleve  (naproxen). If you have been  "given a narcotic such as Vicodin  (hydrocodone with acetaminophen), Percocet  (oxycodone with acetaminophen), or codeine, do not drive for four hours after you have taken it. If the narcotic contains Tylenol  (acetaminophen), do not take Tylenol  with it. All narcotics will cause constipation, so eat a high fiber diet.      Pyridium  (phenazopyridine) or Uristat  (phenazopyridine) is a prescription medication that numbs the bladder to reduce the burning pain of some UTIs.  The same medication is available in a non-prescription version called Azo-Standard  (phenazopyridine), Urodol  (phenazopyridine), or other brand names. This medication will change the color of the urine and tears (usually blue or orange). If you wear contacts, do not wear them while taking this medication as they may be stained by the medication.    Antibiotic Warning:     If you have been placed on antibiotics - watch for signs of allergic reaction.  These include rash, lip swelling, difficulty breathing, wheezing, and dizziness.  If you develop any of these symptoms, stop the antibiotic immediately and go to an emergency room or urgent care for evaluation.    Probiotics: If you have been given an antibiotic, you may want to also take a probiotic pill or eat yogurt with live cultures. Probiotics have \"good bacteria\" to help your intestines stay healthy. Studies have shown that probiotics help prevent diarrhea and other intestine problems (including C. diff infection) when you take antibiotics. You can buy these without a prescription in the pharmacy section of the store.   If you were given a prescription for medicine here today, be sure to read all of the information (including the package insert) that comes with your prescription.  This will include important information about the medicine, its side effects, and any warnings that you need to know about.  The pharmacist who fills the prescription can provide more information and answer questions " you may have about the medicine.  If you have questions or concerns that the pharmacist cannot address, please call or return to the Emergency Department.   Opioid Medication Information    Pain medications are among the most commonly prescribed medicines, so we are including this information for all our patients. If you did not receive pain medication or get a prescription for pain medicine, you can ignore it.     You may have been given a prescription for an opioid (narcotic) pain medicine and/or have received a pain medicine while here in the Emergency Department. These medicines can make you drowsy or impaired. You must not drive, operate dangerous equipment, or engage in any other dangerous activities while taking these medications. If you drive while taking these medications, you could be arrested for DUI, or driving under the influence. Do not drink any alcohol while you are taking these medications.     Opioid pain medications can cause addiction. If you have a history of chemical dependency of any type, you are at a higher risk of becoming addicted to pain medications.  Only take these prescribed medications to treat your pain when all other options have been tried. Take it for as short a time and as few doses as possible. Store your pain pills in a secure place, as they are frequently stolen and provide a dangerous opportunity for children or visitors in your house to start abusing these powerful medications. We will not replace any lost or stolen medicine.  As soon as your pain is better, you should flush all your remaining medication.     Many prescription pain medications contain Tylenol  (acetaminophen), including Vicodin , Tylenol #3 , Norco , Lortab , and Percocet .  You should not take any extra pills of Tylenol  if you are using these prescription medications or you can get very sick.  Do not ever take more than 3000 mg of acetaminophen in any 24 hour period.    All opioids tend to cause  constipation. Drink plenty of water and eat foods that have a lot of fiber, such as fruits, vegetables, prune juice, apple juice and high fiber cereal.  Take a laxative if you don t move your bowels at least every other day. Miralax , Milk of Magnesia, Colace , or Senna  can be used to keep you regular.      Remember that you can always come back to the Emergency Department if you are not able to see your regular doctor in the amount of time listed above, if you get any new symptoms, or if there is anything that worries you.        24 Hour Appointment Hotline       To make an appointment at any [facility], call [phone] ([phone]). If you don't have a family doctor or clinic, we will help you find one. [facility] clinics are conveniently located to serve the needs of you and your family.             Review of your medicines      START taking        Dose / Directions Last dose taken    cephALEXin 500 MG capsule   Commonly known as:  KEFLEX   Dose:  500 mg   Quantity:  14 capsule        Take 1 capsule (500 mg) by mouth 2 times daily for 7 days   Refills:  0        ondansetron 4 MG ODT tab   Commonly known as:  ZOFRAN ODT   Dose:  4 mg   Quantity:  10 tablet        Take 1 tablet (4 mg) by mouth every 8 hours as needed for nausea   Refills:  0          CONTINUE these medicines which may have CHANGED, or have new prescriptions. If we are uncertain of the size of tablets/capsules you have at home, strength may be listed as something that might have changed.        Dose / Directions Last dose taken    ACCU-CHEK VI Kit   What changed:    - when to take this  - additional instructions   Quantity:  1 kit        2 times daily. With routine lancets as well as alcohol swabs, dispense 1 month   Refills:  0        blood glucose monitoring lancets   What changed:    - when to take this  - additional instructions   Quantity:  100 each        3 times daily. BG testing 3 times a day   Refills:  11        blood  glucose monitoring test strip   Commonly known as:  no brand specified   What changed:    - when to take this  - additional instructions   Quantity:  300 strip        Accucheck Kristine Plus  3 times daily   Refills:  11          Our records show that you are taking the medicines listed below. If these are incorrect, please call your family doctor or clinic.        Dose / Directions Last dose taken    amLODIPine 5 MG tablet   Commonly known as:  NORVASC   Dose:  5 mg   Quantity:  90 tablet        Take 1 tablet (5 mg) by mouth daily   Refills:  3        aspirin 81 MG EC tablet   Dose:  81 mg   Quantity:  90 tablet        Take 1 tablet (81 mg) by mouth daily   Refills:  3        atorvastatin 40 MG tablet   Commonly known as:  LIPITOR   Dose:  40 mg   Quantity:  90 tablet        Take 1 tablet (40 mg) by mouth daily   Refills:  3        blood glucose calibration solution   Quantity:  3 Bottle        Use as directed   Refills:  11        docusate sodium 100 MG capsule   Commonly known as:  COLACE   Dose:  100 mg        Take 100 mg by mouth 2 times daily   Refills:  0        LORazepam 0.5 MG tablet   Commonly known as:  ATIVAN   Dose:  0.5 mg   Quantity:  5 tablet        Take 1 tablet (0.5 mg) by mouth every 8 hours as needed for anxiety   Refills:  0        metFORMIN 500 MG tablet   Commonly known as:  GLUCOPHAGE   Dose:  500 mg   Quantity:  90 tablet        Take 1 tablet (500 mg) by mouth daily (with dinner)   Refills:  3        metoprolol succinate 50 MG 24 hr tablet   Commonly known as:  TOPROL XL   Dose:  50 mg   Quantity:  90 tablet        Take 1 tablet (50 mg) by mouth daily   Refills:  3        nitroGLYcerin 0.4 MG sublingual tablet   Commonly known as:  NITROSTAT   Quantity:  25 tablet        DISSOLVE 1 TAB UNDER TONGUE AS NEEDED FOR CHEST PAIN. MAXIMUM IS 3 DOSES EVERY 5 MIN OVER 15 MINUTES   Refills:  3        omeprazole 20 MG CR capsule   Commonly known as:  priLOSEC   Quantity:  90 capsule        TAKE ONE  CAPSULE BY MOUTH EVERY DAY   Refills:  3        terbinafine 250 MG tablet   Commonly known as:  lamISIL   Dose:  250 mg   Quantity:  30 tablet        Take 1 tablet (250 mg) by mouth daily   Refills:  2                Prescriptions were sent or printed at these locations (2 Prescriptions)                   Other Prescriptions                Printed at Department/Unit printer (2 of 2)         cephALEXin (KEFLEX) 500 MG capsule               ondansetron (ZOFRAN ODT) 4 MG ODT tab                Procedures and tests performed during your visit     CBC with platelets differential    Comprehensive metabolic panel    EKG 12 lead    EKG 12-lead, tracing only    ISTAT CG4 gases lactate guru nursing POCT    ISTAT gases lactate guru POCT    Lipase    Peripheral IV: Standard    Troponin I    Troponin I (now)    UA with Microscopic    Urine Culture Aerobic Bacterial    XR Chest 2 Views      Orders Needing Specimen Collection     None      Pending Results     Date and Time Order Name Status Description    5/29/2018 0334 Urine Culture Aerobic Bacterial In process             Pending Culture Results     Date and Time Order Name Status Description    5/29/2018 0334 Urine Culture Aerobic Bacterial In process             Pending Results Instructions     If you had any lab results that were not finalized at the time of your Discharge, you can call the ED Lab Result RN at [phone]. You will be contacted by this team for any positive Lab results or changes in treatment. The nurses are available 7 days a week from 10A to 6:30P.  You can leave a message 24 hours per day and they will return your call.        Test Results From Your Hospital Stay        5/29/2018  2:31 AM      Component Results     Component Value Ref Range & Units Status    WBC 10.8 4.0 - 11.0 10e9/L Final    RBC Count 3.80 3.8 - 5.2 10e12/L Final    Hemoglobin 11.8 11.7 - 15.7 g/dL Final    Hematocrit 36.6 35.0 - 47.0 % Final    MCV 96 78 - 100 fl Final    MCH 31.1 26.5 -  33.0 pg Final    MCHC 32.2 31.5 - 36.5 g/dL Final    RDW 13.4 10.0 - 15.0 % Final    Platelet Count 238 150 - 450 10e9/L Final    Diff Method Automated Method  Final    % Neutrophils 80.7 % Final    % Lymphocytes 6.3 % Final    % Monocytes 12.2 % Final    % Eosinophils 0.1 % Final    % Basophils 0.3 % Final    % Immature Granulocytes 0.4 % Final    Nucleated RBCs 0 0 /100 Final    Absolute Neutrophil 8.8 (H) 1.6 - 8.3 10e9/L Final    Absolute Lymphocytes 0.7 (L) 0.8 - 5.3 10e9/L Final    Absolute Monocytes 1.3 0.0 - 1.3 10e9/L Final    Absolute Eosinophils 0.0 0.0 - 0.7 10e9/L Final    Absolute Basophils 0.0 0.0 - 0.2 10e9/L Final    Abs Immature Granulocytes 0.0 0 - 0.4 10e9/L Final    Absolute Nucleated RBC 0.0  Final         5/29/2018  2:50 AM      Component Results     Component Value Ref Range & Units Status    Sodium 133 133 - 144 mmol/L Final    Potassium 4.2 3.4 - 5.3 mmol/L Final    Chloride 102 94 - 109 mmol/L Final    Carbon Dioxide 22 20 - 32 mmol/L Final    Anion Gap 9 3 - 14 mmol/L Final    Glucose 144 (H) 70 - 99 mg/dL Final    Urea Nitrogen 10 7 - 30 mg/dL Final    Creatinine 0.75 0.52 - 1.04 mg/dL Final    GFR Estimate 76 >60 mL/min/1.7m2 Final    Non  GFR Calc    GFR Estimate If Black >90 >60 mL/min/1.7m2 Final    African American GFR Calc    Calcium 8.5 8.5 - 10.1 mg/dL Final    Bilirubin Total 0.3 0.2 - 1.3 mg/dL Final    Albumin 3.3 (L) 3.4 - 5.0 g/dL Final    Protein Total 7.4 6.8 - 8.8 g/dL Final    Alkaline Phosphatase 75 40 - 150 U/L Final    ALT 22 0 - 50 U/L Final    AST 16 0 - 45 U/L Final         5/29/2018  2:50 AM      Component Results     Component Value Ref Range & Units Status    Lipase 118 73 - 393 U/L Final         5/29/2018  2:50 AM      Component Results     Component Value Ref Range & Units Status    Troponin I ES <0.015 0.000 - 0.045 ug/L Final    The 99th percentile for upper reference range is 0.045 ug/L.  Troponin values   in the range of 0.045 - 0.120 ug/L  may be associated with risks of adverse   clinical events.           5/29/2018  3:33 AM      Component Results     Component Value Ref Range & Units Status    Color Urine Yellow  Final    Appearance Urine Slightly Cloudy  Final    Glucose Urine Negative NEG^Negative mg/dL Final    Bilirubin Urine Negative NEG^Negative Final    Ketones Urine 5 (A) NEG^Negative mg/dL Final    Specific Gravity Urine 1.017 1.003 - 1.035 Final    Blood Urine Negative NEG^Negative Final    pH Urine 5.0 5.0 - 7.0 pH Final    Protein Albumin Urine 30 (A) NEG^Negative mg/dL Final    Urobilinogen mg/dL 4.0 (H) 0.0 - 2.0 mg/dL Final    Nitrite Urine Negative NEG^Negative Final    Leukocyte Esterase Urine Small (A) NEG^Negative Final    Source Midstream Urine  Final    WBC Urine 41 (H) 0 - 5 /HPF Final    RBC Urine 2 0 - 2 /HPF Final    Squamous Epithelial /HPF Urine 1 0 - 1 /HPF Final    Mucous Urine Present (A) NEG^Negative /LPF Final    Hyaline Casts 1 0 - 2 /LPF Final         5/29/2018  3:52 AM      Narrative     CHEST 2 VIEWS  5/29/2018 2:41 AM     HISTORY: Chest and abdominal pain.    COMPARISON: 4/23/2017.    FINDINGS: The lungs are clear. Normal-sized cardiac silhouette.  Atherosclerotic calcification in the thoracic aorta.        Impression     IMPRESSION: No evidence of active cardiopulmonary disease.    PAUL WARD MD         5/29/2018  3:46 AM         5/29/2018  4:28 AM      Component Results     Component Value Ref Range & Units Status    Troponin I ES <0.015 0.000 - 0.045 ug/L Final    The 99th percentile for upper reference range is 0.045 ug/L.  Troponin values   in the range of 0.045 - 0.120 ug/L may be associated with risks of adverse   clinical events.                 5/29/2018  4:06 AM      Component Results     Component Value Ref Range & Units Status    Ph Venous 7.37 7.32 - 7.43 pH Final    PCO2 Venous 41 40 - 50 mm Hg Final    PO2 Venous 16 (L) 25 - 47 mm Hg Final    Bicarbonate Venous 24 21 - 28 mmol/L Final    O2  Sat Venous 20 % Final    Lactic Acid 0.9 0.7 - 2.1 mmol/L Final                Clinical Quality Measure: Blood Pressure Screening     Your blood pressure was checked while you were in the emergency department today. The last reading we obtained was  BP: 120/62 . Please read the guidelines below about what these numbers mean and what you should do about them.  If your systolic blood pressure (the top number) is less than 120 and your diastolic blood pressure (the bottom number) is less than 80, then your blood pressure is normal. There is nothing more that you need to do about it.  If your systolic blood pressure (the top number) is 120-139 or your diastolic blood pressure (the bottom number) is 80-89, your blood pressure may be higher than it should be. You should have your blood pressure rechecked within a year by a primary care provider.  If your systolic blood pressure (the top number) is 140 or greater or your diastolic blood pressure (the bottom number) is 90 or greater, you may have high blood pressure. High blood pressure is treatable, but if left untreated over time it can put you at risk for heart attack, stroke, or kidney failure. You should have your blood pressure rechecked by a primary care provider within the next 4 weeks.  If your provider in the emergency department today gave you specific instructions to follow-up with your doctor or provider even sooner than that, you should follow that instruction and not wait for up to 4 weeks for your follow-up visit.        Thank you for choosing [facility]       Thank you for choosing [facility] for your care. Our goal is always to provide you with excellent care. Hearing back from our patients is one way we can continue to improve our services. Please take a few minutes to complete the written survey that you may receive in the mail after you visit with us. Thank you!        [facility] Information     Digital Message Display gives you secure access to your electronic health record.  If you see a primary care provider, you can also send messages to your care team and make appointments. If you have questions, please call your primary care clinic.  If you do not have a primary care provider, please call [phone] and they will assist you.        Care EveryWhere ID     This is your Care EveryWhere ID. This could be used by other organizations to access your [facility] medical records  [id number]        Equal Access to Services     LIT VASQUEZ : Prisca Hernandes, stanley hernández, miriam desai, mireya ascencio. So [facility] [phone].    ATENCIÓN: Si habla cyndy, tiene a herbert disposición servicios gratuitos de asistencia lingüística. Llame al [phone].    We comply with applicable federal civil rights laws and [place] laws. We do not discriminate on the basis of race, color, national origin, age, disability, sex, sexual orientation, or gender identity.            After Visit Summary       This is your record. Keep this with you and show to your community pharmacist(s) and doctor(s) at your next visit.

## 2018-05-29 NOTE — DISCHARGE INSTRUCTIONS
Stop the bactrim      Discharge Instructions  Vomiting    You have been seen today for vomiting. This is usually caused by a virus, but some bacteria, parasites, medicines or other medical conditions can cause similar symptoms. At this time your doctor does not find that your vomiting is a sign of anything dangerous or life-threatening. However, sometimes the signs of serious illness do not show up right away. If you have new or worse symptoms, you may need to be seen again in the emergency department or by your primary doctor. Remember that serious problems like appendicitis can start as vomiting.     Return to the Emergency Department if:    You keep throwing up and you are not able to keep liquids down.     You feel you are getting dehydrated, such as being very thirsty, not urinating at least every 8-12 hours, or feeling faint or lightheaded.     You develop a new fever, or your fever continues for more than 2 days.     You have belly pain that seems worse than cramps, is in one spot, or is getting worse over time.     You have blood in your vomit or stools.     You feel very weak.    You are not starting to improve within 24 hours of your visit here.     What can I do to help myself?    The most important thing to do is to drink clear liquids. If you have been vomiting a lot, it is best to have only small, frequent sips of liquids. Drinking too much at once may cause more vomiting. If you are vomiting often, you must replace minerals, sodium and potassium lost with your illness. Pedialyte  and sports drinks can help you replace these minerals. You can also drink clear liquids such as water, weak tea, apple juice, and 7-Up . Avoid acid liquids (orange), caffeine (coffee) or alcohol. Do not drink milk until you no longer have diarrhea.     After liquids are staying down, you may start eating mild foods. Soda crackers, toast, plain noodles, gelatin, applesauce and bananas are good first choices. Avoid foods that  have acid, are spicy, fatty or have a lot of fiber (such as meats, coarse grains, vegetables). You may start eating these foods again in about 3 days when you are better.     Sometimes treatment includes prescription medicine to prevent nausea and vomiting. If your doctor prescribes these for you, take them as directed.     Don t take ibuprofen, or other nonsteroidal anti-inflammatory medicines without checking with your healthcare provider.   If you were given a prescription for medicine here today, be sure to read all of the information (including the package insert) that comes with your prescription.  This will include important information about the medicine, its side effects, and any warnings that you need to know about.  The pharmacist who fills the prescription can provide more information and answer questions you may have about the medicine.  If you have questions or concerns that the pharmacist cannot address, please call or return to the Emergency Department.       Opioid Medication Information    Pain medications are among the most commonly prescribed medicines, so we are including this information for all our patients. If you did not receive pain medication or get a prescription for pain medicine, you can ignore it.     You may have been given a prescription for an opioid (narcotic) pain medicine and/or have received a pain medicine while here in the Emergency Department. These medicines can make you drowsy or impaired. You must not drive, operate dangerous equipment, or engage in any other dangerous activities while taking these medications. If you drive while taking these medications, you could be arrested for DUI, or driving under the influence. Do not drink any alcohol while you are taking these medications.     Opioid pain medications can cause addiction. If you have a history of chemical dependency of any type, you are at a higher risk of becoming addicted to pain medications.  Only take these  prescribed medications to treat your pain when all other options have been tried. Take it for as short a time and as few doses as possible. Store your pain pills in a secure place, as they are frequently stolen and provide a dangerous opportunity for children or visitors in your house to start abusing these powerful medications. We will not replace any lost or stolen medicine.  As soon as your pain is better, you should flush all your remaining medication.     Many prescription pain medications contain Tylenol  (acetaminophen), including Vicodin , Tylenol #3 , Norco , Lortab , and Percocet .  You should not take any extra pills of Tylenol  if you are using these prescription medications or you can get very sick.  Do not ever take more than 3000 mg of acetaminophen in any 24 hour period.    All opioids tend to cause constipation. Drink plenty of water and eat foods that have a lot of fiber, such as fruits, vegetables, prune juice, apple juice and high fiber cereal.  Take a laxative if you don t move your bowels at least every other day. Miralax , Milk of Magnesia, Colace , or Senna  can be used to keep you regular.      Remember that you can always come back to the Emergency Department if you are not able to see your regular doctor in the amount of time listed above, if you get any new symptoms, or if there is anything that worries you.      Discharge Instructions  Chest Pain    You have been seen today for chest pain or discomfort.  At this time, your doctor has found no signs that your chest pain is due to a serious or life-threatening condition, (or you have declined more testing and/or admission to the hospital). However, sometimes there is a serious problem that does not show up right away. Your evaluation today may not be complete and you may need further testing and evaluation.     You need to follow-up with your regular doctor within 3 days.    Return to the Emergency Department if:    Your chest pain  changes, gets worse, starts to happen more often, or comes with less activity.    You are short of breath.    You get very weak or tired.    You pass out or faint.    You have any new symptoms, like fever, cough, numb legs, or you cough up blood.    You have anything else that worries you.    Until you follow-up with your regular doctor please do the following:    Take one aspirin daily unless you have an allergy or are told not to by your doctor.    If a stress test appointment has been made, go to the appointment.    If you have questions, contact your regular doctor.    If your doctor today has told you to follow-up with your regular doctor, it is very important that you make an appointment with your clinic and go to the appointment.  If you do not follow-up with your primary doctor, it may result in missing an important development which could result in permanent injury or disability and/or lasting pain.  If there is any problem keeping your appointment, call your doctor or return to the Emergency Department.    If you were given a prescription for medicine here today, be sure to read all of the information (including the package insert) that comes with your prescription.  This will include important information about the medicine, its side effects, and any warnings that you need to know about.  The pharmacist who fills the prescription can provide more information and answer questions you may have about the medicine.  If you have questions or concerns that the pharmacist cannot address, please call or return to the Emergency Department.     Opioid Medication Information    Pain medications are among the most commonly prescribed medicines, so we are including this information for all our patients. If you did not receive pain medication or get a prescription for pain medicine, you can ignore it.     You may have been given a prescription for an opioid (narcotic) pain medicine and/or have received a pain medicine  while here in the Emergency Department. These medicines can make you drowsy or impaired. You must not drive, operate dangerous equipment, or engage in any other dangerous activities while taking these medications. If you drive while taking these medications, you could be arrested for DUI, or driving under the influence. Do not drink any alcohol while you are taking these medications.     Opioid pain medications can cause addiction. If you have a history of chemical dependency of any type, you are at a higher risk of becoming addicted to pain medications.  Only take these prescribed medications to treat your pain when all other options have been tried. Take it for as short a time and as few doses as possible. Store your pain pills in a secure place, as they are frequently stolen and provide a dangerous opportunity for children or visitors in your house to start abusing these powerful medications. We will not replace any lost or stolen medicine.  As soon as your pain is better, you should flush all your remaining medication.     Many prescription pain medications contain Tylenol  (acetaminophen), including Vicodin , Tylenol #3 , Norco , Lortab , and Percocet .  You should not take any extra pills of Tylenol  if you are using these prescription medications or you can get very sick.  Do not ever take more than 3000 mg of acetaminophen in any 24 hour period.    All opioids tend to cause constipation. Drink plenty of water and eat foods that have a lot of fiber, such as fruits, vegetables, prune juice, apple juice and high fiber cereal.  Take a laxative if you don t move your bowels at least every other day. Miralax , Milk of Magnesia, Colace , or Senna  can be used to keep you regular.      Remember that you can always come back to the Emergency Department if you are not able to see your regular doctor in the amount of time listed above, if you get any new symptoms, or if there is anything that worries  you.          Reaction to Medicine (Other Type)  You are having a reaction to a medicine you have taken. This may not be the same as an allergic reaction. It is an undesired, unfavorable reaction, or a side effect of a medicine. This can cause a variety of symptoms, including:    Dizziness or headache    Rash    Flushing or hot sensation    Nausea, vomiting, or stomach pain    Diarrhea or constipation    Trouble breathing    High or low blood pressure  A reaction can be an upset stomach from something like aspirin or ibuprofen, feeling faint after taking a blood pressure medicine, feeling anxious, and many other things. Symptoms of a medicine reaction can range from very mild to very severe.  In most cases, the reaction goes away within 1 to 12 hours. But it will probably occur again if you take this same medicine. Your healthcare provider will advise you whether to change how much, when, or how often you take this medicine. He or she may also advise you to discontinue this medicine or switch to another one.   Home care    Another medicine may be recommended to reduce your symptoms until the medicine s effect wears off. Follow your healthcare provider s advice.    When the medicine s effect has worn off, there should be no further problem as long as you don't take the same medicine again.     Ask your healthcare provider if you should also avoid similar medicines. Write down the information so you will remember it.    Make certain the medicine reaction is documented in your medical record.  Follow-up care  Follow up with your healthcare provider, or as advised if your symptoms are not better within 24 hours.  When to seek medical advice  Call your healthcare provider right away if any of these occur.    New symptoms that concern you    Worsening of your current symptoms, including rash or facial swelling    Symptoms that are not relieved by the treatment advised    Fever of 100.4 F (38 C) or higher, or as advised by  your healthcare provider  Call 911  Call 911 if any of these occur:    Trouble breathing or swallowing, or wheezing    Hoarse voice or trouble speaking    Confusion    Extreme drowsiness or trouble awakening    Fainting or loss of consciousness    Rapid heart rate or slow heart rate    Very low or very high blood pressure    Vomiting blood, or large amounts of blood in stool    Seizure  Date Last Reviewed: 4/1/2017 2000-2017 [employer]. [address] [place] [address], [place] [address]. All rights reserved. This information is not intended as a substitute for professional medical care. Always follow your healthcare professional's instructions.      Discharge Instructions  Urinary Tract Infection  You have urinary tract infection, or UTI. The urinary tract includes the kidneys (which make urine), ureters (the tubes that carry urine from the kidneys to the bladder), the bladder (which stores urine), and urethra (the tube that carries urine out of the bladder).  Urinary tract infections occur when bacteria travel up the urethra into the bladder. We suspect a UTI based on chemical and microscopic findings in your urine, but if there is a question about your findings, we will do a culture to see if bacteria grow. A urine culture takes several days. You should always follow-up with your primary physician to find out about results of your culture if one was done.   Return to the Emergency Department if:    You have severe back pain.    You are vomiting so that you can t take your medicine, or have signs of dehydration (such as urinating less than 3 times per day).    You have fever over 101.5 degrees F.    You have significant confusion or are very weak, or feel very ill.    Your child seems much more ill, won t wake up, won t respond right, or is crying for a long time and won t calm down.    Your child is showing signs of dehydration, Signs of dehydration can be:  o Your infant has had no wet diapers in 4-5  hours.  o Your older child has not passed urine in 6-8 hours.  o Your infant or child starts to have dry mouth and lips, or no saliva or tears.    Follow-up with your doctor:     Children under 24 months need to be seen by their regular doctor within one week after a diagnosis of a UTI. It may be necessary to do some imaging tests to look at the child s kidney or bladder.    You should begin to feel better within 24 - 48 hours of starting your antibiotic.  If you do not, you need to be seen again.      Treatment:     You will be treated with an antibiotic to kill the bacteria. We have to make an educated guess as to which antibiotic will work for your infection. In most healthy people, we can guess right almost all of the time. Sometimes a culture is done to show which antibiotics will work. This usually takes 2-3 days. When the culture is done, we may have to contact you to put you on a different antibiotic.    Take a pain medication such as Tylenol  (acetaminophen), Advil  (ibuprofen), Nuprin  (ibuprofen), or Aleve  (naproxen). If you have been given a narcotic such as Vicodin  (hydrocodone with acetaminophen), Percocet  (oxycodone with acetaminophen), or codeine, do not drive for four hours after you have taken it. If the narcotic contains Tylenol  (acetaminophen), do not take Tylenol  with it. All narcotics will cause constipation, so eat a high fiber diet.      Pyridium  (phenazopyridine) or Uristat  (phenazopyridine) is a prescription medication that numbs the bladder to reduce the burning pain of some UTIs.  The same medication is available in a non-prescription version called Azo-Standard  (phenazopyridine), Urodol  (phenazopyridine), or other brand names. This medication will change the color of the urine and tears (usually blue or orange). If you wear contacts, do not wear them while taking this medication as they may be stained by the medication.    Antibiotic Warning:     If you have been placed on  "antibiotics - watch for signs of allergic reaction.  These include rash, lip swelling, difficulty breathing, wheezing, and dizziness.  If you develop any of these symptoms, stop the antibiotic immediately and go to an emergency room or urgent care for evaluation.    Probiotics: If you have been given an antibiotic, you may want to also take a probiotic pill or eat yogurt with live cultures. Probiotics have \"good bacteria\" to help your intestines stay healthy. Studies have shown that probiotics help prevent diarrhea and other intestine problems (including C. diff infection) when you take antibiotics. You can buy these without a prescription in the pharmacy section of the store.   If you were given a prescription for medicine here today, be sure to read all of the information (including the package insert) that comes with your prescription.  This will include important information about the medicine, its side effects, and any warnings that you need to know about.  The pharmacist who fills the prescription can provide more information and answer questions you may have about the medicine.  If you have questions or concerns that the pharmacist cannot address, please call or return to the Emergency Department.   Opioid Medication Information    Pain medications are among the most commonly prescribed medicines, so we are including this information for all our patients. If you did not receive pain medication or get a prescription for pain medicine, you can ignore it.     You may have been given a prescription for an opioid (narcotic) pain medicine and/or have received a pain medicine while here in the Emergency Department. These medicines can make you drowsy or impaired. You must not drive, operate dangerous equipment, or engage in any other dangerous activities while taking these medications. If you drive while taking these medications, you could be arrested for DUI, or driving under the influence. Do not drink any alcohol " while you are taking these medications.     Opioid pain medications can cause addiction. If you have a history of chemical dependency of any type, you are at a higher risk of becoming addicted to pain medications.  Only take these prescribed medications to treat your pain when all other options have been tried. Take it for as short a time and as few doses as possible. Store your pain pills in a secure place, as they are frequently stolen and provide a dangerous opportunity for children or visitors in your house to start abusing these powerful medications. We will not replace any lost or stolen medicine.  As soon as your pain is better, you should flush all your remaining medication.     Many prescription pain medications contain Tylenol  (acetaminophen), including Vicodin , Tylenol #3 , Norco , Lortab , and Percocet .  You should not take any extra pills of Tylenol  if you are using these prescription medications or you can get very sick.  Do not ever take more than 3000 mg of acetaminophen in any 24 hour period.    All opioids tend to cause constipation. Drink plenty of water and eat foods that have a lot of fiber, such as fruits, vegetables, prune juice, apple juice and high fiber cereal.  Take a laxative if you don t move your bowels at least every other day. Miralax , Milk of Magnesia, Colace , or Senna  can be used to keep you regular.      Remember that you can always come back to the Emergency Department if you are not able to see your regular doctor in the amount of time listed above, if you get any new symptoms, or if there is anything that worries you.

## 2018-05-29 NOTE — ED AVS SNAPSHOT
[facility] Emergency Department    [address]    [facility] [address]    Phone:  [phone]    Fax:  [phone]                                       Shantelle Su   MRN: [id number]    Department:  [facility] Emergency Department   Date of Visit:  5/29/2018           After Visit Summary Signature Page     I have received my discharge instructions, and my questions have been answered. I have discussed any challenges I see with this plan with the nurse or doctor.    ..........................................................................................................................................  Patient/Patient Representative Signature      ..........................................................................................................................................  Patient Representative Print Name and Relationship to Patient    ..................................................               ................................................  Date                                            Time    ..........................................................................................................................................  Reviewed by Signature/Title    ...................................................              ..............................................  Date                                                            Time

## 2018-05-29 NOTE — ED PROVIDER NOTES
History     Chief Complaint:  Nausea & Vomiting and Chest Pressure    HPI   Shantelle Su is a 73 year old female who presents to the emergency department today for evaluation of chest pressure, nausea, and vomiting. The patient reports on Saturday, 3 days ago, she was diagnosed with H.pylori and started on Bactrim after evaluation at [place] urgent [facility]. The patient reports since being started on Bactrim, her flank pain, dysuria, and urinary frequency resolved, however she has since developed 3 days of nausea, vomiting, and inability to keep food down. The patient reports she became concerned after she developed mild, constant chest pressure with associated irregular palpitations at 0000, 2 hours ago, making it difficult for her to sleep tonight. The patient reports symptoms occurred after she was started on Bactrim. The patient endorses history of heart catheterization with stent placement. The patient endorses history of Norovirus in the past. The patient denies blood thinner use, but reports she takes aspirin daily. The patient denies abdominal pain or chest pain.     Allergies:  Lisinopril      Medications:    Amlodipine   Aspirin   Atorvastatin   Colace   Ativan   Metformin   Metoprolol  Nitroglycerin    Omeprazole   Lamisil     Past Medical History:    Acute upper respiratory infection   CAD (coronary artery disease)   Diabetes mellitus   Enterocele   Essential hypertension, benign   GERD (gastroesophageal reflux disease)   Hiatal hernia   Hyperlipidemia LDL goal <130   Other pulmonary embolism and infarction   PAC (premature atrial contraction)   Rectocele     Past Surgical History:    Bladder sling   [place] mesh augmented anterior colporrhaphy and vault suspension   Colporrhaphy posterior, cystoscopy, combined   Dilation and curettage, hysteroscopy diagnostic, combined   Left heart catheterization with stent   Tooth extraction     Family History:    Gynecology   Heart disease x3  Coronary artery  disease x2  Hyperlipidemia    Diabetes  Hypertension   Depression   Cerebrovascular Disease    Social History:  The patient was accompanied to the ED by .  Smoking Status: Former Smoker, 0.75 PPD, 20 years, Quit: 11/5/1987  Smokeless Tobacco: Never Used  Alcohol Use: Positive   Marital Status:   [2]     Review of Systems   Constitutional: Positive for appetite change (decreased).   Respiratory: Positive for chest tightness.    Cardiovascular: Positive for palpitations (irregular). Negative for chest pain.   Gastrointestinal: Positive for nausea and vomiting. Negative for abdominal pain.   Genitourinary: Negative for dysuria, flank pain and frequency.   All other systems reviewed and are negative.    Physical Exam     Patient Vitals for the past 24 hrs:   BP Temp Temp src Pulse Heart Rate Resp SpO2   05/29/18 0445 127/60 - - - - - 97 %   05/29/18 0430 120/62 - - - - - 94 %   05/29/18 0415 112/47 - - - - - 94 %   05/29/18 0400 130/63 - - - - - 94 %   05/29/18 0345 125/62 - - - - - 95 %   05/29/18 0330 123/54 - - - - - -   05/29/18 0315 123/52 - - - - - 92 %   05/29/18 0230 125/55 - - - - - -   05/29/18 0215 - - - - - - 95 %   05/29/18 0213 - 98.2  F (36.8  C) Oral 77 77 18 92 %   05/29/18 0212 136/70 - - - - - 94 %     Physical Exam  Constitutional:  Oriented to person, place, and time. She is well appearing.   HENT:   Head:    Normocephalic.   Mouth/Throat:   Oropharynx is clear and moist.   Eyes:    EOM are normal. Pupils are equal, round, and reactive to light.   Neck:    Neck supple.   Cardiovascular:  Normal rate, regular rhythm and normal heart sounds.      Exam reveals no gallop and no friction rub.       No murmur heard.  Pulmonary/Chest:  Effort normal and breath sounds normal.      No respiratory distress. No wheezes. No rales.      No reproducible chest wall pain.  Abdominal:   Soft. No distension. No tenderness. No rebound and no guarding.   Musculoskeletal:  Normal range of motion.    Neurological:   Alert and oriented to person, place, and time.           Moves all 4 extremities spontaneously    Skin:    No rash noted. No pallor.     Emergency Department Course     ECG #1:  ECG taken at 0212, ECG read at 0214  Normal sinus rhythm  Inferior infarct, age undetermined  Abnormal ECG  Rate 78 bpm. CO interval 172 ms. QRS duration 84 ms. QT/QTc 386/440 ms. P-R-T axes 39 -23 1.    ECG #2:  ECG taken at 0341, ECG read at 0342  Normal sinus rhythm  Low voltage QRS  Nonspecific T wave abnormality   Abnormal ECG  Rate 68 bpm. CO interval 178 ms. QRS duration 80 ms. QT/QTc 386/410 ms. P-R-T axes 73 -17 -5.    Imaging:  Radiology findings were communicated with the patient who voiced understanding of the findings.    XR Chest 2 Views  No evidence of active cardiopulmonary disease.  Reading per radiology    Laboratory:  Laboratory findings were communicated with the patient who voiced understanding of the findings.    ISTAT Gases Lactate Venous (Collected: 0403): pH: 7.37, PCO2: 41, PO2: 16 (L), Bicarbonate: 24, O2 Sat: 20, Lactic Acid: 0.9  Troponin (Collected 0400): <0.015  UA: Ketones: 5 (A), Protein Albumin: 30 (A), Urobilinogen: 4.0 (H), Leukocyte esterase: Small (A), WBC/HPF: 41 (H), Mucous: Present (A)  Urine culture: Pending   CBC: WBC 10.8, HGB 11.8,   CMP: Glucose: 144 (H), Albumin: 3.3 (L), o/w WNL (Creatinine 0.75)  Lipase: 118  Troponin (Collected 0220): <0.015    Interventions:  0227 Zofran 4 mg IV  0227 Pepcid 20 mg IV   0310 NS 1000 ml IV    Emergency Department Course:    0213 Nursing notes and vitals reviewed.    0216 I performed an exam of the patient as documented above.     0220 IV was inserted and blood was drawn for laboratory testing, results above.    0237 The patient was sent for a XR Chest 2 Views while in the emergency department, results above.     0258 I rechecked the patient.     0307 The patient provided a urine sample here in the emergency department. This was sent  for laboratory testing, findings above.    0438 I personally reviewed the ECG, imaging, and laboratory results with the patient and answered all related questions prior to discharge. I discussed the treatment plan with the patient. She expressed understanding of this plan and consented to discharge. She will be discharged home with instructions for care and follow up. In addition, the patient will return to the emergency department if her symptoms persist, worsen, if new symptoms arise or if there is any concern.  All questions were answered.    Impression & Plan      Medical Decision Making:  Shantelle Su is a 73 year old female who presents to the emergency department today for evaluation of nausea and vomiting after starting Bactrim for urinary tract infection. She was complaining of some mild pressure in her chest after multiple episodes of vomiting. I did obtain an ECG as well as troponin x2, which otherwise did not suggest any acute ischemia. This is likely secondary to the vomiting, which is currently improved after the above interventions. Lab work is otherwise reassuring. The patient has continued pyuria. Negative lactic acid. Due to the adverse reaction of vomiting secondary to Bactrim, I do believe the patient will benefit from new antibiotics. Bactrim has been discontinued. She will be started on Keflex. Urine culture is currently pending. She is safe for discharge and told to follow up with her primary care doctor and return for any worsening vomiting, chest pain, new symptoms or concerns.     Diagnosis:    ICD-10-CM    1. Nausea and vomiting, intractability of vomiting not specified, unspecified vomiting type R11.2    2. Atypical chest pain R07.89    3. Antibiotics causing adverse effect in therapeutic use, initial encounter T36.95XA    4. Acute UTI N39.0      Disposition:   The patient is discharged to home.    Discharge Medications:  Discharge Medication List as of 5/29/2018  4:49 AM      START  taking these medications    Details   cephALEXin (KEFLEX) 500 MG capsule Take 1 capsule (500 mg) by mouth 2 times daily for 7 days, Disp-14 capsule, R-0, Local Print      ondansetron (ZOFRAN ODT) 4 MG ODT tab Take 1 tablet (4 mg) by mouth every 8 hours as needed for nausea, Disp-10 tablet, R-0, Local Print           Scribe Disclosure:  I, Zack Jimenez, am serving as a scribe at 2:10 AM on 5/29/2018 to document services personally performed by Sunil Hill MD based on my observations and the provider's statements to me.    [facility] EMERGENCY DEPARTMENT       Sunil Hill MD  05/29/18 1567

## 2018-05-30 LAB
BACTERIA SPEC CULT: NORMAL
SPECIMEN SOURCE: NORMAL

## 2018-06-04 ENCOUNTER — OFFICE VISIT (OUTPATIENT)
Dept: INTERNAL MEDICINE | Facility: CLINIC | Age: 73
End: 2018-06-04
Payer: COMMERCIAL

## 2018-06-04 VITALS
HEART RATE: 64 BPM | SYSTOLIC BLOOD PRESSURE: 130 MMHG | DIASTOLIC BLOOD PRESSURE: 64 MMHG | HEIGHT: 60 IN | BODY MASS INDEX: 32.51 KG/M2 | WEIGHT: 165.6 LBS | TEMPERATURE: 98.2 F | OXYGEN SATURATION: 95 % | RESPIRATION RATE: 16 BRPM

## 2018-06-04 DIAGNOSIS — N39.0 URINARY TRACT INFECTION WITHOUT HEMATURIA, SITE UNSPECIFIED: Primary | ICD-10-CM

## 2018-06-04 DIAGNOSIS — I26.99 PULMONARY EMBOLISM AND INFARCTION (H): ICD-10-CM

## 2018-06-04 DIAGNOSIS — I10 ESSENTIAL HYPERTENSION, BENIGN: ICD-10-CM

## 2018-06-04 DIAGNOSIS — E88.810 METABOLIC SYNDROME: ICD-10-CM

## 2018-06-04 PROCEDURE — 99214 OFFICE O/P EST MOD 30 MIN: CPT | Performed by: INTERNAL MEDICINE

## 2018-06-04 NOTE — PROGRESS NOTES
SUBJECTIVE:   Shantelle Su is a 73 year old female who presents to clinic today for the following health issues:      ED/UC Followup:    Facility:  [facility]  Date of visit: 5/29/18  Reason for visit: Nausea & Vomiting, atypical chest pain    Current Status: Improved with Keflex change. Urinary symptoms also improved       Problem list and histories reviewed & adjusted, as indicated.  Additional history: as documented    Patient Active Problem List   Diagnosis     Pulmonary embolism and infarction (H)     Essential hypertension, benign     Rectocele     Metabolic syndrome     Advanced directives, counseling/discussion     Gastroesophageal reflux disease without esophagitis     Fatty liver     Postmenopausal bleeding     Heart palpitations     Angina pectoris, unspecified     ASCVD (arteriosclerotic cardiovascular disease)     Postsurgical percutaneous transluminal coronary angioplasty status     Paroxysmal supraventricular tachycardia (H)     Abnormal electrocardiogram     Hyperlipidemia LDL goal <70     Coronary artery disease of native artery of native heart with stable angina pectoris (H)     Past Surgical History:   Procedure Laterality Date     ABDOMEN SURGERY      Bladder sling     C NONSPECIFIC PROCEDURE  2009    [place] mesh augmented anterior colporrhaphy and vault suspension, tension-free vaginal tape sling with a transobturator approach usingthe Obtryx device and cystoscopy.       COLONOSCOPY      Scheduled October 2016     COLPORRHAPY POSTERIOR, CYSTOSCOPY, COMBINED  12/7/2011    Procedure:COMBINED COLPORRHAPY POSTERIOR, CYSTOSCOPY; POSTERIOR MESH AUGMENTED REPAIR WITH ELEVATE MESH , cystoscopy; Surgeon:HEDY UP; Location:[facility] OR     DILATION AND CURETTAGE, HYSTEROSCOPY DIAGNOSTIC, COMBINED N/A 10/4/2016    Procedure: COMBINED DILATION AND CURETTAGE, HYSTEROSCOPY DIAGNOSTIC;  Surgeon: Hedy Up MD;  Location: [facility] OR     HC DILATION/CURETTAGE DIAG/THER NON OB      after sab     HC LEFT HEART  CATHETERIZATION  11/23/16    PCI with drug-eluting stent placement in the proximal LAD     HC TOOTH EXTRACTION W/FORCEP         Social History   Substance Use Topics     Smoking status: Former Smoker     Packs/day: 0.75     Years: 20.00     Quit date: 11/5/1987     Smokeless tobacco: Never Used     Alcohol use Yes      Comment: 2 per month     Family History   Problem Relation Age of Onset     Gynecology Daughter      HEART DISEASE Father      D AGE 50     Coronary Artery Disease Father      Hyperlipidemia Father      HEART DISEASE Mother      D AGE 70     DIABETES Mother      Coronary Artery Disease Mother      Hypertension Mother      Hyperlipidemia Mother      HEART DISEASE Brother      B AGE 52 HEART SURGERY     Family History Negative Brother      B AGE 47     Hypertension Brother      CEREBROVASCULAR DISEASE Sister      B AGE 54 BLOOD CLOTS     Family History Negative Sister      B AGE 60     Depression Sister          Current Outpatient Prescriptions   Medication Sig Dispense Refill     ACCU-CHEK MULTICLIX LANCETS MISC 3 times daily. BG testing 3 times a day (Patient taking differently: daily 3 times daily. BG testing 3 times a day) 100 each 11     amLODIPine (NORVASC) 5 MG tablet Take 1 tablet (5 mg) by mouth daily 90 tablet 3     aspirin EC 81 MG EC tablet Take 1 tablet (81 mg) by mouth daily 90 tablet 3     atorvastatin (LIPITOR) 40 MG tablet Take 1 tablet (40 mg) by mouth daily 90 tablet 3     Blood Glucose Calibration (ACCU-CHEK VI) SOLN Use as directed 3 Bottle 11     Blood Glucose Monitoring Suppl (ACCU-CHEK VI) KIT 2 times daily. With routine lancets as well as alcohol swabs, dispense 1 month (Patient taking differently: daily With routine lancets as well as alcohol swabs, dispense 1 month) 1 kit 0     cephALEXin (KEFLEX) 500 MG capsule Take 1 capsule (500 mg) by mouth 2 times daily for 7 days 14 capsule 0     docusate sodium (COLACE) 100 MG capsule Take 100 mg by mouth 2 times daily        glucose blood VI test strips strip Accucheck Kristine Plus  3 times daily (Patient taking differently: daily Accucheck Kristine Plus  3 times daily) 300 strip 11     LORazepam (ATIVAN) 0.5 MG tablet Take 1 tablet (0.5 mg) by mouth every 8 hours as needed for anxiety 5 tablet 0     metFORMIN (GLUCOPHAGE) 500 MG tablet Take 1 tablet (500 mg) by mouth daily (with dinner) 90 tablet 3     metoprolol (TOPROL XL) 50 MG 24 hr tablet Take 1 tablet (50 mg) by mouth daily 90 tablet 3     nitroGLYcerin (NITROSTAT) 0.4 MG sublingual tablet DISSOLVE 1 TAB UNDER TONGUE AS NEEDED FOR CHEST PAIN. MAXIMUM IS 3 DOSES EVERY 5 MIN OVER 15 MINUTES 25 tablet 3     omeprazole (PRILOSEC) 20 MG CR capsule TAKE ONE CAPSULE BY MOUTH EVERY DAY 90 capsule 3     terbinafine (LAMISIL) 250 MG tablet Take 1 tablet (250 mg) by mouth daily 30 tablet 2     Allergies   Allergen Reactions     Bactrim [Sulfamethoxazole W/Trimethoprim] Nausea and Vomiting     Lisinopril      bp increased     BP Readings from Last 3 Encounters:   06/04/18 130/64   05/29/18 127/60   05/17/18 112/66    Wt Readings from Last 3 Encounters:   06/04/18 165 lb 9.6 oz (75.1 kg)   05/17/18 167 lb 3.2 oz (75.8 kg)   03/23/18 166 lb 9.6 oz (75.6 kg)            Reviewed and updated as needed this visit by clinical staff  Tobacco  Allergies  Meds  Med Hx  Surg Hx  Fam Hx  Soc Hx      Reviewed and updated as needed this visit by Provider       ROS:  CONSTITUTIONAL: NEGATIVE for fever, chills, change in weight  ENT/MOUTH: NEGATIVE for ear, mouth and throat problems  RESP: NEGATIVE for significant cough or SOB  CV: NEGATIVE for chest pain, palpitations or peripheral edema  GI: NEGATIVE for nausea, abdominal pain, heartburn, or change in bowel habits  : NEGATIVE for frequency, dysuria, or hematuria  MUSCULOSKELETAL: NEGATIVE for significant arthralgias or myalgia  NEURO: NEGATIVE for weakness, dizziness or paresthesias  HEME: NEGATIVE for bleeding problems  PSYCHIATRIC: NEGATIVE for  changes in mood or affect    OBJECTIVE:                                                    /64  Pulse 64  Temp 98.2  F (36.8  C) (Oral)  Resp 16  Ht 5' (1.524 m)  Wt 165 lb 9.6 oz (75.1 kg)  SpO2 95%  BMI 32.34 kg/m2  Body mass index is 32.34 kg/(m^2).  GENERAL: healthy, alert and no distress  RESP: lungs clear to auscultation - no rales, no rhonchi, no wheezes  CV: regular rates and rhythm, normal S1 S2, no S3 or S4 and no click or rub   MS: extremities- no gross deformities noted, no edema  PSYCH: Alert and oriented times 3; speech- coherent , normal rate and volume; able to articulate logical thoughts, able to abstract reason, no tangential thoughts, no hallucinations or delusions, affect- normal     Lab Results   Component Value Date    A1C 6.2 05/21/2018    A1C 6.4 12/04/2017    A1C 6.8 04/23/2017    A1C 6.4 11/24/2016    A1C 6.4 08/25/2016     ASSESSMENT/PLAN:                                                      (N39.0) Urinary tract infection without hematuria, site unspecified  (primary encounter diagnosis)  Comment: stable on therapy as ordered  Plan: resolving symptoms    (I10) Essential hypertension, benign  Comment: at goal on therapy  Plan:     (E88.81) Metabolic syndrome  Comment: stable with noted goal A1c  Plan:     (I26.99) Pulmonary embolism and infarction (H)  Comment: on ASA stable  Plan:       See Patient Instructions    Klever Vega MD    THE MEDICATION LIST HAS BEEN FULLY RECONCILED BY THE M.D. AND THE NURSING STAFF.    25 minutes spent with this patient, face to face, discussing treatment options for listed problems above as well as side effects of appropriate medications.  Counseling time extended beyond 50% of the clinic visit.  Medication dosing, treatment plan and follow-up were discussed. Also reviewed need for primary care testing for patient.     [facility]

## 2018-06-04 NOTE — MR AVS SNAPSHOT
After Visit Summary   6/4/2018    Shantelle Su    MRN: [id number]           Patient Information     Date Of Birth          1945        Visit Information        Provider Department      6/4/2018 10:00 AM Klever Vega MD [facility]        Today's Diagnoses     Urinary tract infection without hematuria, site unspecified    -  1    Essential hypertension, benign        Metabolic syndrome        Pulmonary embolism and infarction (H)           Follow-ups after your visit        Follow-up notes from your care team     Return if symptoms worsen or fail to improve.      Who to contact     If you have questions or need follow up information about today's clinic visit or your schedule please contact [facility] directly at [phone].  Normal or non-critical lab and imaging results will be communicated to you by MyChart, letter or phone within 4 business days after the clinic has received the results. If you do not hear from us within 7 days, please contact the clinic through Widgetboxhart or phone. If you have a critical or abnormal lab result, we will notify you by phone as soon as possible.  Submit refill requests through whistleBox or call your pharmacy and they will forward the refill request to us. Please allow 3 business days for your refill to be completed.          Additional Information About Your Visit        MyChart Information     whistleBox gives you secure access to your electronic health record. If you see a primary care provider, you can also send messages to your care team and make appointments. If you have questions, please call your primary care clinic.  If you do not have a primary care provider, please call [phone] and they will assist you.        Care EveryWhere ID     This is your Care EveryWhere ID. This could be used by other organizations to access your [facility] medical records  [id number]        Your Vitals Were     Pulse  Temperature Respirations Height Pulse Oximetry BMI (Body Mass Index)    64 98.2  F (36.8  C) (Oral) 16 5' (1.524 m) 95% 32.34 kg/m2       Blood Pressure from Last 3 Encounters:   06/04/18 130/64   05/29/18 127/60   05/17/18 112/66    Weight from Last 3 Encounters:   06/04/18 165 lb 9.6 oz (75.1 kg)   05/17/18 167 lb 3.2 oz (75.8 kg)   03/23/18 166 lb 9.6 oz (75.6 kg)              Today, you had the following     No orders found for display         Today's Medication Changes          These changes are accurate as of 6/4/18 10:21 AM.  If you have any questions, ask your nurse or doctor.               These medicines have changed or have updated prescriptions.        Dose/Directions    ACCU-CHEK KRISTINE Kit   This may have changed:    - when to take this  - additional instructions   Used for:  Hyperglycemia        2 times daily. With routine lancets as well as alcohol swabs, dispense 1 month   Quantity:  1 kit   Refills:  0       blood glucose monitoring lancets   This may have changed:    - when to take this  - additional instructions   Used for:  Hyperglycemia        3 times daily. BG testing 3 times a day   Quantity:  100 each   Refills:  11       blood glucose monitoring test strip   Commonly known as:  no brand specified   This may have changed:    - when to take this  - additional instructions   Used for:  Hyperglycemia        Accucheck Kristine Plus  3 times daily   Quantity:  300 strip   Refills:  11                Primary Care Provider Office Phone # Fax #    Klever Vega [phone]       [address] [place] [id number]        Equal Access to Services     : Hadii lilia rehman hadashleroy Soomaali, waaxda luqadaha, qaybta kaalmada adeegheath, mireya idiin hayaan adeeg kharash la'aan . So [facility] [phone].    ATENCIÓN: Si habla español, tiene a herbert disposición servicios gratuitos de asistencia lingüística. Llame al [phone].    We comply with applicable federal civil rights laws  and [place] laws. We do not discriminate on the basis of race, color, national origin, age, disability, sex, sexual orientation, or gender identity.            Thank you!     Thank you for choosing [facility]  for your care. Our goal is always to provide you with excellent care. Hearing back from our patients is one way we can continue to improve our services. Please take a few minutes to complete the written survey that you may receive in the mail after your visit with us. Thank you!             Your Updated Medication List - Protect others around you: Learn how to safely use, store and throw away your medicines at www.disposemymeds.org.          This list is accurate as of 6/4/18 10:21 AM.  Always use your most recent med list.                   Brand Name Dispense Instructions for use Diagnosis    ACCU-CHEK VI Kit     1 kit    2 times daily. With routine lancets as well as alcohol swabs, dispense 1 month    Hyperglycemia       amLODIPine 5 MG tablet    NORVASC    90 tablet    Take 1 tablet (5 mg) by mouth daily    Essential hypertension, benign       aspirin 81 MG EC tablet     90 tablet    Take 1 tablet (81 mg) by mouth daily    Angina pectoris, unspecified, Status post coronary angioplasty       atorvastatin 40 MG tablet    LIPITOR    90 tablet    Take 1 tablet (40 mg) by mouth daily    Hyperlipidemia LDL goal <130       blood glucose calibration solution     3 Bottle    Use as directed    Metabolic syndrome       blood glucose monitoring lancets     100 each    3 times daily. BG testing 3 times a day    Hyperglycemia       blood glucose monitoring test strip    no brand specified    300 strip    Accucheck Vi Plus  3 times daily    Hyperglycemia       cephALEXin 500 MG capsule    KEFLEX    14 capsule    Take 1 capsule (500 mg) by mouth 2 times daily for 7 days        docusate sodium 100 MG capsule    COLACE     Take 100 mg by mouth 2 times daily        LORazepam 0.5 MG tablet     ATIVAN    5 tablet    Take 1 tablet (0.5 mg) by mouth every 8 hours as needed for anxiety    Grief reaction       metFORMIN 500 MG tablet    GLUCOPHAGE    90 tablet    Take 1 tablet (500 mg) by mouth daily (with dinner)    Metabolic syndrome       metoprolol succinate 50 MG 24 hr tablet    TOPROL XL    90 tablet    Take 1 tablet (50 mg) by mouth daily    Paroxysmal supraventricular tachycardia (H), Abnormal electrocardiogram       nitroGLYcerin 0.4 MG sublingual tablet    NITROSTAT    25 tablet    DISSOLVE 1 TAB UNDER TONGUE AS NEEDED FOR CHEST PAIN. MAXIMUM IS 3 DOSES EVERY 5 MIN OVER 15 MINUTES    Status post coronary angioplasty       omeprazole 20 MG CR capsule    priLOSEC    90 capsule    TAKE ONE CAPSULE BY MOUTH EVERY DAY    Gastroesophageal reflux disease without esophagitis       terbinafine 250 MG tablet    lamISIL    30 tablet    Take 1 tablet (250 mg) by mouth daily    Onychomycosis

## 2018-06-21 ENCOUNTER — OFFICE VISIT (OUTPATIENT)
Dept: INTERNAL MEDICINE | Facility: CLINIC | Age: 73
End: 2018-06-21
Payer: COMMERCIAL

## 2018-06-21 VITALS
SYSTOLIC BLOOD PRESSURE: 110 MMHG | RESPIRATION RATE: 18 BRPM | OXYGEN SATURATION: 97 % | HEART RATE: 57 BPM | DIASTOLIC BLOOD PRESSURE: 62 MMHG | TEMPERATURE: 98.1 F | BODY MASS INDEX: 32.73 KG/M2 | WEIGHT: 167.6 LBS

## 2018-06-21 DIAGNOSIS — N89.8 VAGINAL ITCHING: ICD-10-CM

## 2018-06-21 DIAGNOSIS — N39.0 URINARY TRACT INFECTION WITHOUT HEMATURIA, SITE UNSPECIFIED: Primary | ICD-10-CM

## 2018-06-21 LAB
ALBUMIN UR-MCNC: NEGATIVE MG/DL
APPEARANCE UR: CLEAR
BACTERIA #/AREA URNS HPF: ABNORMAL /HPF
BILIRUB UR QL STRIP: NEGATIVE
COLOR UR AUTO: YELLOW
GLUCOSE UR STRIP-MCNC: NEGATIVE MG/DL
HGB UR QL STRIP: NEGATIVE
KETONES UR STRIP-MCNC: NEGATIVE MG/DL
LEUKOCYTE ESTERASE UR QL STRIP: ABNORMAL
NITRATE UR QL: NEGATIVE
PH UR STRIP: 5 PH (ref 5–7)
RBC #/AREA URNS AUTO: ABNORMAL /HPF
SOURCE: ABNORMAL
SP GR UR STRIP: 1.01 (ref 1–1.03)
SPECIMEN SOURCE: NORMAL
UROBILINOGEN UR STRIP-ACNC: 0.2 EU/DL (ref 0.2–1)
WBC #/AREA URNS AUTO: >100 /HPF
WET PREP SPEC: NORMAL

## 2018-06-21 PROCEDURE — 99213 OFFICE O/P EST LOW 20 MIN: CPT | Performed by: PHYSICIAN ASSISTANT

## 2018-06-21 PROCEDURE — 87088 URINE BACTERIA CULTURE: CPT | Performed by: PHYSICIAN ASSISTANT

## 2018-06-21 PROCEDURE — 81001 URINALYSIS AUTO W/SCOPE: CPT | Performed by: PHYSICIAN ASSISTANT

## 2018-06-21 PROCEDURE — 87210 SMEAR WET MOUNT SALINE/INK: CPT | Performed by: PHYSICIAN ASSISTANT

## 2018-06-21 PROCEDURE — 87086 URINE CULTURE/COLONY COUNT: CPT | Performed by: PHYSICIAN ASSISTANT

## 2018-06-21 PROCEDURE — 87186 SC STD MICRODIL/AGAR DIL: CPT | Performed by: PHYSICIAN ASSISTANT

## 2018-06-21 RX ORDER — CIPROFLOXACIN 500 MG/1
500 TABLET, FILM COATED ORAL 2 TIMES DAILY
Qty: 14 TABLET | Refills: 0 | Status: SHIPPED | OUTPATIENT
Start: 2018-06-21 | End: 2018-10-22

## 2018-06-21 RX ORDER — ACETAMINOPHEN 120 MG/1
SUPPOSITORY RECTAL EVERY 4 HOURS PRN
COMMUNITY
End: 2018-10-22

## 2018-06-21 NOTE — MR AVS SNAPSHOT
After Visit Summary   6/21/2018    Shantelle Su    MRN: [id number]           Patient Information     Date Of Birth          1945        Visit Information        Provider Department      6/21/2018 4:00 PM Brenda Ramon PA-C [facility]        Today's Diagnoses     Urinary tract infection without hematuria, site unspecified    -  1    Vaginal itching           Follow-ups after your visit        Who to contact     If you have questions or need follow up information about today's clinic visit or your schedule please contact [facility] directly at [phone].  Normal or non-critical lab and imaging results will be communicated to you by MyChart, letter or phone within 4 business days after the clinic has received the results. If you do not hear from us within 7 days, please contact the clinic through [employer] or phone. If you have a critical or abnormal lab result, we will notify you by phone as soon as possible.  Submit refill requests through [employer] or call your pharmacy and they will forward the refill request to us. Please allow 3 business days for your refill to be completed.          Additional Information About Your Visit        MyChart Information     [employer] gives you secure access to your electronic health record. If you see a primary care provider, you can also send messages to your care team and make appointments. If you have questions, please call your primary care clinic.  If you do not have a primary care provider, please call [phone] and they will assist you.        Care EveryWhere ID     This is your Care EveryWhere ID. This could be used by other organizations to access your [facility] medical records  [id number]        Your Vitals Were     Pulse Temperature Respirations Pulse Oximetry BMI (Body Mass Index)       57 98.1  F (36.7  C) (Oral) 18 97% 32.73 kg/m2        Blood Pressure from Last 3 Encounters:   06/21/18 110/62    06/04/18 130/64   05/29/18 127/60    Weight from Last 3 Encounters:   06/21/18 167 lb 9.6 oz (76 kg)   06/04/18 165 lb 9.6 oz (75.1 kg)   05/17/18 167 lb 3.2 oz (75.8 kg)              We Performed the Following     UA reflex to Microscopic     Urine Culture Aerobic Bacterial     Urine Microscopic     Wet prep          Today's Medication Changes          These changes are accurate as of 6/21/18  4:55 PM.  If you have any questions, ask your nurse or doctor.               Start taking these medicines.        Dose/Directions    ciprofloxacin 500 MG tablet   Commonly known as:  CIPRO   Used for:  Urinary tract infection without hematuria, site unspecified        Dose:  500 mg   Take 1 tablet (500 mg) by mouth 2 times daily   Quantity:  14 tablet   Refills:  0         These medicines have changed or have updated prescriptions.        Dose/Directions    ACCU-CHEK VI Kit   This may have changed:    - when to take this  - additional instructions   Used for:  Hyperglycemia        2 times daily. With routine lancets as well as alcohol swabs, dispense 1 month   Quantity:  1 kit   Refills:  0       blood glucose monitoring lancets   This may have changed:    - when to take this  - additional instructions   Used for:  Hyperglycemia        3 times daily. BG testing 3 times a day   Quantity:  100 each   Refills:  11       blood glucose monitoring test strip   Commonly known as:  no brand specified   This may have changed:    - when to take this  - additional instructions   Used for:  Hyperglycemia        Accucheck Vi Plus  3 times daily   Quantity:  300 strip   Refills:  11            Where to get your medicines      These medications were sent to [facility]/pharmacy #[id number] - [place], [place] - [address] [address]  [address] [address] [address]    Hours:  Old ann drug converted to [employer] Phone:  [phone]     ciprofloxacin 500 MG tablet                Primary Care Provider Office Phone # Fax #    Klever Vega MD  [phone]       [address] [facility] [address]        Equal Access to Services     LIT VASQUEZ : Prisca lilia rehman adarsh Soisabela, wanolanda luqrocío, qaalenata kaalmada lloyd, mireya molinajessica silva. So [facility] [phone].    ATENCIÓN: Si habla español, tiene a herbert disposición servicios gratuitos de asistencia lingüística. Llame al [phone].    We comply with applicable federal civil rights laws and [place] laws. We do not discriminate on the basis of race, color, national origin, age, disability, sex, sexual orientation, or gender identity.            Thank you!     Thank you for choosing [facility]  for your care. Our goal is always to provide you with excellent care. Hearing back from our patients is one way we can continue to improve our services. Please take a few minutes to complete the written survey that you may receive in the mail after your visit with us. Thank you!             Your Updated Medication List - Protect others around you: Learn how to safely use, store and throw away your medicines at www.disposemymeds.org.          This list is accurate as of 6/21/18  4:55 PM.  Always use your most recent med list.                   Brand Name Dispense Instructions for use Diagnosis    ACCU-CHEK VI Kit     1 kit    2 times daily. With routine lancets as well as alcohol swabs, dispense 1 month    Hyperglycemia       acetaminophen 120 MG Suppository    TYLENOL     Place rectally every 4 hours as needed for fever        amLODIPine 5 MG tablet    NORVASC    90 tablet    Take 1 tablet (5 mg) by mouth daily    Essential hypertension, benign       aspirin 81 MG EC tablet     90 tablet    Take 1 tablet (81 mg) by mouth daily    Angina pectoris, unspecified, Status post coronary angioplasty       atorvastatin 40 MG tablet    LIPITOR    90 tablet    Take 1 tablet (40 mg) by mouth daily    Hyperlipidemia LDL goal <130       blood glucose  calibration solution     3 Bottle    Use as directed    Metabolic syndrome       blood glucose monitoring lancets     100 each    3 times daily. BG testing 3 times a day    Hyperglycemia       blood glucose monitoring test strip    no brand specified    300 strip    Accucheck Kristine Plus  3 times daily    Hyperglycemia       ciprofloxacin 500 MG tablet    CIPRO    14 tablet    Take 1 tablet (500 mg) by mouth 2 times daily    Urinary tract infection without hematuria, site unspecified       docusate sodium 100 MG capsule    COLACE     Take 100 mg by mouth 2 times daily        LORazepam 0.5 MG tablet    ATIVAN    5 tablet    Take 1 tablet (0.5 mg) by mouth every 8 hours as needed for anxiety    Grief reaction       metFORMIN 500 MG tablet    GLUCOPHAGE    90 tablet    Take 1 tablet (500 mg) by mouth daily (with dinner)    Metabolic syndrome       metoprolol succinate 50 MG 24 hr tablet    TOPROL XL    90 tablet    Take 1 tablet (50 mg) by mouth daily    Paroxysmal supraventricular tachycardia (H), Abnormal electrocardiogram       nitroGLYcerin 0.4 MG sublingual tablet    NITROSTAT    25 tablet    DISSOLVE 1 TAB UNDER TONGUE AS NEEDED FOR CHEST PAIN. MAXIMUM IS 3 DOSES EVERY 5 MIN OVER 15 MINUTES    Status post coronary angioplasty       omeprazole 20 MG CR capsule    priLOSEC    90 capsule    TAKE ONE CAPSULE BY MOUTH EVERY DAY    Gastroesophageal reflux disease without esophagitis       terbinafine 250 MG tablet    lamISIL    30 tablet    Take 1 tablet (250 mg) by mouth daily    Onychomycosis

## 2018-06-21 NOTE — PROGRESS NOTES
SUBJECTIVE:   Shantelle Su is a 73 year old female who presents to clinic today for the following health issues:    URINARY TRACT SYMPTOMS      Duration:  On going since 5/27/18    Pt had UTI on 05/27/18 was prescribed bactrim and had a reaction to the medication and went to the ER on 05/29/18 and antibiotic was switched to keflex     Pt notes her symptoms resolved and then returned this week     Description  dysuria, urgency, and odor with urination   Felt like a yeast infection today because she had itching     Intensity:  Mild - just starting again     Accompanying signs and symptoms:  Fever/chills: no   Flank pain no   Nausea and vomiting: no   Vaginal symptoms:  Itching   Abdominal/Pelvic Pain: no     History  History of frequent UTI's: No   History of kidney stones: YES- One time 50 years ago  Sexually Active: no  Possibility of pregnancy: No    Precipitating or alleviating factors: None    Therapies tried and outcome: none   Outcome: none      Problem list and histories reviewed & adjusted, as indicated.  Additional history: as documented    Reviewed and updated as needed this visit by clinical staff  Allergies  Meds  Problems       Reviewed and updated as needed this visit by Provider  Meds  Problems           OBJECTIVE:     /62  Pulse 57  Temp 98.1  F (36.7  C) (Oral)  Resp 18  Wt 167 lb 9.6 oz (76 kg)  SpO2 97%  BMI 32.73 kg/m2  Body mass index is 32.73 kg/(m^2).  GENERAL: healthy, alert and no distress  RESP: lungs clear to auscultation - no rales, rhonchi or wheezes  CV: regular rates and rhythm, normal S1 S2, no S3 or S4 and no murmur, click or rub  ABDOMEN: soft, nontender, without hepatosplenomegaly or masses and bowel sounds normal without CVA tenderness     Diagnostic Test Results:  Results for orders placed or performed in visit on 06/21/18 (from the past 24 hour(s))   UA reflex to Microscopic   Result Value Ref Range    Color Urine Yellow     Appearance Urine Clear      Glucose Urine Negative NEG^Negative mg/dL    Bilirubin Urine Negative NEG^Negative    Ketones Urine Negative NEG^Negative mg/dL    Specific Gravity Urine 1.010 1.003 - 1.035    Blood Urine Negative NEG^Negative    pH Urine 5.0 5.0 - 7.0 pH    Protein Albumin Urine Negative NEG^Negative mg/dL    Urobilinogen Urine 0.2 0.2 - 1.0 EU/dL    Nitrite Urine Negative NEG^Negative    Leukocyte Esterase Urine Moderate (A) NEG^Negative    Source Midstream Urine    Urine Microscopic   Result Value Ref Range    WBC Urine >100 (A) OTO5^0 - 5 /HPF    RBC Urine O - 2 OTO2^O - 2 /HPF    Bacteria Urine Moderate (A) NEG^Negative /HPF   Wet prep   Result Value Ref Range    Specimen Description Vagina     Wet Prep No Trichomonas seen     Wet Prep No clue cells seen     Wet Prep No yeast seen        ASSESSMENT/PLAN:     1. Urinary tract infection without hematuria, site unspecified  - reviewed prior culture via Care Everywhere as culture at ER was negative, but culture from clinic visit prior did show infection with sensitivities   - UA reflex to Microscopic  - Urine Microscopic  - ciprofloxacin (CIPRO) 500 MG tablet; Take 1 tablet (500 mg) by mouth 2 times daily  Dispense: 14 tablet; Refill: 0  - reviewed use, side effects, and risk (reviewed risk of tendon rupture)   - Urine Culture Aerobic Bacterial  - follow up if symptoms worsen or fail to improve     2. Vaginal itching  - Wet prep    Pt agreed to above plan and all questions are answered.     Brenda Ramon PA-C  [facility]   - - -

## 2018-06-23 LAB
BACTERIA SPEC CULT: ABNORMAL
SPECIMEN SOURCE: ABNORMAL

## 2018-09-22 NOTE — ADDENDUM NOTE
Addended by: CAESAR GRESHAM on: 11/28/2017 10:15 AM     Modules accepted: Orders    
Addended by: ROBBI ROLLE on: 11/28/2017 10:12 AM     Modules accepted: Orders    
Statement Selected

## 2018-10-22 ENCOUNTER — OFFICE VISIT (OUTPATIENT)
Dept: CARDIOLOGY | Facility: CLINIC | Age: 73
End: 2018-10-22
Payer: COMMERCIAL

## 2018-10-22 VITALS
DIASTOLIC BLOOD PRESSURE: 80 MMHG | WEIGHT: 165 LBS | HEART RATE: 52 BPM | BODY MASS INDEX: 32.39 KG/M2 | SYSTOLIC BLOOD PRESSURE: 128 MMHG | HEIGHT: 60 IN

## 2018-10-22 DIAGNOSIS — E78.5 HYPERLIPIDEMIA LDL GOAL <70: ICD-10-CM

## 2018-10-22 DIAGNOSIS — I10 ESSENTIAL HYPERTENSION, BENIGN: ICD-10-CM

## 2018-10-22 DIAGNOSIS — I25.118 CORONARY ARTERY DISEASE OF NATIVE ARTERY OF NATIVE HEART WITH STABLE ANGINA PECTORIS (H): Primary | ICD-10-CM

## 2018-10-22 DIAGNOSIS — Z95.5 HISTORY OF PLACEMENT OF STENT IN LAD CORONARY ARTERY: ICD-10-CM

## 2018-10-22 PROCEDURE — 99214 OFFICE O/P EST MOD 30 MIN: CPT | Performed by: INTERNAL MEDICINE

## 2018-10-22 NOTE — LETTER
10/22/2018      Klever Vega MD  [address] [facility] [address]      RE: Shantelle Su       Dear Colleague,    I had the pleasure of seeing Shantelle Su in the [facility].    Service Date: 10/22/2018      PRIMARY CARE PHYSICIAN:  Dr. Klever Vega.      HISTORY OF PRESENT ILLNESS:  I again had the pleasure of seeing your patient, Shantelle Su, at [facility] for evaluation of coronary artery disease.  The patient is a delightful 73-year-old female status post intracoronary stenting of her LAD 11/23/2016 after an abnormal stress test.  She has a history of PAT with an ejection fraction generally of 50%-55%, grade 1 diastolic dysfunction.  Stress echocardiogram showed ischemia in the anteroseptal and apical wall accompanied by 4/10 chest burning.  On 11/23/2016, she was found to have a 50% LAD stenosis followed by an 80% stenosis and a mid-vessel 40%-50% stenosis.  Two hours after stent placement, there was evidence of an acute myocardial infarction.  OCT was performed showing a proximal stent edge dissection.  Thrombectomy was performed and an additional overlapping drug-eluting stent was placed near the ostium of the LAD.  The patient has done well since that time and is off her Brilinta.  She denies palpitations, syncope or presyncope.  She is not exercising to any major extent.  She has had a bad year with multiple medical emergencies for family members.  She denies peripheral edema.  Her last lipid profile from 05/21/2018 includes total cholesterol 154, HDL 60, LDL 68 and triglycerides 128.  Hemoglobin A1c is stable at 6.2%.      PHYSICAL EXAMINATION:  As below.      ASSESSMENT/PLAN:   1.  Shantelle Su is a delightful 73-year-old female status post LAD, angioplasty and stenting with acute in-stent thrombosis requiring a second drug-eluting stent placed for edge dissection.  The patient has some back discomfort that prompted us to perform a  stress echocardiogram in 2017.  This is normal without evidence of ischemia and we would not repeat this stress echo for 4 more years if the patient remains angina-free.   2.  Hyperlipidemia, currently reasonably well-controlled.  Triglycerides are now within normal limits.   3.  History of premature atrial contractions which are well-controlled at this time.      It is my pleasure to assist in the care of Rodney Hoyos.  All her questions were answered to her satisfaction.  It is my plan to see her again in 1 year or earlier on a p.r.n. basis.      Lilly Brian MD      cc:   [place] [address]         LILLY BRIAN MD, [facility]             D: 10/22/2018   T: 10/22/2018   MT: al      Name:     RODNEY HOYOS   MRN:      [id number]        Account:      [id number]   :      1945           Service Date: 10/22/2018      Document: [id number]           Outpatient Encounter Prescriptions as of 10/22/2018   Medication Sig Dispense Refill     ACCU-CHEK MULTICLIX LANCETS MISC 3 times daily. BG testing 3 times a day (Patient taking differently: daily 3 times daily. BG testing 3 times a day) 100 each 11     Acetaminophen (TYLENOL PO)        amLODIPine (NORVASC) 5 MG tablet Take 1 tablet (5 mg) by mouth daily 90 tablet 3     aspirin EC 81 MG EC tablet Take 1 tablet (81 mg) by mouth daily 90 tablet 3     atorvastatin (LIPITOR) 40 MG tablet Take 1 tablet (40 mg) by mouth daily 90 tablet 3     Blood Glucose Calibration (ACCU-CHEK KRISTINE) SOLN Use as directed 3 Bottle 11     Blood Glucose Monitoring Suppl (ACCU-CHEK KRISTINE) KIT 2 times daily. With routine lancets as well as alcohol swabs, dispense 1 month (Patient taking differently: daily With routine lancets as well as alcohol swabs, dispense 1 month) 1 kit 0     docusate sodium (COLACE) 100 MG capsule Take 100 mg by mouth 2 times daily       glucose blood VI test strips strip Accucheck Kristine Plus  3  times daily (Patient taking differently: daily Accucheck Kristine Plus  3 times daily) 300 strip 11     metFORMIN (GLUCOPHAGE) 500 MG tablet Take 1 tablet (500 mg) by mouth daily (with dinner) 90 tablet 3     metoprolol (TOPROL XL) 50 MG 24 hr tablet Take 1 tablet (50 mg) by mouth daily 90 tablet 3     nitroGLYcerin (NITROSTAT) 0.4 MG sublingual tablet DISSOLVE 1 TAB UNDER TONGUE AS NEEDED FOR CHEST PAIN. MAXIMUM IS 3 DOSES EVERY 5 MIN OVER 15 MINUTES 25 tablet 3     omeprazole (PRILOSEC) 20 MG CR capsule TAKE ONE CAPSULE BY MOUTH EVERY DAY 90 capsule 3     LORazepam (ATIVAN) 0.5 MG tablet Take 1 tablet (0.5 mg) by mouth every 8 hours as needed for anxiety (Patient not taking: Reported on 10/22/2018) 5 tablet 0     [DISCONTINUED] acetaminophen (TYLENOL) 120 MG Suppository Place rectally every 4 hours as needed for fever       [DISCONTINUED] ciprofloxacin (CIPRO) 500 MG tablet Take 1 tablet (500 mg) by mouth 2 times daily 14 tablet 0     [DISCONTINUED] terbinafine (LAMISIL) 250 MG tablet Take 1 tablet (250 mg) by mouth daily 30 tablet 2     No facility-administered encounter medications on file as of 10/22/2018.        Again, thank you for allowing me to participate in the care of your patient.      Sincerely,    Gee Lozada MD     [facility]

## 2018-10-22 NOTE — PROGRESS NOTES
Service Date: 10/22/2018      PRIMARY CARE PHYSICIAN:  Dr. Klever Vega.      HISTORY OF PRESENT ILLNESS:  I again had the pleasure of seeing your patient, Shantelle Su, at [facility] for evaluation of coronary artery disease.  The patient is a delightful 73-year-old female status post intracoronary stenting of her LAD 11/23/2016 after an abnormal stress test.  She has a history of PAT with an ejection fraction generally of 50%-55%, grade 1 diastolic dysfunction.  Stress echocardiogram showed ischemia in the anteroseptal and apical wall accompanied by 4/10 chest burning.  On 11/23/2016, she was found to have a 50% LAD stenosis followed by an 80% stenosis and a mid-vessel 40%-50% stenosis.  Two hours after stent placement, there was evidence of an acute myocardial infarction.  OCT was performed showing a proximal stent edge dissection.  Thrombectomy was performed and an additional overlapping drug-eluting stent was placed near the ostium of the LAD.  The patient has done well since that time and is off her Brilinta.  She denies palpitations, syncope or presyncope.  She is not exercising to any major extent.  She has had a bad year with multiple medical emergencies for family members.  She denies peripheral edema.  Her last lipid profile from 05/21/2018 includes total cholesterol 154, HDL 60, LDL 68 and triglycerides 128.  Hemoglobin A1c is stable at 6.2%.      PHYSICAL EXAMINATION:  As below.      ASSESSMENT/PLAN:   1.  Shantelle Su is a delightful 73-year-old female status post LAD, angioplasty and stenting with acute in-stent thrombosis requiring a second drug-eluting stent placed for edge dissection.  The patient has some back discomfort that prompted us to perform a stress echocardiogram in 03/2017.  This is normal without evidence of ischemia and we would not repeat this stress echo for 4 more years if the patient remains angina-free.   2.  Hyperlipidemia, currently reasonably  well-controlled.  Triglycerides are now within normal limits.   3.  History of premature atrial contractions which are well-controlled at this time.      It is my pleasure to assist in the care of Rodney Hoyos.  All her questions were answered to her satisfaction.  It is my plan to see her again in 1 year or earlier on a p.r.n. basis.      Lilly Brian MD      cc:   [place] [address]         LILLY BRIAN MD, [facility]             D: 10/22/2018   T: 10/22/2018   MT: al      Name:     RODNEY HOYOS   MRN:      [id number]        Account:      [id number]   :      1945           Service Date: 10/22/2018      Document: [id number]

## 2018-10-22 NOTE — PROGRESS NOTES
HPI and Plan:   See dictation:[id number]    Orders Placed This Encounter   Procedures     Follow-Up with Cardiologist       Orders Placed This Encounter   Medications     Acetaminophen (TYLENOL PO)       Medications Discontinued During This Encounter   Medication Reason     acetaminophen (TYLENOL) 120 MG Suppository Medication Reconciliation Clean Up     ciprofloxacin (CIPRO) 500 MG tablet Therapy completed     terbinafine (LAMISIL) 250 MG tablet Therapy completed         Encounter Diagnoses   Name Primary?     Coronary artery disease of native artery of native heart with stable angina pectoris (H) Yes     History of placement of stent in LAD coronary artery      Essential hypertension, benign      Hyperlipidemia LDL goal <70        CURRENT MEDICATIONS:  Current Outpatient Prescriptions   Medication Sig Dispense Refill     ACCU-CHEK MULTICLIX LANCETS MISC 3 times daily. BG testing 3 times a day (Patient taking differently: daily 3 times daily. BG testing 3 times a day) 100 each 11     Acetaminophen (TYLENOL PO)        amLODIPine (NORVASC) 5 MG tablet Take 1 tablet (5 mg) by mouth daily 90 tablet 3     aspirin EC 81 MG EC tablet Take 1 tablet (81 mg) by mouth daily 90 tablet 3     atorvastatin (LIPITOR) 40 MG tablet Take 1 tablet (40 mg) by mouth daily 90 tablet 3     Blood Glucose Calibration (ACCU-CHEK VI) SOLN Use as directed 3 Bottle 11     Blood Glucose Monitoring Suppl (ACCU-CHEK VI) KIT 2 times daily. With routine lancets as well as alcohol swabs, dispense 1 month (Patient taking differently: daily With routine lancets as well as alcohol swabs, dispense 1 month) 1 kit 0     docusate sodium (COLACE) 100 MG capsule Take 100 mg by mouth 2 times daily       glucose blood VI test strips strip Accucheck Vi Plus  3 times daily (Patient taking differently: daily Accucheck Vi Plus  3 times daily) 300 strip 11     metFORMIN (GLUCOPHAGE) 500 MG tablet Take 1 tablet (500 mg) by mouth daily (with dinner) 90 tablet  3     metoprolol (TOPROL XL) 50 MG 24 hr tablet Take 1 tablet (50 mg) by mouth daily 90 tablet 3     nitroGLYcerin (NITROSTAT) 0.4 MG sublingual tablet DISSOLVE 1 TAB UNDER TONGUE AS NEEDED FOR CHEST PAIN. MAXIMUM IS 3 DOSES EVERY 5 MIN OVER 15 MINUTES 25 tablet 3     omeprazole (PRILOSEC) 20 MG CR capsule TAKE ONE CAPSULE BY MOUTH EVERY DAY 90 capsule 3     LORazepam (ATIVAN) 0.5 MG tablet Take 1 tablet (0.5 mg) by mouth every 8 hours as needed for anxiety (Patient not taking: Reported on 10/22/2018) 5 tablet 0       ALLERGIES     Allergies   Allergen Reactions     Bactrim [Sulfamethoxazole W/Trimethoprim] Nausea and Vomiting     Lisinopril      bp increased       PAST MEDICAL HISTORY:  Past Medical History:   Diagnosis Date     Acute upper respiratory infection 9/30/2002     Angina pectoris, unspecified (H) 11/23/2016     ASCVD (arteriosclerotic cardiovascular disease) 12/20/2016     CAD (coronary artery disease) 11/23/2016    sp proximal LAD stenting     Diabetes mellitus (H)      Enterocele 9/12/2011     Essential hypertension, benign      Fatty liver 6/4/2015    Based on CT scan done 5/23/15      GERD (gastroesophageal reflux disease) 10/13/2008     Heart palpitations      Hiatal hernia      Hyperlipidemia LDL goal <70 12/20/2016     Metabolic syndrome 12/1/2011     Other pulmonary embolism and infarction 9/30/2002    after starting hormone therapy     PAC (premature atrial contraction)      Paroxysmal supraventricular tachycardia (H) 12/20/2016     Rectocele 10/15/2007       PAST SURGICAL HISTORY:  Past Surgical History:   Procedure Laterality Date     ABDOMEN SURGERY      Bladder sling     C NONSPECIFIC PROCEDURE  2009    [place] mesh augmented anterior colporrhaphy and vault suspension, tension-free vaginal tape sling with a transobturator approach usingthe Obtryx device and cystoscopy.       COLONOSCOPY      Scheduled October 2016     COLPORRHAPY POSTERIOR, CYSTOSCOPY, COMBINED  12/7/2011     Procedure:COMBINED COLPORRHAPY POSTERIOR, CYSTOSCOPY; POSTERIOR MESH AUGMENTED REPAIR WITH ELEVATE MESH , cystoscopy; Surgeon:ANDRE UP; Location:[facility]     DILATION AND CURETTAGE, HYSTEROSCOPY DIAGNOSTIC, COMBINED N/A 10/4/2016    Procedure: COMBINED DILATION AND CURETTAGE, HYSTEROSCOPY DIAGNOSTIC;  Surgeon: Andre Up MD;  Location: [facility] OR     HC DILATION/CURETTAGE DIAG/THER NON OB      after sab     HC LEFT HEART CATHETERIZATION  11/23/16    PCI with drug-eluting stent placement in the proximal LAD     HC TOOTH EXTRACTION W/FORCEP         FAMILY HISTORY:  Family History   Problem Relation Age of Onset     Gynecology Daughter      HEART DISEASE Father      D AGE 50     Coronary Artery Disease Father      Hyperlipidemia Father      HEART DISEASE Mother      D AGE 70     Diabetes Mother      Coronary Artery Disease Mother      Hypertension Mother      Hyperlipidemia Mother      HEART DISEASE Brother      B AGE 52 HEART SURGERY     Family History Negative Brother      B AGE 47     Hypertension Brother      Cerebrovascular Disease Sister      B AGE 54 BLOOD CLOTS     Family History Negative Sister      B AGE 60     Depression Sister        SOCIAL HISTORY:  Social History     Social History     Marital status:      Spouse name: N/A     Number of children: N/A     Years of education: N/A     Social History Main Topics     Smoking status: Former Smoker     Packs/day: 0.75     Years: 20.00     Quit date: 11/5/1987     Smokeless tobacco: Never Used     Alcohol use Yes      Comment: 2 per month     Drug use: No     Sexual activity: No     Other Topics Concern     Parent/Sibling W/ Cabg, Mi Or Angioplasty Before 65f 55m? Yes     Father, brother     Caffeine Concern No     decaf coffee      Special Diet No     Exercise No     cardio rehab     Social History Narrative       Review of Systems:  Skin:  Positive for   dry    Eyes:  Positive for glasses    ENT:  Negative      Respiratory:  Negative        Cardiovascular:    Positive for;palpitations palpitations with stress   Gastroenterology: Positive for reflux controlled with meds   Genitourinary:  Negative      Musculoskeletal:  Negative      Neurologic:  Positive for numbness or tingling of feet toe on R foot has been numb  Psychiatric:  Positive for excessive stress;anxiety;depression    Heme/Lymph/Imm:  Negative      Endocrine:  Positive for diabetes      Physical Exam:  Vitals: /80 (BP Location: Right arm, Patient Position: Sitting, Cuff Size: Adult Large)  Pulse 52  Ht 1.524 m (5')  Wt 74.8 kg (165 lb)  BMI 32.22 kg/m2    Constitutional:  cooperative, alert and oriented, well developed, well nourished, in no acute distress overweight      Skin:  warm and dry to the touch, no apparent skin lesions or masses noted          Head:  normocephalic, no masses or lesions        Eyes:  pupils equal and round;conjunctivae and lids unremarkable;sclera white;no xanthalasma;EOMS intact        Lymph:      ENT:  no pallor or cyanosis, dentition good        Neck:  carotid pulses are full and equal bilaterally, JVP normal, no carotid bruit        Respiratory:  normal breath sounds, clear to auscultation, normal A-P diameter, normal symmetry, normal respiratory excursion, no use of accessory muscles         Cardiac: regular rhythm, normal S1/S2, no S3 or S4, apical impulse not displaced, no murmurs, gallops or rubs                pulses full and equal, no bruits auscultated                                        GI:  abdomen soft, non-tender, BS normoactive, no mass, no HSM, no bruits        Extremities and Muscular Skeletal:  no deformities, clubbing, cyanosis, erythema observed;no edema              Neurological:  no gross motor deficits;affect appropriate        Psych:  Alert and Oriented x 3        CC  Klever Vega MD  [address] [place] [address]

## 2018-10-22 NOTE — MR AVS SNAPSHOT
After Visit Summary   10/22/2018    Shantelle Su    MRN: [id number]           Patient Information     Date Of Birth          1945        Visit Information        Provider Department      10/22/2018 2:45 PM Gee Lozada MD [facility]        Today's Diagnoses     Coronary artery disease of native artery of native heart with stable angina pectoris (H)    -  1    History of placement of stent in LAD coronary artery        Essential hypertension, benign        Hyperlipidemia LDL goal <70           Follow-ups after your visit        Additional Services     Follow-Up with Cardiologist                 Future tests that were ordered for you today     Open Future Orders        Priority Expected Expires Ordered    Follow-Up with Cardiologist Routine 10/22/2019 10/23/2019 10/22/2018            Who to contact     If you have questions or need follow up information about today's clinic visit or your schedule please contact [facility] directly at [phone].  Normal or non-critical lab and imaging results will be communicated to you by [employer], letter or phone within 4 business days after the clinic has received the results. If you do not hear from us within 7 days, please contact the clinic through [employer] or phone. If you have a critical or abnormal lab result, we will notify you by phone as soon as possible.  Submit refill requests through [employer] or call your pharmacy and they will forward the refill request to us. Please allow 3 business days for your refill to be completed.          Additional Information About Your Visit        MyChart Information     [employer] gives you secure access to your electronic health record. If you see a primary care provider, you can also send messages to your care team and make appointments. If you have questions, please call your primary care clinic.  If you do not have a primary  care provider, please call [phone] and they will assist you.        Care EveryWhere ID     This is your Care EveryWhere ID. This could be used by other organizations to access your [place] medical records  [id number]        Your Vitals Were     Pulse Height BMI (Body Mass Index)             52 1.524 m (5') 32.22 kg/m2          Blood Pressure from Last 3 Encounters:   10/22/18 128/80   06/21/18 110/62   06/04/18 130/64    Weight from Last 3 Encounters:   10/22/18 74.8 kg (165 lb)   06/21/18 76 kg (167 lb 9.6 oz)   06/04/18 75.1 kg (165 lb 9.6 oz)                 Today's Medication Changes          These changes are accurate as of 10/22/18  3:25 PM.  If you have any questions, ask your nurse or doctor.               These medicines have changed or have updated prescriptions.        Dose/Directions    ACCU-CHEK KRISTINE Kit   This may have changed:    - when to take this  - additional instructions   Used for:  Hyperglycemia        2 times daily. With routine lancets as well as alcohol swabs, dispense 1 month   Quantity:  1 kit   Refills:  0       blood glucose monitoring lancets   This may have changed:    - when to take this  - additional instructions   Used for:  Hyperglycemia        3 times daily. BG testing 3 times a day   Quantity:  100 each   Refills:  11       blood glucose monitoring test strip   Commonly known as:  no brand specified   This may have changed:    - when to take this  - additional instructions   Used for:  Hyperglycemia        Accucheck Kristine Plus  3 times daily   Quantity:  300 strip   Refills:  11                Primary Care Provider Office Phone # Fax #    Klever Vega [phone]       [address] [place] [address]        Equal Access to Services     PHONG VASQUEZ : Prisca Hernandes, stanley hernández, mireya browning. So [facility] [phone].    ATENCIÓN: Si habla español, tiene a herbert disposición  servicios gratuitos de asistencia lingüística. Martinez duff [phone].    We comply with applicable federal civil rights laws and [place] laws. We do not discriminate on the basis of race, color, national origin, age, disability, sex, sexual orientation, or gender identity.            Thank you!     Thank you for choosing [facility]  for your care. Our goal is always to provide you with excellent care. Hearing back from our patients is one way we can continue to improve our services. Please take a few minutes to complete the written survey that you may receive in the mail after your visit with us. Thank you!             Your Updated Medication List - Protect others around you: Learn how to safely use, store and throw away your medicines at www.disposemymeds.org.          This list is accurate as of 10/22/18  3:25 PM.  Always use your most recent med list.                   Brand Name Dispense Instructions for use Diagnosis    ACCU-CHEK VI Kit     1 kit    2 times daily. With routine lancets as well as alcohol swabs, dispense 1 month    Hyperglycemia       amLODIPine 5 MG tablet    NORVASC    90 tablet    Take 1 tablet (5 mg) by mouth daily    Essential hypertension, benign       aspirin 81 MG EC tablet     90 tablet    Take 1 tablet (81 mg) by mouth daily    Angina pectoris, unspecified (H), Status post coronary angioplasty       atorvastatin 40 MG tablet    LIPITOR    90 tablet    Take 1 tablet (40 mg) by mouth daily    Hyperlipidemia LDL goal <130       blood glucose calibration solution     3 Bottle    Use as directed    Metabolic syndrome       blood glucose monitoring lancets     100 each    3 times daily. BG testing 3 times a day    Hyperglycemia       blood glucose monitoring test strip    no brand specified    300 strip    Accucheck Vi Plus  3 times daily    Hyperglycemia       docusate sodium 100 MG capsule    COLACE     Take 100 mg by mouth 2 times daily         LORazepam 0.5 MG tablet    ATIVAN    5 tablet    Take 1 tablet (0.5 mg) by mouth every 8 hours as needed for anxiety    Grief reaction       metFORMIN 500 MG tablet    GLUCOPHAGE    90 tablet    Take 1 tablet (500 mg) by mouth daily (with dinner)    Metabolic syndrome       metoprolol succinate 50 MG 24 hr tablet    TOPROL XL    90 tablet    Take 1 tablet (50 mg) by mouth daily    Paroxysmal supraventricular tachycardia (H), Abnormal electrocardiogram       nitroGLYcerin 0.4 MG sublingual tablet    NITROSTAT    25 tablet    DISSOLVE 1 TAB UNDER TONGUE AS NEEDED FOR CHEST PAIN. MAXIMUM IS 3 DOSES EVERY 5 MIN OVER 15 MINUTES    Status post coronary angioplasty       omeprazole 20 MG CR capsule    priLOSEC    90 capsule    TAKE ONE CAPSULE BY MOUTH EVERY DAY    Gastroesophageal reflux disease without esophagitis       TYLENOL PO

## 2018-10-22 NOTE — LETTER
10/22/2018    Klever Vega MD  [address] [facility] [address]    RE: Shantelle Su       Dear Colleague,    I had the pleasure of seeing Shantelle Su in the [facility].    HPI and Plan:   See dictation:[id number]    Orders Placed This Encounter   Procedures     Follow-Up with Cardiologist       Orders Placed This Encounter   Medications     Acetaminophen (TYLENOL PO)       Medications Discontinued During This Encounter   Medication Reason     acetaminophen (TYLENOL) 120 MG Suppository Medication Reconciliation Clean Up     ciprofloxacin (CIPRO) 500 MG tablet Therapy completed     terbinafine (LAMISIL) 250 MG tablet Therapy completed         Encounter Diagnoses   Name Primary?     Coronary artery disease of native artery of native heart with stable angina pectoris (H) Yes     History of placement of stent in LAD coronary artery      Essential hypertension, benign      Hyperlipidemia LDL goal <70        CURRENT MEDICATIONS:  Current Outpatient Prescriptions   Medication Sig Dispense Refill     ACCU-CHEK MULTICLIX LANCETS MISC 3 times daily. BG testing 3 times a day (Patient taking differently: daily 3 times daily. BG testing 3 times a day) 100 each 11     Acetaminophen (TYLENOL PO)        amLODIPine (NORVASC) 5 MG tablet Take 1 tablet (5 mg) by mouth daily 90 tablet 3     aspirin EC 81 MG EC tablet Take 1 tablet (81 mg) by mouth daily 90 tablet 3     atorvastatin (LIPITOR) 40 MG tablet Take 1 tablet (40 mg) by mouth daily 90 tablet 3     Blood Glucose Calibration (ACCU-CHEK VI) SOLN Use as directed 3 Bottle 11     Blood Glucose Monitoring Suppl (ACCU-CHEK VI) KIT 2 times daily. With routine lancets as well as alcohol swabs, dispense 1 month (Patient taking differently: daily With routine lancets as well as alcohol swabs, dispense 1 month) 1 kit 0     docusate sodium (COLACE) 100 MG capsule Take 100 mg by mouth 2 times daily       glucose blood VI test strips strip  Accucheck Kristine Plus  3 times daily (Patient taking differently: daily Accucheck Kristine Plus  3 times daily) 300 strip 11     metFORMIN (GLUCOPHAGE) 500 MG tablet Take 1 tablet (500 mg) by mouth daily (with dinner) 90 tablet 3     metoprolol (TOPROL XL) 50 MG 24 hr tablet Take 1 tablet (50 mg) by mouth daily 90 tablet 3     nitroGLYcerin (NITROSTAT) 0.4 MG sublingual tablet DISSOLVE 1 TAB UNDER TONGUE AS NEEDED FOR CHEST PAIN. MAXIMUM IS 3 DOSES EVERY 5 MIN OVER 15 MINUTES 25 tablet 3     omeprazole (PRILOSEC) 20 MG CR capsule TAKE ONE CAPSULE BY MOUTH EVERY DAY 90 capsule 3     LORazepam (ATIVAN) 0.5 MG tablet Take 1 tablet (0.5 mg) by mouth every 8 hours as needed for anxiety (Patient not taking: Reported on 10/22/2018) 5 tablet 0       ALLERGIES     Allergies   Allergen Reactions     Bactrim [Sulfamethoxazole W/Trimethoprim] Nausea and Vomiting     Lisinopril      bp increased       PAST MEDICAL HISTORY:  Past Medical History:   Diagnosis Date     Acute upper respiratory infection 9/30/2002     Angina pectoris, unspecified (H) 11/23/2016     ASCVD (arteriosclerotic cardiovascular disease) 12/20/2016     CAD (coronary artery disease) 11/23/2016    sp proximal LAD stenting     Diabetes mellitus (H)      Enterocele 9/12/2011     Essential hypertension, benign      Fatty liver 6/4/2015    Based on CT scan done 5/23/15      GERD (gastroesophageal reflux disease) 10/13/2008     Heart palpitations      Hiatal hernia      Hyperlipidemia LDL goal <70 12/20/2016     Metabolic syndrome 12/1/2011     Other pulmonary embolism and infarction 9/30/2002    after starting hormone therapy     PAC (premature atrial contraction)      Paroxysmal supraventricular tachycardia (H) 12/20/2016     Rectocele 10/15/2007       PAST SURGICAL HISTORY:  Past Surgical History:   Procedure Laterality Date     ABDOMEN SURGERY      Bladder sling     C NONSPECIFIC PROCEDURE  2009    [place] mesh augmented anterior colporrhaphy and vault suspension,  tension-free vaginal tape sling with a transobturator approach usingthe Obtryx device and cystoscopy.       COLONOSCOPY      Scheduled October 2016     COLPORRHAPY POSTERIOR, CYSTOSCOPY, COMBINED  12/7/2011    Procedure:COMBINED COLPORRHAPY POSTERIOR, CYSTOSCOPY; POSTERIOR MESH AUGMENTED REPAIR WITH ELEVATE MESH , cystoscopy; Surgeon:ANDRE UP; Location:[facility] OR     DILATION AND CURETTAGE, HYSTEROSCOPY DIAGNOSTIC, COMBINED N/A 10/4/2016    Procedure: COMBINED DILATION AND CURETTAGE, HYSTEROSCOPY DIAGNOSTIC;  Surgeon: Andre Up MD;  Location: [facility] OR     HC DILATION/CURETTAGE DIAG/THER NON OB      after sab     HC LEFT HEART CATHETERIZATION  11/23/16    PCI with drug-eluting stent placement in the proximal LAD     HC TOOTH EXTRACTION W/FORCEP         FAMILY HISTORY:  Family History   Problem Relation Age of Onset     Gynecology Daughter      HEART DISEASE Father      D AGE 50     Coronary Artery Disease Father      Hyperlipidemia Father      HEART DISEASE Mother      D AGE 70     Diabetes Mother      Coronary Artery Disease Mother      Hypertension Mother      Hyperlipidemia Mother      HEART DISEASE Brother      B AGE 52 HEART SURGERY     Family History Negative Brother      B AGE 47     Hypertension Brother      Cerebrovascular Disease Sister      B AGE 54 BLOOD CLOTS     Family History Negative Sister      B AGE 60     Depression Sister        SOCIAL HISTORY:  Social History     Social History     Marital status:      Spouse name: N/A     Number of children: N/A     Years of education: N/A     Social History Main Topics     Smoking status: Former Smoker     Packs/day: 0.75     Years: 20.00     Quit date: 11/5/1987     Smokeless tobacco: Never Used     Alcohol use Yes      Comment: 2 per month     Drug use: No     Sexual activity: No     Other Topics Concern     Parent/Sibling W/ Cabg, Mi Or Angioplasty Before 65f 55m? Yes     Father, brother     Caffeine Concern No     decaf coffee      Special Diet  No     Exercise No     cardio rehab     Social History Narrative       Review of Systems:  Skin:  Positive for   dry    Eyes:  Positive for glasses    ENT:  Negative      Respiratory:  Negative       Cardiovascular:    Positive for;palpitations palpitations with stress   Gastroenterology: Positive for reflux controlled with meds   Genitourinary:  Negative      Musculoskeletal:  Negative      Neurologic:  Positive for numbness or tingling of feet toe on R foot has been numb  Psychiatric:  Positive for excessive stress;anxiety;depression    Heme/Lymph/Imm:  Negative      Endocrine:  Positive for diabetes      Physical Exam:  Vitals: /80 (BP Location: Right arm, Patient Position: Sitting, Cuff Size: Adult Large)  Pulse 52  Ht 1.524 m (5')  Wt 74.8 kg (165 lb)  BMI 32.22 kg/m2    Constitutional:  cooperative, alert and oriented, well developed, well nourished, in no acute distress overweight      Skin:  warm and dry to the touch, no apparent skin lesions or masses noted          Head:  normocephalic, no masses or lesions        Eyes:  pupils equal and round;conjunctivae and lids unremarkable;sclera white;no xanthalasma;EOMS intact        Lymph:      ENT:  no pallor or cyanosis, dentition good        Neck:  carotid pulses are full and equal bilaterally, JVP normal, no carotid bruit        Respiratory:  normal breath sounds, clear to auscultation, normal A-P diameter, normal symmetry, normal respiratory excursion, no use of accessory muscles         Cardiac: regular rhythm, normal S1/S2, no S3 or S4, apical impulse not displaced, no murmurs, gallops or rubs                pulses full and equal, no bruits auscultated                                        GI:  abdomen soft, non-tender, BS normoactive, no mass, no HSM, no bruits        Extremities and Muscular Skeletal:  no deformities, clubbing, cyanosis, erythema observed;no edema              Neurological:  no gross motor deficits;affect appropriate         Psych:  Alert and Oriented x 3        CC  Klever Vega MD  [address] [place] [phone]                Thank you for allowing me to participate in the care of your patient.      Sincerely,     Gee Lozada MD     [facility]    cc:   Klever Vega MD  [address] [place] [phone]

## 2018-11-09 ENCOUNTER — ALLIED HEALTH/NURSE VISIT (OUTPATIENT)
Dept: NURSING | Facility: CLINIC | Age: 73
End: 2018-11-09
Payer: COMMERCIAL

## 2018-11-09 DIAGNOSIS — Z23 NEED FOR PROPHYLACTIC VACCINATION AND INOCULATION AGAINST INFLUENZA: Primary | ICD-10-CM

## 2018-11-09 PROCEDURE — 90662 IIV NO PRSV INCREASED AG IM: CPT

## 2018-11-09 PROCEDURE — G0008 ADMIN INFLUENZA VIRUS VAC: HCPCS

## 2018-11-09 NOTE — MR AVS SNAPSHOT
After Visit Summary   11/9/2018    Shantelle Su    MRN: [id number]           Patient Information     Date Of Birth          1945        Visit Information        Provider Department      11/9/2018 11:00 AM LAURA PATHAK/LPN [facility]        Today's Diagnoses     Need for prophylactic vaccination and inoculation against influenza    -  1       Follow-ups after your visit        Who to contact     If you have questions or need follow up information about today's clinic visit or your schedule please contact [facility] directly at [phone].  Normal or non-critical lab and imaging results will be communicated to you by [email or web address], letter or phone within 4 business days after the clinic has received the results. If you do not hear from us within 7 days, please contact the clinic through [employer] or phone. If you have a critical or abnormal lab result, we will notify you by phone as soon as possible.  Submit refill requests through [employer] or call your pharmacy and they will forward the refill request to us. Please allow 3 business days for your refill to be completed.          Additional Information About Your Visit        MyChart Information     [employer] gives you secure access to your electronic health record. If you see a primary care provider, you can also send messages to your care team and make appointments. If you have questions, please call your primary care clinic.  If you do not have a primary care provider, please call [phone] and they will assist you.        Care EveryWhere ID     This is your Care EveryWhere ID. This could be used by other organizations to access your [facility] medical records  [id number]         Blood Pressure from Last 3 Encounters:   10/22/18 128/80   06/21/18 110/62   06/04/18 130/64    Weight from Last 3 Encounters:   10/22/18 165 lb (74.8 kg)   06/21/18 167 lb 9.6 oz (76 kg)   06/04/18 165 lb 9.6 oz (75.1 kg)              We Performed  the Following     FLU VACCINE, INCREASED ANTIGEN, PRESV FREE, AGE 65+ [95720]     Vaccine Administration, Initial [89771]          Today's Medication Changes          These changes are accurate as of 11/9/18 11:07 AM.  If you have any questions, ask your nurse or doctor.               These medicines have changed or have updated prescriptions.        Dose/Directions    ACCU-CHEK KRISTINE Kit   This may have changed:    - when to take this  - additional instructions   Used for:  Hyperglycemia        2 times daily. With routine lancets as well as alcohol swabs, dispense 1 month   Quantity:  1 kit   Refills:  0       blood glucose monitoring lancets   This may have changed:    - when to take this  - additional instructions   Used for:  Hyperglycemia        3 times daily. BG testing 3 times a day   Quantity:  100 each   Refills:  11       blood glucose monitoring test strip   Commonly known as:  no brand specified   This may have changed:    - when to take this  - additional instructions   Used for:  Hyperglycemia        Accucheck Kristine Plus  3 times daily   Quantity:  300 strip   Refills:  11                Primary Care Provider Office Phone # Fax #    Klever Vega [phone]       [address] [place] [phone]        Equal Access to Services     [place] ValleyCYNTHIA : Hadii lilia ku hadasho Soomaali, waaxda luqadaha, qaybta kaalmada adeegyada, mireya ha . So [facility] [phone].    ATENCIÓN: Si habla español, tiene a herbert disposición servicios gratuitos de asistencia lingüística. [facility] [phone].    We comply with applicable federal civil rights laws and [place] laws. We do not discriminate on the basis of race, color, national origin, age, disability, sex, sexual orientation, or gender identity.            Thank you!     Thank you for choosing [facility]  for your care. Our goal is always to provide you with excellent care. Hearing back from our  patients is one way we can continue to improve our services. Please take a few minutes to complete the written survey that you may receive in the mail after your visit with us. Thank you!             Your Updated Medication List - Protect others around you: Learn how to safely use, store and throw away your medicines at www.disposemymeds.org.          This list is accurate as of 11/9/18 11:07 AM.  Always use your most recent med list.                   Brand Name Dispense Instructions for use Diagnosis    ACCU-CHEK VI Kit     1 kit    2 times daily. With routine lancets as well as alcohol swabs, dispense 1 month    Hyperglycemia       amLODIPine 5 MG tablet    NORVASC    90 tablet    Take 1 tablet (5 mg) by mouth daily    Essential hypertension, benign       aspirin 81 MG EC tablet     90 tablet    Take 1 tablet (81 mg) by mouth daily    Angina pectoris, unspecified (H), Status post coronary angioplasty       atorvastatin 40 MG tablet    LIPITOR    90 tablet    Take 1 tablet (40 mg) by mouth daily    Hyperlipidemia LDL goal <130       blood glucose calibration solution     3 Bottle    Use as directed    Metabolic syndrome       blood glucose monitoring lancets     100 each    3 times daily. BG testing 3 times a day    Hyperglycemia       blood glucose monitoring test strip    no brand specified    300 strip    Accucheck Vi Plus  3 times daily    Hyperglycemia       docusate sodium 100 MG capsule    COLACE     Take 100 mg by mouth 2 times daily        LORazepam 0.5 MG tablet    ATIVAN    5 tablet    Take 1 tablet (0.5 mg) by mouth every 8 hours as needed for anxiety    Grief reaction       metFORMIN 500 MG tablet    GLUCOPHAGE    90 tablet    Take 1 tablet (500 mg) by mouth daily (with dinner)    Metabolic syndrome       metoprolol succinate 50 MG 24 hr tablet    TOPROL XL    90 tablet    Take 1 tablet (50 mg) by mouth daily    Paroxysmal supraventricular tachycardia (H), Abnormal electrocardiogram        nitroGLYcerin 0.4 MG sublingual tablet    NITROSTAT    25 tablet    DISSOLVE 1 TAB UNDER TONGUE AS NEEDED FOR CHEST PAIN. MAXIMUM IS 3 DOSES EVERY 5 MIN OVER 15 MINUTES    Status post coronary angioplasty       omeprazole 20 MG CR capsule    priLOSEC    90 capsule    TAKE ONE CAPSULE BY MOUTH EVERY DAY    Gastroesophageal reflux disease without esophagitis       TYLENOL PO

## 2018-11-09 NOTE — PROGRESS NOTES

## 2018-11-21 DIAGNOSIS — E78.5 HYPERLIPIDEMIA LDL GOAL <130: ICD-10-CM

## 2018-11-21 DIAGNOSIS — R94.31 ABNORMAL ELECTROCARDIOGRAM: ICD-10-CM

## 2018-11-21 DIAGNOSIS — I47.10 PAROXYSMAL SUPRAVENTRICULAR TACHYCARDIA (H): ICD-10-CM

## 2018-11-21 DIAGNOSIS — E88.810 METABOLIC SYNDROME: ICD-10-CM

## 2018-11-21 RX ORDER — ATORVASTATIN CALCIUM 40 MG/1
TABLET, FILM COATED ORAL
Qty: 90 TABLET | Refills: 1 | Status: SHIPPED | OUTPATIENT
Start: 2018-11-21 | End: 2019-05-02

## 2018-11-21 RX ORDER — METOPROLOL SUCCINATE 50 MG/1
50 TABLET, EXTENDED RELEASE ORAL DAILY
Qty: 90 TABLET | Refills: 3 | Status: SHIPPED | OUTPATIENT
Start: 2018-11-21 | End: 2019-10-30

## 2018-11-21 NOTE — TELEPHONE ENCOUNTER
REFILL REQUEST    [facility]    Medication requested: Metoprolol Succinate 50 mg/d     Reviewed: Dr. Lozada's OV notes from 10-22-18       Refill approved: yes  Quantity given: 90  # Refills: 3    Prescription sent.  TGamilo RN

## 2018-11-21 NOTE — TELEPHONE ENCOUNTER
"Requested Prescriptions   Pending Prescriptions Disp Refills     metFORMIN (GLUCOPHAGE) 500 MG tablet [Pharmacy Med Name: METFORMIN  MG TABLET] 90 tablet 1     Sig: TAKE 1 TABLET BY MOUTH ONCE DAILY WITH DINNER    Biguanide Agents Failed    11/21/2018  2:19 AM       Failed - Patient has had a Microalbumin in the past 15 mos.    Recent Labs   Lab Test  06/12/14   0947   MICROL  <5  Urine Microalbumin lowest reportable value has been changed from 2 mg/L to 5   mg/L due to a methodology change on April 16,2012.     UMALCR  Unable to calculate            Failed - Patient has documented A1c within the specified period of time.    If HgbA1C is 8 or greater, it needs to be on file within the past 3 months.  If less than 8, must be on file within the past 6 months.     Recent Labs   Lab Test  05/21/18   0932   A1C  6.2*            Passed - Blood pressure less than 140/90 in past 6 months    BP Readings from Last 3 Encounters:   10/22/18 128/80   06/21/18 110/62   06/04/18 130/64                Passed - Patient has documented LDL within the past 12 mos.    Recent Labs   Lab Test  05/21/18   0932   LDL  68            Passed - Patient is age 10 or older       Passed - Patient's CR is NOT>1.4 OR Patient's EGFR is NOT<45 within past 12 mos.    Recent Labs   Lab Test  05/29/18   0220   GFRESTIMATED  76   GFRESTBLACK  >90       Recent Labs   Lab Test  05/29/18   0220   CR  0.75            Passed - Patient does NOT have a diagnosis of CHF.       Passed - Patient is not pregnant       Passed - Patient has not had a positive pregnancy test within the past 12 mos.        Passed - Recent (6 mo) or future (30 days) visit within the authorizing provider's specialty    Patient had office visit in the last 6 months or has a visit in the next 30 days with authorizing provider or within the authorizing provider's specialty.  See \"Patient Info\" tab in inbasket, or \"Choose Columns\" in Meds & Orders section of the refill encounter.     "        Medication is being filled for 1 time refill only due to:  needs ov

## 2018-11-21 NOTE — LETTER
[facility]  [address] [id number] [phone]            Shantelle Su  [address] [facility] [address]        November 21, 2018    Dear Shantelle,    While refilling your prescription today, we noticed that you are due for an appointment with your provider.  We will refill your prescription for 30 days, but a follow-up appointment must be made before any additional refills can be approved.     Taking care of your health is important to us and we look forward to seeing you in the near future.  Please call us at [phone] or [phone] (or use [employer]) to schedule an appointment.     Please disregard this notice if you have already made an appointment.    Sincerely,        [facility]

## 2018-11-21 NOTE — TELEPHONE ENCOUNTER
"Requested Prescriptions   Pending Prescriptions Disp Refills     atorvastatin (LIPITOR) 40 MG tablet [Pharmacy Med Name: ATORVASTATIN 40 MG TABLET] 90 tablet 1     Sig: TAKE 1 TABLET BY MOUTH ONCE DAILY    Statins Protocol Passed    11/21/2018 10:06 AM       Passed - LDL on file in past 12 months    Recent Labs   Lab Test  05/21/18   0932   LDL  68            Passed - No abnormal creatine kinase in past 12 months    No lab results found.            Passed - Recent (12 mo) or future (30 days) visit within the authorizing provider's specialty    Patient had office visit in the last 12 months or has a visit in the next 30 days with authorizing provider or within the authorizing provider's specialty.  See \"Patient Info\" tab in inbasket, or \"Choose Columns\" in Meds & Orders section of the refill encounter.             Passed - Patient is age 18 or older       Passed - No active pregnancy on record       Passed - No positive pregnancy test in past 12 months          "

## 2018-11-24 DIAGNOSIS — E88.810 METABOLIC SYNDROME: ICD-10-CM

## 2018-11-24 NOTE — TELEPHONE ENCOUNTER
Requested Prescriptions   Pending Prescriptions Disp Refills     metFORMIN (GLUCOPHAGE) 500 MG tablet [Pharmacy Med Name: METFORMIN  MG TABLET] 30 tablet 0    Last Written Prescription Date:  11/21/2018  Last Fill Quantity: 30,  # refills: 0   Last office visit: 6/21/2018 with prescribing provider:  6/21/2018   Future Office Visit:     Sig: TAKE 1 TABLET BY MOUTH ONCE DAILY WITH DINNER    Biguanide Agents Failed    11/24/2018 10:43 AM       Failed - Patient has had a Microalbumin in the past 15 mos.    Recent Labs   Lab Test  06/12/14   0947   MICROL  <5  Urine Microalbumin lowest reportable value has been changed from 2 mg/L to 5   mg/L due to a methodology change on April 16,2012.     UMALCR  Unable to calculate            Failed - Patient has documented A1c within the specified period of time.    If HgbA1C is 8 or greater, it needs to be on file within the past 3 months.  If less than 8, must be on file within the past 6 months.     Recent Labs   Lab Test  05/21/18   0932   A1C  6.2*            Passed - Blood pressure less than 140/90 in past 6 months    BP Readings from Last 3 Encounters:   10/22/18 128/80   06/21/18 110/62   06/04/18 130/64                Passed - Patient has documented LDL within the past 12 mos.    Recent Labs   Lab Test  05/21/18   0932   LDL  68            Passed - Patient is age 10 or older       Passed - Patient's CR is NOT>1.4 OR Patient's EGFR is NOT<45 within past 12 mos.    Recent Labs   Lab Test  05/29/18   0220   GFRESTIMATED  76   GFRESTBLACK  >90       Recent Labs   Lab Test  05/29/18   0220   CR  0.75            Passed - Patient does NOT have a diagnosis of CHF.       Passed - Patient is not pregnant       Passed - Patient has not had a positive pregnancy test within the past 12 mos.        Passed - Recent (6 mo) or future (30 days) visit within the authorizing provider's specialty    Patient had office visit in the last 6 months or has a visit in the next 30 days with  "authorizing provider or within the authorizing provider's specialty.  See \"Patient Info\" tab in inbasket, or \"Choose Columns\" in Meds & Orders section of the refill encounter.              "

## 2018-12-05 ENCOUNTER — OFFICE VISIT (OUTPATIENT)
Dept: INTERNAL MEDICINE | Facility: CLINIC | Age: 73
End: 2018-12-05
Payer: COMMERCIAL

## 2018-12-05 VITALS
WEIGHT: 166 LBS | BODY MASS INDEX: 32.42 KG/M2 | DIASTOLIC BLOOD PRESSURE: 62 MMHG | HEART RATE: 70 BPM | OXYGEN SATURATION: 97 % | SYSTOLIC BLOOD PRESSURE: 104 MMHG | TEMPERATURE: 98 F | RESPIRATION RATE: 14 BRPM

## 2018-12-05 DIAGNOSIS — R73.9 HYPERGLYCEMIA: ICD-10-CM

## 2018-12-05 DIAGNOSIS — Z12.11 COLON CANCER SCREENING: ICD-10-CM

## 2018-12-05 DIAGNOSIS — E88.810 METABOLIC SYNDROME: Primary | ICD-10-CM

## 2018-12-05 DIAGNOSIS — I25.118 CORONARY ARTERY DISEASE OF NATIVE ARTERY OF NATIVE HEART WITH STABLE ANGINA PECTORIS (H): ICD-10-CM

## 2018-12-05 LAB
ANION GAP SERPL CALCULATED.3IONS-SCNC: 10 MMOL/L (ref 3–14)
BUN SERPL-MCNC: 15 MG/DL (ref 7–30)
CALCIUM SERPL-MCNC: 9.6 MG/DL (ref 8.5–10.1)
CHLORIDE SERPL-SCNC: 103 MMOL/L (ref 94–109)
CO2 SERPL-SCNC: 25 MMOL/L (ref 20–32)
CREAT SERPL-MCNC: 0.69 MG/DL (ref 0.52–1.04)
GFR SERPL CREATININE-BSD FRML MDRD: 83 ML/MIN/1.7M2
GLUCOSE SERPL-MCNC: 106 MG/DL (ref 70–99)
HBA1C MFR BLD: 6.6 % (ref 0–5.6)
POTASSIUM SERPL-SCNC: 4.3 MMOL/L (ref 3.4–5.3)
SODIUM SERPL-SCNC: 138 MMOL/L (ref 133–144)

## 2018-12-05 PROCEDURE — 80048 BASIC METABOLIC PNL TOTAL CA: CPT | Performed by: INTERNAL MEDICINE

## 2018-12-05 PROCEDURE — 36415 COLL VENOUS BLD VENIPUNCTURE: CPT | Performed by: INTERNAL MEDICINE

## 2018-12-05 PROCEDURE — 99214 OFFICE O/P EST MOD 30 MIN: CPT | Performed by: INTERNAL MEDICINE

## 2018-12-05 PROCEDURE — 83036 HEMOGLOBIN GLYCOSYLATED A1C: CPT | Performed by: INTERNAL MEDICINE

## 2018-12-05 ASSESSMENT — PATIENT HEALTH QUESTIONNAIRE - PHQ9: SUM OF ALL RESPONSES TO PHQ QUESTIONS 1-9: 0

## 2018-12-05 NOTE — PROGRESS NOTES
SUBJECTIVE:   Shantelle Su is a 73 year old female who presents to clinic today for the following health issues:    Diabetes Follow-up    Patient is checking blood sugars: once daily.  Results are as follows:         am - 115    Diabetic concerns: None     Symptoms of hypoglycemia (low blood sugar): none     Paresthesias (numbness or burning in feet) or sores: No     Date of last diabetic eye exam: DUE     BP Readings from Last 2 Encounters:   10/22/18 128/80   06/21/18 110/62     Hemoglobin A1C (%)   Date Value   05/21/2018 6.2 (H)   12/04/2017 6.4 (H)     LDL Cholesterol Calculated (mg/dL)   Date Value   05/21/2018 68   03/16/2017 65       Diabetes Management Resources    Amount of exercise or physical activity: None    Problems taking medications regularly: No    Medication side effects: none    Diet: regular (no restrictions)    Problem list and histories reviewed & adjusted, as indicated.  Additional history: as documented    Patient Active Problem List   Diagnosis     Essential hypertension, benign     Rectocele     Metabolic syndrome     Advanced directives, counseling/discussion     Gastroesophageal reflux disease without esophagitis     Fatty liver     Postmenopausal bleeding     Heart palpitations     Angina pectoris, unspecified (H)     ASCVD (arteriosclerotic cardiovascular disease)     Postsurgical percutaneous transluminal coronary angioplasty status     Paroxysmal supraventricular tachycardia (H)     Abnormal electrocardiogram     Hyperlipidemia LDL goal <70     Coronary artery disease of native artery of native heart with stable angina pectoris (H)     History of placement of stent in LAD coronary artery     Past Surgical History:   Procedure Laterality Date     ABDOMEN SURGERY      Bladder sling     C NONSPECIFIC PROCEDURE  2009    [place] mesh augmented anterior colporrhaphy and vault suspension, tension-free vaginal tape sling with a transobturator approach usingthe Obtryx device and  cystoscopy.       COLONOSCOPY      Scheduled October 2016     COLPORRHAPY POSTERIOR, CYSTOSCOPY, COMBINED  12/7/2011    Procedure:COMBINED COLPORRHAPY POSTERIOR, CYSTOSCOPY; POSTERIOR MESH AUGMENTED REPAIR WITH ELEVATE MESH , cystoscopy; Surgeon:ANDRE UP; Location:[facility] OR     DILATION AND CURETTAGE, HYSTEROSCOPY DIAGNOSTIC, COMBINED N/A 10/4/2016    Procedure: COMBINED DILATION AND CURETTAGE, HYSTEROSCOPY DIAGNOSTIC;  Surgeon: Andre Up MD;  Location: [facility] OR     HC DILATION/CURETTAGE DIAG/THER NON OB      after sab     HC LEFT HEART CATHETERIZATION  11/23/16    PCI with drug-eluting stent placement in the proximal LAD     HC TOOTH EXTRACTION W/FORCEP         Social History   Substance Use Topics     Smoking status: Former Smoker     Packs/day: 0.75     Years: 20.00     Quit date: 11/5/1987     Smokeless tobacco: Never Used     Alcohol use Yes      Comment: 2 per month     Family History   Problem Relation Age of Onset     Gynecology Daughter      Heart Disease Father      D AGE 50     Coronary Artery Disease Father      Hyperlipidemia Father      Heart Disease Mother      D AGE 70     Diabetes Mother      Coronary Artery Disease Mother      Hypertension Mother      Hyperlipidemia Mother      Heart Disease Brother      B AGE 52 HEART SURGERY     Family History Negative Brother      B AGE 47     Hypertension Brother      Cerebrovascular Disease Sister      B AGE 54 BLOOD CLOTS     Family History Negative Sister      B AGE 60     Depression Sister          Current Outpatient Prescriptions   Medication Sig Dispense Refill     ACCU-CHEK MULTICLIX LANCETS MISC 3 times daily. BG testing 3 times a day (Patient taking differently: daily 3 times daily. BG testing 3 times a day) 100 each 11     Acetaminophen (TYLENOL PO)        amLODIPine (NORVASC) 5 MG tablet Take 1 tablet (5 mg) by mouth daily 90 tablet 3     aspirin EC 81 MG EC tablet Take 1 tablet (81 mg) by mouth daily 90 tablet 3     atorvastatin (LIPITOR) 40 MG  tablet TAKE 1 TABLET BY MOUTH ONCE DAILY 90 tablet 1     Blood Glucose Calibration (ACCU-CHEK VI) SOLN Use as directed 3 Bottle 11     Blood Glucose Monitoring Suppl (ACCU-CHEK VI) KIT 2 times daily. With routine lancets as well as alcohol swabs, dispense 1 month (Patient taking differently: daily With routine lancets as well as alcohol swabs, dispense 1 month) 1 kit 0     docusate sodium (COLACE) 100 MG capsule Take 100 mg by mouth 2 times daily       glucose blood VI test strips strip Accucheck Vi Plus  3 times daily (Patient taking differently: daily Accucheck Vi Plus  3 times daily) 300 strip 11     LORazepam (ATIVAN) 0.5 MG tablet Take 1 tablet (0.5 mg) by mouth every 8 hours as needed for anxiety (Patient not taking: Reported on 10/22/2018) 5 tablet 0     metFORMIN (GLUCOPHAGE) 500 MG tablet TAKE 1 TABLET BY MOUTH ONCE DAILY WITH DINNER 30 tablet 0     metoprolol succinate (TOPROL XL) 50 MG 24 hr tablet Take 1 tablet (50 mg) by mouth daily 90 tablet 3     nitroGLYcerin (NITROSTAT) 0.4 MG sublingual tablet DISSOLVE 1 TAB UNDER TONGUE AS NEEDED FOR CHEST PAIN. MAXIMUM IS 3 DOSES EVERY 5 MIN OVER 15 MINUTES 25 tablet 3     omeprazole (PRILOSEC) 20 MG CR capsule TAKE ONE CAPSULE BY MOUTH EVERY DAY 90 capsule 3     Allergies   Allergen Reactions     Bactrim [Sulfamethoxazole W/Trimethoprim] Nausea and Vomiting     Lisinopril      bp increased     BP Readings from Last 3 Encounters:   10/22/18 128/80   06/21/18 110/62   06/04/18 130/64    Wt Readings from Last 3 Encounters:   10/22/18 165 lb (74.8 kg)   06/21/18 167 lb 9.6 oz (76 kg)   06/04/18 165 lb 9.6 oz (75.1 kg)         Labs reviewed in EPIC    Reviewed and updated as needed this visit by clinical staff       Reviewed and updated as needed this visit by Provider       ROS:  CONSTITUTIONAL: NEGATIVE for fever, chills, change in weight  ENT/MOUTH: NEGATIVE for ear, mouth and throat problems  RESP: NEGATIVE for significant cough or SOB  CV: NEGATIVE  for chest pain, palpitations or peripheral edema  GI: NEGATIVE for nausea, abdominal pain, heartburn, or change in bowel habits  : NEGATIVE for frequency, dysuria, or hematuria  MUSCULOSKELETAL: NEGATIVE for significant arthralgias or myalgia  NEURO: NEGATIVE for weakness, dizziness or paresthesias  ENDOCRINE: NEGATIVE for temperature intolerance, skin/hair changes  HEME: NEGATIVE for bleeding problems  PSYCHIATRIC: NEGATIVE for changes in mood or affect    OBJECTIVE:                                                    /62  Pulse 70  Temp 98  F (36.7  C) (Oral)  Resp 14  Wt 166 lb (75.3 kg)  SpO2 97%  BMI 32.42 kg/m2  Body mass index is 32.42 kg/(m^2).  GENERAL: healthy, alert and no distress  EYES: Eyes grossly normal to inspection, extraocular movements - intact, and PERRL  HENT: ear canals- normal; TMs- normal; Nose- normal; Mouth- no ulcers, no lesions  NECK: no tenderness, no adenopathy, no asymmetry, no masses, no stiffness; thyroid- normal to palpation  RESP: lungs clear to auscultation - no rales, no rhonchi, no wheezes  CV: regular rates and rhythm, normal S1 S2, no S3 or S4 and no murmur, no click or rub -  MS: extremities- no gross deformities noted, no edema  PSYCH: Alert and oriented times 3; speech- coherent , normal rate and volume; able to articulate logical thoughts, able to abstract reason, no tangential thoughts, no hallucinations or delusions, affect- normal.     ASSESSMENT/PLAN:                                                      (E88.81) Metabolic syndrome  (primary encounter diagnosis)  Comment: Tinea with metformin use as directed.  Plan: metFORMIN (GLUCOPHAGE) 500 MG tablet        Suggested need for dietary follow-up    (R73.9) Hyperglycemia  Comment: Labs ordered as fasting per routine pending metformin use  Plan: Basic metabolic panel, Hemoglobin A1c            (I25.118) Coronary artery disease of native artery of native heart with stable angina pectoris (H)  Comment: Stable  on current therapy continue with medical management.  Plan: Follow-up cardiology as directed.    (Z12.11) Colon cancer screening  Comment: Appears that may benefit from repeat colonoscopy as per prior done  Plan: GASTROENTEROLOGY ADULT REF PROCEDURE ONLY         Other; [facility] [phone]          See Patient Instructions advised of need for shingles vaccination.    Klever Vega MD  [facility][1]    THE MEDICATION LIST HAS BEEN FULLY RECONCILED BY THE M.D. AND THE NURSING STAFF.    25 minutes spent with this patient, face to face, discussing treatment options for listed problems above as well as side effects of appropriate medications.  Counseling time extended beyond 50% of the clinic visit.  Medication dosing, treatment plan and follow-up were discussed. Also reviewed need for primary care testing for patient.

## 2018-12-05 NOTE — LETTER
My Depression Action Plan  Name: Shantelle Su   Date of Birth 1945  Date: 12/5/2018    My doctor: Klever Vega   My clinic: [address] [address] [phone]          GREEN    ZONE   Good Control    What it looks like:     Things are going generally well. You have normal up s and down s. You may even feel depressed from time to time, but bad moods usually last less than a day.   What you need to do:  1. Continue to care for yourself (see self care plan)  2. Check your depression survival kit and update it as needed  3. Follow your physician s recommendations including any medication.  4. Do not stop taking medication unless you consult with your physician first.           YELLOW         ZONE Getting Worse    What it looks like:     Depression is starting to interfere with your life.     It may be hard to get out of bed; you may be starting to isolate yourself from others.    Symptoms of depression are starting to last most all day and this has happened for several days.     You may have suicidal thoughts but they are not constant.   What you need to do:     1. Call your care team, your response to treatment will improve if you keep your care team informed of your progress. Yellow periods are signs an adjustment may need to be made.     2. Continue your self-care, even if you have to fake it!    3. Talk to someone in your support network    4. Open up your depression survival kit           RED    ZONE Medical Alert - Get Help    What it looks like:     Depression is seriously interfering with your life.     You may experience these or other symptoms: You can t get out of bed most days, can t work or engage in other necessary activities, you have trouble taking care of basic hygiene, or basic responsibilities, thoughts of suicide or death that will not go away, self-injurious behavior.     What you need to do:  1. Call your care  team and request a same-day appointment. If they are not available (weekends or after hours) call your local crisis line, emergency room or 911.            Depression Self Care Plan / Survival Kit    Self-Care for Depression  Here s the deal. Your body and mind are really not as separate as most people think.  What you do and think affects how you feel and how you feel influences what you do and think. This means if you do things that people who feel good do, it will help you feel better.  Sometimes this is all it takes.  There is also a place for medication and therapy depending on how severe your depression is, so be sure to consult with your medical provider and/ or Behavioral Health Consultant if your symptoms are worsening or not improving.     In order to better manage my stress, I will:    Exercise  Get some form of exercise, every day. This will help reduce pain and release endorphins, the  feel good  chemicals in your brain. This is almost as good as taking antidepressants!  This is not the same as joining a gym and then never going! (they count on that by the way ) It can be as simple as just going for a walk or doing some gardening, anything that will get you moving.      Hygiene   Maintain good hygiene (Get out of bed in the morning, Make your bed, Brush your teeth, Take a shower, and Get dressed like you were going to work, even if you are unemployed).  If your clothes don't fit try to get ones that do.    Diet  I will strive to eat foods that are good for me, drink plenty of water, and avoid excessive sugar, caffeine, alcohol, and other mood-altering substances.  Some foods that are helpful in depression are: complex carbohydrates, B vitamins, flaxseed, fish or fish oil, fresh fruits and vegetables.    Psychotherapy  I agree to participate in Individual Therapy (if recommended).    Medication  If prescribed medications, I agree to take them.  Missing doses can result in serious side effects.  I  understand that drinking alcohol, or other illicit drug use, may cause potential side effects.  I will not stop my medication abruptly without first discussing it with my provider.    Staying Connected With Others  I will stay in touch with my friends, family members, and my primary care provider/team.    Use your imagination  Be creative.  We all have a creative side; it doesn t matter if it s oil painting, sand castles, or mud pies! This will also kick up the endorphins.    Witness Beauty  (AKA stop and smell the roses) Take a look outside, even in mid-winter. Notice colors, textures. Watch the squirrels and birds.     Service to others  Be of service to others.  There is always someone else in need.  By helping others we can  get out of ourselves  and remember the really important things.  This also provides opportunities for practicing all the other parts of the program.    Humor  Laugh and be silly!  Adjust your TV habits for less news and crime-drama and more comedy.    Control your stress  Try breathing deep, massage therapy, biofeedback, and meditation. Find time to relax each day.     My support system    Clinic Contact:  Phone number:    Contact 1:  Phone number:    Contact 2:  Phone number:    Quaker/:  Phone number:    Therapist:  Phone number:    Local crisis center:    Phone number:    Other community support:  Phone number:

## 2018-12-05 NOTE — MR AVS SNAPSHOT
After Visit Summary   12/5/2018    Shantelle Su    MRN: [id number]           Patient Information     Date Of Birth          1945        Visit Information        Provider Department      12/5/2018 9:40 AM Klever Vega MD [facility]        Today's Diagnoses     Metabolic syndrome    -  1    Hyperglycemia        Coronary artery disease of native artery of native heart with stable angina pectoris (H)        Colon cancer screening           Follow-ups after your visit        Additional Services     GASTROENTEROLOGY ADULT REF PROCEDURE ONLY Other; [facility] [phone]       Last Lab Result: Creatinine (mg/dL)       Date                     Value                 05/29/2018               0.75             ----------  Body mass index is 32.42 kg/(m^2).     Needed:  No  Language:  English    Patient will be contacted to schedule procedure.     Please be aware that coverage of these services is subject to the terms and limitations of your health insurance plan.  Call member services at your health plan with any benefit or coverage questions.  Any procedures must be performed at a [facility] facility OR coordinated by your clinic's referral office.    Please bring the following with you to your appointment:    (1) Any X-Rays, CTs or MRIs which have been performed.  Contact the facility where they were done to arrange for  prior to your scheduled appointment.    (2) List of current medications   (3) This referral request   (4) Any documents/labs given to you for this referral                  Follow-up notes from your care team     Return if symptoms worsen or fail to improve.      Who to contact     If you have questions or need follow up information about today's clinic visit or your schedule please contact [facility] directly at [phone].  Normal or non-critical lab and imaging results will be communicated to you by [employer], letter  or phone within 4 business days after the clinic has received the results. If you do not hear from us within 7 days, please contact the clinic through [employer] or phone. If you have a critical or abnormal lab result, we will notify you by phone as soon as possible.  Submit refill requests through [employer] or call your pharmacy and they will forward the refill request to us. Please allow 3 business days for your refill to be completed.          Additional Information About Your Visit        [employer] Information     [employer] gives you secure access to your electronic health record. If you see a primary care provider, you can also send messages to your care team and make appointments. If you have questions, please call your primary care clinic.  If you do not have a primary care provider, please call [phone] and they will assist you.        Care EveryWhere ID     This is your Care EveryWhere ID. This could be used by other organizations to access your [facility] medical records  [id number]        Your Vitals Were     Pulse Temperature Respirations Pulse Oximetry BMI (Body Mass Index)       70 98  F (36.7  C) (Oral) 14 97% 32.42 kg/m2        Blood Pressure from Last 3 Encounters:   12/05/18 104/62   10/22/18 128/80   06/21/18 110/62    Weight from Last 3 Encounters:   12/05/18 166 lb (75.3 kg)   10/22/18 165 lb (74.8 kg)   06/21/18 167 lb 9.6 oz (76 kg)              We Performed the Following     Basic metabolic panel     DEPRESSION ACTION PLAN (DAP)     GASTROENTEROLOGY ADULT REF PROCEDURE ONLY Other; [facility] [phone]     Hemoglobin A1c          Today's Medication Changes          These changes are accurate as of 12/5/18  9:57 AM.  If you have any questions, ask your nurse or doctor.               These medicines have changed or have updated prescriptions.        Dose/Directions    ACCU-CHEK VI Kit   This may have changed:    - when to take this  - additional instructions   Used for:  Hyperglycemia        2  times daily. With routine lancets as well as alcohol swabs, dispense 1 month   Quantity:  1 kit   Refills:  0       blood glucose monitoring lancets   This may have changed:    - when to take this  - additional instructions   Used for:  Hyperglycemia        3 times daily. BG testing 3 times a day   Quantity:  100 each   Refills:  11       blood glucose monitoring test strip   Commonly known as:  NO BRAND SPECIFIED   This may have changed:    - when to take this  - additional instructions   Used for:  Hyperglycemia        Accucheck Kristine Plus  3 times daily   Quantity:  300 strip   Refills:  11       metFORMIN 500 MG tablet   Commonly known as:  GLUCOPHAGE   This may have changed:  See the new instructions.   Used for:  Metabolic syndrome   Changed by:  Klever Vega MD        Dose:  500 mg   Take 1 tablet (500 mg) by mouth daily (with dinner)   Quantity:  90 tablet   Refills:  3            Where to get your medicines      These medications were sent to [facility]pharm[facility] #[id number] - [place], [place] - [address]  [address] [address] [place] [address]    Hours:  [facility] to [facility] Phone:  [phone]     metFORMIN 500 MG tablet                Primary Care Provider Office Phone # Fax #    Klever Vega [phone]       [address] [place] [id number]        Equal Access to Services     PHONG VASQUEZ AH: Hadii aad ku hadasho Soomaali, waaxda luqadaha, qaybta kaalmada adeegyada, waxay idiin haymaryn sangeetha ascencio. So [facility] [phone].    ATENCIÓN: Si habla español, tiene a herbert disposición servicios gratuitos de asistencia lingüística. Llame al [phone].    We comply with applicable federal civil rights laws and [place] laws. We do not discriminate on the basis of race, color, national origin, age, disability, sex, sexual orientation, or gender identity.            Thank you!     Thank you for choosing [facility]  for your care. Our goal is  always to provide you with excellent care. Hearing back from our patients is one way we can continue to improve our services. Please take a few minutes to complete the written survey that you may receive in the mail after your visit with us. Thank you!             Your Updated Medication List - Protect others around you: Learn how to safely use, store and throw away your medicines at www.disposemymeds.org.          This list is accurate as of 12/5/18  9:57 AM.  Always use your most recent med list.                   Brand Name Dispense Instructions for use Diagnosis    ACCU-CHEK VI Kit     1 kit    2 times daily. With routine lancets as well as alcohol swabs, dispense 1 month    Hyperglycemia       amLODIPine 5 MG tablet    NORVASC    90 tablet    Take 1 tablet (5 mg) by mouth daily    Essential hypertension, benign       aspirin 81 MG EC tablet    ASA    90 tablet    Take 1 tablet (81 mg) by mouth daily    Angina pectoris, unspecified (H), Status post coronary angioplasty       atorvastatin 40 MG tablet    LIPITOR    90 tablet    TAKE 1 TABLET BY MOUTH ONCE DAILY    Hyperlipidemia LDL goal <130       blood glucose calibration solution     3 Bottle    Use as directed    Metabolic syndrome       blood glucose monitoring lancets     100 each    3 times daily. BG testing 3 times a day    Hyperglycemia       blood glucose monitoring test strip    NO BRAND SPECIFIED    300 strip    Accucheck Vi Plus  3 times daily    Hyperglycemia       docusate sodium 100 MG capsule    COLACE     Take 100 mg by mouth 2 times daily        metFORMIN 500 MG tablet    GLUCOPHAGE    90 tablet    Take 1 tablet (500 mg) by mouth daily (with dinner)    Metabolic syndrome       metoprolol succinate ER 50 MG 24 hr tablet    TOPROL XL    90 tablet    Take 1 tablet (50 mg) by mouth daily    Paroxysmal supraventricular tachycardia (H), Abnormal electrocardiogram       nitroGLYcerin 0.4 MG sublingual tablet    NITROSTAT    25 tablet    DISSOLVE  1 TAB UNDER TONGUE AS NEEDED FOR CHEST PAIN. MAXIMUM IS 3 DOSES EVERY 5 MIN OVER 15 MINUTES    Status post coronary angioplasty       omeprazole 20 MG DR capsule    priLOSEC    90 capsule    TAKE ONE CAPSULE BY MOUTH EVERY DAY    Gastroesophageal reflux disease without esophagitis       TYLENOL PO

## 2018-12-05 NOTE — LETTER
[address] [address]  [phone]      12/5/2018       Shant[place] Su  [address] [facility] [facility] [facility] [address]        Dear Shantelle,    Your Hemoglobin A1C is slightly more abnormal and indicates your blood sugars could be better controlled.  Please follow-up in the clinic to discuss some changes that need to be done.      Sincerely,      Klever Vega MD  Internal Medicine

## 2018-12-07 ENCOUNTER — TELEPHONE (OUTPATIENT)
Dept: CARDIOLOGY | Facility: CLINIC | Age: 73
End: 2018-12-07

## 2018-12-07 NOTE — TELEPHONE ENCOUNTER
Pt called stating that she is getting a colonoscopy and the  asked if she has had a stent and what kind. Pt didn't know the type of stent she had. Informed patient that she had PCI with drug-eluting stent placement in the proximal LAD 11/23/16) then acute in-stent thrombosis, and proximal stent edge dissection and an overlapping stent placement same day. Pt verbalized understanding. No further questions or concerns at this time.

## 2019-01-23 ENCOUNTER — TRANSFERRED RECORDS (OUTPATIENT)
Dept: HEALTH INFORMATION MANAGEMENT | Facility: CLINIC | Age: 74
End: 2019-01-23

## 2019-04-29 ENCOUNTER — TELEPHONE (OUTPATIENT)
Dept: INTERNAL MEDICINE | Facility: CLINIC | Age: 74
End: 2019-04-29

## 2019-04-29 NOTE — TELEPHONE ENCOUNTER
Right lung discomfort, more intense in the morning, has been there for about 1 month, and not going away. Some wheezing when walking up the stairs. No SOB. Came back from [place] on March 10th. Wants to make sure she doesn't have a PE. Not as painful or intense as it was when she first came back--but just wants to get it checked out. Doesn't hurt to take deep breaths, but says she can feel something when she takes deep breath. No chest pain.   Also, needs to discuss pre-diabetic labs.   Appt scheduled with Dr. Vega on Thursday May2nd at 2:40pm.

## 2019-05-01 ENCOUNTER — TRANSFERRED RECORDS (OUTPATIENT)
Dept: HEALTH INFORMATION MANAGEMENT | Facility: CLINIC | Age: 74
End: 2019-05-01

## 2019-05-02 ENCOUNTER — OFFICE VISIT (OUTPATIENT)
Dept: INTERNAL MEDICINE | Facility: CLINIC | Age: 74
End: 2019-05-02
Payer: COMMERCIAL

## 2019-05-02 ENCOUNTER — ANCILLARY PROCEDURE (OUTPATIENT)
Dept: GENERAL RADIOLOGY | Facility: CLINIC | Age: 74
End: 2019-05-02
Attending: INTERNAL MEDICINE
Payer: COMMERCIAL

## 2019-05-02 VITALS
HEART RATE: 75 BPM | OXYGEN SATURATION: 95 % | SYSTOLIC BLOOD PRESSURE: 120 MMHG | HEIGHT: 60 IN | TEMPERATURE: 98.4 F | WEIGHT: 171.3 LBS | BODY MASS INDEX: 33.63 KG/M2 | DIASTOLIC BLOOD PRESSURE: 68 MMHG | RESPIRATION RATE: 15 BRPM

## 2019-05-02 DIAGNOSIS — E78.5 HYPERLIPIDEMIA LDL GOAL <130: ICD-10-CM

## 2019-05-02 DIAGNOSIS — I10 ESSENTIAL HYPERTENSION, BENIGN: ICD-10-CM

## 2019-05-02 DIAGNOSIS — R07.1 PAINFUL RESPIRATION: Primary | ICD-10-CM

## 2019-05-02 DIAGNOSIS — E11.9 TYPE 2 DIABETES MELLITUS WITHOUT COMPLICATION, WITHOUT LONG-TERM CURRENT USE OF INSULIN (H): ICD-10-CM

## 2019-05-02 DIAGNOSIS — Z13.89 SCREENING FOR DIABETIC PERIPHERAL NEUROPATHY: ICD-10-CM

## 2019-05-02 DIAGNOSIS — R07.1 PAINFUL RESPIRATION: ICD-10-CM

## 2019-05-02 DIAGNOSIS — K21.9 GASTROESOPHAGEAL REFLUX DISEASE WITHOUT ESOPHAGITIS: ICD-10-CM

## 2019-05-02 LAB — D DIMER PPP FEU-MCNC: 0.3 UG/ML FEU (ref 0–0.5)

## 2019-05-02 PROCEDURE — 85379 FIBRIN DEGRADATION QUANT: CPT | Performed by: INTERNAL MEDICINE

## 2019-05-02 PROCEDURE — 99215 OFFICE O/P EST HI 40 MIN: CPT | Performed by: INTERNAL MEDICINE

## 2019-05-02 PROCEDURE — 71046 X-RAY EXAM CHEST 2 VIEWS: CPT

## 2019-05-02 PROCEDURE — 36415 COLL VENOUS BLD VENIPUNCTURE: CPT | Performed by: INTERNAL MEDICINE

## 2019-05-02 PROCEDURE — 99207 C FOOT EXAM  NO CHARGE: CPT | Mod: 25 | Performed by: INTERNAL MEDICINE

## 2019-05-02 RX ORDER — ATORVASTATIN CALCIUM 40 MG/1
40 TABLET, FILM COATED ORAL DAILY
Qty: 90 TABLET | Refills: 3 | Status: SHIPPED | OUTPATIENT
Start: 2019-05-02 | End: 2020-05-04

## 2019-05-02 RX ORDER — AMLODIPINE BESYLATE 5 MG/1
5 TABLET ORAL DAILY
Qty: 90 TABLET | Refills: 3 | Status: SHIPPED | OUTPATIENT
Start: 2019-05-02 | End: 2020-04-02

## 2019-05-02 ASSESSMENT — MIFFLIN-ST. JEOR: SCORE: 1203.51

## 2019-05-02 NOTE — Clinical Note
Please abstract the following data from this visit with this patient into the appropriate field in Epic:Eye exam with ophthalmology on this date: Exam done by Dr. Herman at [facility] date of examination 5/1/2019

## 2019-05-02 NOTE — PROGRESS NOTES
SUBJECTIVE:   Shantelle Su is a 74 year old female who presents to clinic today for the following   health issues:    RESPIRATORY SYMPTOMS      Duration: 1 month - began immediately after return flight home form [place]    Description  Right upper back pain with breathing, worse in the morning, wheezing with walking up stairs. Not as intense as when sx initially began     Severity: moderate    Accompanying signs and symptoms: patient states she had bad cramping in legs during flight. Has previous hx of pulmonary embolism     History (predisposing factors):  none    Precipitating or alleviating factors: Flying     Therapies tried and outcome:  none    Other concerns:  1. Left ear- decreased hearing, slight discomfort with touching around ear  2. Heavy fluctuance in afternoon and night time- started taking Beano which helps, but would like to discuss using supplement   3. Discuss pre-diabetes, checks blood sugars in AM with readings averaging 120-130     Additional history: as documented    Reviewed  and updated as needed this visit by clinical staff       Reviewed and updated as needed this visit by Provider       Patient Active Problem List   Diagnosis     Essential hypertension, benign     Rectocele     Metabolic syndrome     Advanced directives, counseling/discussion     Gastroesophageal reflux disease without esophagitis     Fatty liver     Postmenopausal bleeding     Heart palpitations     Angina pectoris, unspecified (H)     ASCVD (arteriosclerotic cardiovascular disease)     Postsurgical percutaneous transluminal coronary angioplasty status     Paroxysmal supraventricular tachycardia (H)     Abnormal electrocardiogram     Hyperlipidemia LDL goal <70     Coronary artery disease of native artery of native heart with stable angina pectoris (H)     History of placement of stent in LAD coronary artery     Diabetes mellitus, type 2 (H)     Past Surgical History:   Procedure Laterality Date     ABDOMEN SURGERY       Bladder sling     C NONSPECIFIC PROCEDURE      [place] mesh augmented anterior colporrhaphy and vault suspension, tension-free vaginal tape sling with a transobturator approach usingthe Obtryx device and cystoscopy.       COLONOSCOPY      Scheduled 2016     COLPORRHAPY POSTERIOR, CYSTOSCOPY, COMBINED  2011    Procedure:COMBINED COLPORRHAPY POSTERIOR, CYSTOSCOPY; POSTERIOR MESH AUGMENTED REPAIR WITH ELEVATE MESH , cystoscopy; Surgeon:ANDRE UP; Location:[facility] OR     DILATION AND CURETTAGE, HYSTEROSCOPY DIAGNOSTIC, COMBINED N/A 10/4/2016    Procedure: COMBINED DILATION AND CURETTAGE, HYSTEROSCOPY DIAGNOSTIC;  Surgeon: Andre Up MD;  Location: [facility] OR     HC DILATION/CURETTAGE DIAG/THER NON OB      after sab     HC LEFT HEART CATHETERIZATION  16    PCI with drug-eluting stent placement in the proximal LAD     HC TOOTH EXTRACTION W/FORCEP         Social History     Tobacco Use     Smoking status: Former Smoker     Packs/day: 0.75     Years: 20.00     Pack years: 15.00     Last attempt to quit: 1987     Years since quittin.5     Smokeless tobacco: Never Used   Substance Use Topics     Alcohol use: Yes     Comment: 2 per month     Family History   Problem Relation Age of Onset     Gynecology Daughter      Heart Disease Father         D AGE 50     Coronary Artery Disease Father      Hyperlipidemia Father      Heart Disease Mother         D AGE 70     Diabetes Mother      Coronary Artery Disease Mother      Hypertension Mother      Hyperlipidemia Mother      Heart Disease Brother         B AGE 52 HEART SURGERY     Family History Negative Brother         B AGE 47     Hypertension Brother      Cerebrovascular Disease Sister         B AGE 54 BLOOD CLOTS     Family History Negative Sister         B AGE 60     Depression Sister          Current Outpatient Medications   Medication Sig Dispense Refill     ACCU-CHEK MULTICLIX LANCETS MISC 3 times daily. BG testing 3 times a day (Patient  taking differently: daily 3 times daily. BG testing 3 times a day) 100 each 11     Acetaminophen (TYLENOL PO)        amLODIPine (NORVASC) 5 MG tablet Take 1 tablet (5 mg) by mouth daily 90 tablet 3     aspirin EC 81 MG EC tablet Take 1 tablet (81 mg) by mouth daily 90 tablet 3     atorvastatin (LIPITOR) 40 MG tablet TAKE 1 TABLET BY MOUTH ONCE DAILY 90 tablet 1     Blood Glucose Calibration (ACCU-CHEK VI) SOLN Use as directed 3 Bottle 11     Blood Glucose Monitoring Suppl (ACCU-CHEK VI) KIT 2 times daily. With routine lancets as well as alcohol swabs, dispense 1 month (Patient taking differently: daily With routine lancets as well as alcohol swabs, dispense 1 month) 1 kit 0     docusate sodium (COLACE) 100 MG capsule Take 100 mg by mouth 2 times daily       glucose blood VI test strips strip Accucheck Vi Plus  3 times daily (Patient taking differently: daily Accucheck Vi Plus  3 times daily) 300 strip 11     metFORMIN (GLUCOPHAGE) 500 MG tablet Take 1 tablet (500 mg) by mouth daily (with dinner) 90 tablet 3     metoprolol succinate (TOPROL XL) 50 MG 24 hr tablet Take 1 tablet (50 mg) by mouth daily 90 tablet 3     nitroGLYcerin (NITROSTAT) 0.4 MG sublingual tablet DISSOLVE 1 TAB UNDER TONGUE AS NEEDED FOR CHEST PAIN. MAXIMUM IS 3 DOSES EVERY 5 MIN OVER 15 MINUTES 25 tablet 3     omeprazole (PRILOSEC) 20 MG CR capsule TAKE ONE CAPSULE BY MOUTH EVERY DAY 90 capsule 3     Allergies   Allergen Reactions     Bactrim [Sulfamethoxazole W/Trimethoprim] Nausea and Vomiting     Lisinopril      bp increased     BP Readings from Last 3 Encounters:   12/05/18 104/62   10/22/18 128/80   06/21/18 110/62    Wt Readings from Last 3 Encounters:   12/05/18 75.3 kg (166 lb)   10/22/18 74.8 kg (165 lb)   06/21/18 76 kg (167 lb 9.6 oz)            Labs reviewed in EPIC    ROS:  CONSTITUTIONAL: NEGATIVE for fever, chills, change in weight  ENT/MOUTH: NEGATIVE for ear, mouth and throat problems  RESP: NEGATIVE for significant  cough  CV: NEGATIVE for chest pain, palpitations or peripheral edema  GI: NEGATIVE for nausea, abdominal pain, heartburn, or change in bowel habits  : NEGATIVE for frequency, dysuria, or hematuria  MUSCULOSKELETAL: NEGATIVE for significant arthralgias or myalgia  NEURO: NEGATIVE for weakness, dizziness or paresthesias  HEME: NEGATIVE for bleeding problems  PSYCHIATRIC: NEGATIVE for changes in mood or affect    OBJECTIVE:                                                    /68   Pulse 75   Temp 98.4  F (36.9  C) (Oral)   Resp 15   Ht 1.524 m (5')   Wt 77.7 kg (171 lb 4.8 oz)   SpO2 95%   BMI 33.45 kg/m    There is no height or weight on file to calculate BMI.  GENERAL: alert and no distress  EYES: Eyes grossly normal to inspection, extraocular movements - intact, and PERRL  HENT: ear canals- normal; TMs- normal; Nose- normal; Mouth- no ulcers, no lesions  NECK: no tenderness, no adenopathy, no asymmetry, no masses, no stiffness; thyroid- normal to palpation  RESP: lungs clear to auscultation - no rales, no rhonchi, no wheezes  CV: regular rates and rhythm, normal S1 S2, no S3 or S4 and no click or rub   MS: extremities- no gross deformities noted  PSYCH: Alert and oriented times 3; speech- coherent , normal rate and volume; able to articulate logical thoughts, able to abstract reason, no tangential thoughts, no hallucinations or delusions, affect- normal.     ASSESSMENT/PLAN:                                                      (R07.1) Painful respiration  (primary encounter diagnosis)  Comment: Discussed with patient that based on her current symptoms although atypical and over a month and duration it would be reasonable to obtain a chest x-ray and a d-dimer study.  Her prior notable history of pulmonary embolism would suggest that if the d-dimer study is high that we would obtain a CT scan and duplex ultrasounds.  If negative it would be unlikely this represents clinical significant clot  burden.  Plan: XR Chest 2 Views, D dimer, quantitative            (E11.9) Type 2 diabetes mellitus without complication, without long-term current use of insulin (H)  Comment: Stable on therapy continuing with current medical management  Plan:     (I10) Essential hypertension, benign  Comment: Stable on therapy at goal with no change in medicine recommended  Plan: amLODIPine (NORVASC) 5 MG tablet            (E78.5) Hyperlipidemia LDL goal <130  Comment: Labs ordered as fasting per routine  Plan: Lipid panel reflex to direct LDL Fasting,         atorvastatin (LIPITOR) 40 MG tablet            (K21.9) Gastroesophageal reflux disease without esophagitis  Comment: Stable with medications refilled and recommend  Plan: omeprazole (PRILOSEC) 20 MG DR capsule            (Z13.89) Screening for diabetic peripheral neuropathy  Comment: No changes recommended current routine foot care discussed  Plan: FOOT EXAM  NO CHARGE [76783.114], TSH WITH FREE        T4 REFLEX, Hemoglobin A1c            See Patient Instructions    Klever Vega MD  [facility]

## 2019-05-06 ENCOUNTER — TELEPHONE (OUTPATIENT)
Dept: INTERNAL MEDICINE | Facility: CLINIC | Age: 74
End: 2019-05-06

## 2019-05-06 DIAGNOSIS — R07.9 CHEST PAIN, UNSPECIFIED TYPE: Primary | ICD-10-CM

## 2019-05-06 NOTE — TELEPHONE ENCOUNTER
Okay to use over-the-counter products as mentioned and please inform patient that due to concern in prior history despite D-dimer test with ongoing symptoms I have ordered a CT scan of her chest to rule out a pulmonary embolism.  Patient should be getting a call to schedule

## 2019-05-06 NOTE — TELEPHONE ENCOUNTER
Patient called.  Was seen last Thursday. Has a bad cough and was just wondering if it also has something to do with her visit from 5/2.  She's worried about her lungs.  Having a lot of pain in lungs too.  Please call her back @  [phone]

## 2019-05-06 NOTE — TELEPHONE ENCOUNTER
Triage spoke with patient.     Reports coughing spasma started on Saturday. Non productive. No fever or chills. Notices slight wheezy and can still feel pain in upper right back when she takes in a deep breath.    Reviewed chest x-ray and d-dimer results with patient which were essentially WNL.     Patient reports she is scared it is another PE. As she still can fell as if something is there when she take a deep breath. Patient reports SOB as well.     Triage to update PCP. Please advise of further recommendations.    Advised can try OTC Corcidin and Loratadine if concerned with PND cough or viral cough.    Natalie SHAW, RN, BSN, PHN

## 2019-05-08 DIAGNOSIS — Z13.89 SCREENING FOR DIABETIC PERIPHERAL NEUROPATHY: ICD-10-CM

## 2019-05-08 DIAGNOSIS — E78.5 HYPERLIPIDEMIA LDL GOAL <130: ICD-10-CM

## 2019-05-08 LAB
CHOLEST SERPL-MCNC: 163 MG/DL
HBA1C MFR BLD: 6.6 % (ref 0–5.6)
HDLC SERPL-MCNC: 65 MG/DL
LDLC SERPL CALC-MCNC: 69 MG/DL
NONHDLC SERPL-MCNC: 98 MG/DL
T4 FREE SERPL-MCNC: 1.02 NG/DL (ref 0.76–1.46)
TRIGL SERPL-MCNC: 147 MG/DL
TSH SERPL DL<=0.005 MIU/L-ACNC: 5.23 MU/L (ref 0.4–4)

## 2019-05-08 PROCEDURE — 80061 LIPID PANEL: CPT | Performed by: INTERNAL MEDICINE

## 2019-05-08 PROCEDURE — 36415 COLL VENOUS BLD VENIPUNCTURE: CPT | Performed by: INTERNAL MEDICINE

## 2019-05-08 PROCEDURE — 84443 ASSAY THYROID STIM HORMONE: CPT | Performed by: FAMILY MEDICINE

## 2019-05-08 PROCEDURE — 84439 ASSAY OF FREE THYROXINE: CPT | Performed by: FAMILY MEDICINE

## 2019-05-08 PROCEDURE — 83036 HEMOGLOBIN GLYCOSYLATED A1C: CPT | Performed by: FAMILY MEDICINE

## 2019-05-15 ENCOUNTER — HOSPITAL ENCOUNTER (OUTPATIENT)
Dept: CT IMAGING | Facility: CLINIC | Age: 74
Discharge: HOME | End: 2019-05-15
Attending: INTERNAL MEDICINE | Admitting: INTERNAL MEDICINE
Payer: COMMERCIAL

## 2019-05-15 DIAGNOSIS — R07.9 CHEST PAIN, UNSPECIFIED TYPE: ICD-10-CM

## 2019-05-15 LAB
CREAT BLD-MCNC: 0.7 MG/DL (ref 0.52–1.04)
GFR SERPL CREATININE-BSD FRML MDRD: 82 ML/MIN/{1.73_M2}

## 2019-05-15 PROCEDURE — 25000128 H RX IP 250 OP 636: Performed by: INTERNAL MEDICINE

## 2019-05-15 PROCEDURE — 82565 ASSAY OF CREATININE: CPT

## 2019-05-15 PROCEDURE — 71260 CT THORAX DX C+: CPT

## 2019-05-15 RX ORDER — IOPAMIDOL 755 MG/ML
500 INJECTION, SOLUTION INTRAVASCULAR ONCE
Status: COMPLETED | OUTPATIENT
Start: 2019-05-15 | End: 2019-05-15

## 2019-05-15 RX ADMIN — SODIUM CHLORIDE 80 ML: 9 INJECTION, SOLUTION INTRAVENOUS at 08:33

## 2019-05-15 RX ADMIN — IOPAMIDOL 65 ML: 755 INJECTION, SOLUTION INTRAVENOUS at 08:33

## 2019-05-16 ENCOUNTER — TELEPHONE (OUTPATIENT)
Dept: INTERNAL MEDICINE | Facility: CLINIC | Age: 74
End: 2019-05-16

## 2019-05-16 NOTE — TELEPHONE ENCOUNTER
Patient did read results off my chart .Kassandra Patterson RN  Also creatinine was fine . Pt has already been back on metformin.Kassandra Patterson RN

## 2019-05-21 ENCOUNTER — OFFICE VISIT (OUTPATIENT)
Dept: INTERNAL MEDICINE | Facility: CLINIC | Age: 74
End: 2019-05-21
Payer: COMMERCIAL

## 2019-05-21 VITALS
WEIGHT: 170.1 LBS | SYSTOLIC BLOOD PRESSURE: 118 MMHG | RESPIRATION RATE: 16 BRPM | DIASTOLIC BLOOD PRESSURE: 70 MMHG | OXYGEN SATURATION: 96 % | BODY MASS INDEX: 33.22 KG/M2 | HEART RATE: 74 BPM | TEMPERATURE: 98.2 F

## 2019-05-21 DIAGNOSIS — I25.118 CORONARY ARTERY DISEASE OF NATIVE ARTERY OF NATIVE HEART WITH STABLE ANGINA PECTORIS (H): ICD-10-CM

## 2019-05-21 DIAGNOSIS — R22.1 NECK MASS: Primary | ICD-10-CM

## 2019-05-21 DIAGNOSIS — I47.10 PAROXYSMAL SUPRAVENTRICULAR TACHYCARDIA (H): ICD-10-CM

## 2019-05-21 PROCEDURE — 99214 OFFICE O/P EST MOD 30 MIN: CPT | Performed by: INTERNAL MEDICINE

## 2019-05-21 NOTE — PROGRESS NOTES
Subjective     Shantelle Su is a 74 year old female who presents to clinic today for the following health issues:    Patient is primarily here today to follow-up on a CT scan of her chest that was done to rule out a pulmonary embolism at which time an incidental note was made of a neck mass..  Report listed below:    CT CHEST PULMONARY EMBOLISM WITH CONTRAST  5/15/2019 8:45 AM      HISTORY: PE suspected, low pretest probability. Chest pain,  unspecified type.    FINDINGS:  No evidence of pulmonary embolism or acute thoracic aortic  abnormality. No pneumothorax. No pleural or pericardial effusion. No  noncalcified pulmonary nodule or mass. No enlarged thoracic or  axillary lymph nodes. Coronary calcifications and/or stents are noted,  moderate. There is diffuse low-density of the liver.     At the superior margin of the study (series 5, image 1), there is a  soft tissue mass that measures 2.2 cm, likely a prominent  submandibular gland in a patient scanned with the head slightly  tilted.                                                                IMPRESSION:  1. No evidence of pulmonary embolism.  2. Moderate coronary calcification and/or stents.  3. Fatty infiltration of the liver.  4. 2.2 cm soft tissue nodule in the submandibular right neck, likely a  normal submandibular gland in a patient that was scanned with a  slightly tilted head. This area should be palpable. If there is  further clinical concern for an abnormal mass in this region, consider  ultrasound of the neck or contrast enhanced CT of the neck.       RESPIRATORY SYMPTOMS      Duration: 2-3 weeks     Description  rhinorrhea, sore throat (only in morning), fatigue/malaise and hoarse voice    Severity: mild- improving     Accompanying signs and symptoms: None- patient states she continues to have discomfort in left shoulder blade with deep breathing and in AM     History (predisposing factors):  none    Precipitating or alleviating factors:  Daughter recently sick with URI    Therapies tried and outcome: Coricidin HBP     {  Patient Active Problem List   Diagnosis     Essential hypertension, benign     Rectocele     Metabolic syndrome     Advanced directives, counseling/discussion     Gastroesophageal reflux disease without esophagitis     Fatty liver     Postmenopausal bleeding     Heart palpitations     Angina pectoris, unspecified (H)     ASCVD (arteriosclerotic cardiovascular disease)     Postsurgical percutaneous transluminal coronary angioplasty status     Paroxysmal supraventricular tachycardia (H)     Abnormal electrocardiogram     Hyperlipidemia LDL goal <70     Coronary artery disease of native artery of native heart with stable angina pectoris (H)     History of placement of stent in LAD coronary artery     Type 2 diabetes mellitus without complication, without long-term current use of insulin (H)     Past Surgical History:   Procedure Laterality Date     ABDOMEN SURGERY      Bladder sling     C NONSPECIFIC PROCEDURE  2009    [place] mesh augmented anterior colporrhaphy and vault suspension, tension-free vaginal tape sling with a transobturator approach usingthe Obtryx device and cystoscopy.       COLONOSCOPY      Scheduled October 2016     COLPORRHAPY POSTERIOR, CYSTOSCOPY, COMBINED  12/7/2011    Procedure:COMBINED COLPORRHAPY POSTERIOR, CYSTOSCOPY; POSTERIOR MESH AUGMENTED REPAIR WITH ELEVATE MESH , cystoscopy; Surgeon:ANDRE UP; Location:[facility] OR     DILATION AND CURETTAGE, HYSTEROSCOPY DIAGNOSTIC, COMBINED N/A 10/4/2016    Procedure: COMBINED DILATION AND CURETTAGE, HYSTEROSCOPY DIAGNOSTIC;  Surgeon: Andre Up MD;  Location: [facility] OR     HC DILATION/CURETTAGE DIAG/THER NON OB      after sab     HC LEFT HEART CATHETERIZATION  11/23/16    PCI with drug-eluting stent placement in the proximal LAD     HC TOOTH EXTRACTION W/FORCEP         Social History     Tobacco Use     Smoking status: Former Smoker     Packs/day: 0.75     Years:  20.00     Pack years: 15.00     Last attempt to quit: 1987     Years since quittin.5     Smokeless tobacco: Never Used   Substance Use Topics     Alcohol use: Yes     Comment: 2 per month     Family History   Problem Relation Age of Onset     Gynecology Daughter      Heart Disease Father         D AGE 50     Coronary Artery Disease Father      Hyperlipidemia Father      Heart Disease Mother         D AGE 70     Diabetes Mother      Coronary Artery Disease Mother      Hypertension Mother      Hyperlipidemia Mother      Heart Disease Brother         B AGE 52 HEART SURGERY     Family History Negative Brother         B AGE 47     Hypertension Brother      Cerebrovascular Disease Sister         B AGE 54 BLOOD CLOTS     Family History Negative Sister         B AGE 60     Depression Sister          Current Outpatient Medications   Medication Sig Dispense Refill     ACCU-CHEK MULTICLIX LANCETS MISC 3 times daily. BG testing 3 times a day (Patient taking differently: daily 3 times daily. BG testing 3 times a day) 100 each 11     Acetaminophen (TYLENOL PO)        alpha-d-galactosidase (BEANO) tablet Take by mouth 3 times daily (before meals)       amLODIPine (NORVASC) 5 MG tablet Take 1 tablet (5 mg) by mouth daily 90 tablet 3     aspirin EC 81 MG EC tablet Take 1 tablet (81 mg) by mouth daily 90 tablet 3     atorvastatin (LIPITOR) 40 MG tablet Take 1 tablet (40 mg) by mouth daily 90 tablet 3     Blood Glucose Calibration (ACCU-CHEK VI) SOLN Use as directed 3 Bottle 11     Blood Glucose Monitoring Suppl (ACCU-CHEK VI) KIT 2 times daily. With routine lancets as well as alcohol swabs, dispense 1 month (Patient taking differently: daily With routine lancets as well as alcohol swabs, dispense 1 month) 1 kit 0     docusate sodium (COLACE) 100 MG capsule Take 100 mg by mouth 2 times daily       glucose blood VI test strips strip Accucheck Vi Plus  3 times daily (Patient taking differently: daily Accucheck Vi  Plus  3 times daily) 300 strip 11     metFORMIN (GLUCOPHAGE) 500 MG tablet Take 1 tablet (500 mg) by mouth daily (with dinner) 90 tablet 3     metoprolol succinate (TOPROL XL) 50 MG 24 hr tablet Take 1 tablet (50 mg) by mouth daily 90 tablet 3     nitroGLYcerin (NITROSTAT) 0.4 MG sublingual tablet DISSOLVE 1 TAB UNDER TONGUE AS NEEDED FOR CHEST PAIN. MAXIMUM IS 3 DOSES EVERY 5 MIN OVER 15 MINUTES 25 tablet 3     omeprazole (PRILOSEC) 20 MG DR capsule Take 1 capsule (20 mg) by mouth daily 90 capsule 3     Wheat Dextrin (BENEFIBER DRINK MIX PO)        Allergies   Allergen Reactions     Bactrim [Sulfamethoxazole W/Trimethoprim] Nausea and Vomiting     Lisinopril      bp increased     BP Readings from Last 3 Encounters:   05/02/19 120/68   12/05/18 104/62   10/22/18 128/80    Wt Readings from Last 3 Encounters:   05/02/19 77.7 kg (171 lb 4.8 oz)   12/05/18 75.3 kg (166 lb)   10/22/18 74.8 kg (165 lb)           Reviewed and updated as needed this visit by Provider       Review of Systems   ROS COMP: CONSTITUTIONAL: NEGATIVE for fever, chills, change in weight  ENT/MOUTH: NEGATIVE for ear, mouth and throat problems  RESP: NEGATIVE for significant cough or SOB  BREAST: NEGATIVE for masses, tenderness or discharge  CV: NEGATIVE for chest pain, palpitations or peripheral edema  GI: NEGATIVE for nausea, abdominal pain, heartburn, or change in bowel habits  : NEGATIVE for frequency, dysuria, or hematuria  MUSCULOSKELETAL: NEGATIVE for significant arthralgias or myalgia  NEURO: NEGATIVE for weakness, dizziness or paresthesias  HEME: NEGATIVE for bleeding problems  PSYCHIATRIC: NEGATIVE for changes in mood or affect      Objective    /70   Pulse 74   Temp 98.2  F (36.8  C) (Oral)   Resp 16   Wt 77.2 kg (170 lb 1.6 oz)   SpO2 96%   BMI 33.22 kg/m    Body mass index is 33.22 kg/m .  Physical Exam   GENERAL:  alert and no distress  EYES: Eyes grossly normal to inspection, PERRL and conjunctivae and sclerae  normal  HENT: ear canals and TM's normal less mild cerumen left ear, nose and mouth without ulcers or lesions  NECK: Likely small prominent submandibular gland right neck.  RESP: lungs clear to auscultation - no rales, rhonchi or wheezes  CV: regular rate and rhythm, normal S1 S2, no S3 or S4, no click or rub, no peripheral edema and peripheral pulses strong  MS: no gross musculoskeletal defects noted  Bilateral foot exam performed without acute changes noted  PSYCH: mentation appears normal, affect normal/bright        Lab Results   Component Value Date    A1C 6.6 05/08/2019    A1C 6.6 12/05/2018    A1C 6.2 05/21/2018    A1C 6.4 12/04/2017    A1C 6.8 04/23/2017       Assessment & Plan     1. Neck mass  Suggest a CT scan of soft tissue neck to rule out pathological abnormality associated with incidental changes noted on CT scan  - CT Soft Tissue Neck w Contrast; Future    (I47.1) Paroxysmal supraventricular tachycardia (H)  Comment: Stable without any evidence of recurrence with prior cardiology clinic visit reviewed  Plan:     (I25.118) Coronary artery disease of native artery of native heart with stable angina pectoris (H)  Comment: Stable with comment to patient about incidental coronary calcification noted, stable without acute symptoms    Note the patient also asked me to write a note  stating that she could have her dog with her in a hotel room and that it was medically necessary.  I informed the patient that I am unable to do that note for her and the fact that the dog is not considered to be medically necessary.    BMI:   Estimated body mass index is 33.45 kg/m  as calculated from the following:    Height as of 5/2/19: 1.524 m (5').    Weight as of 5/2/19: 77.7 kg (171 lb 4.8 oz).     Regular exercise    No follow-ups on file.    Klever Vega MD  [facility]

## 2019-05-31 ENCOUNTER — HOSPITAL ENCOUNTER (OUTPATIENT)
Dept: CT IMAGING | Facility: CLINIC | Age: 74
Discharge: HOME | End: 2019-05-31
Attending: INTERNAL MEDICINE | Admitting: INTERNAL MEDICINE
Payer: COMMERCIAL

## 2019-05-31 DIAGNOSIS — R22.1 NECK MASS: ICD-10-CM

## 2019-05-31 PROCEDURE — 70491 CT SOFT TISSUE NECK W/DYE: CPT

## 2019-05-31 PROCEDURE — 25000128 H RX IP 250 OP 636: Performed by: INTERNAL MEDICINE

## 2019-05-31 RX ORDER — IOPAMIDOL 755 MG/ML
500 INJECTION, SOLUTION INTRAVASCULAR ONCE
Status: COMPLETED | OUTPATIENT
Start: 2019-05-31 | End: 2019-05-31

## 2019-05-31 RX ADMIN — SODIUM CHLORIDE 65 ML: 9 INJECTION, SOLUTION INTRAVENOUS at 08:14

## 2019-05-31 RX ADMIN — IOPAMIDOL 80 ML: 755 INJECTION, SOLUTION INTRAVENOUS at 08:13

## 2019-07-30 DIAGNOSIS — Z98.61 STATUS POST CORONARY ANGIOPLASTY: ICD-10-CM

## 2019-07-30 RX ORDER — NITROGLYCERIN 0.4 MG/1
TABLET SUBLINGUAL
Qty: 25 TABLET | Refills: 3 | Status: SHIPPED | OUTPATIENT
Start: 2019-07-30 | End: 2022-12-06

## 2019-07-30 NOTE — TELEPHONE ENCOUNTER
"Requested Prescriptions   Pending Prescriptions Disp Refills     nitroGLYcerin (NITROSTAT) 0.4 MG sublingual tablet 25 tablet 3     Sig: DISSOLVE 1 TAB UNDER TONGUE AS NEEDED FOR CHEST PAIN. MAXIMUM IS 3 DOSES EVERY 5 MIN OVER 15 MINUTES       Nitrates Failed - 7/30/2019  2:48 PM        Failed - Sublingual nitro order needs review     If refill exceeds 1 bottle per month, please forward request to provider.           Passed - Blood pressure under 140/90 in past 12 months     BP Readings from Last 3 Encounters:   05/21/19 118/70   05/02/19 120/68   12/05/18 104/62                 Passed - Pt is not on erectile dysfunction medications        Passed - Recent (12 mo) or future (30 days) visit within the authorizing provider's specialty     Patient had office visit in the last 12 months or has a visit in the next 30 days with authorizing provider or within the authorizing provider's specialty.  See \"Patient Info\" tab in inbasket, or \"Choose Columns\" in Meds & Orders section of the refill encounter.              Passed - Medication is active on med list        Passed - Patient is age 18 or older      Last Written Prescription Date:  12/26/2017  Last Fill Quantity: 25,  # refills: 3   Last office visit: 5/21/2019 with prescribing provider:  Klever Vega     Future Office Visit:      Routing refill request to provider for review/approval because:  Please review medication refill.    Natalie SHAW RN, BSN, PHN            "

## 2019-08-23 ENCOUNTER — TELEPHONE (OUTPATIENT)
Dept: CARDIOLOGY | Facility: CLINIC | Age: 74
End: 2019-08-23

## 2019-08-23 NOTE — TELEPHONE ENCOUNTER
"Patient wanted to get in sooner prior to leaving for [place]. Patient is not having any symptoms and sates \"I feel great.\" patient ok to see PEDRO as she has been seen by cardiologist within the year. RN transferred patient to [facility] to arrange apt.           10/22/18 OV Dr. Lozada  ASSESSMENT/PLAN:   1.  Shantelle Su is a delightful 73-year-old female status post LAD, angioplasty and stenting with acute in-stent thrombosis requiring a second drug-eluting stent placed for edge dissection.  The patient has some back discomfort that prompted us to perform a stress echocardiogram in 03/2017.  This is normal without evidence of ischemia and we would not repeat this stress echo for 4 more years if the patient remains angina-free.   2.  Hyperlipidemia, currently reasonably well-controlled.  Triglycerides are now within normal limits.   3.  History of premature atrial contractions which are well-controlled at this time.      It is my pleasure to assist in the care of Shantelle Su.  All her questions were answered to her satisfaction.  It is my plan to see her again in 1 year or earlier on a p.r.n. basis.      Lilly Lozada MD      cc:   Klever Vega MD   [address] [address]         LILLY LOZADA MD, FACC    "

## 2019-10-01 ENCOUNTER — HEALTH MAINTENANCE LETTER (OUTPATIENT)
Age: 74
End: 2019-10-01

## 2019-10-10 ENCOUNTER — OFFICE VISIT (OUTPATIENT)
Dept: URGENT CARE | Facility: URGENT CARE | Age: 74
End: 2019-10-10
Payer: COMMERCIAL

## 2019-10-10 VITALS
DIASTOLIC BLOOD PRESSURE: 74 MMHG | BODY MASS INDEX: 33.2 KG/M2 | WEIGHT: 170 LBS | TEMPERATURE: 97.9 F | SYSTOLIC BLOOD PRESSURE: 120 MMHG | RESPIRATION RATE: 16 BRPM | OXYGEN SATURATION: 96 % | HEART RATE: 82 BPM

## 2019-10-10 DIAGNOSIS — M79.672 INFLAMMATORY HEEL PAIN, LEFT: Primary | ICD-10-CM

## 2019-10-10 DIAGNOSIS — M76.62 ACHILLES TENDINITIS OF LEFT LOWER EXTREMITY: ICD-10-CM

## 2019-10-10 PROCEDURE — 99214 OFFICE O/P EST MOD 30 MIN: CPT | Performed by: FAMILY MEDICINE

## 2019-10-11 NOTE — PROGRESS NOTES
SUBJECTIVE:  Chief Complaint   Patient presents with     Urgent Care     Musculoskeletal Problem     Left ankle, had swelling back in September, still swollen and painful to walk, burning sensation      Shantelle Su is a 74 year old female who presents with a chief complaint of left heel and Achilles tendon pain symptoms began almost for last 3-1/2 weeks.  While she was on vacation she thinks it could be related to walking on sand.  She denies any injury or any twisting of the ankle.  She describes the pain as burning in nature and located in the posterior aspect of the ankle along backless tendon.  Pain is mostly with walking.  She also mentioned that while she was on vacation she got bitten by a bug in the medial aspect of the left ankle following which the ankle was swollen but then it resolved.  pain, swelling and tenderness.  Symptoms began 3 week(s) ago, are moderate and gradual onset  Context: She did not take any antibiotic recently that might have caused a tendinitis Injury:No.  . How: as above more walking ,Pain exacerbated by walking, weight-bearing, movement, repetitive motion and flexion/extension Relieved by rest and ice.  She treated it initially with ice and Ibuprofen. This is the first time this type of injury has occurred to this patient.     Past Medical History:   Diagnosis Date     Acute upper respiratory infection 9/30/2002     Angina pectoris, unspecified (H) 11/23/2016     ASCVD (arteriosclerotic cardiovascular disease) 12/20/2016     CAD (coronary artery disease) 11/23/2016    sp proximal LAD stenting     Diabetes mellitus (H)      Enterocele 9/12/2011     Essential hypertension, benign      Fatty liver 6/4/2015    Based on CT scan done 5/23/15      GERD (gastroesophageal reflux disease) 10/13/2008     Heart palpitations      Hiatal hernia      Hyperlipidemia LDL goal <70 12/20/2016     Metabolic syndrome 12/1/2011     Other pulmonary embolism and infarction 9/30/2002    after starting  hormone therapy     PAC (premature atrial contraction)      Paroxysmal supraventricular tachycardia (H) 12/20/2016     Rectocele 10/15/2007     Current Outpatient Medications   Medication Sig Dispense Refill     order for DME Equipment being ordered: DME  Cam walker 1 Device 0     ACCU-CHEK MULTICLIX LANCETS MISC 3 times daily. BG testing 3 times a day (Patient taking differently: daily 3 times daily. BG testing 3 times a day) 100 each 11     Acetaminophen (TYLENOL PO)        alpha-d-galactosidase (BEANO) tablet Take by mouth 3 times daily (before meals)       amLODIPine (NORVASC) 5 MG tablet Take 1 tablet (5 mg) by mouth daily 90 tablet 3     aspirin EC 81 MG EC tablet Take 1 tablet (81 mg) by mouth daily 90 tablet 3     atorvastatin (LIPITOR) 40 MG tablet Take 1 tablet (40 mg) by mouth daily 90 tablet 3     Blood Glucose Calibration (ACCU-CHEK VI) SOLN Use as directed 3 Bottle 11     Blood Glucose Monitoring Suppl (ACCU-CHEK VI) KIT 2 times daily. With routine lancets as well as alcohol swabs, dispense 1 month (Patient taking differently: daily With routine lancets as well as alcohol swabs, dispense 1 month) 1 kit 0     docusate sodium (COLACE) 100 MG capsule Take 100 mg by mouth 2 times daily       glucose blood VI test strips strip Accucheck Vi Plus  3 times daily (Patient taking differently: daily Accucheck Vi Plus  3 times daily) 300 strip 11     metFORMIN (GLUCOPHAGE) 500 MG tablet Take 1 tablet (500 mg) by mouth daily (with dinner) 90 tablet 3     metoprolol succinate (TOPROL XL) 50 MG 24 hr tablet Take 1 tablet (50 mg) by mouth daily 90 tablet 3     nitroGLYcerin (NITROSTAT) 0.4 MG sublingual tablet DISSOLVE 1 TAB UNDER TONGUE AS NEEDED FOR CHEST PAIN. MAXIMUM IS 3 DOSES EVERY 5 MIN OVER 15 MINUTES 25 tablet 3     omeprazole (PRILOSEC) 20 MG DR capsule Take 1 capsule (20 mg) by mouth daily 90 capsule 3     Wheat Dextrin (BENEFIBER DRINK MIX PO)        Social History     Tobacco Use     Smoking  status: Former Smoker     Packs/day: 0.75     Years: 20.00     Pack years: 15.00     Last attempt to quit: 1987     Years since quittin.9     Smokeless tobacco: Never Used   Substance Use Topics     Alcohol use: Yes     Comment: 2 per month       ROS:  10 point ROS of systems including Constitutional, Eyes, Respiratory, Cardiovascular, Gastroenterology, Genitourinary, Integumentary, , Psychiatric were all negative except for pertinent positives noted in my HPI           EXAM:   /74   Pulse 82   Temp 97.9  F (36.6  C) (Oral)   Resp 16   Wt 77.1 kg (170 lb)   SpO2 96%   BMI 33.20 kg/m    M/S Exam:left ankle noted  heel pain and pain along the left achilles tenderness to palpation and decreased ROM GENERAL APPEARANCE: healthy, alert and no distress  EXTREMITIES: peripheral pulses normal  SKIN: no suspicious lesions or rashes  NEURO: Normal strength and tone, sensory exam grossly normal, mentation intact and speech normal    X-RAY was not done.    ASSESSMENT:     Encounter Diagnoses   Name Primary?     Inflammatory heel pain, left Yes     Achilles tendinitis of left lower extremity    Shantelle was seen today for urgent care and musculoskeletal problem.    Diagnoses and all orders for this visit:    Inflammatory heel pain, left  -     order for DME; Equipment being ordered: DME  Cam walker    Achilles tendinitis of left lower extremity          PLAN:  See orders in epic  I did review with patient that symptoms seem to be related to Achilles tendinitis  Would consider using cam walker to help with the pain  Should do ibuprofen 600 mg with food every 6-8 hours for no more than 2 to 3 days try and use a cam walker for next 2 weeks if pain still persists even after week of treatment should follow-up with Ortho.  Patient was given a handout on Achilles tendinitis and I did explain her the condition in details.  There was no calf tenderness noted so I am not worried about a blood clot I did encourage patient  not to do anything that would worsen the pain    Patient did understand and agreed with plan    Celina Crabtree MD

## 2019-10-30 ENCOUNTER — ANCILLARY PROCEDURE (OUTPATIENT)
Dept: GENERAL RADIOLOGY | Facility: CLINIC | Age: 74
End: 2019-10-30
Attending: INTERNAL MEDICINE
Payer: COMMERCIAL

## 2019-10-30 ENCOUNTER — OFFICE VISIT (OUTPATIENT)
Dept: INTERNAL MEDICINE | Facility: CLINIC | Age: 74
End: 2019-10-30
Payer: COMMERCIAL

## 2019-10-30 VITALS
BODY MASS INDEX: 33.2 KG/M2 | OXYGEN SATURATION: 98 % | DIASTOLIC BLOOD PRESSURE: 70 MMHG | TEMPERATURE: 97.5 F | SYSTOLIC BLOOD PRESSURE: 132 MMHG | RESPIRATION RATE: 15 BRPM | WEIGHT: 170 LBS | HEART RATE: 69 BPM

## 2019-10-30 DIAGNOSIS — M65.30 TRIGGER FINGER, ACQUIRED: Primary | ICD-10-CM

## 2019-10-30 DIAGNOSIS — M79.672 PAIN OF LEFT HEEL: ICD-10-CM

## 2019-10-30 DIAGNOSIS — M25.562 LEFT KNEE PAIN, UNSPECIFIED CHRONICITY: ICD-10-CM

## 2019-10-30 DIAGNOSIS — Z11.59 ENCOUNTER FOR HCV SCREENING TEST FOR LOW RISK PATIENT: ICD-10-CM

## 2019-10-30 DIAGNOSIS — I47.10 PAROXYSMAL SUPRAVENTRICULAR TACHYCARDIA (H): ICD-10-CM

## 2019-10-30 DIAGNOSIS — E88.810 METABOLIC SYNDROME: ICD-10-CM

## 2019-10-30 DIAGNOSIS — R73.9 HYPERGLYCEMIA: ICD-10-CM

## 2019-10-30 PROCEDURE — 99214 OFFICE O/P EST MOD 30 MIN: CPT | Performed by: INTERNAL MEDICINE

## 2019-10-30 PROCEDURE — 73630 X-RAY EXAM OF FOOT: CPT | Mod: LT

## 2019-10-30 PROCEDURE — 73560 X-RAY EXAM OF KNEE 1 OR 2: CPT | Mod: LT

## 2019-10-30 RX ORDER — METOPROLOL SUCCINATE 50 MG/1
50 TABLET, EXTENDED RELEASE ORAL DAILY
Qty: 90 TABLET | Refills: 3 | Status: SHIPPED | OUTPATIENT
Start: 2019-10-30 | End: 2020-10-02

## 2019-10-30 ASSESSMENT — PATIENT HEALTH QUESTIONNAIRE - PHQ9: SUM OF ALL RESPONSES TO PHQ QUESTIONS 1-9: 1

## 2019-10-30 NOTE — LETTER
[facility]  [address] [address]  [phone]        December 23, 2019    Shantelle Su  [address] [facility] [address]              Dear Shantelle Su    This is to remind you that your non-fasting labs is due.    You may call our office at [phone] to schedule an appointment.    Please disregard this notice if you have already had your labs drawn or made an appointment.        Sincerely,        Klever Vega MD

## 2019-10-30 NOTE — PROGRESS NOTES
Subjective     Shantelle Su is a 74 year old female who presents to clinic today for the following health issues:    HPI     ED/UC Followup:    Facility:  [facility]   Date of visit: 10/10/19  Reason for visit: Inflammatory heel pain, left   Current Status: Patient has noticed some improvement in left ankle swelling. After wearing CAM boot for 9 days patient noticed she was having new pain in left calf/ shin so she discontinued use. Now experiencing pain that radiates up foot and up behind knee. Using Advil nightly      Other concerns:  1. Right hand middle finger- decreased mobility, finger locking up, decreased strength, swelling     Patient Active Problem List   Diagnosis     Essential hypertension, benign     Rectocele     Metabolic syndrome     Advanced directives, counseling/discussion     Gastroesophageal reflux disease without esophagitis     Fatty liver     Postmenopausal bleeding     Heart palpitations     Angina pectoris, unspecified (H)     ASCVD (arteriosclerotic cardiovascular disease)     Postsurgical percutaneous transluminal coronary angioplasty status     Paroxysmal supraventricular tachycardia (H)     Abnormal electrocardiogram     Hyperlipidemia LDL goal <70     Coronary artery disease of native artery of native heart with stable angina pectoris (H)     History of placement of stent in LAD coronary artery     Type 2 diabetes mellitus without complication, without long-term current use of insulin (H)     Past Surgical History:   Procedure Laterality Date     ABDOMEN SURGERY      Bladder sling     C NONSPECIFIC PROCEDURE  2009    [place] mesh augmented anterior colporrhaphy and vault suspension, tension-free vaginal tape sling with a transobturator approach usingthe Obtryx device and cystoscopy.       COLONOSCOPY      Scheduled October 2016     COLPORRHAPY POSTERIOR, CYSTOSCOPY, COMBINED  12/7/2011    Procedure:COMBINED COLPORRHAPY POSTERIOR, CYSTOSCOPY; POSTERIOR MESH AUGMENTED REPAIR WITH  ELEVATE MESH , cystoscopy; Surgeon:ANDRE UP; Location:[facility] OR     DILATION AND CURETTAGE, HYSTEROSCOPY DIAGNOSTIC, COMBINED N/A 10/4/2016    Procedure: COMBINED DILATION AND CURETTAGE, HYSTEROSCOPY DIAGNOSTIC;  Surgeon: Andre Up MD;  Location: [facility] OR     HC DILATION/CURETTAGE DIAG/THER NON OB      after sab     HC LEFT HEART CATHETERIZATION  16    PCI with drug-eluting stent placement in the proximal LAD     HC TOOTH EXTRACTION W/FORCEP         Social History     Tobacco Use     Smoking status: Former Smoker     Packs/day: 0.75     Years: 20.00     Pack years: 15.00     Last attempt to quit: 1987     Years since quittin.0     Smokeless tobacco: Never Used   Substance Use Topics     Alcohol use: Yes     Comment: 2 per month     Family History   Problem Relation Age of Onset     Gynecology Daughter      Heart Disease Father         D AGE 50     Coronary Artery Disease Father      Hyperlipidemia Father      Heart Disease Mother         D AGE 70     Diabetes Mother      Coronary Artery Disease Mother      Hypertension Mother      Hyperlipidemia Mother      Heart Disease Brother         B AGE 52 HEART SURGERY     Family History Negative Brother         B AGE 47     Hypertension Brother      Cerebrovascular Disease Sister         B AGE 54 BLOOD CLOTS     Family History Negative Sister         B AGE 60     Depression Sister          Current Outpatient Medications   Medication Sig Dispense Refill     ACCU-CHEK MULTICLIX LANCETS MISC 3 times daily. BG testing 3 times a day (Patient taking differently: daily 3 times daily. BG testing 3 times a day) 100 each 11     Acetaminophen (TYLENOL PO)        alpha-d-galactosidase (BEANO) tablet Take by mouth 3 times daily (before meals)       amLODIPine (NORVASC) 5 MG tablet Take 1 tablet (5 mg) by mouth daily 90 tablet 3     aspirin EC 81 MG EC tablet Take 1 tablet (81 mg) by mouth daily 90 tablet 3     atorvastatin (LIPITOR) 40 MG tablet Take 1 tablet (40 mg)  by mouth daily 90 tablet 3     Blood Glucose Calibration (ACCU-CHEK VI) SOLN Use as directed 3 Bottle 11     Blood Glucose Monitoring Suppl (ACCU-CHEK VI) KIT 2 times daily. With routine lancets as well as alcohol swabs, dispense 1 month (Patient taking differently: daily With routine lancets as well as alcohol swabs, dispense 1 month) 1 kit 0     docusate sodium (COLACE) 100 MG capsule Take 100 mg by mouth 2 times daily       glucose blood VI test strips strip Accucheck Vi Plus  3 times daily (Patient taking differently: daily Accucheck Vi Plus  3 times daily) 300 strip 11     metFORMIN (GLUCOPHAGE) 500 MG tablet Take 1 tablet (500 mg) by mouth daily (with dinner) 90 tablet 3     metoprolol succinate (TOPROL XL) 50 MG 24 hr tablet Take 1 tablet (50 mg) by mouth daily 90 tablet 3     nitroGLYcerin (NITROSTAT) 0.4 MG sublingual tablet DISSOLVE 1 TAB UNDER TONGUE AS NEEDED FOR CHEST PAIN. MAXIMUM IS 3 DOSES EVERY 5 MIN OVER 15 MINUTES 25 tablet 3     omeprazole (PRILOSEC) 20 MG DR capsule Take 1 capsule (20 mg) by mouth daily 90 capsule 3     order for DME Equipment being ordered: DME  Cam walker 1 Device 0     Wheat Dextrin (BENEFIBER DRINK MIX PO)        Allergies   Allergen Reactions     Bactrim [Sulfamethoxazole W/Trimethoprim] Nausea and Vomiting     Lisinopril      bp increased     BP Readings from Last 3 Encounters:   10/10/19 120/74   05/21/19 118/70   05/02/19 120/68    Wt Readings from Last 3 Encounters:   10/10/19 77.1 kg (170 lb)   05/21/19 77.2 kg (170 lb 1.6 oz)   05/02/19 77.7 kg (171 lb 4.8 oz)           Reviewed and updated as needed this visit by Provider        Lab Results   Component Value Date    A1C 6.6 05/08/2019    A1C 6.6 12/05/2018    A1C 6.2 05/21/2018    A1C 6.4 12/04/2017    A1C 6.8 04/23/2017         Review of Systems   ROS COMP: CONSTITUTIONAL: NEGATIVE for fever, chills, change in weight  ENT/MOUTH: NEGATIVE for ear, mouth and throat problems  RESP: NEGATIVE for significant  cough or SOB  CV: NEGATIVE for chest pain, palpitations or peripheral edema  GI: NEGATIVE for nausea, abdominal pain, heartburn, or change in bowel habits  : NEGATIVE for frequency, dysuria, or hematuria  NEURO: NEGATIVE for weakness, dizziness or paresthesias  HEME: NEGATIVE for bleeding problems  PSYCHIATRIC: NEGATIVE for changes in mood or affect      Objective    /70   Pulse 69   Temp 97.5  F (36.4  C) (Oral)   Resp 15   Wt 77.1 kg (170 lb)   SpO2 98%   BMI 33.20 kg/m    Body mass index is 33.2 kg/m .  Physical Exam   GENERAL: alert and no distress  EYES: Eyes grossly normal to inspection, PERRL and conjunctivae and sclerae normal  HENT: ear canals and TM's normal, nose and mouth without ulcers or lesions  NECK: no adenopathy, no asymmetry, masses, or scars and thyroid normal to palpation  RESP: lungs clear to auscultation - no rales, rhonchi or wheezes  CV: regular rate and rhythm, normal S1 S2, no S3 or S4, no murmur, click or rub, no peripheral edema and peripheral pulses strong  MS: extremities normal- no gross deformities noted, mild Dupuytren contracture right palm.  There is tenderness noted to the left knee on forward flexion with inability to fully extend the knee and there is also tenderness noted to the left Achilles tendon with palpation  NEURO: No focal changes.  PSYCH: mentation appears normal, affect normal/bright        Assessment & Plan     1. Trigger finger, acquired  Suggest orthopedic assessment for determination of need for steroid injection or further treatment  - ORTHO  REFERRAL    2. Metabolic syndrome  Recheck labs accordingly as fasting  - Albumin Random Urine Quantitative with Creat Ratio; Future  - metFORMIN (GLUCOPHAGE) 500 MG tablet; Take 1 tablet (500 mg) by mouth daily (with dinner)  Dispense: 90 tablet; Refill: 3  - Comprehensive metabolic panel; Future    3. Left knee pain, unspecified chronicity  Rule out degenerative changes and potential benefit of  injection versus surgical repair  - ORTHO  REFERRAL  - XR Knee Left 1/2 Views; Future    4. Pain of left heel  Question Achilles tendinitis, suggest orthopedic assessment  - ORTHO  REFERRAL  - XR Foot Left G/E 3 Views; Future    5. Encounter for HCV screening test for low risk patient  Ordered as routine screening based on age  - Hepatitis C Screen Reflex to HCV RNA Quant and Genotype; Future    6. Paroxysmal supraventricular tachycardia (H)  Rate control on therapy continue with current medication  - metoprolol succinate ER (TOPROL XL) 50 MG 24 hr tablet; Take 1 tablet (50 mg) by mouth daily  Dispense: 90 tablet; Refill: 3    8. Hyperglycemia  Recheck A1c based on recent labs  - HEMOGLOBIN A1C; Future     BMI:   Estimated body mass index is 33.2 kg/m  as calculated from the following:    Height as of 5/2/19: 1.524 m (5').    Weight as of 10/10/19: 77.1 kg (170 lb).     See Patient Instructions    No follow-ups on file.    Klever Vega MD  [facility]

## 2019-11-07 ENCOUNTER — OFFICE VISIT (OUTPATIENT)
Dept: ORTHOPEDICS | Facility: CLINIC | Age: 74
End: 2019-11-07
Payer: COMMERCIAL

## 2019-11-07 VITALS
HEIGHT: 59 IN | DIASTOLIC BLOOD PRESSURE: 70 MMHG | WEIGHT: 170 LBS | BODY MASS INDEX: 34.27 KG/M2 | SYSTOLIC BLOOD PRESSURE: 120 MMHG

## 2019-11-07 DIAGNOSIS — M65.331 TRIGGER MIDDLE FINGER OF RIGHT HAND: ICD-10-CM

## 2019-11-07 DIAGNOSIS — M17.12 PRIMARY OSTEOARTHRITIS OF LEFT KNEE: Primary | ICD-10-CM

## 2019-11-07 PROCEDURE — 99203 OFFICE O/P NEW LOW 30 MIN: CPT | Performed by: ORTHOPAEDIC SURGERY

## 2019-11-07 ASSESSMENT — MIFFLIN-ST. JEOR: SCORE: 1181.74

## 2019-11-07 NOTE — PROGRESS NOTES
HISTORY OF PRESENT ILLNESS:    Shantelle Su is a 74 year old female who is seen in consultation at the request of Dr. Vega for left knee pain and right middle finger pain.     Present symptoms: chronic left knee pain, worsening in the last 1 month possibly from wearing a walking boot. Pain located medial, anterior and posterior. Pian worsens with walking and stair climbing. It improves with activity avoidance. Rates pain 3/10 current, 7/10 at worst. Endoreses swelling. Denies locking or catching sensations.   There is no specific trauma before the onset of her pain.  Sudden twisting motion and going up and down steps has been quite painful.  She is concerned about swelling narrowing in the knee but also in the ankle region.  She has been taking ibuprofen only at night but not in a consistent fashion.  She also complains of right long finger catching and pain that is been going on for about 1 month.  Sometimes the morning, she would literally have to pull the finger out from a locked up position.  No paresthesia symptoms otherwise.    Treatments tried to this point: ice and advile  Orthopedic PMH: fell on knee 20 years ago.     Patient also has a right middle finger trigger finger for 1 month. She endores pain and triggering sensations. Has pain with ADLs and gripping. No treatment tried to date for this problem.      Past Medical History:   Diagnosis Date     Acute upper respiratory infection 9/30/2002     Angina pectoris, unspecified (H) 11/23/2016     ASCVD (arteriosclerotic cardiovascular disease) 12/20/2016     CAD (coronary artery disease) 11/23/2016    sp proximal LAD stenting     Diabetes mellitus (H)      Enterocele 9/12/2011     Essential hypertension, benign      Fatty liver 6/4/2015    Based on CT scan done 5/23/15      GERD (gastroesophageal reflux disease) 10/13/2008     Heart palpitations      Hiatal hernia      Hyperlipidemia LDL goal <70 12/20/2016     Metabolic syndrome 12/1/2011     Other  pulmonary embolism and infarction 9/30/2002    after starting hormone therapy     PAC (premature atrial contraction)      Paroxysmal supraventricular tachycardia (H) 12/20/2016     Rectocele 10/15/2007       Past Surgical History:   Procedure Laterality Date     ABDOMEN SURGERY      Bladder sling     C NONSPECIFIC PROCEDURE  2009    Lily mesh augmented anterior colporrhaphy and vault suspension, tension-free vaginal tape sling with a transobturator approach usingthe Obtryx device and cystoscopy.       COLONOSCOPY      Scheduled October 2016     COLPORRHAPY POSTERIOR, CYSTOSCOPY, COMBINED  12/7/2011    Procedure:COMBINED COLPORRHAPY POSTERIOR, CYSTOSCOPY; POSTERIOR MESH AUGMENTED REPAIR WITH ELEVATE MESH , cystoscopy; Surgeon:ANDRE UP; Location:[facility] OR     DILATION AND CURETTAGE, HYSTEROSCOPY DIAGNOSTIC, COMBINED N/A 10/4/2016    Procedure: COMBINED DILATION AND CURETTAGE, HYSTEROSCOPY DIAGNOSTIC;  Surgeon: Andre Up MD;  Location: [facility] OR     HC DILATION/CURETTAGE DIAG/THER NON OB      after sab     HC LEFT HEART CATHETERIZATION  11/23/16    PCI with drug-eluting stent placement in the proximal LAD     HC TOOTH EXTRACTION W/FORCEP         Family History   Problem Relation Age of Onset     Gynecology Daughter      Heart Disease Father         D AGE 50     Coronary Artery Disease Father      Hyperlipidemia Father      Heart Disease Mother         D AGE 70     Diabetes Mother      Coronary Artery Disease Mother      Hypertension Mother      Hyperlipidemia Mother      Heart Disease Brother         B AGE 52 HEART SURGERY     Family History Negative Brother         B AGE 47     Hypertension Brother      Cerebrovascular Disease Sister         B AGE 54 BLOOD CLOTS     Family History Negative Sister         B AGE 60     Depression Sister        Social History     Socioeconomic History     Marital status:      Spouse name: Not on file     Number of children: Not on file     Years of education: Not on file      Highest education level: Not on file   Occupational History     Not on file   Social Needs     Financial resource strain: Not on file     Food insecurity:     Worry: Not on file     Inability: Not on file     Transportation needs:     Medical: Not on file     Non-medical: Not on file   Tobacco Use     Smoking status: Former Smoker     Packs/day: 0.75     Years: 20.00     Pack years: 15.00     Last attempt to quit: 1987     Years since quittin.0     Smokeless tobacco: Never Used   Substance and Sexual Activity     Alcohol use: Yes     Comment: 2 per month     Drug use: No     Sexual activity: Never     Partners: Male     Birth control/protection: Post-menopausal   Lifestyle     Physical activity:     Days per week: Not on file     Minutes per session: Not on file     Stress: Not on file   Relationships     Social connections:     Talks on phone: Not on file     Gets together: Not on file     Attends Synagogue service: Not on file     Active member of club or organization: Not on file     Attends meetings of clubs or organizations: Not on file     Relationship status: Not on file     Intimate partner violence:     Fear of current or ex partner: Not on file     Emotionally abused: Not on file     Physically abused: Not on file     Forced sexual activity: Not on file   Other Topics Concern     Parent/sibling w/ CABG, MI or angioplasty before 65F 55M? Yes     Comment: Father, brother      Service Not Asked     Blood Transfusions Not Asked     Caffeine Concern No     Comment: decaf coffee      Occupational Exposure Not Asked     Hobby Hazards Not Asked     Sleep Concern Not Asked     Stress Concern Not Asked     Weight Concern Not Asked     Special Diet No     Back Care Not Asked     Exercise No     Comment: cardio rehab     Bike Helmet Not Asked     Seat Belt Not Asked     Self-Exams Not Asked   Social History Narrative     Not on file       Current Outpatient Medications   Medication Sig Dispense  Refill     ACCU-CHEK MULTICLIX LANCETS MISC 3 times daily. BG testing 3 times a day (Patient taking differently: daily 3 times daily. BG testing 3 times a day) 100 each 11     Acetaminophen (TYLENOL PO)        alpha-d-galactosidase (BEANO) tablet Take by mouth 3 times daily (before meals)       amLODIPine (NORVASC) 5 MG tablet Take 1 tablet (5 mg) by mouth daily 90 tablet 3     aspirin EC 81 MG EC tablet Take 1 tablet (81 mg) by mouth daily 90 tablet 3     atorvastatin (LIPITOR) 40 MG tablet Take 1 tablet (40 mg) by mouth daily 90 tablet 3     Blood Glucose Calibration (ACCU-CHEK VI) SOLN Use as directed 3 Bottle 11     Blood Glucose Monitoring Suppl (ACCU-CHEK VI) KIT 2 times daily. With routine lancets as well as alcohol swabs, dispense 1 month (Patient taking differently: daily With routine lancets as well as alcohol swabs, dispense 1 month) 1 kit 0     docusate sodium (COLACE) 100 MG capsule Take 100 mg by mouth 2 times daily       glucose blood VI test strips strip Accucheck Vi Plus  3 times daily (Patient taking differently: daily Accucheck Vi Plus  3 times daily) 300 strip 11     metFORMIN (GLUCOPHAGE) 500 MG tablet Take 1 tablet (500 mg) by mouth daily (with dinner) 90 tablet 3     metoprolol succinate ER (TOPROL XL) 50 MG 24 hr tablet Take 1 tablet (50 mg) by mouth daily 90 tablet 3     nitroGLYcerin (NITROSTAT) 0.4 MG sublingual tablet DISSOLVE 1 TAB UNDER TONGUE AS NEEDED FOR CHEST PAIN. MAXIMUM IS 3 DOSES EVERY 5 MIN OVER 15 MINUTES 25 tablet 3     omeprazole (PRILOSEC) 20 MG DR capsule Take 1 capsule (20 mg) by mouth daily 90 capsule 3     order for DME Equipment being ordered: DME  Cam walker 1 Device 0     Wheat Dextrin (BENEFIBER DRINK MIX PO)          Allergies   Allergen Reactions     Bactrim [Sulfamethoxazole W/Trimethoprim] Nausea and Vomiting     Lisinopril      bp increased       REVIEW OF SYSTEMS:  CONSTITUTIONAL:  NEGATIVE for fever, chills, change in weight  INTEGUMENTARY/SKIN:   "NEGATIVE for worrisome rashes, moles or lesions  EYES:  NEGATIVE for vision changes or irritation  ENT/MOUTH:  NEGATIVE for ear, mouth and throat problems  RESP:  NEGATIVE for significant cough or SOB  BREAST:  NEGATIVE for masses, tenderness or discharge  CV:  NEGATIVE for chest pain, palpitations or peripheral edema  GI:  NEGATIVE for nausea, abdominal pain, heartburn, or change in bowel habits  :  Negative   MUSCULOSKELETAL:  See HPI above  NEURO:  NEGATIVE for weakness, dizziness or paresthesias  ENDOCRINE:  NEGATIVE for temperature intolerance, skin/hair changes  HEME/ALLERGY/IMMUNE:  NEGATIVE for bleeding problems  PSYCHIATRIC:  NEGATIVE for changes in mood or affect      PHYSICAL EXAM:  /70   Ht 1.499 m (4' 11\")   Wt 77.1 kg (170 lb)   BMI 34.34 kg/m    Body mass index is 34.34 kg/m .   GENERAL APPEARANCE: healthy, alert and no distress   HEENT: No apparent thyroid megaly. Clear sclera with normal ocular movement  RESPIRATORY: No labored breathing  SKIN: no suspicious lesions or rashes  NEURO: Normal strength and tone, mentation intact and speech normal  VASCULAR: Good pulses, and capillary refill   LYMPH: no lymphadenopathy   PSYCH:  mentation appears normal and affect normal/bright    MUSCULOSKELETAL:  Not in acute distress  Slight difficulty getting up from sitting  She walks with a limp  She has tendency of keeping the left leg extended when she walks  Obvious the swelling of the left knee without erythema  Swelling extends down to the ankle, left compared to right  She has painful left knee range of motion  Patellofemoral joint has moderate crepitus with a flexion extension, left  Significant pain related to patellofemoral compression no medial lateral joint line tenderness  Intact ligaments  Though some pain in the posterior lateral hamstring structures, left knee    Right hand examination shows normal skin  Normal sensation  Palpable tenderness at the A1 pulley of the long finger  Obvious " catching and locking of the right long finger  Circulation is intact    No other digits with catching or locking  Wrist range of motion is full       ASSESSMENT:  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  Pathophysiology and treatment options were thoroughly explained.  Informational materials provided.    We also had a long discussion as to what is going on with her knee.  Based on physical examination findings and x-ray findings, patellofemoral joint arthritic condition was felt to be mainly responsible for her symptoms.  We talked about the treatment options of further observation versus more consistent anti-inflammatory medication versus cortisone injection.    Since that she has not had an adequate trial of over-the-counter anti-inflammatory medication, I recommended her to take 3 tablets of Advil 3 times a day for the next 2 weeks.  If she does  not see any significant improvement with more consistent anti-inflammatory medication, we will consider cortisone injection to the knee.  In the meantime she will consider what to do about trigger finger phenomenon specifically the surgical intervention that was mentioned in detail.  All the questions were answered.  We will see her back in 3 weeks for further discussion.    The x-rays of left knee from October 30, 2019 were visualized.  Findings were explained.            Imaging Interpretation:     LEFT KNEE THREE VIEWS   10/30/2019 12:32 PM     HISTORY: Left knee pain, unspecified chronicity.     COMPARISON: None.                                                                      IMPRESSION: No fracture or acute abnormality is demonstrated. There is  mild joint space loss in the medial aspect of the patellofemoral  articulation. The remaining joint spaces are well preserved. There  appears to be chondrocalcinosis in the  periphery of the body of the  medial compartment. No other abnormality is seen.      MD Lit CHAN MD  Department of Orthopedic Surgery        Disclaimer: This note consists of symbols derived from keyboarding, dictation and/or voice recognition software. As a result, there may be errors in the script that have gone undetected. Please consider this when interpreting information found in this chart.

## 2019-11-07 NOTE — LETTER
11/7/2019         RE: Shantelle Su  [id number]  [facility] [id number]        Dear Colleague,    Thank you for referring your patient, Shantelle Su, to the [facility] ORTHOPEDIC SURGERY. Please see a copy of my visit note below.    HISTORY OF PRESENT ILLNESS:    Shantelle Su is a 74 year old female who is seen in consultation at the request of Dr. Vega for left knee pain and right middle finger pain.     Present symptoms: chronic left knee pain, worsening in the last 1 month possibly from wearing a walking boot. Pain located medial, anterior and posterior. Pian worsens with walking and stair climbing. It improves with activity avoidance. Rates pain 3/10 current, 7/10 at worst. Endoreses swelling. Denies locking or catching sensations.   There is no specific trauma before the onset of her pain.  Sudden twisting motion and going up and down steps has been quite painful.  She is concerned about swelling narrowing in the knee but also in the ankle region.  She has been taking ibuprofen only at night but not in a consistent fashion.  She also complains of right long finger catching and pain that is been going on for about 1 month.  Sometimes the morning, she would literally have to pull the finger out from a locked up position.  No paresthesia symptoms otherwise.    Treatments tried to this point: ice and advile  Orthopedic PMH: fell on knee 20 years ago.     Patient also has a right middle finger trigger finger for 1 month. She endores pain and triggering sensations. Has pain with ADLs and gripping. No treatment tried to date for this problem.      Past Medical History:   Diagnosis Date     Acute upper respiratory infection 9/30/2002     Angina pectoris, unspecified (H) 11/23/2016     ASCVD (arteriosclerotic cardiovascular disease) 12/20/2016     CAD (coronary artery disease) 11/23/2016    sp proximal LAD stenting     Diabetes mellitus (H)      Enterocele 9/12/2011     Essential  hypertension, benign      Fatty liver 6/4/2015    Based on CT scan done 5/23/15      GERD (gastroesophageal reflux disease) 10/13/2008     Heart palpitations      Hiatal hernia      Hyperlipidemia LDL goal <70 12/20/2016     Metabolic syndrome 12/1/2011     Other pulmonary embolism and infarction 9/30/2002    after starting hormone therapy     PAC (premature atrial contraction)      Paroxysmal supraventricular tachycardia (H) 12/20/2016     Rectocele 10/15/2007       Past Surgical History:   Procedure Laterality Date     ABDOMEN SURGERY      Bladder sling     C NONSPECIFIC PROCEDURE  2009    [place] mesh augmented anterior colporrhaphy and vault suspension, tension-free vaginal tape sling with a transobturator approach usingthe Obtryx device and cystoscopy.       COLONOSCOPY      Scheduled October 2016     COLPORRHAPY POSTERIOR, CYSTOSCOPY, COMBINED  12/7/2011    Procedure:COMBINED COLPORRHAPY POSTERIOR, CYSTOSCOPY; POSTERIOR MESH AUGMENTED REPAIR WITH ELEVATE MESH , cystoscopy; Surgeon:ANDRE UP; Location:[facility] OR     DILATION AND CURETTAGE, HYSTEROSCOPY DIAGNOSTIC, COMBINED N/A 10/4/2016    Procedure: COMBINED DILATION AND CURETTAGE, HYSTEROSCOPY DIAGNOSTIC;  Surgeon: Andre Up MD;  Location: [facility] OR     HC DILATION/CURETTAGE DIAG/THER NON OB      after sab     HC LEFT HEART CATHETERIZATION  11/23/16    PCI with drug-eluting stent placement in the proximal LAD     HC TOOTH EXTRACTION W/FORCEP         Family History   Problem Relation Age of Onset     Gynecology Daughter      Heart Disease Father         D AGE 50     Coronary Artery Disease Father      Hyperlipidemia Father      Heart Disease Mother         D AGE 70     Diabetes Mother      Coronary Artery Disease Mother      Hypertension Mother      Hyperlipidemia Mother      Heart Disease Brother         B AGE 52 HEART SURGERY     Family History Negative Brother         B AGE 47     Hypertension Brother      Cerebrovascular Disease Sister         B AGE 54  BLOOD CLOTS     Family History Negative Sister         B AGE 60     Depression Sister        Social History     Socioeconomic History     Marital status:      Spouse name: Not on file     Number of children: Not on file     Years of education: Not on file     Highest education level: Not on file   Occupational History     Not on file   Social Needs     Financial resource strain: Not on file     Food insecurity:     Worry: Not on file     Inability: Not on file     Transportation needs:     Medical: Not on file     Non-medical: Not on file   Tobacco Use     Smoking status: Former Smoker     Packs/day: 0.75     Years: 20.00     Pack years: 15.00     Last attempt to quit: 1987     Years since quittin.0     Smokeless tobacco: Never Used   Substance and Sexual Activity     Alcohol use: Yes     Comment: 2 per month     Drug use: No     Sexual activity: Never     Partners: Male     Birth control/protection: Post-menopausal   Lifestyle     Physical activity:     Days per week: Not on file     Minutes per session: Not on file     Stress: Not on file   Relationships     Social connections:     Talks on phone: Not on file     Gets together: Not on file     Attends Religion service: Not on file     Active member of club or organization: Not on file     Attends meetings of clubs or organizations: Not on file     Relationship status: Not on file     Intimate partner violence:     Fear of current or ex partner: Not on file     Emotionally abused: Not on file     Physically abused: Not on file     Forced sexual activity: Not on file   Other Topics Concern     Parent/sibling w/ CABG, MI or angioplasty before 65F 55M? Yes     Comment: Father, brother      Service Not Asked     Blood Transfusions Not Asked     Caffeine Concern No     Comment: decaf coffee      Occupational Exposure Not Asked     Hobby Hazards Not Asked     Sleep Concern Not Asked     Stress Concern Not Asked     Weight Concern Not Asked      Special Diet No     Back Care Not Asked     Exercise No     Comment: cardio rehab     Bike Helmet Not Asked     Seat Belt Not Asked     Self-Exams Not Asked   Social History Narrative     Not on file       Current Outpatient Medications   Medication Sig Dispense Refill     ACCU-CHEK MULTICLIX LANCETS MISC 3 times daily. BG testing 3 times a day (Patient taking differently: daily 3 times daily. BG testing 3 times a day) 100 each 11     Acetaminophen (TYLENOL PO)        alpha-d-galactosidase (BEANO) tablet Take by mouth 3 times daily (before meals)       amLODIPine (NORVASC) 5 MG tablet Take 1 tablet (5 mg) by mouth daily 90 tablet 3     aspirin EC 81 MG EC tablet Take 1 tablet (81 mg) by mouth daily 90 tablet 3     atorvastatin (LIPITOR) 40 MG tablet Take 1 tablet (40 mg) by mouth daily 90 tablet 3     Blood Glucose Calibration (ACCU-CHEK VI) SOLN Use as directed 3 Bottle 11     Blood Glucose Monitoring Suppl (ACCU-CHEK VI) KIT 2 times daily. With routine lancets as well as alcohol swabs, dispense 1 month (Patient taking differently: daily With routine lancets as well as alcohol swabs, dispense 1 month) 1 kit 0     docusate sodium (COLACE) 100 MG capsule Take 100 mg by mouth 2 times daily       glucose blood VI test strips strip Accucheck Vi Plus  3 times daily (Patient taking differently: daily Accucheck Vi Plus  3 times daily) 300 strip 11     metFORMIN (GLUCOPHAGE) 500 MG tablet Take 1 tablet (500 mg) by mouth daily (with dinner) 90 tablet 3     metoprolol succinate ER (TOPROL XL) 50 MG 24 hr tablet Take 1 tablet (50 mg) by mouth daily 90 tablet 3     nitroGLYcerin (NITROSTAT) 0.4 MG sublingual tablet DISSOLVE 1 TAB UNDER TONGUE AS NEEDED FOR CHEST PAIN. MAXIMUM IS 3 DOSES EVERY 5 MIN OVER 15 MINUTES 25 tablet 3     omeprazole (PRILOSEC) 20 MG DR capsule Take 1 capsule (20 mg) by mouth daily 90 capsule 3     order for DME Equipment being ordered: DME  Cam walker 1 Device 0     Wheat Dextrin (BENEFIBER  "DRINK MIX PO)          Allergies   Allergen Reactions     Bactrim [Sulfamethoxazole W/Trimethoprim] Nausea and Vomiting     Lisinopril      bp increased       REVIEW OF SYSTEMS:  CONSTITUTIONAL:  NEGATIVE for fever, chills, change in weight  INTEGUMENTARY/SKIN:  NEGATIVE for worrisome rashes, moles or lesions  EYES:  NEGATIVE for vision changes or irritation  ENT/MOUTH:  NEGATIVE for ear, mouth and throat problems  RESP:  NEGATIVE for significant cough or SOB  BREAST:  NEGATIVE for masses, tenderness or discharge  CV:  NEGATIVE for chest pain, palpitations or peripheral edema  GI:  NEGATIVE for nausea, abdominal pain, heartburn, or change in bowel habits  :  Negative   MUSCULOSKELETAL:  See HPI above  NEURO:  NEGATIVE for weakness, dizziness or paresthesias  ENDOCRINE:  NEGATIVE for temperature intolerance, skin/hair changes  HEME/ALLERGY/IMMUNE:  NEGATIVE for bleeding problems  PSYCHIATRIC:  NEGATIVE for changes in mood or affect      PHYSICAL EXAM:  /70   Ht 1.499 m (4' 11\")   Wt 77.1 kg (170 lb)   BMI 34.34 kg/m     Body mass index is 34.34 kg/m .   GENERAL APPEARANCE: healthy, alert and no distress   HEENT: No apparent thyroid megaly. Clear sclera with normal ocular movement  RESPIRATORY: No labored breathing  SKIN: no suspicious lesions or rashes  NEURO: Normal strength and tone, mentation intact and speech normal  VASCULAR: Good pulses, and capillary refill   LYMPH: no lymphadenopathy   PSYCH:  mentation appears normal and affect normal/bright    MUSCULOSKELETAL:  Not in acute distress  Slight difficulty getting up from sitting  She walks with a limp  She has tendency of keeping the left leg extended when she walks  Obvious the swelling of the left knee without erythema  Swelling extends down to the ankle, left compared to right  She has painful left knee range of motion  Patellofemoral joint has moderate crepitus with a flexion extension, left  Significant pain related to patellofemoral compression " no medial lateral joint line tenderness  Intact ligaments  Though some pain in the posterior lateral hamstring structures, left knee    Right hand examination shows normal skin  Normal sensation  Palpable tenderness at the A1 pulley of the long finger  Obvious catching and locking of the right long finger  Circulation is intact    No other digits with catching or locking  Wrist range of motion is full       ASSESSMENT:  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  Pathophysiology and treatment options were thoroughly explained.  Informational materials provided.    We also had a long discussion as to what is going on with her knee.  Based on physical examination findings and x-ray findings, patellofemoral joint arthritic condition was felt to be mainly responsible for her symptoms.  We talked about the treatment options of further observation versus more consistent anti-inflammatory medication versus cortisone injection.    Since that she has not had an adequate trial of over-the-counter anti-inflammatory medication, I recommended her to take 3 tablets of Advil 3 times a day for the next 2 weeks.  If she does  not see any significant improvement with more consistent anti-inflammatory medication, we will consider cortisone injection to the knee.  In the meantime she will consider what to do about trigger finger phenomenon specifically the surgical intervention that was mentioned in detail.  All the questions were answered.  We will see her back in 3 weeks for further discussion.    The x-rays of left knee from October 30, 2019 were visualized.  Findings were explained.            Imaging Interpretation:     LEFT KNEE THREE VIEWS   10/30/2019 12:32 PM     HISTORY: Left knee pain, unspecified chronicity.     COMPARISON: None.                                                                       IMPRESSION: No fracture or acute abnormality is demonstrated. There is  mild joint space loss in the medial aspect of the patellofemoral  articulation. The remaining joint spaces are well preserved. There  appears to be chondrocalcinosis in the periphery of the body of the  medial compartment. No other abnormality is seen.      MD Lit CHAN MD  Department of Orthopedic Surgery        Disclaimer: This note consists of symbols derived from keyboarding, dictation and/or voice recognition software. As a result, there may be errors in the script that have gone undetected. Please consider this when interpreting information found in this chart.      Again, thank you for allowing me to participate in the care of your patient.        Sincerely,        Lit Flowers MD

## 2019-11-07 NOTE — PATIENT INSTRUCTIONS
Patient Education     What is Trigger Finger?  Trigger finger is an inflammation of tissue inside your finger or thumb. It is also called tenosynovitis (ten-oh-sin-oh-VY-tis). Tendons (cordlike fibers that attach muscle to bone and allow you to bend the joints) become swollen. So does the synovium (a slick membrane that allows the tendons to move easily). This makes it hard to straighten the finger or thumb.    Causes  Repeated use of a tool with strong gripping, such as a drill or wrench, can irritate and inflame the tendons and the synovium. It is also more common in certain medical conditions, such as rheumatoid arthritis, gout, and diabetes. But often the cause of trigger finger is unknown.  Inside your finger  Tendons connect muscles in your forearm to the bones in your fingers. The tendons in each finger are surrounded by a protective tendon sheath. This sheath is lined with synovium, which produces a fluid that allows the tendons to slide easily when you bend and straighten the finger. If a tendon is irritated, it becomes inflamed.  When a tendon is inflamed  When a tendon is inflamed, it causes the lining of the tendon sheath to swell and thicken. Or the tendon itself may thicken. Then the sheath pinches the tendon. The tendon can then no longer slide easily inside the sheath. When you straighten your finger, the tendon sticks or  locks  as it tries to squeeze back through the sheath.    Symptoms  The first sign of trigger finger may be pain where the finger or thumb joins the palm. You may also notice some swelling. As the tendon becomes more inflamed, the finger may start to catch when you try to straighten or bend it. When the locked tendon releases, the finger jumps, as if you were releasing the trigger of a gun. This further irritates the tendon. It may set up a cycle of catching and swelling.   Date Last Reviewed: 1/1/2018 2000-2018 [employer]. [address] [facility], [place]  [address]. All rights reserved. This information is not intended as a substitute for professional medical care. Always follow your healthcare professional's instructions.           Patient Education     Treating Trigger Finger    Trigger finger occurs when the tissue inside your finger or thumb becomes inflamed. Mild cases can be treated without surgery. If the problem is severe, surgery may be needed. Your healthcare provider will talk with you about your options.  Nonsurgical treatment  For mild symptoms, your healthcare provider may have you rest the finger or thumb. You may also be told to take anti-inflammatory medicines. These include ibuprofen or aspirin. You may be given an injection of medicine in the base of the finger or thumb. This typically is a steroid, such as cortisone.  Surgery  If nonsurgical treatments don t ease your symptoms, you may need surgery. A tendon is a cordlike fiber that attaches muscle to bone and allows joints to bend. The tendon is surrounded by a protective cover called a sheath. During surgery, the sheath in your finger or thumb is opened to enlarge the space and release the swollen tendon. This allows the finger or thumb to bend and straighten normally. Surgery takes about 20 minutes. It can often be done using a local anesthetic. You may also be sedated. You will likely be able to go home the same day. Your hand will be wrapped in a soft bandage. You may need to wear a plaster splint for a short time to keep the finger or thumb still as it heals. The stitches will be removed in about 2 weeks. Your healthcare provider will talk with you about the risks and benefits of surgery.  Date Last Reviewed: 1/1/2018 2000-2018 [employer]. [address] [place] [address], [place] [address]. All rights reserved. This information is not intended as a substitute for professional medical care. Always follow your healthcare professional's instructions.         We discussed the problem of  "arthritis today. Arthritis means that the joint surfaces that were once smooth are getting rough. When the rough joint surfaces are loaded and gliding, you often have pain, swelling, catching/popping, and locking type of symptoms. It can be episodic or constant. Even though the process is slow developing and chronic in nature, the symptoms can come on rather suddenly sometimes triggered by an injury or at other times without any identifiable event.  Typically, even with arthritis, most people can  function quite well with a common sense management which include: activity modifications, OTC medications (e.g.. Acetaminophen and Ibuprofen or Naproxen), icing, stretching and muscle strengthening. In general, staying active helps because it will help maintaining joint flexibility and muscle strength although \"overdoing\" and doing repetitively loading activities could worsen the symptoms. If you carry extra weight, losing weight should be a part of management of arthritis. I recommend diet along with gentle aerobic exercises such as walking, elliptical, swimming and stationary biking. Activities like jumping, pivoting, squatting, lunging, stair climbing, and kneeling are better to be avoided.  You can also try over- the- counter braces (especially for the knee arthritis) but since the problem is an internal issue, it is not always helpful.  Formal PT is not always necessary but can be helpful if you are not sure about what exercises to do. A discussion with a dietician can be very helpful if you decided to include weight loss as a part of the treatment.  If these measures are not effective, we often resort to the injection treatment, either cortisone or viscous joint supplement. It has been shown that viscous joint injections are not very effective if arthritis is advanced and at this point is approved only for knee arthritis. The potential benefit from injections are not permanent and for that reason they can be repeated " at a reasonable frequency. The duration of benefit is impossible to predict. Sometimes, it does not provide any noticeable benefit at all.  As you can imagine, surgical intervention is not the first line of management. It is important to remember that even with surgery one cannot cure arthritis unless joint replacement is considered. As was the case with the injection treatment, the response from arthroscopic surgeries  is unpredictable since we cannot make the joint surfaces back to perfectly smooth. It would be an option for someone who has tried all appropriate non-operative treatment modalities and is not quite ready for replacement type of permanent procedure. Arthroscopic procedures are more applicable to the knees, the shoulders and the ankles as opposed to the hips and fingers.

## 2019-11-14 DIAGNOSIS — R73.9 HYPERGLYCEMIA: ICD-10-CM

## 2019-11-14 LAB — HBA1C MFR BLD: 6.5 % (ref 0–5.6)

## 2019-11-14 PROCEDURE — 36415 COLL VENOUS BLD VENIPUNCTURE: CPT | Performed by: INTERNAL MEDICINE

## 2019-11-14 PROCEDURE — 83036 HEMOGLOBIN GLYCOSYLATED A1C: CPT | Performed by: INTERNAL MEDICINE

## 2019-12-02 ENCOUNTER — OFFICE VISIT (OUTPATIENT)
Dept: CARDIOLOGY | Facility: CLINIC | Age: 74
End: 2019-12-02
Payer: COMMERCIAL

## 2019-12-02 VITALS
BODY MASS INDEX: 33.87 KG/M2 | SYSTOLIC BLOOD PRESSURE: 118 MMHG | HEART RATE: 74 BPM | OXYGEN SATURATION: 96 % | HEIGHT: 59 IN | WEIGHT: 168 LBS | DIASTOLIC BLOOD PRESSURE: 74 MMHG

## 2019-12-02 DIAGNOSIS — I10 ESSENTIAL HYPERTENSION, BENIGN: ICD-10-CM

## 2019-12-02 DIAGNOSIS — E11.9 TYPE 2 DIABETES MELLITUS WITHOUT COMPLICATION, WITHOUT LONG-TERM CURRENT USE OF INSULIN (H): ICD-10-CM

## 2019-12-02 DIAGNOSIS — E78.5 HYPERLIPIDEMIA LDL GOAL <70: ICD-10-CM

## 2019-12-02 DIAGNOSIS — Z95.5 HISTORY OF PLACEMENT OF STENT IN LAD CORONARY ARTERY: ICD-10-CM

## 2019-12-02 DIAGNOSIS — I25.10 ASCVD (ARTERIOSCLEROTIC CARDIOVASCULAR DISEASE): ICD-10-CM

## 2019-12-02 DIAGNOSIS — E88.810 METABOLIC SYNDROME: ICD-10-CM

## 2019-12-02 DIAGNOSIS — I47.10 PAROXYSMAL SUPRAVENTRICULAR TACHYCARDIA (H): ICD-10-CM

## 2019-12-02 DIAGNOSIS — I25.118 CORONARY ARTERY DISEASE OF NATIVE ARTERY OF NATIVE HEART WITH STABLE ANGINA PECTORIS (H): Primary | ICD-10-CM

## 2019-12-02 DIAGNOSIS — R00.2 HEART PALPITATIONS: ICD-10-CM

## 2019-12-02 PROCEDURE — 99214 OFFICE O/P EST MOD 30 MIN: CPT | Performed by: INTERNAL MEDICINE

## 2019-12-02 ASSESSMENT — MIFFLIN-ST. JEOR: SCORE: 1172.92

## 2019-12-02 NOTE — PROGRESS NOTES
HPI and Plan:   See dictation:[id number]    Orders Placed This Encounter   Procedures     Follow-Up with Cardiologist       No orders of the defined types were placed in this encounter.      Medications Discontinued During This Encounter   Medication Reason     order for DME Medication Reconciliation Clean Up         Encounter Diagnoses   Name Primary?     Coronary artery disease of native artery of native heart with stable angina pectoris (H) Yes     ASCVD (arteriosclerotic cardiovascular disease)      History of placement of stent in LAD coronary artery      Heart palpitations      Paroxysmal supraventricular tachycardia (H)      Metabolic syndrome      Essential hypertension, benign      Hyperlipidemia LDL goal <70      Type 2 diabetes mellitus without complication, without long-term current use of insulin (H)        CURRENT MEDICATIONS:  Current Outpatient Medications   Medication Sig Dispense Refill     ACCU-CHEK MULTICLIX LANCETS MISC 3 times daily. BG testing 3 times a day (Patient taking differently: daily 3 times daily. BG testing 3 times a day) 100 each 11     alpha-d-galactosidase (BEANO) tablet Take by mouth 3 times daily (before meals)       amLODIPine (NORVASC) 5 MG tablet Take 1 tablet (5 mg) by mouth daily 90 tablet 3     aspirin EC 81 MG EC tablet Take 1 tablet (81 mg) by mouth daily 90 tablet 3     atorvastatin (LIPITOR) 40 MG tablet Take 1 tablet (40 mg) by mouth daily 90 tablet 3     Blood Glucose Calibration (ACCU-CHEK VI) SOLN Use as directed 3 Bottle 11     Blood Glucose Monitoring Suppl (ACCU-CHEK VI) KIT 2 times daily. With routine lancets as well as alcohol swabs, dispense 1 month (Patient taking differently: daily With routine lancets as well as alcohol swabs, dispense 1 month) 1 kit 0     glucose blood VI test strips strip Accucheck Vi Plus  3 times daily (Patient taking differently: daily Accucheck Vi Plus  3 times daily) 300 strip 11     metFORMIN (GLUCOPHAGE) 500 MG tablet Take  1 tablet (500 mg) by mouth daily (with dinner) 90 tablet 3     metoprolol succinate ER (TOPROL XL) 50 MG 24 hr tablet Take 1 tablet (50 mg) by mouth daily 90 tablet 3     omeprazole (PRILOSEC) 20 MG DR capsule Take 1 capsule (20 mg) by mouth daily 90 capsule 3     Acetaminophen (TYLENOL PO)        docusate sodium (COLACE) 100 MG capsule Take 100 mg by mouth 2 times daily       nitroGLYcerin (NITROSTAT) 0.4 MG sublingual tablet DISSOLVE 1 TAB UNDER TONGUE AS NEEDED FOR CHEST PAIN. MAXIMUM IS 3 DOSES EVERY 5 MIN OVER 15 MINUTES (Patient not taking: Reported on 12/2/2019) 25 tablet 3     Wheat Dextrin (BENEFIBER DRINK MIX PO)          ALLERGIES     Allergies   Allergen Reactions     Bactrim [Sulfamethoxazole W/Trimethoprim] Nausea and Vomiting     Lisinopril      bp increased       PAST MEDICAL HISTORY:  Past Medical History:   Diagnosis Date     Acute upper respiratory infection 9/30/2002     Angina pectoris, unspecified (H) 11/23/2016     ASCVD (arteriosclerotic cardiovascular disease) 12/20/2016     CAD (coronary artery disease) 11/23/2016    sp proximal LAD stenting     Diabetes mellitus (H)      Enterocele 9/12/2011     Essential hypertension, benign      Fatty liver 6/4/2015    Based on CT scan done 5/23/15      GERD (gastroesophageal reflux disease) 10/13/2008     Heart palpitations      Hiatal hernia      Hyperlipidemia LDL goal <70 12/20/2016     Metabolic syndrome 12/1/2011     Other pulmonary embolism and infarction 9/30/2002    after starting hormone therapy     PAC (premature atrial contraction)      Paroxysmal supraventricular tachycardia (H) 12/20/2016     Rectocele 10/15/2007       PAST SURGICAL HISTORY:  Past Surgical History:   Procedure Laterality Date     ABDOMEN SURGERY      Bladder sling     C NONSPECIFIC PROCEDURE  2009    [place] mesh augmented anterior colporrhaphy and vault suspension, tension-free vaginal tape sling with a transobturator approach usingthe Obtryx device and cystoscopy.        COLONOSCOPY      Scheduled 2016     COLPORRHAPY POSTERIOR, CYSTOSCOPY, COMBINED  2011    Procedure:COMBINED COLPORRHAPY POSTERIOR, CYSTOSCOPY; POSTERIOR MESH AUGMENTED REPAIR WITH ELEVATE MESH , cystoscopy; Surgeon:ANDRE UP; Location:[facility] OR     DILATION AND CURETTAGE, HYSTEROSCOPY DIAGNOSTIC, COMBINED N/A 10/4/2016    Procedure: COMBINED DILATION AND CURETTAGE, HYSTEROSCOPY DIAGNOSTIC;  Surgeon: Andre Up MD;  Location: [facility] OR     HC DILATION/CURETTAGE DIAG/THER NON OB      after sab     HC LEFT HEART CATHETERIZATION  16    PCI with drug-eluting stent placement in the proximal LAD     HC TOOTH EXTRACTION W/FORCEP         FAMILY HISTORY:  Family History   Problem Relation Age of Onset     Gynecology Daughter      Heart Disease Father         D AGE 50     Coronary Artery Disease Father      Hyperlipidemia Father      Heart Disease Mother         D AGE 70     Diabetes Mother      Coronary Artery Disease Mother      Hypertension Mother      Hyperlipidemia Mother      Heart Disease Brother         B AGE 52 HEART SURGERY     Family History Negative Brother         B AGE 47     Hypertension Brother      Cerebrovascular Disease Sister         B AGE 54 BLOOD CLOTS     Family History Negative Sister         B AGE 60     Depression Sister        SOCIAL HISTORY:  Social History     Socioeconomic History     Marital status:      Spouse name: None     Number of children: None     Years of education: None     Highest education level: None   Occupational History     None   Social Needs     Financial resource strain: None     Food insecurity:     Worry: None     Inability: None     Transportation needs:     Medical: None     Non-medical: None   Tobacco Use     Smoking status: Former Smoker     Packs/day: 0.75     Years: 20.00     Pack years: 15.00     Last attempt to quit: 1987     Years since quittin.0     Smokeless tobacco: Never Used   Substance and Sexual Activity     Alcohol use:  Yes     Comment: 2 per month     Drug use: No     Sexual activity: Never     Partners: Male     Birth control/protection: Post-menopausal   Lifestyle     Physical activity:     Days per week: None     Minutes per session: None     Stress: None   Relationships     Social connections:     Talks on phone: None     Gets together: None     Attends [employer] service: None     Active member of club or organization: None     Attends meetings of clubs or organizations: None     Relationship status: None     Intimate partner violence:     Fear of current or ex partner: None     Emotionally abused: None     Physically abused: None     Forced sexual activity: None   Other Topics Concern     Parent/sibling w/ CABG, MI or angioplasty before 65F 55M? Yes     Comment: Father, brother      Service Not Asked     Blood Transfusions Not Asked     Caffeine Concern No     Comment: decaf coffee      Occupational Exposure Not Asked     Hobby Hazards Not Asked     Sleep Concern Not Asked     Stress Concern Not Asked     Weight Concern Not Asked     Special Diet No     Back Care Not Asked     Exercise No     Comment: cardio rehab     Bike Helmet Not Asked     Seat Belt Not Asked     Self-Exams Not Asked   Social History Narrative     None       Review of Systems:  Skin:  Negative   dry    Eyes:  Positive for glasses    ENT:  Negative nasal congestion    Respiratory:  Negative shortness of breath     Cardiovascular:  Negative;Negative for;palpitations;chest pain;fatigue;lightheadedness;dizziness edema;palpitations lower left leg   Gastroenterology: Positive for reflux controlled with meds   Genitourinary:  Negative      Musculoskeletal:  Negative back pain;joint pain left knee ankle   Neurologic:  Positive for numbness or tingling of feet toe on R foot has been numb  Psychiatric:  Positive for excessive stress;anxiety;depression    Heme/Lymph/Imm:  Negative easy bruising    Endocrine:  Positive for diabetes      Physical  "Exam:  Vitals: /74 (BP Location: Right arm, Patient Position: Sitting, Cuff Size: Adult Large)   Pulse 74   Ht 1.499 m (4' 11.02\")   Wt 76.2 kg (168 lb)   SpO2 96%   BMI 33.91 kg/m      Constitutional:  cooperative, alert and oriented, well developed, well nourished, in no acute distress overweight      Skin:  warm and dry to the touch, no apparent skin lesions or masses noted          Head:  normocephalic, no masses or lesions        Eyes:  pupils equal and round;conjunctivae and lids unremarkable;sclera white;no xanthalasma;EOMS intact        Lymph:      ENT:  no pallor or cyanosis, dentition good        Neck:  carotid pulses are full and equal bilaterally, JVP normal, no carotid bruit        Respiratory:  normal breath sounds, clear to auscultation, normal A-P diameter, normal symmetry, normal respiratory excursion, no use of accessory muscles         Cardiac: regular rhythm, normal S1/S2, no S3 or S4, apical impulse not displaced, no murmurs, gallops or rubs                pulses full and equal, no bruits auscultated                                   left radial artery clean, dry and intact without bruit or hematoma. right femoral access clean, dry and intact without bruit or hematoma. CMS intact    GI:  abdomen soft, non-tender, BS normoactive, no mass, no HSM, no bruits        Extremities and Muscular Skeletal:  no deformities, clubbing, cyanosis, erythema observed;no edema         right forearm pain    Neurological:  no gross motor deficits;affect appropriate        Psych:  Alert and Oriented x 3        CC  Klever Vega MD  [address] [place] [id number]              "

## 2019-12-02 NOTE — PROGRESS NOTES
Service Date: 12/02/2019      PRIMARY CARE PHYSICIAN:  Klever Vega MD      HISTORY OF PRESENT ILLNESS:  I again had the pleasure of seeing your patient, Shantelle Su, at [facility] in [place] for evaluation of coronary artery disease.  The patient is a delightful 74-year-old female, status post intracoronary stenting of her LAD 11/23/2016 after an abnormal stress test.  She has a history of PAD with an ejection fraction generally of 50%-55% with grade I diastolic dysfunction.  Stress echocardiogram showed ischemia in the anteroseptal and apical wall accompanied by 4/10 chest burning.  On 11/23/2016, she was found to have a 50% LAD stenosis followed by an 80% stenosis and a mid vessel 40%-50% stenosis.  Two hours after stent placement, there was evidence of an acute myocardial infarction.  OCT was performed showing a proximal stent edge dissection.  Thrombectomy was performed and an additional overlapping drug-eluting stent was placed near the ostium of the LAD.  The patient has done well since that time without recurrent angina.  She denies palpitations, syncope or presyncope.  She is not exercising due to left knee and ankle arthritis pain and swelling since September.  She was doing some modest walking prior to that.  She denies angina or shortness of breath.  She was hospitalized with chest pain and shortness of breath in May of this year with a CT scan of her chest, ruling out pulmonary emboli.  She did have evidence of a fatty liver.  This was discussed with her today.  Her most recent lipid profile dated 05/08/2019 shows total cholesterol 163, HDL 65, LDL 69, triglycerides 147.  A nodule in her neck was found and TSH was 5.23 but normal free T4.  Hemoglobin A1c on 11/14 was 6.5%.  No other hospitalizations or surgery.      PHYSICAL EXAMINATION:  As listed below.      ASSESSMENT/PLAN:   1.  Shantelle Su is a delightful 74-year-old female, status post LAD angioplasty and stenting with  acute in-stent thrombosis requiring a second drug-eluting stent placed for edge dissection.  The patient has some back discomfort that prompted a stress echocardiogram 2017.  This was normal without evidence of ischemia.  We would not repeat this stress echocardiogram for 3-4 years if she remains angina-free.   2.  Hyperlipidemia, currently well-controlled.  Triglycerides are now within normal limits.  Her fatty liver may be secondary to diabetes and hypertriglyceridemia in the past.  We discussed this at length today and I suggested good control of her diabetes, exercise and weight loss.   3.  History of premature atrial contractions which are currently well-controlled.      It is my pleasure to assist in the care of Rodney Hoyos.  All her questions were answered to her satisfaction.  Her  was in attendance today.  It is my plan to see her again in 1 year or earlier on a p.r.n. basis.      Lilly Brian MD      cc:   [place] [address]         LILLY BRIAN MD, FACC             D: 2019   T: 2019   MT: al      Name:     RODNEY HOYOS   MRN:      [id number]        Account:      [id number]   :      1945           Service Date: 2019      Document: [id number]

## 2019-12-02 NOTE — LETTER
12/2/2019      Klever Vega MD  [address] [facility] [address]      RE: Shantelle Su       Dear Colleague,    I had the pleasure of seeing Shantelle Su in the [facility].    Service Date: 12/02/2019      PRIMARY CARE PHYSICIAN:  Klever Vega MD      HISTORY OF PRESENT ILLNESS:  I again had the pleasure of seeing your patient, Shantelle Su, at [facility] in [place] for evaluation of coronary artery disease.  The patient is a delightful 74-year-old female, status post intracoronary stenting of her LAD 11/23/2016 after an abnormal stress test.  She has a history of PAD with an ejection fraction generally of 50%-55% with grade I diastolic dysfunction.  Stress echocardiogram showed ischemia in the anteroseptal and apical wall accompanied by 4/10 chest burning.  On 11/23/2016, she was found to have a 50% LAD stenosis followed by an 80% stenosis and a mid vessel 40%-50% stenosis.  Two hours after stent placement, there was evidence of an acute myocardial infarction.  OCT was performed showing a proximal stent edge dissection.  Thrombectomy was performed and an additional overlapping drug-eluting stent was placed near the ostium of the LAD.  The patient has done well since that time without recurrent angina.  She denies palpitations, syncope or presyncope.  She is not exercising due to left knee and ankle arthritis pain and swelling since September.  She was doing some modest walking prior to that.  She denies angina or shortness of breath.  She was hospitalized with chest pain and shortness of breath in May of this year with a CT scan of her chest, ruling out pulmonary emboli.  She did have evidence of a fatty liver.  This was discussed with her today.  Her most recent lipid profile dated 05/08/2019 shows total cholesterol 163, HDL 65, LDL 69, triglycerides 147.  A nodule in her neck was found and TSH was 5.23 but normal free T4.  Hemoglobin A1c on   was 6.5%.  No other hospitalizations or surgery.      PHYSICAL EXAMINATION:  As listed below.      ASSESSMENT/PLAN:   1.  Rodney Hoyos is a delightful 74-year-old female, status post LAD angioplasty and stenting with acute in-stent thrombosis requiring a second drug-eluting stent placed for edge dissection.  The patient has some back discomfort that prompted a stress echocardiogram 2017.  This was normal without evidence of ischemia.  We would not repeat this stress echocardiogram for 3-4 years if she remains angina-free.   2.  Hyperlipidemia, currently well-controlled.  Triglycerides are now within normal limits.  Her fatty liver may be secondary to diabetes and hypertriglyceridemia in the past.  We discussed this at length today and I suggested good control of her diabetes, exercise and weight loss.   3.  History of premature atrial contractions which are currently well-controlled.      It is my pleasure to assist in the care of Rodney Hoyos.  All her questions were answered to her satisfaction.  Her  was in attendance today.  It is my plan to see her again in 1 year or earlier on a p.r.n. basis.      Lilly Brian MD      cc:   [place] [address]         LILLY BRIAN MD, [facility]             D: 2019   T: 2019   MT: al      Name:     RODNEY HOYOS   MRN:      [id number]        Account:      [id number]   :      1945           Service Date: 2019      Document: [id number]         Outpatient Encounter Medications as of 2019   Medication Sig Dispense Refill     ACCU-CHEK MULTICLIX LANCETS MISC 3 times daily. BG testing 3 times a day (Patient taking differently: daily 3 times daily. BG testing 3 times a day) 100 each 11     alpha-d-galactosidase (BEANO) tablet Take by mouth 3 times daily (before meals)       amLODIPine (NORVASC) 5 MG tablet Take 1 tablet (5 mg) by mouth daily 90 tablet 3     aspirin  EC 81 MG EC tablet Take 1 tablet (81 mg) by mouth daily 90 tablet 3     atorvastatin (LIPITOR) 40 MG tablet Take 1 tablet (40 mg) by mouth daily 90 tablet 3     Blood Glucose Calibration (ACCU-CHEK VI) SOLN Use as directed 3 Bottle 11     Blood Glucose Monitoring Suppl (ACCU-CHEK VI) KIT 2 times daily. With routine lancets as well as alcohol swabs, dispense 1 month (Patient taking differently: daily With routine lancets as well as alcohol swabs, dispense 1 month) 1 kit 0     glucose blood VI test strips strip Accucheck Vi Plus  3 times daily (Patient taking differently: daily Accucheck Vi Plus  3 times daily) 300 strip 11     metFORMIN (GLUCOPHAGE) 500 MG tablet Take 1 tablet (500 mg) by mouth daily (with dinner) 90 tablet 3     metoprolol succinate ER (TOPROL XL) 50 MG 24 hr tablet Take 1 tablet (50 mg) by mouth daily 90 tablet 3     omeprazole (PRILOSEC) 20 MG DR capsule Take 1 capsule (20 mg) by mouth daily 90 capsule 3     Acetaminophen (TYLENOL PO)        docusate sodium (COLACE) 100 MG capsule Take 100 mg by mouth 2 times daily       nitroGLYcerin (NITROSTAT) 0.4 MG sublingual tablet DISSOLVE 1 TAB UNDER TONGUE AS NEEDED FOR CHEST PAIN. MAXIMUM IS 3 DOSES EVERY 5 MIN OVER 15 MINUTES (Patient not taking: Reported on 12/2/2019) 25 tablet 3     Wheat Dextrin (BENEFIBER DRINK MIX PO)        [DISCONTINUED] order for DME Equipment being ordered: DME  Cam walker 1 Device 0     No facility-administered encounter medications on file as of 12/2/2019.        Again, thank you for allowing me to participate in the care of your patient.      Sincerely,    Gee Lozada MD     [facility]

## 2019-12-02 NOTE — LETTER
12/2/2019    Klever Vega MD  [address] [facility] [id number]    RE: Shantelle Su       Dear Colleague,    I had the pleasure of seeing Shantelle Su in the [facility].    HPI and Plan:   See dictation:[id number]    Orders Placed This Encounter   Procedures     Follow-Up with Cardiologist       No orders of the defined types were placed in this encounter.      Medications Discontinued During This Encounter   Medication Reason     order for DME Medication Reconciliation Clean Up         Encounter Diagnoses   Name Primary?     Coronary artery disease of native artery of native heart with stable angina pectoris (H) Yes     ASCVD (arteriosclerotic cardiovascular disease)      History of placement of stent in LAD coronary artery      Heart palpitations      Paroxysmal supraventricular tachycardia (H)      Metabolic syndrome      Essential hypertension, benign      Hyperlipidemia LDL goal <70      Type 2 diabetes mellitus without complication, without long-term current use of insulin (H)        CURRENT MEDICATIONS:  Current Outpatient Medications   Medication Sig Dispense Refill     ACCU-CHEK MULTICLIX LANCETS MISC 3 times daily. BG testing 3 times a day (Patient taking differently: daily 3 times daily. BG testing 3 times a day) 100 each 11     alpha-d-galactosidase (BEANO) tablet Take by mouth 3 times daily (before meals)       amLODIPine (NORVASC) 5 MG tablet Take 1 tablet (5 mg) by mouth daily 90 tablet 3     aspirin EC 81 MG EC tablet Take 1 tablet (81 mg) by mouth daily 90 tablet 3     atorvastatin (LIPITOR) 40 MG tablet Take 1 tablet (40 mg) by mouth daily 90 tablet 3     Blood Glucose Calibration (ACCU-CHEK VI) SOLN Use as directed 3 Bottle 11     Blood Glucose Monitoring Suppl (ACCU-CHEK VI) KIT 2 times daily. With routine lancets as well as alcohol swabs, dispense 1 month (Patient taking differently: daily With routine lancets as well as alcohol swabs, dispense 1  month) 1 kit 0     glucose blood VI test strips strip Accucheck Kristine Plus  3 times daily (Patient taking differently: daily Accucheck Kristine Plus  3 times daily) 300 strip 11     metFORMIN (GLUCOPHAGE) 500 MG tablet Take 1 tablet (500 mg) by mouth daily (with dinner) 90 tablet 3     metoprolol succinate ER (TOPROL XL) 50 MG 24 hr tablet Take 1 tablet (50 mg) by mouth daily 90 tablet 3     omeprazole (PRILOSEC) 20 MG DR capsule Take 1 capsule (20 mg) by mouth daily 90 capsule 3     Acetaminophen (TYLENOL PO)        docusate sodium (COLACE) 100 MG capsule Take 100 mg by mouth 2 times daily       nitroGLYcerin (NITROSTAT) 0.4 MG sublingual tablet DISSOLVE 1 TAB UNDER TONGUE AS NEEDED FOR CHEST PAIN. MAXIMUM IS 3 DOSES EVERY 5 MIN OVER 15 MINUTES (Patient not taking: Reported on 12/2/2019) 25 tablet 3     Wheat Dextrin (BENEFIBER DRINK MIX PO)          ALLERGIES     Allergies   Allergen Reactions     Bactrim [Sulfamethoxazole W/Trimethoprim] Nausea and Vomiting     Lisinopril      bp increased       PAST MEDICAL HISTORY:  Past Medical History:   Diagnosis Date     Acute upper respiratory infection 9/30/2002     Angina pectoris, unspecified (H) 11/23/2016     ASCVD (arteriosclerotic cardiovascular disease) 12/20/2016     CAD (coronary artery disease) 11/23/2016    sp proximal LAD stenting     Diabetes mellitus (H)      Enterocele 9/12/2011     Essential hypertension, benign      Fatty liver 6/4/2015    Based on CT scan done 5/23/15      GERD (gastroesophageal reflux disease) 10/13/2008     Heart palpitations      Hiatal hernia      Hyperlipidemia LDL goal <70 12/20/2016     Metabolic syndrome 12/1/2011     Other pulmonary embolism and infarction 9/30/2002    after starting hormone therapy     PAC (premature atrial contraction)      Paroxysmal supraventricular tachycardia (H) 12/20/2016     Rectocele 10/15/2007       PAST SURGICAL HISTORY:  Past Surgical History:   Procedure Laterality Date     ABDOMEN SURGERY       Bladder sling     C NONSPECIFIC PROCEDURE  2009    [place] mesh augmented anterior colporrhaphy and vault suspension, tension-free vaginal tape sling with a transobturator approach usingthe Obtryx device and cystoscopy.       COLONOSCOPY      Scheduled October 2016     COLPORRHAPY POSTERIOR, CYSTOSCOPY, COMBINED  12/7/2011    Procedure:COMBINED COLPORRHAPY POSTERIOR, CYSTOSCOPY; POSTERIOR MESH AUGMENTED REPAIR WITH ELEVATE MESH , cystoscopy; Surgeon:ANDRE UP; Location:[facility] OR     DILATION AND CURETTAGE, HYSTEROSCOPY DIAGNOSTIC, COMBINED N/A 10/4/2016    Procedure: COMBINED DILATION AND CURETTAGE, HYSTEROSCOPY DIAGNOSTIC;  Surgeon: Andre Up MD;  Location: [facility] OR     HC DILATION/CURETTAGE DIAG/THER NON OB      after sab     HC LEFT HEART CATHETERIZATION  11/23/16    PCI with drug-eluting stent placement in the proximal LAD     HC TOOTH EXTRACTION W/FORCEP         FAMILY HISTORY:  Family History   Problem Relation Age of Onset     Gynecology Daughter      Heart Disease Father         D AGE 50     Coronary Artery Disease Father      Hyperlipidemia Father      Heart Disease Mother         D AGE 70     Diabetes Mother      Coronary Artery Disease Mother      Hypertension Mother      Hyperlipidemia Mother      Heart Disease Brother         B AGE 52 HEART SURGERY     Family History Negative Brother         B AGE 47     Hypertension Brother      Cerebrovascular Disease Sister         B AGE 54 BLOOD CLOTS     Family History Negative Sister         B AGE 60     Depression Sister        SOCIAL HISTORY:  Social History     Socioeconomic History     Marital status:      Spouse name: None     Number of children: None     Years of education: None     Highest education level: None   Occupational History     None   Social Needs     Financial resource strain: None     Food insecurity:     Worry: None     Inability: None     Transportation needs:     Medical: None     Non-medical: None   Tobacco Use     Smoking  status: Former Smoker     Packs/day: 0.75     Years: 20.00     Pack years: 15.00     Last attempt to quit: 1987     Years since quittin.0     Smokeless tobacco: Never Used   Substance and Sexual Activity     Alcohol use: Yes     Comment: 2 per month     Drug use: No     Sexual activity: Never     Partners: Male     Birth control/protection: Post-menopausal   Lifestyle     Physical activity:     Days per week: None     Minutes per session: None     Stress: None   Relationships     Social connections:     Talks on phone: None     Gets together: None     Attends Worship service: None     Active member of club or organization: None     Attends meetings of clubs or organizations: None     Relationship status: None     Intimate partner violence:     Fear of current or ex partner: None     Emotionally abused: None     Physically abused: None     Forced sexual activity: None   Other Topics Concern     Parent/sibling w/ CABG, MI or angioplasty before 65F 55M? Yes     Comment: Father, brother      Service Not Asked     Blood Transfusions Not Asked     Caffeine Concern No     Comment: decaf coffee      Occupational Exposure Not Asked     Hobby Hazards Not Asked     Sleep Concern Not Asked     Stress Concern Not Asked     Weight Concern Not Asked     Special Diet No     Back Care Not Asked     Exercise No     Comment: cardio rehab     Bike Helmet Not Asked     Seat Belt Not Asked     Self-Exams Not Asked   Social History Narrative     None       Review of Systems:  Skin:  Negative   dry    Eyes:  Positive for glasses    ENT:  Negative nasal congestion    Respiratory:  Negative shortness of breath     Cardiovascular:  Negative;Negative for;palpitations;chest pain;fatigue;lightheadedness;dizziness edema;palpitations lower left leg   Gastroenterology: Positive for reflux controlled with meds   Genitourinary:  Negative      Musculoskeletal:  Negative back pain;joint pain left knee ankle   Neurologic:  Positive  "for numbness or tingling of feet toe on R foot has been numb  Psychiatric:  Positive for excessive stress;anxiety;depression    Heme/Lymph/Imm:  Negative easy bruising    Endocrine:  Positive for diabetes      Physical Exam:  Vitals: /74 (BP Location: Right arm, Patient Position: Sitting, Cuff Size: Adult Large)   Pulse 74   Ht 1.499 m (4' 11.02\")   Wt 76.2 kg (168 lb)   SpO2 96%   BMI 33.91 kg/m       Constitutional:  cooperative, alert and oriented, well developed, well nourished, in no acute distress overweight      Skin:  warm and dry to the touch, no apparent skin lesions or masses noted          Head:  normocephalic, no masses or lesions        Eyes:  pupils equal and round;conjunctivae and lids unremarkable;sclera white;no xanthalasma;EOMS intact        Lymph:      ENT:  no pallor or cyanosis, dentition good        Neck:  carotid pulses are full and equal bilaterally, JVP normal, no carotid bruit        Respiratory:  normal breath sounds, clear to auscultation, normal A-P diameter, normal symmetry, normal respiratory excursion, no use of accessory muscles         Cardiac: regular rhythm, normal S1/S2, no S3 or S4, apical impulse not displaced, no murmurs, gallops or rubs                pulses full and equal, no bruits auscultated                                   left radial artery clean, dry and intact without bruit or hematoma. right femoral access clean, dry and intact without bruit or hematoma. CMS intact    GI:  abdomen soft, non-tender, BS normoactive, no mass, no HSM, no bruits        Extremities and Muscular Skeletal:  no deformities, clubbing, cyanosis, erythema observed;no edema         right forearm pain    Neurological:  no gross motor deficits;affect appropriate        Psych:  Alert and Oriented x 3        CC  Klever Vega MD  [address] [place] [id number]                Thank you for allowing me to participate in the care of your patient.      Sincerely,     Gee SILVERIO" MD Kierra     [facility]    cc:   Klever Vega MD  [address] [place] [id number]

## 2019-12-15 ENCOUNTER — HEALTH MAINTENANCE LETTER (OUTPATIENT)
Age: 74
End: 2019-12-15

## 2020-02-14 ENCOUNTER — TRANSFERRED RECORDS (OUTPATIENT)
Dept: HEALTH INFORMATION MANAGEMENT | Facility: CLINIC | Age: 75
End: 2020-02-14

## 2020-04-02 DIAGNOSIS — I10 ESSENTIAL HYPERTENSION, BENIGN: ICD-10-CM

## 2020-04-02 RX ORDER — AMLODIPINE BESYLATE 5 MG/1
TABLET ORAL
Qty: 90 TABLET | Refills: 1 | Status: SHIPPED | OUTPATIENT
Start: 2020-04-02 | End: 2020-10-02

## 2020-04-02 NOTE — TELEPHONE ENCOUNTER
"Requested Prescriptions   Pending Prescriptions Disp Refills     amLODIPine (NORVASC) 5 MG tablet [Pharmacy Med Name: AMLODIPINE BESYLATE 5 MG TAB] 90 tablet 3     Sig: TAKE 1 TABLET BY MOUTH EVERY DAY       Calcium Channel Blockers Protocol  Passed - 4/2/2020  2:45 PM        Passed - Blood pressure under 140/90 in past 12 months     BP Readings from Last 3 Encounters:   12/02/19 118/74   11/07/19 120/70   10/30/19 132/70                 Passed - Recent (12 mo) or future (30 days) visit within the authorizing provider's specialty     Patient has had an office visit with the authorizing provider or a provider within the authorizing providers department within the previous 12 mos or has a future within next 30 days. See \"Patient Info\" tab in inbasket, or \"Choose Columns\" in Meds & Orders section of the refill encounter.              Passed - Medication is active on med list        Passed - Patient is age 18 or older        Passed - No active pregnancy on record        Passed - Normal serum creatinine on file in past 12 months     Recent Labs   Lab Test 05/15/19  0828 12/05/18  1006   CR  --  0.69   CREAT 0.7  --        Ok to refill medication if creatinine is low          Passed - No positive pregnancy test in past 12 months             Prescription approved per [facility] Refill Protocol.;    Natalie WILSON, RN, PHN      "

## 2020-04-06 DIAGNOSIS — K21.9 GASTROESOPHAGEAL REFLUX DISEASE WITHOUT ESOPHAGITIS: ICD-10-CM

## 2020-04-06 NOTE — TELEPHONE ENCOUNTER
"Requested Prescriptions   Pending Prescriptions Disp Refills     omeprazole (PRILOSEC) 20 MG DR capsule 90 capsule 3     Sig: Take 1 capsule (20 mg) by mouth daily       PPI Protocol Passed - 4/6/2020 11:43 AM        Passed - Not on Clopidogrel (unless Pantoprazole ordered)        Passed - No diagnosis of osteoporosis on record        Passed - Recent (12 mo) or future (30 days) visit within the authorizing provider's specialty     Patient has had an office visit with the authorizing provider or a provider within the authorizing providers department within the previous 12 mos or has a future within next 30 days. See \"Patient Info\" tab in inbasket, or \"Choose Columns\" in Meds & Orders section of the refill encounter.              Passed - Medication is active on med list        Passed - Patient is age 18 or older        Passed - No active pregnacy on record        Passed - No positive pregnancy test in past 12 months           Last Written Prescription Date:  5/02/2019  Last Fill Quantity: 90,  # refills: 3   Last Office Visit: 10/30/2019   Future Office Visit:       "

## 2020-05-04 DIAGNOSIS — E78.5 HYPERLIPIDEMIA LDL GOAL <130: ICD-10-CM

## 2020-05-04 RX ORDER — ATORVASTATIN CALCIUM 40 MG/1
TABLET, FILM COATED ORAL
Qty: 90 TABLET | Refills: 1 | Status: SHIPPED | OUTPATIENT
Start: 2020-05-04 | End: 2020-10-29

## 2020-05-06 ENCOUNTER — TRANSFERRED RECORDS (OUTPATIENT)
Dept: HEALTH INFORMATION MANAGEMENT | Facility: CLINIC | Age: 75
End: 2020-05-06

## 2020-05-06 LAB — RETINOPATHY: NEGATIVE

## 2020-05-28 ENCOUNTER — OFFICE VISIT (OUTPATIENT)
Dept: ORTHOPEDICS | Facility: CLINIC | Age: 75
End: 2020-05-28
Payer: COMMERCIAL

## 2020-05-28 VITALS
BODY MASS INDEX: 33.87 KG/M2 | DIASTOLIC BLOOD PRESSURE: 64 MMHG | SYSTOLIC BLOOD PRESSURE: 104 MMHG | WEIGHT: 168 LBS | HEIGHT: 59 IN

## 2020-05-28 DIAGNOSIS — M17.12 PRIMARY OSTEOARTHRITIS OF LEFT KNEE: Primary | ICD-10-CM

## 2020-05-28 PROCEDURE — 20610 DRAIN/INJ JOINT/BURSA W/O US: CPT | Mod: LT | Performed by: ORTHOPAEDIC SURGERY

## 2020-05-28 PROCEDURE — 99213 OFFICE O/P EST LOW 20 MIN: CPT | Mod: 25 | Performed by: ORTHOPAEDIC SURGERY

## 2020-05-28 RX ORDER — LIDOCAINE HYDROCHLORIDE 10 MG/ML
4 INJECTION, SOLUTION INFILTRATION; PERINEURAL
Status: SHIPPED | OUTPATIENT
Start: 2020-05-28

## 2020-05-28 RX ORDER — TRAMADOL HYDROCHLORIDE 50 MG/1
TABLET ORAL EVERY 6 HOURS PRN
COMMUNITY
Start: 2020-02-23 | End: 2021-10-21

## 2020-05-28 RX ORDER — TESTOSTERONE CYPIONATE 200 MG/ML
2 INJECTION INTRAMUSCULAR
Status: SHIPPED | OUTPATIENT
Start: 2020-05-28

## 2020-05-28 RX ADMIN — TESTOSTERONE CYPIONATE 2 ML: 200 INJECTION INTRAMUSCULAR at 10:23

## 2020-05-28 RX ADMIN — LIDOCAINE HYDROCHLORIDE 4 ML: 10 INJECTION, SOLUTION INFILTRATION; PERINEURAL at 10:23

## 2020-05-28 ASSESSMENT — MIFFLIN-ST. JEOR: SCORE: 1167.98

## 2020-05-28 NOTE — PATIENT INSTRUCTIONS
1. Primary osteoarthritis of left knee      Steroid injection of the left knee was performed today in clinic    - Would not soak in a hot tub, bath or swimming pool for 48 hours  - Ok to shower  - Ice today and only do your normal amounts of activity  - The lidocaine (what is giving you pain relief right now) will likely stop working in 1-2 hours.  You will then have pain again, similar to before you received the injection.  The corticosteroid will not start working until approximately 1-2 weeks from now.  In a small percentage of people, cortisone can cause flushing/redness in the face. This usually lasts for 1-3 days and resolves. Cool compress and Ibuprofen/Tylenol can help if this happens.      Follow up for repeat injection when pain returns.

## 2020-05-28 NOTE — PROGRESS NOTES
"HISTORY OF PRESENT ILLNESS:    Shantelle Su is a 75 year old female who is seen in follow up for left knee pain.  She was last evaluated on 11/17/19, since her last office visit Shantelle traveled to [place] for the winter. While she was in [place] she noticed increased pain of the left knee. Pain is located in the anterior aspect of the knee, which is sharp, and posteriorly which is sharp, and burning. She notes while in [place] she was seen [facility] due to concerns for blood clot. She was having increased pain in her left lower leg, and also her knee. She obtained x-ray and venous duplex scan that showed baker's cyst of left knee. She was provided with oral prednisone taper, and Tramadol for pain.  Patient also bought a cane at the time, due to feeling unstable while walking, and difficulties WB on the left leg. She notes after she was seen at [facility] she stood up one morning, and had a severe sharp pain, causing her to see \"black\".  She then felt the sensation of liquid running down the anterior and posterior aspect of her shin, internally. She called [facility] who informed her that the baker's cyst had ruptured.  Office visit notes from [facility] available to reference in chart.   Present symptoms: Patient reports continued anterior knee pain that is sharp, pain in the posterior aspect of the knee that radiates to the lower leg, and into the distal thigh. She has swelling present in the left knee, and will dissipate to the lower leg and ankle throughout the day. She has increased pain at nighttime, and must position the left leg a certain way, and not move in order to sleep.  Increased pain with walking, unable to tolerate walking 1 block without stopping due to pain, she is unable to fully weight-bear, and uses cane for ambulation.  Increased pain with use of stairs.   Current pain level: 6/10, Worst pain level: 9/10   Treatments tried to this point: Tylenol, Ibuprofen, Tramadol PRN, completed " "Prednisone taper while in [place], compression wrapping, and elevation  A1C on 11/14/19: 6.5    Past Medical History: Unchanged from the visit of 11/7/19. Please refer to that note.    REVIEW OF SYSTEMS:  CONSTITUTIONAL:  NEGATIVE for fever, chills, change in weight  INTEGUMENTARY/SKIN:  NEGATIVE for worrisome rashes, moles or lesions  EYES:  NEGATIVE for vision changes or irritation  ENT/MOUTH:  NEGATIVE for ear, mouth and throat problems  RESP:  NEGATIVE for significant cough or SOB  BREAST:  NEGATIVE for masses, tenderness or discharge  CV:  hypertension, irregular heart beats, chest pains, CAD   GI:  reflux  :  Negative   MUSCULOSKELETAL:  See HPI above  NEURO:  paresthesias  ENDOCRINE:  NEGATIVE for temperature intolerance, skin/hair changes  HEME/ALLERGY/IMMUNE:  NEGATIVE for bleeding problems  PSYCHIATRIC:  NEGATIVE for changes in mood or affect    PHYSICAL EXAM:  /64 (BP Location: Right arm, Patient Position: Chair, Cuff Size: Adult Regular)   Ht 1.499 m (4' 11.02\")   Wt 76.2 kg (168 lb)   BMI 33.91 kg/m    Body mass index is 33.91 kg/m .   GENERAL APPEARANCE: healthy, alert and no distress   SKIN: no suspicious lesions or rashes  NEURO: Normal strength and tone, mentation intact and speech normal  VASCULAR:  good pulses, and cappillary refill   LYMPH: no lymphadenopathy   PSYCH:  mentation appears normal and affect normal/bright    MSK:  Not in acute distress  She now uses a cane  Some difficulty getting up from sitting  Walks with slight limp  Left knee and left calf is slightly swollen  No erythema noted  At least 5 degree lack of full flexion on the left compared to right  Most of pain is in the medial joint line  Patellofemoral joint is well-tolerated with palpation  Extensor mechanism is intact  Sensation is intact  Circulation is intact    IMAGING INTERPRETATION:    None taken today.      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  Slight medial joint space narrowing, mild bone spur and calcific changes of the medial meniscus were pointed out.  We went over the situation and information related to Baker's cyst and progression of osteoarthritis was thoroughly explained.  Most likely the swelling in the leg will improve gradually.  While she is having pain in the posterior aspect was also explained.  We talked about the options of further observation versus cortisone injection versus total knee replacement.  As a starting point, a cortisone injection at this point was felt to be very reasonable even though he may not provide adequate pain relief.  She agreed to have the cortisone injection.  She tolerated well.  Follow-up in 6 weeks if pain persist.  Continue with the Tylenol and icing.       Large Joint Injection/Arthocentesis: L knee joint    Date/Time: 5/28/2020 10:23 AM  Performed by: Lit Flowers MD  Authorized by: Lit Flowers MD     Indications:  Pain and osteoarthritis  Needle Size:  22 G  Guidance: landmark guided    Approach:  Anterolateral  Location:  Knee      Medications:  2 mL dexamethasone 120 MG/30ML; 4 mL lidocaine 1 %  Medications comment:  8mL of 1% Lidocaine was injected.  Outcome:  Tolerated well, no immediate complications  Procedure discussed: discussed risks, benefits, and alternatives    Consent Given by:  Patient  Timeout: timeout called immediately prior to procedure    Prep: patient was prepped and draped in usual sterile fashion          Lit Flowers MD  Dept. Orthopedic Surgery  [facility]       Disclaimer: This note consists of symbols derived from keyboarding, dictation and/or voice recognition software. As a result, there may be errors in the script that have gone undetected. Please consider this when interpreting information found in this chart.

## 2020-05-28 NOTE — LETTER
"    5/28/2020         RE: Shantelle Su  [address] [facility] [address]        Dear Colleague,    Thank you for referring your patient, Shantelle Su, to the [facility]. Please see a copy of my visit note below.    HISTORY OF PRESENT ILLNESS:    Shantelle Su is a 75 year old female who is seen in follow up for left knee pain.  She was last evaluated on 11/17/19, since her last office visit Shantelle traveled to [place] for the winter. While she was in [place] she noticed increased pain of the left knee. Pain is located in the anterior aspect of the knee, which is sharp, and posteriorly which is sharp, and burning. She notes while in [place] she was seen [facility] due to concerns for blood clot. She was having increased pain in her left lower leg, and also her knee. She obtained x-ray and venous duplex scan that showed baker's cyst of left knee. She was provided with oral prednisone taper, and Tramadol for pain.  Patient also bought a cane at the time, due to feeling unstable while walking, and difficulties WB on the left leg. She notes after she was seen at [facility] she stood up one morning, and had a severe sharp pain, causing her to see \"black\".  She then felt the sensation of liquid running down the anterior and posterior aspect of her shin, internally. She called [facility] who informed her that the baker's cyst had ruptured.  Office visit notes from [facility] available to reference in chart.   Present symptoms: Patient reports continued anterior knee pain that is sharp, pain in the posterior aspect of the knee that radiates to the lower leg, and into the distal thigh. She has swelling present in the left knee, and will dissipate to the lower leg and ankle throughout the day. She has increased pain at nighttime, and must position the left leg a certain way, and not move in order to sleep.  Increased pain with walking, unable to tolerate walking 1 block without " "stopping due to pain, she is unable to fully weight-bear, and uses cane for ambulation.  Increased pain with use of stairs.   Current pain level: 6/10, Worst pain level: 9/10   Treatments tried to this point: Tylenol, Ibuprofen, Tramadol PRN, completed Prednisone taper while in [place], compression wrapping, and elevation  A1C on 11/14/19: 6.5    Past Medical History: Unchanged from the visit of 11/7/19. Please refer to that note.    REVIEW OF SYSTEMS:  CONSTITUTIONAL:  NEGATIVE for fever, chills, change in weight  INTEGUMENTARY/SKIN:  NEGATIVE for worrisome rashes, moles or lesions  EYES:  NEGATIVE for vision changes or irritation  ENT/MOUTH:  NEGATIVE for ear, mouth and throat problems  RESP:  NEGATIVE for significant cough or SOB  BREAST:  NEGATIVE for masses, tenderness or discharge  CV:  hypertension, irregular heart beats, chest pains, CAD   GI:  reflux  :  Negative   MUSCULOSKELETAL:  See HPI above  NEURO:  paresthesias  ENDOCRINE:  NEGATIVE for temperature intolerance, skin/hair changes  HEME/ALLERGY/IMMUNE:  NEGATIVE for bleeding problems  PSYCHIATRIC:  NEGATIVE for changes in mood or affect    PHYSICAL EXAM:  /64 (BP Location: Right arm, Patient Position: Chair, Cuff Size: Adult Regular)   Ht 1.499 m (4' 11.02\")   Wt 76.2 kg (168 lb)   BMI 33.91 kg/m    Body mass index is 33.91 kg/m .   GENERAL APPEARANCE: healthy, alert and no distress   SKIN: no suspicious lesions or rashes  NEURO: Normal strength and tone, mentation intact and speech normal  VASCULAR:  good pulses, and cappillary refill   LYMPH: no lymphadenopathy   PSYCH:  mentation appears normal and affect normal/bright    MSK:  Not in acute distress  She now uses a cane  Some difficulty getting up from sitting  Walks with slight limp  Left knee and left calf is slightly swollen  No erythema noted  At least 5 degree lack of full flexion on the left compared to right  Most of pain is in the medial joint line  Patellofemoral joint is " well-tolerated with palpation  Extensor mechanism is intact  Sensation is intact  Circulation is intact    IMAGING INTERPRETATION:    None taken today.      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  Slight medial joint space narrowing, mild bone spur and calcific changes of the medial meniscus were pointed out.  We went over the situation and information related to Baker's cyst and progression of osteoarthritis was thoroughly explained.  Most likely the swelling in the leg will improve gradually.  While she is having pain in the posterior aspect was also explained.  We talked about the options of further observation versus cortisone injection versus total knee replacement.  As a starting point, a cortisone injection at this point was felt to be very reasonable even though he may not provide adequate pain relief.  She agreed to have the cortisone injection.  She tolerated well.  Follow-up in 6 weeks if pain persist.  Continue with the Tylenol and icing.       Large Joint Injection/Arthocentesis: L knee joint    Date/Time: 5/28/2020 10:23 AM  Performed by: Lit Flowers MD  Authorized by: Lit Flowers MD     Indications:  Pain and osteoarthritis  Needle Size:  22 G  Guidance: landmark guided    Approach:  Anterolateral  Location:  Knee      Medications:  2 mL dexamethasone 120 MG/30ML; 4 mL lidocaine 1 %  Medications comment:  8mL of 1% Lidocaine was injected.  Outcome:  Tolerated well, no immediate complications  Procedure discussed: discussed risks, benefits, and alternatives    Consent Given by:  Patient  Timeout: timeout called immediately prior to procedure    Prep: patient was prepped and draped in usual sterile fashion          Lit Flowers MD  Dept. Orthopedic Surgery  [facility]       Disclaimer: This  note consists of symbols derived from keyboarding, dictation and/or voice recognition software. As a result, there may be errors in the script that have gone undetected. Please consider this when interpreting information found in this chart.      Again, thank you for allowing me to participate in the care of your patient.        Sincerely,        Lit Flowers MD

## 2020-06-03 ENCOUNTER — TRANSFERRED RECORDS (OUTPATIENT)
Dept: HEALTH INFORMATION MANAGEMENT | Facility: CLINIC | Age: 75
End: 2020-06-03

## 2020-06-08 ENCOUNTER — TRANSFERRED RECORDS (OUTPATIENT)
Dept: HEALTH INFORMATION MANAGEMENT | Facility: CLINIC | Age: 75
End: 2020-06-08

## 2020-07-15 ENCOUNTER — OFFICE VISIT (OUTPATIENT)
Dept: INTERNAL MEDICINE | Facility: CLINIC | Age: 75
End: 2020-07-15
Payer: COMMERCIAL

## 2020-07-15 VITALS
TEMPERATURE: 97.3 F | SYSTOLIC BLOOD PRESSURE: 130 MMHG | RESPIRATION RATE: 16 BRPM | DIASTOLIC BLOOD PRESSURE: 74 MMHG | HEART RATE: 71 BPM | HEIGHT: 59 IN | OXYGEN SATURATION: 98 % | WEIGHT: 168 LBS | BODY MASS INDEX: 33.87 KG/M2

## 2020-07-15 DIAGNOSIS — I25.118 CORONARY ARTERY DISEASE OF NATIVE ARTERY OF NATIVE HEART WITH STABLE ANGINA PECTORIS (H): ICD-10-CM

## 2020-07-15 DIAGNOSIS — H57.9 EYE DISORDER: ICD-10-CM

## 2020-07-15 DIAGNOSIS — Z01.818 PREOP GENERAL PHYSICAL EXAM: Primary | ICD-10-CM

## 2020-07-15 DIAGNOSIS — I10 ESSENTIAL HYPERTENSION, BENIGN: ICD-10-CM

## 2020-07-15 DIAGNOSIS — E11.9 TYPE 2 DIABETES MELLITUS WITHOUT COMPLICATION, WITHOUT LONG-TERM CURRENT USE OF INSULIN (H): ICD-10-CM

## 2020-07-15 LAB
GLUCOSE SERPL-MCNC: 113 MG/DL (ref 70–99)
HBA1C MFR BLD: 6.8 % (ref 0–5.6)
HGB BLD-MCNC: 12.4 G/DL (ref 11.7–15.7)
PLATELET # BLD AUTO: 309 10E9/L (ref 150–450)
POTASSIUM SERPL-SCNC: 4.3 MMOL/L (ref 3.4–5.3)

## 2020-07-15 PROCEDURE — 99215 OFFICE O/P EST HI 40 MIN: CPT | Performed by: INTERNAL MEDICINE

## 2020-07-15 PROCEDURE — 85049 AUTOMATED PLATELET COUNT: CPT | Performed by: INTERNAL MEDICINE

## 2020-07-15 PROCEDURE — 84132 ASSAY OF SERUM POTASSIUM: CPT | Performed by: INTERNAL MEDICINE

## 2020-07-15 PROCEDURE — 85018 HEMOGLOBIN: CPT | Performed by: INTERNAL MEDICINE

## 2020-07-15 PROCEDURE — 82947 ASSAY GLUCOSE BLOOD QUANT: CPT | Performed by: INTERNAL MEDICINE

## 2020-07-15 PROCEDURE — 83036 HEMOGLOBIN GLYCOSYLATED A1C: CPT | Performed by: INTERNAL MEDICINE

## 2020-07-15 PROCEDURE — 93000 ELECTROCARDIOGRAM COMPLETE: CPT | Performed by: INTERNAL MEDICINE

## 2020-07-15 PROCEDURE — 36415 COLL VENOUS BLD VENIPUNCTURE: CPT | Performed by: INTERNAL MEDICINE

## 2020-07-15 ASSESSMENT — MIFFLIN-ST. JEOR: SCORE: 1167.67

## 2020-07-15 NOTE — PROGRESS NOTES
[facility]  [address] [address]  [phone]  Dept: [phone]    PRE-OP EVALUATION:  Today's date: 7/15/2020    Shantelle Su (: 1945) presents for pre-operative evaluation assessment as requested by Dr. Crouch .  She requires evaluation and anesthesia risk assessment prior to undergoing surgery/procedure for treatment of right eye.    Proposed Surgery/ Procedure: Vitrectomy right eye   Date of Surgery/ Procedure: 20  Time of Surgery/ Procedure: 8:30 pm   Hospital/Surgical Facility: fax:  [phone]  Fax number for surgical facility: [facility]   Primary Physician: Klever Vega  Type of Anesthesia Anticipated: to be determined    Patient has a Health Care Directive or Living Will:  YES     HPI:     HPI related to upcoming procedure:She requires evaluation and anesthesia risk assessment prior to undergoing surgery/procedure for treatment of right eye.    Proposed Surgery/ Procedure: Vitrectomy right eye     See problem list for active medical problems.  Problems all longstanding and stable, except as noted/documented.  See ROS for pertinent symptoms related to these conditions.    MEDICAL HISTORY:     Patient Active Problem List    Diagnosis Date Noted     Advanced directives, counseling/discussion 2012     Priority: High     Advance Directive Problem List Overview:      Discussed advance care planning with patient; information given to patient to review.   Pt. Received information in the fall prior to her surgery - and wanted to pursue at that time.   However pt. Declines at this time to proceed with health care directive.  SRIDHAR Naranjo RN 2012        Type 2 diabetes mellitus without complication, without long-term current use of insulin (H) 2019     Priority: Medium     History of placement of stent in LAD coronary artery 10/22/2018     Priority: Medium     Coronary artery disease of native artery of native  heart with stable angina pectoris (H) 03/16/2017     Priority: Medium     ASCVD (arteriosclerotic cardiovascular disease) 12/20/2016     Priority: Medium     Postsurgical percutaneous transluminal coronary angioplasty status 12/20/2016     Priority: Medium     Paroxysmal supraventricular tachycardia (H) 12/20/2016     Priority: Medium     Abnormal electrocardiogram 12/20/2016     Priority: Medium     Hyperlipidemia LDL goal <70 12/20/2016     Priority: Medium     Angina pectoris, unspecified (H) 11/23/2016     Priority: Medium     Heart palpitations      Priority: Medium     Postmenopausal bleeding 09/16/2016     Priority: Medium     Gastroesophageal reflux disease without esophagitis 06/04/2015     Priority: Medium     Fatty liver 06/04/2015     Priority: Medium     Based on CT scan done 5/23/15       Metabolic syndrome 12/01/2011     Priority: Medium     Rectocele 10/15/2007     Priority: Medium     Essential hypertension, benign 09/30/2002     Priority: Medium      Past Medical History:   Diagnosis Date     Acute upper respiratory infection 9/30/2002     Angina pectoris, unspecified (H) 11/23/2016     ASCVD (arteriosclerotic cardiovascular disease) 12/20/2016     CAD (coronary artery disease) 11/23/2016    sp proximal LAD stenting     Diabetes mellitus (H)      Enterocele 9/12/2011     Essential hypertension, benign      Fatty liver 6/4/2015    Based on CT scan done 5/23/15      GERD (gastroesophageal reflux disease) 10/13/2008     Heart palpitations      Hiatal hernia      Hyperlipidemia LDL goal <70 12/20/2016     Metabolic syndrome 12/1/2011     Other pulmonary embolism and infarction 9/30/2002    after starting hormone therapy     PAC (premature atrial contraction)      Paroxysmal supraventricular tachycardia (H) 12/20/2016     Rectocele 10/15/2007     Past Surgical History:   Procedure Laterality Date     ABDOMEN SURGERY      Bladder sling     C NONSPECIFIC PROCEDURE  2009    [place] mesh augmented anterior  colporrhaphy and vault suspension, tension-free vaginal tape sling with a transobturator approach usingthe Obtryx device and cystoscopy.       COLONOSCOPY      Scheduled October 2016     COLPORRHAPY POSTERIOR, CYSTOSCOPY, COMBINED  12/7/2011    Procedure:COMBINED COLPORRHAPY POSTERIOR, CYSTOSCOPY; POSTERIOR MESH AUGMENTED REPAIR WITH ELEVATE MESH , cystoscopy; Surgeon:ANDRE UP; Location:[facility] OR     DILATION AND CURETTAGE, HYSTEROSCOPY DIAGNOSTIC, COMBINED N/A 10/4/2016    Procedure: COMBINED DILATION AND CURETTAGE, HYSTEROSCOPY DIAGNOSTIC;  Surgeon: Andre Up MD;  Location: [facility] OR     HC DILATION/CURETTAGE DIAG/THER NON OB      after sab     HC LEFT HEART CATHETERIZATION  11/23/16    PCI with drug-eluting stent placement in the proximal LAD     HC TOOTH EXTRACTION W/FORCEP       Current Outpatient Medications   Medication Sig Dispense Refill     Acetaminophen (TYLENOL PO) 2-3 times daily       amLODIPine (NORVASC) 5 MG tablet TAKE 1 TABLET BY MOUTH EVERY DAY 90 tablet 1     aspirin EC 81 MG EC tablet Take 1 tablet (81 mg) by mouth daily 90 tablet 3     atorvastatin (LIPITOR) 40 MG tablet TAKE 1 TABLET BY MOUTH EVERY DAY 90 tablet 1     metFORMIN (GLUCOPHAGE) 500 MG tablet Take 1 tablet (500 mg) by mouth daily (with dinner) 90 tablet 3     metoprolol succinate ER (TOPROL XL) 50 MG 24 hr tablet Take 1 tablet (50 mg) by mouth daily 90 tablet 3     omeprazole (PRILOSEC) 20 MG DR capsule Take 1 capsule (20 mg) by mouth daily 90 capsule 1     ACCU-CHEK MULTICLIX LANCETS MISC 3 times daily. BG testing 3 times a day (Patient taking differently: daily 3 times daily. BG testing 3 times a day) 100 each 11     alpha-d-galactosidase (BEANO) tablet Take by mouth 3 times daily (before meals)       Blood Glucose Calibration (ACCU-CHEK VI) SOLN Use as directed 3 Bottle 11     Blood Glucose Monitoring Suppl (ACCU-CHEK VI) KIT 2 times daily. With routine lancets as well as alcohol swabs, dispense 1 month (Patient taking  "differently: daily With routine lancets as well as alcohol swabs, dispense 1 month) 1 kit 0     glucose blood VI test strips strip Accucheck Kristine Plus  3 times daily (Patient taking differently: daily Accucheck Kristine Plus  3 times daily) 300 strip 11     nitroGLYcerin (NITROSTAT) 0.4 MG sublingual tablet DISSOLVE 1 TAB UNDER TONGUE AS NEEDED FOR CHEST PAIN. MAXIMUM IS 3 DOSES EVERY 5 MIN OVER 15 MINUTES (Patient not taking: Reported on 2019) 25 tablet 3     traMADol (ULTRAM) 50 MG tablet every 6 hours as needed        Wheat Dextrin (BENEFIBER DRINK MIX PO)        OTC products: None, except as noted above    Allergies   Allergen Reactions     Bactrim [Sulfamethoxazole W/Trimethoprim] Nausea and Vomiting     Lisinopril      bp increased      Latex Allergy: NO    Social History     Tobacco Use     Smoking status: Former Smoker     Packs/day: 0.75     Years: 20.00     Pack years: 15.00     Last attempt to quit: 1987     Years since quittin.7     Smokeless tobacco: Never Used   Substance Use Topics     Alcohol use: Yes     Comment: 2 per month     History   Drug Use No       REVIEW OF SYSTEMS:   CONSTITUTIONAL: NEGATIVE for fever, chills, change in weight  ENT/MOUTH: NEGATIVE for ear, mouth and throat problems  RESP: NEGATIVE for significant cough or SOB  CV: NEGATIVE for chest pain, palpitations or peripheral edema  GI: NEGATIVE for nausea, abdominal pain, heartburn, or change in bowel habits  : NEGATIVE for frequency, dysuria, or hematuria  MUSCULOSKELETAL: + for significant arthralgias knee  NEURO: NEGATIVE for weakness, dizziness or paresthesias  HEME: NEGATIVE for bleeding problems  PSYCHIATRIC: NEGATIVE for changes in mood or affect    EXAM:   /74   Pulse 71   Temp 97.3  F (36.3  C) (Temporal)   Resp 16   Ht 1.499 m (4' 11\")   Wt 76.2 kg (168 lb)   SpO2 98%   BMI 33.93 kg/m      GENERAL APPEARANCE: alert and no distress     HENT: ear canals and TM's normal and nose and mouth without " ulcers or lesions     NECK: no adenopathy, no asymmetry, masses, or scars and thyroid normal to palpation     RESP: lungs clear to auscultation - no rales, rhonchi or wheezes     CV: regular rates and rhythm, normal S1 S2, no S3 or S4 and no click or rub     ABDOMEN:  soft, nontender, no HSM or masses and bowel sounds normal     MS: extremities normal- no gross deformities noted     NEURO: No focal changes     PSYCH: mentation appears normal and affect normal/bright    DIAGNOSTICS:     EKG: Normal Sinus Rhythm @ 70, nonspecific ST-T changes, low voltage is noted that has been present in the past with nonspecific ST changes.    Labs Drawn and in Process:   Unresulted Labs Ordered in the Past 30 Days of this Admission     No orders found from 6/15/2020 to 7/16/2020.          Recent Labs   Lab Test 11/14/19  0859 05/08/19  0759 12/05/18  1006 05/29/18  0220  05/03/17  0929  04/23/17  0800  11/23/16  1020   HGB  --   --   --  11.8  --  13.1   < > 13.2   < > 13.2   PLT  --   --   --  238  --  317   < > 306   < > 293   INR  --   --   --   --   --   --   --  0.90  --  0.93   NA  --   --  138 133   < > 140   < > 138   < > 141   POTASSIUM  --   --  4.3 4.2   < > 3.7   < > 4.5   < > 4.5   CR  --   --  0.69 0.75   < > 0.58   < > 0.62   < > 0.61   A1C 6.5* 6.6* 6.6*  --    < >  --   --  6.8*   < >  --     < > = values in this interval not displayed.     IMPRESSION:   Reason for surgery/procedure: She requires evaluation and anesthesia risk assessment prior to undergoing surgery/procedure for treatment of right eye.    Proposed Surgery/ Procedure: Vitrectomy right eye     The proposed surgical procedure is considered LOW risk.    REVISED CARDIAC RISK INDEX  The patient has the following serious cardiovascular risks for perioperative complications such as (MI, PE, VFib and 3  AV Block):  Diabetes Mellitus, prior ASCVD    INTERPRETATION: 1 risks: Class II (low risk - 0.9% complication rate)    The patient has the following  additional risks for perioperative complications:  No identified additional risks    (Z01.818) Preop general physical exam  (primary encounter diagnosis)  Comment:   Plan: Potassium, Hemoglobin, Platelet count, EKG         12-lead complete w/read - Clinics            (H57.9) Eye disorder  Comment: Surgery as scheduled  Plan:     (E11.9) Type 2 diabetes mellitus without complication, without long-term current use of insulin (H)  Comment: Recheck glucose and A1c level with baseline noted above  Plan: Glucose, Hemoglobin A1c            (I10) Essential hypertension, benign  Comment: Stable on current medical therapy without active complaints  Plan:     (I25.118) Coronary artery disease of native artery of native heart without angina pectoris (H)  Comment: No current active complaints with nonspecific EKG changes noted per baseline  Plan: Followed per Cardiology    RECOMMENDATIONS:     --Consult hospital rounder / IM to assist post-op medical management    Cardiovascular Risk  Performs 4 METs exercise without symptoms (Light housework (dusting, washing dishes), Climb a flight of stairs and Walk on level ground at 15 minutes per mile (4 miles/hour)) .   Patient is already on a Beta Blocker. Continue Betablocker therapy after surgery, using Beta blocker order set as necessary for NPO status.    --Patient is to take all scheduled medications on the day of surgery EXCEPT for modifications listed below.    Diabetes Medication Use  --Hold usual oral and non-insulin diabetic meds (e.g. Metformin, Actos, Glipizide) while NPO.     Anticoagulant or Antiplatelet Medication Use  Bleeding risk is low for this procedure (e.g. dental, skin, cataract)      APPROVAL GIVEN to proceed with proposed procedure, without further diagnostic evaluation     Signed Electronically by: Klever Vega MD    Copy of this evaluation report is provided to requesting physician, Dr Crouch.    [facility] Preop Guidelines    Revised Cardiac Risk Index

## 2020-08-26 ENCOUNTER — TRANSFERRED RECORDS (OUTPATIENT)
Dept: HEALTH INFORMATION MANAGEMENT | Facility: CLINIC | Age: 75
End: 2020-08-26

## 2020-09-02 ENCOUNTER — TELEPHONE (OUTPATIENT)
Dept: INTERNAL MEDICINE | Facility: CLINIC | Age: 75
End: 2020-09-02

## 2020-09-02 NOTE — TELEPHONE ENCOUNTER
Patient Quality Outreach      Summary:    Patient is due/failing the following:   LDL (Fasting) and Microalbumin, LDL (Fasting), Annual wellness, date due: 11/9/16 and Immunizations    Type of outreach:    Sent [email or web address] message.    Questions for provider review:    None                                                                                   **Start Working phrase here:**       Patient Active Problem List   Diagnosis     Essential hypertension, benign     Rectocele     Metabolic syndrome     Advanced directives, counseling/discussion     Gastroesophageal reflux disease without esophagitis     Fatty liver     Postmenopausal bleeding     Heart palpitations     Angina pectoris, unspecified (H)     ASCVD (arteriosclerotic cardiovascular disease)     Postsurgical percutaneous transluminal coronary angioplasty status     Paroxysmal supraventricular tachycardia (H)     Abnormal electrocardiogram     Hyperlipidemia LDL goal <70     Coronary artery disease of native artery of native heart with stable angina pectoris (H)     History of placement of stent in LAD coronary artery     Type 2 diabetes mellitus without complication, without long-term current use of insulin (H)       Patient has the following on her problem list/HM:     Diabetes    Last A1C:  Lab Results   Component Value Date    A1C 6.8 07/15/2020    A1C 6.5 11/14/2019       Last LDL:    Lab Results   Component Value Date    LDL 69 05/08/2019       Is the patient on a Statin? Yes          Is the patient on Aspirin? Yes    Medications     HMG CoA Reductase Inhibitors     atorvastatin (LIPITOR) 40 MG tablet       Salicylates     aspirin EC 81 MG EC tablet             Last three blood pressure readings:  BP Readings from Last 3 Encounters:   07/15/20 130/74   05/28/20 104/64   12/02/19 118/74            Tobacco Use      Smoking status: Former Smoker        Packs/day: 0.75        Years: 20.00        Pack years: 15        Quit date: 11/5/1987        Years  since quittin.8      Smokeless tobacco: Never Used        IVD     ASA: Passed    Last LDL:    Lab Results   Component Value Date    CHOL 163 2019     Lab Results   Component Value Date    HDL 65 2019     Lab Results   Component Value Date    LDL 69 2019     Lab Results   Component Value Date    TRIG 147 2019        Lab Results   Component Value Date    CHOLHDLRATIO 2.4 2015        Is the patient on a Statin? Yes   Is the patient on Aspirin? Yes                  Medications     HMG CoA Reductase Inhibitors     atorvastatin (LIPITOR) 40 MG tablet       Salicylates     aspirin EC 81 MG EC tablet             Last three blood pressure readings:  BP Readings from Last 3 Encounters:   07/15/20 130/74   20 104/64   19 118/74        Tobacco History:       Tobacco Use      Smoking status: Former Smoker        Packs/day: 0.75        Years: 20.00        Pack years: 15        Quit date: 1987        Years since quittin.8      Smokeless tobacco: Never Used      Hypertension   Last three blood pressure readings:  BP Readings from Last 3 Encounters:   07/15/20 130/74   20 104/64   19 118/74     Blood pressure: Passed    HTN Guidelines:  ? 139/89                                                 Laisha Trevizo CMA'     Chart routed to Care Team.

## 2020-10-02 DIAGNOSIS — I47.10 PAROXYSMAL SUPRAVENTRICULAR TACHYCARDIA (H): ICD-10-CM

## 2020-10-02 DIAGNOSIS — I10 ESSENTIAL HYPERTENSION, BENIGN: ICD-10-CM

## 2020-10-02 RX ORDER — AMLODIPINE BESYLATE 5 MG/1
TABLET ORAL
Qty: 90 TABLET | Refills: 1 | Status: SHIPPED | OUTPATIENT
Start: 2020-10-02 | End: 2021-03-30

## 2020-10-02 RX ORDER — METOPROLOL SUCCINATE 50 MG/1
50 TABLET, EXTENDED RELEASE ORAL DAILY
Qty: 90 TABLET | Refills: 1 | Status: SHIPPED | OUTPATIENT
Start: 2020-10-02 | End: 2021-03-30

## 2020-10-02 NOTE — TELEPHONE ENCOUNTER
Amlodipine    Routing refill request to provider for review/approval because:  Labs not current:  BMP      Last office visit: 7/15/2020   Future Office Visit:   Next 5 appointments (look out 90 days)    Nov 19, 2020 11:00 AM  Pre-Op physical with Klever Vega MD  [facility] ([facility]) [address] [place] [id number] [phone]

## 2020-10-03 DIAGNOSIS — K21.9 GASTROESOPHAGEAL REFLUX DISEASE WITHOUT ESOPHAGITIS: ICD-10-CM

## 2020-10-25 DIAGNOSIS — E78.5 HYPERLIPIDEMIA LDL GOAL <130: ICD-10-CM

## 2020-10-27 RX ORDER — ATORVASTATIN CALCIUM 40 MG/1
TABLET, FILM COATED ORAL
Qty: 90 TABLET | Refills: 1 | OUTPATIENT
Start: 2020-10-27

## 2020-10-29 DIAGNOSIS — E78.5 HYPERLIPIDEMIA LDL GOAL <130: ICD-10-CM

## 2020-10-29 RX ORDER — ATORVASTATIN CALCIUM 40 MG/1
TABLET, FILM COATED ORAL
Qty: 90 TABLET | Refills: 1 | OUTPATIENT
Start: 2020-10-29

## 2020-10-29 RX ORDER — ATORVASTATIN CALCIUM 40 MG/1
40 TABLET, FILM COATED ORAL DAILY
Qty: 90 TABLET | Refills: 1 | Status: SHIPPED | OUTPATIENT
Start: 2020-10-29 | End: 2021-04-19

## 2020-10-29 NOTE — TELEPHONE ENCOUNTER
Patient should be seen at the time of her preop fasting because she is overdue for lab work due to her refill.  We can then count that visit

## 2020-11-19 ENCOUNTER — OFFICE VISIT (OUTPATIENT)
Dept: INTERNAL MEDICINE | Facility: CLINIC | Age: 75
End: 2020-11-19
Payer: COMMERCIAL

## 2020-11-19 VITALS
WEIGHT: 166.8 LBS | HEIGHT: 59 IN | DIASTOLIC BLOOD PRESSURE: 70 MMHG | OXYGEN SATURATION: 99 % | HEART RATE: 70 BPM | SYSTOLIC BLOOD PRESSURE: 128 MMHG | RESPIRATION RATE: 15 BRPM | TEMPERATURE: 98.7 F | BODY MASS INDEX: 33.63 KG/M2

## 2020-11-19 DIAGNOSIS — I25.10 ASCVD (ARTERIOSCLEROTIC CARDIOVASCULAR DISEASE): ICD-10-CM

## 2020-11-19 DIAGNOSIS — Z01.818 PREOP GENERAL PHYSICAL EXAM: Primary | ICD-10-CM

## 2020-11-19 DIAGNOSIS — H61.22 IMPACTED CERUMEN OF LEFT EAR: ICD-10-CM

## 2020-11-19 DIAGNOSIS — I10 ESSENTIAL HYPERTENSION, BENIGN: ICD-10-CM

## 2020-11-19 DIAGNOSIS — E11.9 TYPE 2 DIABETES MELLITUS WITHOUT COMPLICATION, WITHOUT LONG-TERM CURRENT USE OF INSULIN (H): ICD-10-CM

## 2020-11-19 DIAGNOSIS — I47.10 PAROXYSMAL SUPRAVENTRICULAR TACHYCARDIA (H): ICD-10-CM

## 2020-11-19 DIAGNOSIS — H26.9 CATARACT, UNSPECIFIED CATARACT TYPE, UNSPECIFIED LATERALITY: ICD-10-CM

## 2020-11-19 LAB
GLUCOSE SERPL-MCNC: 107 MG/DL (ref 70–99)
HGB BLD-MCNC: 13 G/DL (ref 11.7–15.7)
PLATELET # BLD AUTO: 235 10E9/L (ref 150–450)
POTASSIUM SERPL-SCNC: 4 MMOL/L (ref 3.4–5.3)

## 2020-11-19 PROCEDURE — 36415 COLL VENOUS BLD VENIPUNCTURE: CPT | Performed by: INTERNAL MEDICINE

## 2020-11-19 PROCEDURE — 84132 ASSAY OF SERUM POTASSIUM: CPT | Performed by: INTERNAL MEDICINE

## 2020-11-19 PROCEDURE — 99215 OFFICE O/P EST HI 40 MIN: CPT | Performed by: INTERNAL MEDICINE

## 2020-11-19 PROCEDURE — 82947 ASSAY GLUCOSE BLOOD QUANT: CPT | Performed by: INTERNAL MEDICINE

## 2020-11-19 PROCEDURE — 85018 HEMOGLOBIN: CPT | Performed by: INTERNAL MEDICINE

## 2020-11-19 PROCEDURE — 85049 AUTOMATED PLATELET COUNT: CPT | Performed by: INTERNAL MEDICINE

## 2020-11-19 ASSESSMENT — MIFFLIN-ST. JEOR: SCORE: 1162.23

## 2020-11-19 NOTE — PROGRESS NOTES
[address] [address]  Phone: [phone]  Primary Provider: Caesar Vega  Pre-op Performing Provider: CAESAR VEGA    PREOPERATIVE EVALUATION:  Today's date: 11/19/2020    Shantelle Su is a 75 year old female who presents for a preoperative evaluation.    Surgical Information:  Surgery/Procedure: Cataract lens replacement   Surgery Location: [facility] surgery center   Surgeon: Dr. Ross    Surgery Date: 12/1/20 (R) & 12/15/20 (L)   Time of Surgery: 8:00 am   Where patient plans to recover: At home with family  Fax number for surgical facility: [phone]    Type of Anesthesia Anticipated: to be determined    Subjective     HPI related to upcoming procedure: cataract extraction    Status of Chronic Conditions:  See problem list for active medical problems.  Problems all longstanding and stable, except as noted/documented.  See ROS for pertinent symptoms related to these conditions.    Review of Systems:    CONSTITUTIONAL: NEGATIVE for fever, chills, change in weight  ENT/MOUTH: NEGATIVE for mouth and throat problems  RESP: NEGATIVE for significant cough or SOB  CV: NEGATIVE for chest pain, palpitations or peripheral edema  GI: NEGATIVE for nausea, abdominal pain, heartburn, or change in bowel habits  : NEGATIVE for frequency, dysuria, or hematuria  MUSCULOSKELETAL: NEGATIVE for significant arthralgias or myalgia  NEURO: NEGATIVE for weakness, dizziness or paresthesias  HEME: NEGATIVE for bleeding problems  PSYCHIATRIC: NEGATIVE for changes in mood or affect    Patient Active Problem List    Diagnosis Date Noted     Advanced directives, counseling/discussion 02/08/2012     Priority: High     Advance Directive Problem List Overview:      Discussed advance care planning with patient; information given to patient to review.   Pt. Received information in the fall prior to her surgery - and wanted to pursue at that time.    However pt. Declines at this time to proceed with health care directive.  SRIDHAR Naranjo RN 2/8/2012        Type 2 diabetes mellitus without complication, without long-term current use of insulin (H) 05/02/2019     Priority: Medium     History of placement of stent in LAD coronary artery 10/22/2018     Priority: Medium     Coronary artery disease of native artery of native heart with stable angina pectoris (H) 03/16/2017     Priority: Medium     ASCVD (arteriosclerotic cardiovascular disease) 12/20/2016     Priority: Medium     Postsurgical percutaneous transluminal coronary angioplasty status 12/20/2016     Priority: Medium     Paroxysmal supraventricular tachycardia (H) 12/20/2016     Priority: Medium     Abnormal electrocardiogram 12/20/2016     Priority: Medium     Hyperlipidemia LDL goal <70 12/20/2016     Priority: Medium     Angina pectoris, unspecified (H) 11/23/2016     Priority: Medium     Heart palpitations      Priority: Medium     Postmenopausal bleeding 09/16/2016     Priority: Medium     Gastroesophageal reflux disease without esophagitis 06/04/2015     Priority: Medium     Fatty liver 06/04/2015     Priority: Medium     Based on CT scan done 5/23/15       Metabolic syndrome 12/01/2011     Priority: Medium     Rectocele 10/15/2007     Priority: Medium     Essential hypertension, benign 09/30/2002     Priority: Medium      Past Medical History:   Diagnosis Date     Acute upper respiratory infection 9/30/2002     Angina pectoris, unspecified (H) 11/23/2016     ASCVD (arteriosclerotic cardiovascular disease) 12/20/2016     CAD (coronary artery disease) 11/23/2016    sp proximal LAD stenting     Diabetes mellitus (H)      Enterocele 9/12/2011     Essential hypertension, benign      Fatty liver 6/4/2015    Based on CT scan done 5/23/15      GERD (gastroesophageal reflux disease) 10/13/2008     Heart palpitations      Hiatal hernia      Hyperlipidemia LDL goal <70 12/20/2016     Metabolic syndrome 12/1/2011      Other pulmonary embolism and infarction 9/30/2002    after starting hormone therapy     PAC (premature atrial contraction)      Paroxysmal supraventricular tachycardia (H) 12/20/2016     Rectocele 10/15/2007     Past Surgical History:   Procedure Laterality Date     ABDOMEN SURGERY      Bladder sling     COLONOSCOPY      Scheduled October 2016     COLPORRHAPY POSTERIOR, CYSTOSCOPY, COMBINED  12/7/2011    Procedure:COMBINED COLPORRHAPY POSTERIOR, CYSTOSCOPY; POSTERIOR MESH AUGMENTED REPAIR WITH ELEVATE MESH , cystoscopy; Surgeon:ANDRE UP; Location:[facility] OR     DILATION AND CURETTAGE, HYSTEROSCOPY DIAGNOSTIC, COMBINED N/A 10/4/2016    Procedure: COMBINED DILATION AND CURETTAGE, HYSTEROSCOPY DIAGNOSTIC;  Surgeon: Adnre Up MD;  Location: [facility] OR     HC DILATION/CURETTAGE DIAG/THER NON OB      after sab     HC LEFT HEART CATHETERIZATION  11/23/16    PCI with drug-eluting stent placement in the proximal LAD     HC TOOTH EXTRACTION W/FORCEP       ZZC NONSPECIFIC PROCEDURE  2009    [place] mesh augmented anterior colporrhaphy and vault suspension, tension-free vaginal tape sling with a transobturator approach usingthe Obtryx device and cystoscopy.       Current Outpatient Medications   Medication Sig Dispense Refill     ACCU-CHEK MULTICLIX LANCETS MISC 3 times daily. BG testing 3 times a day (Patient taking differently: daily 3 times daily. BG testing 3 times a day) 100 each 11     Acetaminophen (TYLENOL PO) 2-3 times daily       alpha-d-galactosidase (BEANO) tablet Take by mouth 3 times daily (before meals)       amLODIPine (NORVASC) 5 MG tablet TAKE 1 TABLET BY MOUTH EVERY DAY 90 tablet 1     aspirin EC 81 MG EC tablet Take 1 tablet (81 mg) by mouth daily 90 tablet 3     atorvastatin (LIPITOR) 40 MG tablet Take 1 tablet (40 mg) by mouth daily 90 tablet 1     Blood Glucose Calibration (ACCU-CHEK VI) SOLN Use as directed 3 Bottle 11     Blood Glucose Monitoring Suppl (ACCU-CHEK VI) KIT 2 times daily. With  "routine lancets as well as alcohol swabs, dispense 1 month (Patient taking differently: daily With routine lancets as well as alcohol swabs, dispense 1 month) 1 kit 0     glucose blood VI test strips strip Accucheck Kristine Plus  3 times daily (Patient taking differently: daily Accucheck Kristine Plus  3 times daily) 300 strip 11     metFORMIN (GLUCOPHAGE) 500 MG tablet Take 1 tablet (500 mg) by mouth daily (with dinner) 90 tablet 3     metoprolol succinate ER (TOPROL XL) 50 MG 24 hr tablet Take 1 tablet (50 mg) by mouth daily 90 tablet 1     nitroGLYcerin (NITROSTAT) 0.4 MG sublingual tablet DISSOLVE 1 TAB UNDER TONGUE AS NEEDED FOR CHEST PAIN. MAXIMUM IS 3 DOSES EVERY 5 MIN OVER 15 MINUTES (Patient not taking: Reported on 2019) 25 tablet 3     omeprazole (PRILOSEC) 20 MG DR capsule TAKE 1 CAPSULE BY MOUTH EVERY DAY 90 capsule 2     traMADol (ULTRAM) 50 MG tablet every 6 hours as needed        Wheat Dextrin (BENEFIBER DRINK MIX PO)          Allergies   Allergen Reactions     Bactrim [Sulfamethoxazole W/Trimethoprim] Nausea and Vomiting     Lisinopril      bp increased        Social History     Tobacco Use     Smoking status: Former Smoker     Packs/day: 0.75     Years: 20.00     Pack years: 15.00     Quit date: 1987     Years since quittin.0     Smokeless tobacco: Never Used   Substance Use Topics     Alcohol use: Yes     Comment: 2 per month     History   Drug Use No         Objective     /70   Pulse 70   Temp 98.7  F (37.1  C) (Temporal)   Resp 15   Ht 1.499 m (4' 11\")   Wt 75.7 kg (166 lb 12.8 oz)   SpO2 99%   BMI 33.69 kg/m      Physical Exam    GENERAL APPEARANCE: healthy, alert and no distress     EYES: cataract     HENT: ear canals and TM's normal on right occluded on left and nose and mouth without ulcers or lesions     NECK: no adenopathy, no asymmetry, masses, or scars and thyroid normal to palpation     RESP: lungs clear to auscultation - no rales, rhonchi or wheezes     CV: " regular rates and rhythm, normal S1 S2, no S3 or S4 and no click or rub     MS: extremities normal- no gross deformities noted.     NEURO: No focal changes     PSYCH: mentation appears normal and affect normal/bright    Recent Labs   Lab Test 07/15/20  1149 11/14/19  0859 12/05/18  1006 12/05/18  1006   HGB 12.4  --   --   --      --   --   --    NA  --   --   --  138   POTASSIUM 4.3  --   --  4.3   CR  --   --   --  0.69   A1C 6.8* 6.5*   < > 6.6*    < > = values in this interval not displayed.        Diagnostics:  Labs pending at this time.  Results will be reviewed when available.   No EKG required for low risk surgery (cataract, skin procedure, breast biopsy, etc).     Revised Cardiac Risk Index (RCRI):  The patient has the following serious cardiovascular risks for perioperative complications:   - Coronary Artery Disease (MI, positive stress test, angina, Qs on EKG) = 1 point   - Diabetes Mellitus (on Insulin) = 1 point     RCRI Interpretation: 2 points: Class III (moderate risk - 6.6% complication rate)     Estimated Functional Capacity: Performs 4 METS exercise without symptoms (e.g., light housework, stairs, 4 mph walk, 7 mph bike, slow step dance)       Assessment & Plan   Reason for surgery/procedure: She requires evaluation and anesthesia risk assessment prior to undergoing surgery/procedure for treatment of right eye.     Proposed Surgery/ Procedure: Vitrectomy right eye      The proposed surgical procedure is considered LOW risk.     REVISED CARDIAC RISK INDEX  The patient has the following serious cardiovascular risks for perioperative complications such as (MI, PE, VFib and 3  AV Block):  Diabetes Mellitus, prior ASCVD     INTERPRETATION: 1 risks: Class II (low risk - 0.9% complication rate)     The patient has the following additional risks for perioperative complications:  No identified additional risks     (Z01.818) Preop general physical exam  (primary encounter diagnosis)  Comment:   Plan:  Potassium, Hemoglobin, Platelet count, EKG         12-lead complete w/read - Clinics            (H26.9) Cataract, unspecified cataract type, unspecified laterality  Comment: surgery as schedulked  Plan:      (E11.9) Type 2 diabetes mellitus without complication, without long-term current use of insulin (H)  Comment: Recheck glucose   Plan: Glucose           (I10) Essential hypertension, benign  Comment: Stable on current medical therapy without active complaints  Plan:      (I25.118) Coronary artery disease of native artery of native heart without angina pectoris (H)  Comment: No current active complaints with nonspecific EKG changes noted per baseline  Plan: Followed per Cardiology    (H61.22) Impacted cerumen of left ear  Comment: suggest irrigation  Plan: Attempted to irrigate myself but exceedingly hard and firm unable to do so must wash irrigate    RECOMMENDATIONS:      --Consult hospital rounder / IM to assist post-op medical management     Cardiovascular Risk  Performs 4 METs exercise without symptoms (Light housework (dusting, washing dishes), Climb a flight of stairs and Walk on level ground at 15 minutes per mile (4 miles/hour)) .   Patient is already on a Beta Blocker. Continue Betablocker therapy after surgery, using Beta blocker order set as necessary for NPO status.     --Patient is to take all scheduled medications on the day of surgery EXCEPT for modifications listed below.     Diabetes Medication Use  --Hold usual oral and non-insulin diabetic meds (e.g. Metformin, Actos, Glipizide) if NPO.      Anticoagulant or Antiplatelet Medication Use  Bleeding risk is low for this procedure (e.g. dental, skin, cataract)      APPROVAL GIVEN to proceed with proposed procedure, without further diagnostic evaluation     Signed Electronically by: Klever Vega MD     Copy of this evaluation report is provided to requesting physician, Dr Luis Antonio Sin Preop Guidelines

## 2020-11-20 NOTE — PROGRESS NOTES
Pre op physical electronically faxed to [facility] at [phone].

## 2020-12-10 ENCOUNTER — OFFICE VISIT (OUTPATIENT)
Dept: CARDIOLOGY | Facility: CLINIC | Age: 75
End: 2020-12-10
Payer: COMMERCIAL

## 2020-12-10 VITALS
WEIGHT: 167.1 LBS | HEART RATE: 72 BPM | DIASTOLIC BLOOD PRESSURE: 76 MMHG | BODY MASS INDEX: 33.69 KG/M2 | HEIGHT: 59 IN | SYSTOLIC BLOOD PRESSURE: 126 MMHG

## 2020-12-10 DIAGNOSIS — E78.5 HYPERLIPIDEMIA LDL GOAL <70: ICD-10-CM

## 2020-12-10 DIAGNOSIS — I25.118 CORONARY ARTERY DISEASE OF NATIVE ARTERY OF NATIVE HEART WITH STABLE ANGINA PECTORIS (H): Primary | ICD-10-CM

## 2020-12-10 DIAGNOSIS — I25.118 CORONARY ARTERY DISEASE OF NATIVE ARTERY OF NATIVE HEART WITH STABLE ANGINA PECTORIS (H): ICD-10-CM

## 2020-12-10 DIAGNOSIS — I10 ESSENTIAL HYPERTENSION, BENIGN: ICD-10-CM

## 2020-12-10 DIAGNOSIS — Z95.5 HISTORY OF PLACEMENT OF STENT IN LAD CORONARY ARTERY: ICD-10-CM

## 2020-12-10 LAB
CHOLEST SERPL-MCNC: 163 MG/DL
HDLC SERPL-MCNC: 73 MG/DL
LDLC SERPL CALC-MCNC: 50 MG/DL
NONHDLC SERPL-MCNC: 90 MG/DL
TRIGL SERPL-MCNC: 202 MG/DL

## 2020-12-10 PROCEDURE — 80061 LIPID PANEL: CPT | Performed by: INTERNAL MEDICINE

## 2020-12-10 PROCEDURE — 36415 COLL VENOUS BLD VENIPUNCTURE: CPT | Performed by: INTERNAL MEDICINE

## 2020-12-10 PROCEDURE — 99214 OFFICE O/P EST MOD 30 MIN: CPT | Performed by: INTERNAL MEDICINE

## 2020-12-10 SDOH — HEALTH STABILITY: MENTAL HEALTH: HOW MANY STANDARD DRINKS CONTAINING ALCOHOL DO YOU HAVE ON A TYPICAL DAY?: NOT ASKED

## 2020-12-10 SDOH — HEALTH STABILITY: MENTAL HEALTH: HOW OFTEN DO YOU HAVE 6 OR MORE DRINKS ON ONE OCCASION?: NOT ASKED

## 2020-12-10 SDOH — HEALTH STABILITY: MENTAL HEALTH: HOW OFTEN DO YOU HAVE A DRINK CONTAINING ALCOHOL?: NOT ASKED

## 2020-12-10 ASSESSMENT — MIFFLIN-ST. JEOR: SCORE: 1163.59

## 2020-12-10 NOTE — LETTER
12/10/2020    Klever Vega MD  [address] [facility] [address]    RE: Shantelle Su       Dear Colleague,    I had the pleasure of seeing Shantelle Su in the [facility].    CARDIOLOGY CLINIC VISIT    REASON FOR VISIT : follow up CAD, HDL, HTN    PRIMARY CARE PHYSICIAN:  Klever Vega    HISTORY OF PRESENT ILLNESS:    Shantelle is a 76 yo woman previously folllowed by Dr. Lozada for cardiovascular care here for routine follow up.  She has a history of of her LAD 11/23/2016 after an abnormal stress test.  Stress echocardiogram showed ischemia in the anteroseptal and apical wall accompanied by 4/10 chest burning.  On 11/23/2016, she was found to have a 50% LAD stenosis followed by an 80% stenosis and a mid vessel 40%-50% stenosis.  About 2 hrs after stent placement, there was evidence of an acute myocardial infarction.  She had a proximal stent edge dissection.  Thrombectomy was performed and an additional overlapping drug-eluting stent was placed near the ostium of the LAD.  She also has a history of symptomatic PACs.     From a CV standpoing she has done reasonably since then. Last appointment in cardiology was one yr ago with Dr. Lozada.  The one thing she notes is dyspnea with walking up the stairs.  This dyspnea started a few months ago.  The dyspnea on the stairs is not particularly worrisome, and she has been overall less active due to her knee pain. She does not have chest pain or palpitations.     It's been a tough year for Shantelle. She went to [place] last winter and had arthritis and knee issues and lost vision in her left eye and describes having surgery for scar tissue on her retina and then she had to have catarct surgery. She is hoping to have knee surgery next. She also had to put her dog down and her  has had heart issues and is at [facility] right now getting a pacemaker.       PAST MEDICAL HISTORY:  Past Medical History:   Diagnosis Date     Acute  upper respiratory infection 9/30/2002     Angina pectoris, unspecified (H) 11/23/2016     ASCVD (arteriosclerotic cardiovascular disease) 12/20/2016     CAD (coronary artery disease) 11/23/2016    sp proximal LAD stenting     Diabetes mellitus (H)      Enterocele 9/12/2011     Essential hypertension, benign      Fatty liver 6/4/2015    Based on CT scan done 5/23/15      GERD (gastroesophageal reflux disease) 10/13/2008     Heart palpitations      Hiatal hernia      Hyperlipidemia LDL goal <70 12/20/2016     Metabolic syndrome 12/1/2011     Other pulmonary embolism and infarction 9/30/2002    after starting hormone therapy     PAC (premature atrial contraction)      Paroxysmal supraventricular tachycardia (H) 12/20/2016     Rectocele 10/15/2007       MEDICATIONS:  Current Outpatient Medications   Medication     ACCU-CHEK MULTICLIX LANCETS MISC     Acetaminophen (TYLENOL PO)     alpha-d-galactosidase (BEANO) tablet     amLODIPine (NORVASC) 5 MG tablet     aspirin EC 81 MG EC tablet     atorvastatin (LIPITOR) 40 MG tablet     Blood Glucose Calibration (ACCU-CHEK VI) SOLN     Blood Glucose Monitoring Suppl (ACCU-CHEK VI) KIT     glucose blood VI test strips strip     metFORMIN (GLUCOPHAGE) 500 MG tablet     metoprolol succinate ER (TOPROL XL) 50 MG 24 hr tablet     Multiple Vitamins-Minerals (MULTIVITAMIN ADULTS PO)     nitroGLYcerin (NITROSTAT) 0.4 MG sublingual tablet     omeprazole (PRILOSEC) 20 MG DR capsule     traMADol (ULTRAM) 50 MG tablet     Wheat Dextrin (BENEFIBER DRINK MIX PO)     Current Facility-Administered Medications   Medication     dexamethasone (DECADRON) injection 2 mL     lidocaine 1 % injection 4 mL       ALLERGIES:  Allergies   Allergen Reactions     Bactrim [Sulfamethoxazole W/Trimethoprim] Nausea and Vomiting     Lisinopril      bp increased       SOCIAL HISTORY:    Currently nonsmoker (distant history)    FAMILY HISTORY:   w/heart issues and her father was reported to have  CAD    REVIEW OF SYSTEMS:  Skin:  Negative     Eyes:  Positive for glasses;cataracts  ENT:  Negative    Respiratory:  Positive for dyspnea on exertion  Cardiovascular:    left leg edema which she attributes to arthritis  Gastroenterology: Positive for reflux  Genitourinary:  Negative    Musculoskeletal:  Positive for arthritis;joint swelling;nocturnal cramping  Neurologic:  Positive for numbness or tingling of hands  Psychiatric:  Positive for excessive stress;sleep disturbances  Heme/Lymph/Imm:  Negative    Endocrine:  Positive for diabetes    PHYSICAL EXAM:      BP: 126/76 Pulse: 72            Vital Signs with Ranges  Pulse:  [72] 72  BP: (126)/(76) 126/76  167 lbs 1.6 oz    Constitutional: awake, alert, no distress  Eyes: sclera nonicteric  ENT: trachea midline  Respiratory: clear bilaterally  Cardiovascular: regular rate and rhythm no audible murmur, no rub or gallop  GI: nondistended, nontender, bowel sounds present  Lymph/Hematologic: no lymphadenopathy  Skin: dry, no rash no edema  Musculoskeletal: grossly normal muscle bulk and tone   Neurologic: no focal deficits  Neuropsychiatric: appropriate affect      DATA:   LAST CHOLESTEROL:  Lab Results   Component Value Date    CHOL 163 05/08/2019     Lab Results   Component Value Date    HDL 65 05/08/2019     Lab Results   Component Value Date    LDL 69 05/08/2019     Lab Results   Component Value Date    TRIG 147 05/08/2019     Lab Results   Component Value Date    CHOLHDLRATIO 2.4 11/09/2015       LAST BMP:  Lab Results   Component Value Date     12/05/2018      Lab Results   Component Value Date    POTASSIUM 4.0 11/19/2020     Lab Results   Component Value Date    CHLORIDE 103 12/05/2018     Lab Results   Component Value Date    RAFAEL 9.6 12/05/2018     Lab Results   Component Value Date    CO2 25 12/05/2018     Lab Results   Component Value Date    BUN 15 12/05/2018     Lab Results   Component Value Date    CR 0.69 12/05/2018     Lab Results   Component  Value Date     11/19/2020       LAST CBC:  Lab Results   Component Value Date    WBC 10.8 05/29/2018     Lab Results   Component Value Date    RBC 3.80 05/29/2018     Lab Results   Component Value Date    HGB 13.0 11/19/2020     Lab Results   Component Value Date    HCT 36.6 05/29/2018     Lab Results   Component Value Date    MCV 96 05/29/2018     Lab Results   Component Value Date    MCH 31.1 05/29/2018     Lab Results   Component Value Date    MCHC 32.2 05/29/2018     Lab Results   Component Value Date    RDW 13.4 05/29/2018     Lab Results   Component Value Date     11/19/2020       EKG 7/5/2020 sinus relatively low voltage poor r-wave progression, possible old anterior infarct.      Stress echo 3/22/17  The patient exercised 10:45 mm:ss on standard mod Asael protocol.  The patient did not exhibit any symptoms during exercise.  A moderately-high workload was achieved.  There was a normal BP response to exercise.  The EKG portion of this stress test was negative for inducible ischemia (see  echo results below).  Normal resting wall motion and no stress-induced wall motion abnormality.    Echo 12/1916  The visual ejection fraction is estimated at 48-52%.  Left ventricular systolic function is borderline reduced.  Frequent ectopic beats  Compared to prior study, there is no significant change.        ASSESSMENT:  1.  Shantelle Su is a delightful 74-year-old female, status PCI to LAD in 2016 complicated by acute thrombosis w/edge dissection and a second stent that same day.   She had back discomfort that prompted a stress echocardiogram 03/2017.  This was normal without evidence of ischemia.  CCS 0. NYHA 1, last EF was normal. 2.  Hyperlipidemia, LDL 69 5/8/19    3.  History of symptomatic premature atrial contractions, no significant palps recently well-controlled.  4.  HTN, well-controlled. H/o diastolic dysfunction.   5.  Social stressors due to husbands CV issues, her non-cardiac health and  covid    RECOMMENDATIONS:  1. Due for lipids  2. Continue current medications  3. Exercise/healthy diet as much as possible.  Exercise limited by arthritis.  4. Follow up in one year with echo prior.     Flor Hand MD [facility]  Text Page         Thank you for allowing me to participate in the care of your patient.    Sincerely,     Flor Hand MD     [facility]

## 2020-12-10 NOTE — PATIENT INSTRUCTIONS
1.  Have your cholesterol drawn.  2.  Follow up in one year with echo prior to your visit.    It was nice to meet you!  Flor Hand MD on 12/10/2020 at 9:02 AM

## 2020-12-10 NOTE — LETTER
12/10/2020    Klever Vega MD  [address] [facility] [address]    RE: Shantelle Su       Dear Colleague,    I had the pleasure of seeing Shantelle Su in the [facility].    CARDIOLOGY CLINIC VISIT    REASON FOR VISIT : follow up CAD, HDL, HTN    PRIMARY CARE PHYSICIAN:  Klever Vega    HISTORY OF PRESENT ILLNESS:    Shantelle is a 76 yo woman previously folllowed by Dr. Lozada for cardiovascular care here for routine follow up.  She has a history of of her LAD 11/23/2016 after an abnormal stress test.  Stress echocardiogram showed ischemia in the anteroseptal and apical wall accompanied by 4/10 chest burning.  On 11/23/2016, she was found to have a 50% LAD stenosis followed by an 80% stenosis and a mid vessel 40%-50% stenosis.  About 2 hrs after stent placement, there was evidence of an acute myocardial infarction.  She had a proximal stent edge dissection.  Thrombectomy was performed and an additional overlapping drug-eluting stent was placed near the ostium of the LAD.  She also has a history of symptomatic PACs.     From a CV standpoing she has done reasonably since then. Last appointment in cardiology was one yr ago with Dr. Lozada.  The one thing she notes is dyspnea with walking up the stairs.  This dyspnea started a few months ago.  The dyspnea on the stairs is not particularly worrisome, and she has been overall less active due to her knee pain. She does not have chest pain or palpitations.     It's been a tough year for Shantelle. She went to [place] last winter and had arthritis and knee issues and lost vision in her left eye and describes having surgery for scar tissue on her retina and then she had to have catarct surgery. She is hoping to have knee surgery next. She also had to put her dog down and her  has had heart issues and is at [facility] right now getting a pacemaker.       PAST MEDICAL HISTORY:  Past Medical History:   Diagnosis Date     Acute  upper respiratory infection 9/30/2002     Angina pectoris, unspecified (H) 11/23/2016     ASCVD (arteriosclerotic cardiovascular disease) 12/20/2016     CAD (coronary artery disease) 11/23/2016    sp proximal LAD stenting     Diabetes mellitus (H)      Enterocele 9/12/2011     Essential hypertension, benign      Fatty liver 6/4/2015    Based on CT scan done 5/23/15      GERD (gastroesophageal reflux disease) 10/13/2008     Heart palpitations      Hiatal hernia      Hyperlipidemia LDL goal <70 12/20/2016     Metabolic syndrome 12/1/2011     Other pulmonary embolism and infarction 9/30/2002    after starting hormone therapy     PAC (premature atrial contraction)      Paroxysmal supraventricular tachycardia (H) 12/20/2016     Rectocele 10/15/2007       MEDICATIONS:  Current Outpatient Medications   Medication     ACCU-CHEK MULTICLIX LANCETS MISC     Acetaminophen (TYLENOL PO)     alpha-d-galactosidase (BEANO) tablet     amLODIPine (NORVASC) 5 MG tablet     aspirin EC 81 MG EC tablet     atorvastatin (LIPITOR) 40 MG tablet     Blood Glucose Calibration (ACCU-CHEK VI) SOLN     Blood Glucose Monitoring Suppl (ACCU-CHEK VI) KIT     glucose blood VI test strips strip     metFORMIN (GLUCOPHAGE) 500 MG tablet     metoprolol succinate ER (TOPROL XL) 50 MG 24 hr tablet     Multiple Vitamins-Minerals (MULTIVITAMIN ADULTS PO)     nitroGLYcerin (NITROSTAT) 0.4 MG sublingual tablet     omeprazole (PRILOSEC) 20 MG DR capsule     traMADol (ULTRAM) 50 MG tablet     Wheat Dextrin (BENEFIBER DRINK MIX PO)     Current Facility-Administered Medications   Medication     dexamethasone (DECADRON) injection 2 mL     lidocaine 1 % injection 4 mL       ALLERGIES:  Allergies   Allergen Reactions     Bactrim [Sulfamethoxazole W/Trimethoprim] Nausea and Vomiting     Lisinopril      bp increased       SOCIAL HISTORY:    Currently nonsmoker (distant history)    FAMILY HISTORY:   w/heart issues and her father was reported to have  CAD    REVIEW OF SYSTEMS:  Skin:  Negative     Eyes:  Positive for glasses;cataracts  ENT:  Negative    Respiratory:  Positive for dyspnea on exertion  Cardiovascular:    left leg edema which she attributes to arthritis  Gastroenterology: Positive for reflux  Genitourinary:  Negative    Musculoskeletal:  Positive for arthritis;joint swelling;nocturnal cramping  Neurologic:  Positive for numbness or tingling of hands  Psychiatric:  Positive for excessive stress;sleep disturbances  Heme/Lymph/Imm:  Negative    Endocrine:  Positive for diabetes    PHYSICAL EXAM:      BP: 126/76 Pulse: 72            Vital Signs with Ranges  Pulse:  [72] 72  BP: (126)/(76) 126/76  167 lbs 1.6 oz    Constitutional: awake, alert, no distress  Eyes: sclera nonicteric  ENT: trachea midline  Respiratory: clear bilaterally  Cardiovascular: regular rate and rhythm no audible murmur, no rub or gallop  GI: nondistended, nontender, bowel sounds present  Lymph/Hematologic: no lymphadenopathy  Skin: dry, no rash no edema  Musculoskeletal: grossly normal muscle bulk and tone   Neurologic: no focal deficits  Neuropsychiatric: appropriate affect      DATA:   LAST CHOLESTEROL:  Lab Results   Component Value Date    CHOL 163 05/08/2019     Lab Results   Component Value Date    HDL 65 05/08/2019     Lab Results   Component Value Date    LDL 69 05/08/2019     Lab Results   Component Value Date    TRIG 147 05/08/2019     Lab Results   Component Value Date    CHOLHDLRATIO 2.4 11/09/2015       LAST BMP:  Lab Results   Component Value Date     12/05/2018      Lab Results   Component Value Date    POTASSIUM 4.0 11/19/2020     Lab Results   Component Value Date    CHLORIDE 103 12/05/2018     Lab Results   Component Value Date    RAFAEL 9.6 12/05/2018     Lab Results   Component Value Date    CO2 25 12/05/2018     Lab Results   Component Value Date    BUN 15 12/05/2018     Lab Results   Component Value Date    CR 0.69 12/05/2018     Lab Results   Component  Value Date     11/19/2020       LAST CBC:  Lab Results   Component Value Date    WBC 10.8 05/29/2018     Lab Results   Component Value Date    RBC 3.80 05/29/2018     Lab Results   Component Value Date    HGB 13.0 11/19/2020     Lab Results   Component Value Date    HCT 36.6 05/29/2018     Lab Results   Component Value Date    MCV 96 05/29/2018     Lab Results   Component Value Date    MCH 31.1 05/29/2018     Lab Results   Component Value Date    MCHC 32.2 05/29/2018     Lab Results   Component Value Date    RDW 13.4 05/29/2018     Lab Results   Component Value Date     11/19/2020         EKG 7/5/2020 sinus relatively low voltage poor r-wave progression, possible old anterior infarct.      Stress echo 3/22/17  The patient exercised 10:45 mm:ss on standard mod Asael protocol.  The patient did not exhibit any symptoms during exercise.  A moderately-high workload was achieved.  There was a normal BP response to exercise.  The EKG portion of this stress test was negative for inducible ischemia (see  echo results below).  Normal resting wall motion and no stress-induced wall motion abnormality.    Echo 12/1916  The visual ejection fraction is estimated at 48-52%.  Left ventricular systolic function is borderline reduced.  Frequent ectopic beats  Compared to prior study, there is no significant change.          ASSESSMENT:  1.  Shantelle Su is a delightful 74-year-old female, status PCI to LAD in 2016 complicated by acute thrombosis w/edge dissection and a second stent that same day.   She had back discomfort that prompted a stress echocardiogram 03/2017.  This was normal without evidence of ischemia.  CCS 0. NYHA 1, last EF was normal. 2.  Hyperlipidemia, LDL 69 5/8/19    3.  History of symptomatic premature atrial contractions, no significant palps recently well-controlled.  4.  HTN, well-controlled. H/o diastolic dysfunction.   5.  Social stressors due to husbands CV issues, her non-cardiac health and  covid    RECOMMENDATIONS:  1. Due for lipids  2. Continue current medications  3. Exercise/healthy diet as much as possible.  Exercise limited by arthritis.  4. Follow up in one year with echo prior.     Flor Hand MD [facility]  Text Page           Thank you for allowing me to participate in the care of your patient.      Sincerely,     Flor Hand MD     [facility]    cc:   No referring provider defined for this encounter.

## 2020-12-10 NOTE — PROGRESS NOTES
CARDIOLOGY CLINIC VISIT    REASON FOR VISIT : follow up CAD, HDL, HTN    PRIMARY CARE PHYSICIAN:  Klever Vega    HISTORY OF PRESENT ILLNESS:    Shantelle is a 74 yo woman previously folllowed by Dr. Lozada for cardiovascular care here for routine follow up.  She has a history of of her LAD 11/23/2016 after an abnormal stress test.  Stress echocardiogram showed ischemia in the anteroseptal and apical wall accompanied by 4/10 chest burning.  On 11/23/2016, she was found to have a 50% LAD stenosis followed by an 80% stenosis and a mid vessel 40%-50% stenosis.  About 2 hrs after stent placement, there was evidence of an acute myocardial infarction.  She had a proximal stent edge dissection.  Thrombectomy was performed and an additional overlapping drug-eluting stent was placed near the ostium of the LAD.  She also has a history of symptomatic PACs.     From a CV standpoing she has done reasonably since then. Last appointment in cardiology was one yr ago with Dr. Lozada.  The one thing she notes is dyspnea with walking up the stairs.  This dyspnea started a few months ago.  The dyspnea on the stairs is not particularly worrisome, and she has been overall less active due to her knee pain. She does not have chest pain or palpitations.     It's been a tough year for Shantelle. She went to [place] last winter and had arthritis and knee issues and lost vision in her left eye and describes having surgery for scar tissue on her retina and then she had to have catarct surgery. She is hoping to have knee surgery next. She also had to put her dog down and her  has had heart issues and is at [facility] right now getting a pacemaker.       PAST MEDICAL HISTORY:  Past Medical History:   Diagnosis Date     Acute upper respiratory infection 9/30/2002     Angina pectoris, unspecified (H) 11/23/2016     ASCVD (arteriosclerotic cardiovascular disease) 12/20/2016     CAD (coronary artery disease) 11/23/2016    sp proximal LAD stenting      Diabetes mellitus (H)      Enterocele 9/12/2011     Essential hypertension, benign      Fatty liver 6/4/2015    Based on CT scan done 5/23/15      GERD (gastroesophageal reflux disease) 10/13/2008     Heart palpitations      Hiatal hernia      Hyperlipidemia LDL goal <70 12/20/2016     Metabolic syndrome 12/1/2011     Other pulmonary embolism and infarction 9/30/2002    after starting hormone therapy     PAC (premature atrial contraction)      Paroxysmal supraventricular tachycardia (H) 12/20/2016     Rectocele 10/15/2007       MEDICATIONS:  Current Outpatient Medications   Medication     ACCU-CHEK MULTICLIX LANCETS MISC     Acetaminophen (TYLENOL PO)     alpha-d-galactosidase (BEANO) tablet     amLODIPine (NORVASC) 5 MG tablet     aspirin EC 81 MG EC tablet     atorvastatin (LIPITOR) 40 MG tablet     Blood Glucose Calibration (ACCU-CHEK VI) SOLN     Blood Glucose Monitoring Suppl (ACCU-CHEK VI) KIT     glucose blood VI test strips strip     metFORMIN (GLUCOPHAGE) 500 MG tablet     metoprolol succinate ER (TOPROL XL) 50 MG 24 hr tablet     Multiple Vitamins-Minerals (MULTIVITAMIN ADULTS PO)     nitroGLYcerin (NITROSTAT) 0.4 MG sublingual tablet     omeprazole (PRILOSEC) 20 MG DR capsule     traMADol (ULTRAM) 50 MG tablet     Wheat Dextrin (BENEFIBER DRINK MIX PO)     Current Facility-Administered Medications   Medication     dexamethasone (DECADRON) injection 2 mL     lidocaine 1 % injection 4 mL       ALLERGIES:  Allergies   Allergen Reactions     Bactrim [Sulfamethoxazole W/Trimethoprim] Nausea and Vomiting     Lisinopril      bp increased       SOCIAL HISTORY:    Currently nonsmoker (distant history)    FAMILY HISTORY:   w/heart issues and her father was reported to have CAD    REVIEW OF SYSTEMS:  Skin:  Negative     Eyes:  Positive for glasses;cataracts  ENT:  Negative    Respiratory:  Positive for dyspnea on exertion  Cardiovascular:    left leg edema which she attributes to  arthritis  Gastroenterology: Positive for reflux  Genitourinary:  Negative    Musculoskeletal:  Positive for arthritis;joint swelling;nocturnal cramping  Neurologic:  Positive for numbness or tingling of hands  Psychiatric:  Positive for excessive stress;sleep disturbances  Heme/Lymph/Imm:  Negative    Endocrine:  Positive for diabetes    PHYSICAL EXAM:      BP: 126/76 Pulse: 72            Vital Signs with Ranges  Pulse:  [72] 72  BP: (126)/(76) 126/76  167 lbs 1.6 oz    Constitutional: awake, alert, no distress  Eyes: sclera nonicteric  ENT: trachea midline  Respiratory: clear bilaterally  Cardiovascular: regular rate and rhythm no audible murmur, no rub or gallop  GI: nondistended, nontender, bowel sounds present  Lymph/Hematologic: no lymphadenopathy  Skin: dry, no rash no edema  Musculoskeletal: grossly normal muscle bulk and tone   Neurologic: no focal deficits  Neuropsychiatric: appropriate affect      DATA:   LAST CHOLESTEROL:  Lab Results   Component Value Date    CHOL 163 05/08/2019     Lab Results   Component Value Date    HDL 65 05/08/2019     Lab Results   Component Value Date    LDL 69 05/08/2019     Lab Results   Component Value Date    TRIG 147 05/08/2019     Lab Results   Component Value Date    CHOLHDLRATIO 2.4 11/09/2015       LAST BMP:  Lab Results   Component Value Date     12/05/2018      Lab Results   Component Value Date    POTASSIUM 4.0 11/19/2020     Lab Results   Component Value Date    CHLORIDE 103 12/05/2018     Lab Results   Component Value Date    RAFAEL 9.6 12/05/2018     Lab Results   Component Value Date    CO2 25 12/05/2018     Lab Results   Component Value Date    BUN 15 12/05/2018     Lab Results   Component Value Date    CR 0.69 12/05/2018     Lab Results   Component Value Date     11/19/2020       LAST CBC:  Lab Results   Component Value Date    WBC 10.8 05/29/2018     Lab Results   Component Value Date    RBC 3.80 05/29/2018     Lab Results   Component Value Date     HGB 13.0 11/19/2020     Lab Results   Component Value Date    HCT 36.6 05/29/2018     Lab Results   Component Value Date    MCV 96 05/29/2018     Lab Results   Component Value Date    MCH 31.1 05/29/2018     Lab Results   Component Value Date    MCHC 32.2 05/29/2018     Lab Results   Component Value Date    RDW 13.4 05/29/2018     Lab Results   Component Value Date     11/19/2020         EKG 7/5/2020 sinus relatively low voltage poor r-wave progression, possible old anterior infarct.      Stress echo 3/22/17  The patient exercised 10:45 mm:ss on standard mod Asael protocol.  The patient did not exhibit any symptoms during exercise.  A moderately-high workload was achieved.  There was a normal BP response to exercise.  The EKG portion of this stress test was negative for inducible ischemia (see  echo results below).  Normal resting wall motion and no stress-induced wall motion abnormality.    Echo 12/1916  The visual ejection fraction is estimated at 48-52%.  Left ventricular systolic function is borderline reduced.  Frequent ectopic beats  Compared to prior study, there is no significant change.          ASSESSMENT:  1.  Shantelle Su is a delightful 74-year-old female, status PCI to LAD in 2016 complicated by acute thrombosis w/edge dissection and a second stent that same day.   She had back discomfort that prompted a stress echocardiogram 03/2017.  This was normal without evidence of ischemia.  CCS 0. NYHA 1, last EF was normal. 2.  Hyperlipidemia, LDL 69 5/8/19    3.  History of symptomatic premature atrial contractions, no significant palps recently well-controlled.  4.  HTN, well-controlled. H/o diastolic dysfunction.   5.  Social stressors due to husbands CV issues, her non-cardiac health and covid    RECOMMENDATIONS:  1. Due for lipids  2. Continue current medications  3. Exercise/healthy diet as much as possible.  Exercise limited by arthritis.  4. Follow up in one year with echo prior.     Flor  MD [facility]  Text Page

## 2020-12-16 DIAGNOSIS — E88.810 METABOLIC SYNDROME: ICD-10-CM

## 2020-12-16 NOTE — LETTER
[facility]  [address] [id number] [phone]            Shantelle Su  [address] [facility] [address]        December 16, 2020    Dear Shantelle,    While refilling your prescription today, we noticed that you are due for an appointment with your provider.  We will refill your prescription for 30 days, but a follow-up appointment must be made before any additional refills can be approved.     Taking care of your health is important to us and we look forward to seeing you in the near future.  Please call us at [phone] or [phone] (or use [facility]) to schedule an appointment.     Please disregard this notice if you have already made an appointment.    Sincerely,        [facility]

## 2020-12-16 NOTE — TELEPHONE ENCOUNTER
Prescription filled but patient needs to be informed that is due for lab work and clinic follow-up visit for diabetes

## 2021-01-14 ENCOUNTER — TELEPHONE (OUTPATIENT)
Dept: INTERNAL MEDICINE | Facility: CLINIC | Age: 76
End: 2021-01-14

## 2021-01-14 DIAGNOSIS — E11.9 TYPE 2 DIABETES MELLITUS WITHOUT COMPLICATION, WITHOUT LONG-TERM CURRENT USE OF INSULIN (H): Primary | ICD-10-CM

## 2021-01-14 DIAGNOSIS — E11.9 TYPE 2 DIABETES MELLITUS WITHOUT COMPLICATION, WITHOUT LONG-TERM CURRENT USE OF INSULIN (H): ICD-10-CM

## 2021-01-14 LAB — HBA1C MFR BLD: 6.8 % (ref 0–5.6)

## 2021-01-14 PROCEDURE — 36415 COLL VENOUS BLD VENIPUNCTURE: CPT | Performed by: INTERNAL MEDICINE

## 2021-01-14 PROCEDURE — 82043 UR ALBUMIN QUANTITATIVE: CPT | Performed by: INTERNAL MEDICINE

## 2021-01-14 PROCEDURE — 83036 HEMOGLOBIN GLYCOSYLATED A1C: CPT | Performed by: INTERNAL MEDICINE

## 2021-01-14 PROCEDURE — 80053 COMPREHEN METABOLIC PANEL: CPT | Performed by: INTERNAL MEDICINE

## 2021-01-15 ENCOUNTER — HEALTH MAINTENANCE LETTER (OUTPATIENT)
Age: 76
End: 2021-01-15

## 2021-01-15 LAB
ALBUMIN SERPL-MCNC: 3.9 G/DL (ref 3.4–5)
ALP SERPL-CCNC: 106 U/L (ref 40–150)
ALT SERPL W P-5'-P-CCNC: 29 U/L (ref 0–50)
ANION GAP SERPL CALCULATED.3IONS-SCNC: 3 MMOL/L (ref 3–14)
AST SERPL W P-5'-P-CCNC: 20 U/L (ref 0–45)
BILIRUB SERPL-MCNC: 0.3 MG/DL (ref 0.2–1.3)
BUN SERPL-MCNC: 15 MG/DL (ref 7–30)
CALCIUM SERPL-MCNC: 9 MG/DL (ref 8.5–10.1)
CHLORIDE SERPL-SCNC: 107 MMOL/L (ref 94–109)
CO2 SERPL-SCNC: 27 MMOL/L (ref 20–32)
CREAT SERPL-MCNC: 0.64 MG/DL (ref 0.52–1.04)
CREAT UR-MCNC: 54 MG/DL
GFR SERPL CREATININE-BSD FRML MDRD: 87 ML/MIN/{1.73_M2}
GLUCOSE SERPL-MCNC: 97 MG/DL (ref 70–99)
MICROALBUMIN UR-MCNC: 17 MG/L
MICROALBUMIN/CREAT UR: 32.03 MG/G CR (ref 0–25)
POTASSIUM SERPL-SCNC: 4.3 MMOL/L (ref 3.4–5.3)
PROT SERPL-MCNC: 7.5 G/DL (ref 6.8–8.8)
SODIUM SERPL-SCNC: 137 MMOL/L (ref 133–144)

## 2021-01-18 ENCOUNTER — OFFICE VISIT (OUTPATIENT)
Dept: INTERNAL MEDICINE | Facility: CLINIC | Age: 76
End: 2021-01-18
Payer: COMMERCIAL

## 2021-01-18 VITALS
OXYGEN SATURATION: 99 % | TEMPERATURE: 97.3 F | HEIGHT: 59 IN | SYSTOLIC BLOOD PRESSURE: 136 MMHG | DIASTOLIC BLOOD PRESSURE: 82 MMHG | BODY MASS INDEX: 34.27 KG/M2 | RESPIRATION RATE: 15 BRPM | HEART RATE: 69 BPM | WEIGHT: 170 LBS

## 2021-01-18 DIAGNOSIS — E11.9 TYPE 2 DIABETES MELLITUS WITHOUT COMPLICATION, WITHOUT LONG-TERM CURRENT USE OF INSULIN (H): Primary | ICD-10-CM

## 2021-01-18 DIAGNOSIS — Z12.31 VISIT FOR SCREENING MAMMOGRAM: ICD-10-CM

## 2021-01-18 DIAGNOSIS — I10 ESSENTIAL HYPERTENSION, BENIGN: ICD-10-CM

## 2021-01-18 PROCEDURE — 99214 OFFICE O/P EST MOD 30 MIN: CPT | Performed by: INTERNAL MEDICINE

## 2021-01-18 ASSESSMENT — MIFFLIN-ST. JEOR: SCORE: 1176.74

## 2021-01-18 NOTE — PROGRESS NOTES
"  Assessment & Plan     Type 2 diabetes mellitus without complication, without long-term current use of insulin (H)  With current medication management.  Avoiding ACE and ARB therapy due to unknown side effect profile of ACE inhibitor's.  - metFORMIN (GLUCOPHAGE) 500 MG tablet; Take 1 tablet (500 mg) by mouth daily (with dinner)  - ACE/ARB/ARNI NOT PRESCRIBED (INTENTIONAL); Please choose reason not prescribed from choices below.    Essential hypertension, benign  Stable on therapy continue with current medical management.    Visit for screening mammogram  Advised updated mammography.  - *MA Screening Digital Bilateral; Future    Advised also need for shingles vaccination and Medicare wellness exam      25 minutes spent on the date of the encounter doing chart review, review of test results, interpretation of tests, patient visit and documentation      BMI:   Estimated body mass index is 34.34 kg/m  as calculated from the following:    Height as of this encounter: 1.499 m (4' 11\").    Weight as of this encounter: 77.1 kg (170 lb).         Work on weight loss  Regular exercise    No follow-ups on file.    Klever Vega MD  [facility]    Ronny Pepper is a 75 year old who presents to clinic today for the following health issues     HPI     Diabetes Follow-up      How often are you checking your blood sugar? Not at all    What concerns do you have today about your diabetes? Other: concerns about leg swelling being related to diabetes      Do you have any of these symptoms? (Select all that apply)  Numbness in feet and Blurry vision      BP Readings from Last 2 Encounters:   12/10/20 126/76   11/19/20 128/70     Hemoglobin A1C (%)   Date Value   01/14/2021 6.8 (H)   07/15/2020 6.8 (H)     LDL Cholesterol Calculated (mg/dL)   Date Value   12/10/2020 50   05/08/2019 69       How many servings of fruits and vegetables do you eat daily?  0-1    On average, how many sweetened " "beverages do you drink each day (Examples: soda, juice, sweet tea, etc.  Do NOT count diet or artificially sweetened beverages)?   1    How many days per week do you exercise enough to make your heart beat faster? 3 or less    How many minutes a day do you exercise enough to make your heart beat faster? 10 - 19    How many days per week do you miss taking your medication? 0    Swelling, leg pain       Duration: 1+ yr     Description (location/character/radiation): Left leg swelling from knee and down/ pain in left knee     Intensity:  Moderate, tenderness     Accompanying signs and symptoms: swelling in ankles, bilateral     History (similar episodes/previous evaluation): Previous bakers cyst behind left knee, popped on its own. US done on leg in [place] last yr      Precipitating or alleviating factors: None    Therapies tried and outcome: Tylenol         Review of Systems   CONSTITUTIONAL: NEGATIVE for fever, chills, change in weight  EYES: NEGATIVE for vision changes or irritation  ENT/MOUTH: NEGATIVE for ear, mouth and throat problems  RESP: NEGATIVE for significant cough or SOB  CV: NEGATIVE for chest pain, palpitations  GI: NEGATIVE for nausea, abdominal pain, heartburn, or change in bowel habits  : NEGATIVE for frequency, dysuria, or hematuria  HEME: NEGATIVE for bleeding problems  PSYCHIATRIC: NEGATIVE for changes in mood or affect      Objective    /82   Pulse 69   Temp 97.3  F (36.3  C) (Temporal)   Resp 15   Ht 1.499 m (4' 11\")   Wt 77.1 kg (170 lb)   SpO2 99%   BMI 34.34 kg/m    Body mass index is 34.34 kg/m .  Physical Exam   GENERAL: alert and no distress  EYES: Eyes grossly normal to inspection, PERRL and conjunctivae and sclerae normal  HENT: ear canals and TM's normal, nose and mouth without ulcers or lesions  NECK: no adenopathy, no asymmetry, masses, or scars and thyroid normal to palpation  RESP: lungs clear to auscultation - no rales, rhonchi or wheezes  CV: regular rate and " rhythm, normal S1 S2, no S3 or S4, no click or rub, minimal peripheral edema and peripheral pulses strong  NEURO: No focal changes  PSYCH: mentation appears normal, affect normal/bright.        Lab Results   Component Value Date    A1C 6.8 01/14/2021    A1C 6.8 07/15/2020    A1C 6.5 11/14/2019    A1C 6.6 05/08/2019    A1C 6.6 12/05/2018     LDL Cholesterol Calculated   Date Value Ref Range Status   12/10/2020 50 <100 mg/dL Final     Comment:     Desirable:       <100 mg/dl     Creatinine   Date Value Ref Range Status   01/14/2021 0.64 0.52 - 1.04 mg/dL Final     Lab Results   Component Value Date    ALT 29 01/14/2021

## 2021-02-09 ENCOUNTER — HOSPITAL ENCOUNTER (OUTPATIENT)
Dept: MAMMOGRAPHY | Facility: CLINIC | Age: 76
Discharge: HOME | End: 2021-02-09
Attending: INTERNAL MEDICINE | Admitting: INTERNAL MEDICINE
Payer: COMMERCIAL

## 2021-02-09 DIAGNOSIS — Z12.31 VISIT FOR SCREENING MAMMOGRAM: ICD-10-CM

## 2021-02-09 PROCEDURE — 77063 BREAST TOMOSYNTHESIS BI: CPT

## 2021-02-25 ENCOUNTER — IMMUNIZATION (OUTPATIENT)
Dept: PEDIATRICS | Facility: CLINIC | Age: 76
End: 2021-02-25
Payer: COMMERCIAL

## 2021-02-25 PROCEDURE — 91300 PR COVID VAC PFIZER DIL RECON 30 MCG/0.3 ML IM: CPT

## 2021-02-25 PROCEDURE — 0001A PR COVID VAC PFIZER DIL RECON 30 MCG/0.3 ML IM: CPT

## 2021-03-18 ENCOUNTER — IMMUNIZATION (OUTPATIENT)
Dept: PEDIATRICS | Facility: CLINIC | Age: 76
End: 2021-03-18
Attending: INTERNAL MEDICINE
Payer: COMMERCIAL

## 2021-03-18 PROCEDURE — 91300 PR COVID VAC PFIZER DIL RECON 30 MCG/0.3 ML IM: CPT

## 2021-03-18 PROCEDURE — 0002A PR COVID VAC PFIZER DIL RECON 30 MCG/0.3 ML IM: CPT

## 2021-03-29 DIAGNOSIS — I10 ESSENTIAL HYPERTENSION, BENIGN: ICD-10-CM

## 2021-03-29 DIAGNOSIS — I47.10 PAROXYSMAL SUPRAVENTRICULAR TACHYCARDIA (H): ICD-10-CM

## 2021-03-30 RX ORDER — METOPROLOL SUCCINATE 50 MG/1
TABLET, EXTENDED RELEASE ORAL
Qty: 90 TABLET | Refills: 2 | Status: SHIPPED | OUTPATIENT
Start: 2021-03-30 | End: 2021-12-16

## 2021-03-30 RX ORDER — AMLODIPINE BESYLATE 5 MG/1
TABLET ORAL
Qty: 90 TABLET | Refills: 2 | Status: SHIPPED | OUTPATIENT
Start: 2021-03-30 | End: 2021-12-16

## 2021-03-30 NOTE — TELEPHONE ENCOUNTER
Prescription approved per [facility] Refill Protocol.    Seema Quinonez, MSN, RN  Triage RN  [facility]

## 2021-04-19 DIAGNOSIS — E78.5 HYPERLIPIDEMIA LDL GOAL <130: ICD-10-CM

## 2021-04-19 RX ORDER — ATORVASTATIN CALCIUM 40 MG/1
TABLET, FILM COATED ORAL
Qty: 90 TABLET | Refills: 1 | Status: SHIPPED | OUTPATIENT
Start: 2021-04-19 | End: 2021-10-11

## 2021-06-25 DIAGNOSIS — K21.9 GASTROESOPHAGEAL REFLUX DISEASE WITHOUT ESOPHAGITIS: ICD-10-CM

## 2021-06-25 DIAGNOSIS — E11.9 TYPE 2 DIABETES MELLITUS WITHOUT COMPLICATION, WITHOUT LONG-TERM CURRENT USE OF INSULIN (H): ICD-10-CM

## 2021-07-22 ENCOUNTER — OFFICE VISIT (OUTPATIENT)
Dept: FAMILY MEDICINE | Facility: CLINIC | Age: 76
End: 2021-07-22
Payer: COMMERCIAL

## 2021-07-22 ENCOUNTER — ANCILLARY PROCEDURE (OUTPATIENT)
Dept: GENERAL RADIOLOGY | Facility: CLINIC | Age: 76
End: 2021-07-22
Attending: FAMILY MEDICINE
Payer: COMMERCIAL

## 2021-07-22 VITALS
OXYGEN SATURATION: 96 % | WEIGHT: 168.4 LBS | BODY MASS INDEX: 33.95 KG/M2 | HEART RATE: 74 BPM | SYSTOLIC BLOOD PRESSURE: 128 MMHG | DIASTOLIC BLOOD PRESSURE: 74 MMHG | RESPIRATION RATE: 18 BRPM | HEIGHT: 59 IN | TEMPERATURE: 98.1 F

## 2021-07-22 DIAGNOSIS — Z79.4 TYPE 2 DIABETES MELLITUS WITHOUT COMPLICATION, WITH LONG-TERM CURRENT USE OF INSULIN (H): ICD-10-CM

## 2021-07-22 DIAGNOSIS — E11.9 TYPE 2 DIABETES MELLITUS WITHOUT COMPLICATION, WITH LONG-TERM CURRENT USE OF INSULIN (H): ICD-10-CM

## 2021-07-22 DIAGNOSIS — G89.29 CHRONIC PAIN OF RIGHT KNEE: Primary | ICD-10-CM

## 2021-07-22 DIAGNOSIS — M25.561 CHRONIC PAIN OF RIGHT KNEE: Primary | ICD-10-CM

## 2021-07-22 DIAGNOSIS — M25.551 HIP PAIN, RIGHT: ICD-10-CM

## 2021-07-22 DIAGNOSIS — G89.29 CHRONIC PAIN OF RIGHT KNEE: ICD-10-CM

## 2021-07-22 DIAGNOSIS — M25.561 CHRONIC PAIN OF RIGHT KNEE: ICD-10-CM

## 2021-07-22 LAB — HBA1C MFR BLD: 6.8 % (ref 0–5.6)

## 2021-07-22 PROCEDURE — 99214 OFFICE O/P EST MOD 30 MIN: CPT | Performed by: FAMILY MEDICINE

## 2021-07-22 PROCEDURE — 73502 X-RAY EXAM HIP UNI 2-3 VIEWS: CPT | Mod: RT | Performed by: RADIOLOGY

## 2021-07-22 PROCEDURE — 83036 HEMOGLOBIN GLYCOSYLATED A1C: CPT | Performed by: FAMILY MEDICINE

## 2021-07-22 PROCEDURE — 73562 X-RAY EXAM OF KNEE 3: CPT | Mod: RT | Performed by: RADIOLOGY

## 2021-07-22 PROCEDURE — 36415 COLL VENOUS BLD VENIPUNCTURE: CPT | Performed by: FAMILY MEDICINE

## 2021-07-22 RX ORDER — NAPROXEN SODIUM 220 MG
220 TABLET ORAL 2 TIMES DAILY WITH MEALS
Qty: 60 TABLET | Refills: 0 | Status: SHIPPED | OUTPATIENT
Start: 2021-07-22 | End: 2022-06-06

## 2021-07-22 RX ORDER — POLYETHYLENE GLYCOL 3350 17 G/17G
1 POWDER, FOR SOLUTION ORAL DAILY
Qty: 507 G | Refills: 0 | Status: SHIPPED | OUTPATIENT
Start: 2021-07-22 | End: 2021-10-21

## 2021-07-22 ASSESSMENT — ENCOUNTER SYMPTOMS: ARTHRALGIAS: 1

## 2021-07-22 ASSESSMENT — MIFFLIN-ST. JEOR: SCORE: 1164.49

## 2021-07-22 NOTE — PROGRESS NOTES
Assessment & Plan     Chronic pain of right knee  Acute on chronic.  Likely multifactorial, comorbid BMI 34.  There was no acute trauma or inciting injury.  Possible mild medial joint space narrowing on radial diagnostic imaging.  She has some hip tenderness, x-ray was performed which showed very mild arthritic changes.  She likely has some patellofemoral syndrome and slight overuse as she has a longstanding history of chronic left knee pain.  His pain is refractory to conservative measures, recommend starting naproxen twice daily, BMP reviewed, good kidney functions.  She can take Tylenol 3 for breakthrough pain.  Patient already has history of constipation, would not like to make worse with Tylenol 3, will start her on a bowel regimen using MiraLAX.  Start outpatient physical therapy.  - XR Knee Right 3 Views  - polyethylene glycol (MIRALAX) 17 GM/Dose powder  Dispense: 507 g; Refill: 0  - acetaminophen-codeine (TYLENOL #3) 300-30 MG tablet  Dispense: 18 tablet; Refill: 0  - [facility]  - naproxen sodium (ANAPROX) 220 MG tablet  Dispense: 60 tablet; Refill: 0    Hip pain, right  Management as per above.  - XR Hip Right 2-3 Views  - polyethylene glycol (MIRALAX) 17 GM/Dose powder  Dispense: 507 g; Refill: 0  - [facility]  - naproxen sodium (ANAPROX) 220 MG tablet  Dispense: 60 tablet; Refill: 0      Type 2 diabetes mellitus without complication, with long-term current use of insulin (H)  New diagnosis.  Goal A1c less than 7.5; continue current medical management.  Recommend recheck in 6 months.  - Hemoglobin A1c  - Hemoglobin A1c      Return in about 1 year (around 7/22/2022) for If symptoms do not improve or gets worse..    Burton Mejia MD  [facility] SOLE Pepper is a 76 year old who presents for the following health issues     HPI     Musculoskeletal problem/pain  Onset/Duration: couple months  Description  Location: right leg - right  Joint  "Swelling: no  Redness: no  Pain: YES  Warmth: no  Intensity:  severe  Progression of Symptoms:  worse  Accompanying signs and symptoms:   Fevers: no  Numbness/tingling/weakness: YES- weakness  History  Trauma to the area: no  Recent illness:  no  Previous similar problem: Patient has had long time left leg pain. The right leg is now having pain, having pain down outside of leg from thigh to ankle  Previous evaluation:  no  Precipitating or alleviating factors:  Aggravating factors include: walking, bearing weight, has had many herbert horses in leg  Therapies tried and outcome: acetaminophen        Review of Systems   Musculoskeletal: Positive for arthralgias.            Objective    /74   Pulse 74   Temp 98.1  F (36.7  C) (Tympanic)   Resp 18   Ht 1.499 m (4' 11\")   Wt 76.4 kg (168 lb 6.4 oz)   SpO2 96%   BMI 34.01 kg/m    Body mass index is 34.01 kg/m .  Physical Exam  Vitals reviewed.   Constitutional:       Appearance: She is not ill-appearing.   Cardiovascular:      Rate and Rhythm: Normal rate and regular rhythm.   Pulmonary:      Effort: Pulmonary effort is normal.      Breath sounds: Normal breath sounds.   Musculoskeletal:      Comments: Positive right FABIR.    Right knee exam unremarkable   Neurological:      Comments: Antalgic gait           XR:  \"Possible very mild medial compartment joint space  narrowing. No fracture or effusion.\"                                                                   \"Mild right hip degenerative changes. No evidence of  fracture or AVN.\"    Last Comprehensive Metabolic Panel:  Sodium   Date Value Ref Range Status   01/14/2021 137 133 - 144 mmol/L Final     Potassium   Date Value Ref Range Status   01/14/2021 4.3 3.4 - 5.3 mmol/L Final     Chloride   Date Value Ref Range Status   01/14/2021 107 94 - 109 mmol/L Final     Carbon Dioxide   Date Value Ref Range Status   01/14/2021 27 20 - 32 mmol/L Final     Anion Gap   Date Value Ref Range Status   01/14/2021 3 3 " - 14 mmol/L Final     Glucose   Date Value Ref Range Status   01/14/2021 97 70 - 99 mg/dL Final     Urea Nitrogen   Date Value Ref Range Status   01/14/2021 15 7 - 30 mg/dL Final     Creatinine   Date Value Ref Range Status   01/14/2021 0.64 0.52 - 1.04 mg/dL Final     GFR Estimate   Date Value Ref Range Status   01/14/2021 87 >60 mL/min/[1.73_m2] Final     Comment:     Non  GFR Calc  Starting 12/18/2018, serum creatinine based estimated GFR (eGFR) will be   calculated using the Chronic Kidney Disease Epidemiology Collaboration   (CKD-EPI) equation.       Calcium   Date Value Ref Range Status   01/14/2021 9.0 8.5 - 10.1 mg/dL Final     Bilirubin Total   Date Value Ref Range Status   01/14/2021 0.3 0.2 - 1.3 mg/dL Final     Alkaline Phosphatase   Date Value Ref Range Status   01/14/2021 106 40 - 150 U/L Final     ALT   Date Value Ref Range Status   01/14/2021 29 0 - 50 U/L Final     AST   Date Value Ref Range Status   01/14/2021 20 0 - 45 U/L Final               Hemoglobin A1C   Date Value Ref Range Status   07/22/2021 6.8 (H) 0.0 - 5.6 % Final     Comment:     Normal <5.7%   Prediabetes 5.7-6.4%    Diabetes 6.5% or higher     Note: Adopted from ADA consensus guidelines.   01/14/2021 6.8 (H) 0 - 5.6 % Final     Comment:     Normal <5.7% Prediabetes 5.7-6.4%  Diabetes 6.5% or higher - adopted from ADA   consensus guidelines.

## 2021-07-26 ENCOUNTER — THERAPY VISIT (OUTPATIENT)
Dept: PHYSICAL THERAPY | Facility: CLINIC | Age: 76
End: 2021-07-26
Payer: COMMERCIAL

## 2021-07-26 ENCOUNTER — TRANSFERRED RECORDS (OUTPATIENT)
Dept: HEALTH INFORMATION MANAGEMENT | Facility: CLINIC | Age: 76
End: 2021-07-26

## 2021-07-26 DIAGNOSIS — M25.561 CHRONIC PAIN OF RIGHT KNEE: ICD-10-CM

## 2021-07-26 DIAGNOSIS — G89.29 CHRONIC PAIN OF RIGHT KNEE: ICD-10-CM

## 2021-07-26 DIAGNOSIS — M25.551 HIP PAIN, RIGHT: ICD-10-CM

## 2021-07-26 LAB — RETINOPATHY: NEGATIVE

## 2021-07-26 PROCEDURE — 97035 APP MDLTY 1+ULTRASOUND EA 15: CPT | Mod: GP | Performed by: PHYSICAL THERAPIST

## 2021-07-26 PROCEDURE — 97161 PT EVAL LOW COMPLEX 20 MIN: CPT | Mod: GP | Performed by: PHYSICAL THERAPIST

## 2021-07-26 PROCEDURE — 97110 THERAPEUTIC EXERCISES: CPT | Mod: GP | Performed by: PHYSICAL THERAPIST

## 2021-07-26 PROCEDURE — 97010 HOT OR COLD PACKS THERAPY: CPT | Mod: GP | Performed by: PHYSICAL THERAPIST

## 2021-07-26 ASSESSMENT — ACTIVITIES OF DAILY LIVING (ADL)
DEEP_SQUATTING: MODERATE DIFFICULTY
WALKING_APPROXIMATELY_10_MINUTES: EXTREME DIFFICULTY
GETTING_INTO_AND_OUT_OF_AN_AVERAGE_CAR: MODERATE DIFFICULTY
GOING_UP_1_FLIGHT_OF_STAIRS: EXTREME DIFFICULTY
WALKING_15_MINUTES_OR_GREATER: UNABLE TO DO
PUTTING_ON_SOCKS_AND_SHOES: MODERATE DIFFICULTY
WALKING_INITIALLY: EXTREME DIFFICULTY
GETTING_INTO_AND_OUT_OF_A_BATHTUB: EXTREME DIFFICULTY
HOS_ADL_ITEM_SCORE_TOTAL: 15
SITTING_FOR_15_MINUTES: NO DIFFICULTY AT ALL
STANDING_FOR_15_MINUTES: SLIGHT DIFFICULTY
HOS_ADL_HIGHEST_POTENTIAL_SCORE: 44
GOING_DOWN_1_FLIGHT_OF_STAIRS: MODERATE DIFFICULTY
HOS_ADL_COUNT: 11
STEPPING_UP_AND_DOWN_CURBS: EXTREME DIFFICULTY
WALKING_UP_STEEP_HILLS: EXTREME DIFFICULTY

## 2021-07-26 NOTE — PROGRESS NOTES
Physical Therapy Initial Evaluation  Subjective:  Pt describes intermittent pain from her right lateral hip that will run down her lateral thigh into the lateral calf at times.  Began insidiously about 2 months ago.  Felt when lying on her right side, with transitional movements, when first walking and then if walking too much.  Hx of left knee OA.  Had recent x-rays showing mild OA of the right hip and knee.  Referred to PT on 7/22/21.    The history is provided by the patient.   Therapist Generated HPI Evaluation         Type of problem:  Right hip.    This is a new condition.  Condition occurred with:  Insidious onset.  Where condition occurred: for unknown reasons.  Patient reports pain:  Greater trochanter and lateral.  Pain is described as aching and stabbing and is intermittent.  Pain radiates to:  Thigh, knee and lower leg. Pain is worse during the night.  Since onset symptoms are gradually worsening.  Associated symptoms:  Loss of motion/stiffness and loss of strength. Symptoms are exacerbated by walking, transfers and lying on extremity  and relieved by rest.  Special tests included:  X-ray.    Barriers include:  None as reported by patient.    Patient Health History             Pertinent medical history includes: diabetes, heart problems, high blood pressure, overweight and smoking.   Red flags:  Calf pain-swelling-warmth, pain at rest/night and significant weakness (weak bones).     Surgeries include:  Heart surgery and other (stent). Other surgery history details: bladder, PE lungs.    Current medications:  High blood pressure medication, cardiac medication and pain medication.       Primary job tasks include:  Lifting/carrying and prolonged sitting.                                    Objective:  System                                           Hip Evaluation  Hip PROM:    Flexion: Left: 100   Right: 100        Internal Rotation: Left: 10    Right: 15  External Rotation: Left: 40    Right: 45  +              Hip Strength:    Flexion:   Left: 5-/5   Pain:  Right: 5-/5   Pain:                    Extension:  Left: 4/5  Pain:Right: 4/5    Pain:    Abduction:  Left: 4/5     Pain:Right: 4/5    Pain:      External Rotation:  Left: 4+/5   Pain:  Right: 4+/5   Pain:            Hip Special Testing:    Left hip positive for the following special tests:  Melany   Right hip positive for the following special tests:  Melany    Hip Palpation:      Right hip tenderness present at:  Greater Trachanter       Knee Evaluation:  ROM:      PROM      Extension: Left: 0    Right:  0  Flexion: Left: 100 +    Right:  120            Palpation:  Normal      Edema:  Edema of the knee: Left knee swollen.            General     ROS    Assessment/Plan:    Patient is a 76 year old female with right side hip complaints.    Patient has the following significant findings with corresponding treatment plan.                Diagnosis 1:  Right hip OA with bursitis  Pain -  hot/cold therapy, US, manual therapy and education  Decreased ROM/flexibility - manual therapy, therapeutic exercise and home program  Decreased strength - therapeutic exercise, therapeutic activities and home program    Therapy Evaluation Codes:   1) History comprised of:   Personal factors that impact the plan of care:      None.    Comorbidity factors that impact the plan of care are:      None.     Medications impacting care: None.  2) Examination of Body Systems comprised of:   Body structures and functions that impact the plan of care:      Hip and Knee.   Activity limitations that impact the plan of care are:      Dressing, Stairs, Walking and Sleeping.  3) Clinical presentation characteristics are:   Stable/Uncomplicated.  4) Decision-Making    Low complexity using standardized patient assessment instrument and/or measureable assessment of functional outcome.  Cumulative Therapy Evaluation is: Low complexity.    Previous and current functional limitations:  (See Goal  Flow Sheet for this information)    Short term and Long term goals: (See Goal Flow Sheet for this information)     Communication ability:  Patient appears to be able to clearly communicate and understand verbal and written communication and follow directions correctly.  Treatment Explanation - The following has been discussed with the patient:   RX ordered/plan of care  Anticipated outcomes  Possible risks and side effects  This patient would benefit from PT intervention to resume normal activities.   Rehab potential is good.    Frequency:  1 X week, once daily  Duration:  for 8 weeks  Discharge Plan:  Achieve all LTG.  Independent in home treatment program.  Reach maximal therapeutic benefit.    Please refer to the daily flowsheet for treatment today, total treatment time and time spent performing 1:1 timed codes.

## 2021-08-04 ENCOUNTER — THERAPY VISIT (OUTPATIENT)
Dept: PHYSICAL THERAPY | Facility: CLINIC | Age: 76
End: 2021-08-04
Payer: COMMERCIAL

## 2021-08-04 DIAGNOSIS — M25.551 HIP PAIN, RIGHT: ICD-10-CM

## 2021-08-04 PROCEDURE — 97035 APP MDLTY 1+ULTRASOUND EA 15: CPT | Mod: GP | Performed by: PHYSICAL THERAPIST

## 2021-08-04 PROCEDURE — 97110 THERAPEUTIC EXERCISES: CPT | Mod: GP | Performed by: PHYSICAL THERAPIST

## 2021-08-30 ENCOUNTER — OFFICE VISIT (OUTPATIENT)
Dept: FAMILY MEDICINE | Facility: CLINIC | Age: 76
End: 2021-08-30
Payer: COMMERCIAL

## 2021-08-30 VITALS
BODY MASS INDEX: 32.79 KG/M2 | WEIGHT: 167 LBS | HEART RATE: 70 BPM | TEMPERATURE: 98.2 F | DIASTOLIC BLOOD PRESSURE: 70 MMHG | HEIGHT: 60 IN | OXYGEN SATURATION: 96 % | RESPIRATION RATE: 14 BRPM | SYSTOLIC BLOOD PRESSURE: 124 MMHG

## 2021-08-30 DIAGNOSIS — Z00.00 ENCOUNTER FOR MEDICARE ANNUAL WELLNESS EXAM: Primary | ICD-10-CM

## 2021-08-30 DIAGNOSIS — I25.118 CORONARY ARTERY DISEASE OF NATIVE ARTERY OF NATIVE HEART WITH STABLE ANGINA PECTORIS (H): ICD-10-CM

## 2021-08-30 DIAGNOSIS — Z11.59 NEED FOR HEPATITIS C SCREENING TEST: ICD-10-CM

## 2021-08-30 DIAGNOSIS — I47.10 PAROXYSMAL SUPRAVENTRICULAR TACHYCARDIA (H): ICD-10-CM

## 2021-08-30 PROCEDURE — G0438 PPPS, INITIAL VISIT: HCPCS | Performed by: FAMILY MEDICINE

## 2021-08-30 PROCEDURE — 90471 IMMUNIZATION ADMIN: CPT | Performed by: FAMILY MEDICINE

## 2021-08-30 PROCEDURE — 90714 TD VACC NO PRESV 7 YRS+ IM: CPT | Performed by: FAMILY MEDICINE

## 2021-08-30 ASSESSMENT — ENCOUNTER SYMPTOMS
CONSTIPATION: 0
HEMATOCHEZIA: 0
DYSURIA: 0
SHORTNESS OF BREATH: 1
HEARTBURN: 0
PARESTHESIAS: 0
DIZZINESS: 0
BREAST MASS: 0
PALPITATIONS: 0
HEMATURIA: 1
NAUSEA: 0
JOINT SWELLING: 1
ARTHRALGIAS: 1
SORE THROAT: 0
HEADACHES: 0
COUGH: 0
CHILLS: 0
MYALGIAS: 1
EYE PAIN: 0
FEVER: 0
WEAKNESS: 1
ABDOMINAL PAIN: 0
FREQUENCY: 1
DIARRHEA: 0

## 2021-08-30 ASSESSMENT — ACTIVITIES OF DAILY LIVING (ADL): CURRENT_FUNCTION: NO ASSISTANCE NEEDED

## 2021-08-30 ASSESSMENT — MIFFLIN-ST. JEOR: SCORE: 1166.07

## 2021-08-30 NOTE — PROGRESS NOTES
"SUBJECTIVE:   Shantelle Su is a 76 year old female who presents for Preventive Visit.      Patient has been advised of split billing requirements and indicates understanding: Yes   Are you in the first 12 months of your Medicare coverage?  No    Healthy Habits:     In general, how would you rate your overall health?  Good    Frequency of exercise:  None    Do you usually eat at least 4 servings of fruit and vegetables a day, include whole grains    & fiber and avoid regularly eating high fat or \"junk\" foods?  Yes    Taking medications regularly:  Yes    Medication side effects:  None    Ability to successfully perform activities of daily living:  No assistance needed    Home Safety:  No safety concerns identified    Hearing Impairment:  No hearing concerns    In the past 6 months, have you been bothered by leaking of urine? Yes    In general, how would you rate your overall mental or emotional health?  Good      PHQ-2 Total Score: 0    Additional concerns today:  No    Do you feel safe in your environment? Yes    Have you ever done Advance Care Planning? (For example, a Health Directive, POLST, or a discussion with a medical provider or your loved ones about your wishes): No, advance care planning information given to patient to review.  Patient plans to discuss their wishes with loved ones or provider.        Fall risk  Fallen 2 or more times in the past year?: No  Any fall with injury in the past year?: No  click delete button to remove this line now  Cognitive Screening   1) Repeat 3 items (Leader, Season, Table)    2) Clock draw: NORMAL  3) 3 item recall: Recalls 1 object   Results: NORMAL clock, 1-2 items recalled: COGNITIVE IMPAIRMENT LESS LIKELY    Mini-CogTM Copyright TONJA Malone. Licensed by the author for use in [facility]; reprinted with permission (catie@.[email or web address]). All rights reserved.      Do you have sleep apnea, excessive snoring or daytime drowsiness?: no    Reviewed and updated as needed " this visit by clinical staff  Tobacco  Allergies               Reviewed and updated as needed this visit by Provider                Social History     Tobacco Use     Smoking status: Former Smoker     Packs/day: 0.75     Years: 20.00     Pack years: 15.00     Quit date: 1987     Years since quittin.8     Smokeless tobacco: Never Used   Substance Use Topics     Alcohol use: Yes     Comment: rare     If you drink alcohol do you typically have >3 drinks per day or >7 drinks per week? No    Alcohol Use 2021   Prescreen: >3 drinks/day or >7 drinks/week? No   Prescreen: >3 drinks/day or >7 drinks/week? -   No flowsheet data found.            Current providers sharing in care for this patient include:   Patient Care Team:  Burton Mejia MD as PCP - General (Family Medicine)  Klever Vega MD as Assigned PCP  Lit Flowers MD as Assigned Musculoskeletal Provider  Flor Hand MD as Assigned Heart and Vascular Provider    The following health maintenance items are reviewed in Epic and correct as of today:  Health Maintenance Due   Topic Date Due     URINE DRUG SCREEN  Never done     HEPATITIS C SCREENING  Never done     ZOSTER IMMUNIZATION (1 of 2) Never done     DIABETIC FOOT EXAM  2020     FALL RISK ASSESSMENT  07/15/2021     INFLUENZA VACCINE (1) 2021     Labs [facility] in [facility]  Mammogram Screening: Mammogram Screening - Patient over age 75, has elected to continue with screening.  Pertinent mammograms are reviewed under the imaging tab.    Review of Systems   Constitutional: Negative for chills and fever.   HENT: Negative for congestion, ear pain, hearing loss and sore throat.    Eyes: Negative for pain and visual disturbance.   Respiratory: Positive for shortness of breath. Negative for cough.    Cardiovascular: Positive for peripheral edema. Negative for chest pain and palpitations.   Gastrointestinal: Negative for abdominal pain, constipation, diarrhea, heartburn, hematochezia  "and nausea.   Breasts:  Negative for tenderness, breast mass and discharge.   Genitourinary: Positive for frequency, hematuria, urgency and vaginal bleeding. Negative for dysuria, genital sores, pelvic pain and vaginal discharge.   Musculoskeletal: Positive for arthralgias, joint swelling and myalgias.   Skin: Negative for rash.   Neurological: Positive for weakness. Negative for dizziness, headaches and paresthesias.   Psychiatric/Behavioral: Negative for mood changes.         OBJECTIVE:   /70 (Cuff Size: Adult Regular)   Pulse 70   Temp 98.2  F (36.8  C) (Oral)   Resp 14   Ht 1.511 m (4' 11.5\")   Wt 75.8 kg (167 lb)   SpO2 96%   BMI 33.17 kg/m   Estimated body mass index is 33.17 kg/m  as calculated from the following:    Height as of this encounter: 1.511 m (4' 11.5\").    Weight as of this encounter: 75.8 kg (167 lb).  Physical Exam  Constitutional:       Appearance: Normal appearance.   Cardiovascular:      Rate and Rhythm: Normal rate and regular rhythm.   Pulmonary:      Effort: Pulmonary effort is normal.      Breath sounds: Normal breath sounds.   Neurological:      Mental Status: She is alert.         Diagnostic Test Results:  Labs reviewed in Epic    ASSESSMENT / PLAN:   Shantelle was seen today for physical.    Diagnoses and all orders for this visit:    Encounter for Medicare annual wellness exam    Paroxysmal supraventricular tachycardia (H)    Coronary artery disease of native artery of native heart with stable angina pectoris (H)    Need for hepatitis C screening test    Other orders  -     TD PRESERV FREE, IM (7+ YRS)        Patient has been advised of split billing requirements and indicates understanding: No  COUNSELING:  Reviewed preventive health counseling, as reflected in patient instructions       Regular exercise       Healthy diet/nutrition       Fall risk prevention    Estimated body mass index is 33.17 kg/m  as calculated from the following:    Height as of this encounter: 1.511 m " "(4' 11.5\").    Weight as of this encounter: 75.8 kg (167 lb).    Weight management plan: Discussed healthy diet and exercise guidelines    She reports that she quit smoking about 33 years ago. She has a 15.00 pack-year smoking history. She has never used smokeless tobacco.      Appropriate preventive services were discussed with this patient, including applicable screening as appropriate for cardiovascular disease, diabetes, osteopenia/osteoporosis, and glaucoma.  As appropriate for age/gender, discussed screening for colorectal cancer, prostate cancer, breast cancer, and cervical cancer. Checklist reviewing preventive services available has been given to the patient.    Reviewed patients plan of care and provided an AVS. The Basic Care Plan (routine screening as documented in Health Maintenance) for Shantelle meets the Care Plan requirement. This Care Plan has been established and reviewed with the Patient.    Counseling Resources:  ATP IV Guidelines  Pooled Cohorts Equation Calculator  Breast Cancer Risk Calculator  Breast Cancer: Medication to Reduce Risk  FRAX Risk Assessment  ICSI Preventive Guidelines  Dietary Guidelines for Americans, 2010  [employer]'s MyPlate  ASA Prophylaxis  Lung CA Screening    Burton Mejia MD  [facility]    Identified Health Risks:    She is at risk for lack of exercise and has been provided with information to increase physical activity for the benefit of her well-being.  Information on urinary incontinence and treatment options given to patient.  "

## 2021-08-30 NOTE — NURSING NOTE
Prior to immunization administration, verified patients identity using patient s name and date of birth. Please see Immunization Activity for additional information.     Screening Questionnaire for Adult Immunization    Are you sick today?   No   Do you have allergies to medications, food, a vaccine component or latex?   No   Have you ever had a serious reaction after receiving a vaccination?   No   Do you have a long-term health problem with heart, lung, kidney, or metabolic disease (e.g., diabetes), asthma, a blood disorder, no spleen, complement component deficiency, a cochlear implant, or a spinal fluid leak?  Are you on long-term aspirin therapy?   No   Do you have cancer, leukemia, HIV/AIDS, or any other immune system problem?   No   Do you have a parent, brother, or sister with an immune system problem?   No   In the past 3 months, have you taken medications that affect  your immune system, such as prednisone, other steroids, or anticancer drugs; drugs for the treatment of rheumatoid arthritis, Crohn s disease, or psoriasis; or have you had radiation treatments?   No   Have you had a seizure, or a brain or other nervous system problem?   No   During the past year, have you received a transfusion of blood or blood    products, or been given immune (gamma) globulin or antiviral drug?   No   For women: Are you pregnant or is there a chance you could become       pregnant during the next month?   No   Have you received any vaccinations in the past 4 weeks?   No     Immunization questionnaire answers were all negative.        Per orders of Dr. Mejia, injection of Td given by Regi Odell CMA. Patient instructed to remain in clinic for 15 minutes afterwards, and to report any adverse reaction to me immediately.       Screening performed by Regi Odell CMA on 8/30/2021 at 1:44 PM.

## 2021-08-30 NOTE — PATIENT INSTRUCTIONS
Patient Education   Personalized Prevention Plan  You are due for the preventive services outlined below.  Your care team is available to assist you in scheduling these services.  If you have already completed any of these items, please share that information with your care team to update in your medical record.  Health Maintenance Due   Topic Date Due     URINE DRUG SCREEN  Never done     Hepatitis C Screening  Never done     Zoster (Shingles) Vaccine (1 of 2) Never done     Diabetic Foot Exam  05/02/2020     Diptheria Tetanus Pertussis (DTAP/TDAP/TD) Vaccine (2 - Td or Tdap) 12/21/2020     FALL RISK ASSESSMENT  07/15/2021     Flu Vaccine (1) 09/01/2021       Exercise for a Healthier Heart  You may wonder how you can improve the health of your heart. If you re thinking about exercise, you re on the right track. You don t need to become an athlete. But you do need a certain amount of brisk exercise to help strengthen your heart. If you have been diagnosed with a heart condition, your healthcare provider may advise exercise to help stabilize your condition. To help make exercise a habit, choose safe, fun activities.      Exercise with a friend. When activity is fun, you're more likely to stick with it.   Before you start  Check with your healthcare provider before starting an exercise program. This is especially important if you have not been active for a while. It's also important if you have a long-term (chronic) health problem such as heart disease, diabetes, or obesity. Or if you are at high risk for having these problems.   Why exercise?  Exercising regularly offers many healthy rewards. It can help you do all of the following:     Improve your blood cholesterol level to help prevent further heart trouble    Lower your blood pressure to help prevent a stroke or heart attack    Control diabetes, or reduce your risk of getting this disease    Improve your heart and lung function    Reach and stay at a healthy  weight    Make your muscles stronger so you can stay active    Prevent falls and fractures by slowing the loss of bone mass (osteoporosis)    Manage stress better    Reduce your blood pressure    Improve your sense of self and your body image  Exercise tips      Ease into your routine. Set small goals. Then build on them. If you are not sure what your activity level should be, talk with your healthcare provider first before starting an exercise routine.    Exercise on most days. Aim for a total of 150 minutes (2 hours and 30 minutes) or more of moderate-intensity aerobic activity each week. Or 75 minutes (1 hour and 15 minutes) or more of vigorous-intensity aerobic activity each week. Or try for a combination of both. Moderate activity means that you breathe heavier and your heart rate increases but you can still talk. Think about doing 40 minutes of moderate exercise, 3 to 4 times a week. For best results, activity should last for about 40 minutes to lower blood pressure and cholesterol. It's OK to work up to the 40-minute period over time. Examples of moderate-intensity activity are walking 1 mile in 15 minutes. Or doing 30 to 45 minutes of yard work.    Step up your daily activity level.  Along with your exercise program, try being more active the whole day. Walk instead of drive. Or park further away so that you take more steps each day. Do more household tasks or yard work. You may not be able to meet the advised mount of physical activity. But doing some moderate- or vigorous-intensity aerobic activity can help reduce your risk for heart disease. Your healthcare provider can help you figure out what is best for you.    Choose 1 or more activities you enjoy.  Walking is one of the easiest things you can do. You can also try swimming, riding a bike, dancing, or taking an exercise class.    When to call your healthcare provider  Call your healthcare provider if you have any of these:     Chest pain or feel dizzy  or lightheaded    Burning, tightness, pressure, or heaviness in your chest, neck, shoulders, back, or arms    Abnormal shortness of breath    More joint or muscle pain    A very fast or irregular heartbeat (palpitations)  Dharmesh last reviewed this educational content on 7/1/2019 2000-2021 [employer]. All rights reserved. This information is not intended as a substitute for professional medical care. Always follow your healthcare professional's instructions.          Urinary Incontinence, Female (Adult)   Urinary incontinence means loss of bladder control. This problem affects many women, especially as they get older. If you have incontinence, you may be embarrassed to ask for help. But know that this problem can be treated.   Types of Incontinence  There are different types of incontinence. Two of the main types are described here. You can have more than one type.     Stress incontinence. With this type, urine leaks when pressure (stress) is put on the bladder. This may happen when you cough, sneeze, or laugh. Stress incontinence most often occurs because the pelvic floor muscles that support the bladder and urethra are weak. This can happen after pregnancy and vaginal childbirth or a hysterectomy. It can also be due to excess body weight or hormone changes.    Urge incontinence (also called overactive bladder). With this type, a sudden urge to urinate is felt often. This may happen even though there may not be much urine in the bladder. The need to urinate often during the night is common. Urge incontinence most often occurs because of bladder spasms. This may be due to bladder irritation or infection. Damage to bladder nerves or pelvic muscles, constipation, and certain medicines can also lead to urge incontinence.  Treatment depends on the cause. Further evaluation is needed to find the type you have. This will likely include an exam and certain tests. Based on the results, you and your  healthcare provider can then plan treatment. Until a diagnosis is made, the home care tips below can help ease symptoms.   Home care    Do pelvic floor muscle exercises, if they are prescribed. The pelvic floor muscles help support the bladder and urethra. Many women find that their symptoms improve when doing special exercises that strengthen these muscles. To do the exercises, contract the muscles you would use to stop your stream of urine. But do this when you re not urinating. Hold for 10 seconds, then relax. Repeat 10 to 20 times in a row, at least 3 times a day. Your healthcare provider may give you other instructions for how to do the exercises and how often.    Keep a bladder diary. This helps track how often and how much you urinate over a set period of time. Bring this diary with you to your next visit with the provider. The information can help your provider learn more about your bladder problem.    Lose weight, if advised to by your provider. Extra weight puts pressure on the bladder. Your provider can help you create a weight-loss plan that s right for you. This may include exercising more and making certain diet changes.    Don't have foods and drinks that may irritate the bladder. These can include alcohol and caffeinated drinks.    Quit smoking. Smoking and other tobacco use can lead to a long-term (chronic) cough that strains the pelvic floor muscles. Smoking may also damage the bladder and urethra. Talk with your provider about treatments or methods you can use to quit smoking.    If drinking large amounts of fluid makes you have symptoms, you may be advised to limit your fluid intake. You may also be advised to drink most of your fluids during the day and to limit fluids at night.    If you re worried about urine leakage or accidents, you may wear absorbent pads to catch urine. Change the pads often. This helps reduce discomfort. It may also reduce the risk of skin or bladder  infections.    Follow-up care  Follow up with your healthcare provider, or as directed. It may take some to find the right treatment for your problem. But healthy lifestyle changes can be made right away. These include such things as exercising on a regular basis, eating a healthy diet, losing weight (if needed), and quitting smoking. Your treatment plan may include special therapies or medicines. Certain procedures or surgery may also be options. Talk about any questions you have with your provider.   When to seek medical advice  Call the healthcare provider right away if any of these occur:    Fever of 100.4 F (38 C) or higher, or as directed by your provider    Bladder pain or fullness    Belly swelling    Nausea or vomiting    Back pain    Weakness, dizziness, or fainting  Dharmesh last reviewed this educational content on 1/1/2020 2000-2021 [employer]. All rights reserved. This information is not intended as a substitute for professional medical care. Always follow your healthcare professional's instructions.

## 2021-09-03 ENCOUNTER — VIRTUAL VISIT (OUTPATIENT)
Dept: FAMILY MEDICINE | Facility: CLINIC | Age: 76
End: 2021-09-03
Payer: COMMERCIAL

## 2021-09-03 ENCOUNTER — LAB (OUTPATIENT)
Dept: URGENT CARE | Facility: URGENT CARE | Age: 76
End: 2021-09-03
Attending: NURSE PRACTITIONER
Payer: COMMERCIAL

## 2021-09-03 ENCOUNTER — NURSE TRIAGE (OUTPATIENT)
Dept: NURSING | Facility: CLINIC | Age: 76
End: 2021-09-03

## 2021-09-03 DIAGNOSIS — Z20.822 EXPOSURE TO COVID-19 VIRUS: ICD-10-CM

## 2021-09-03 DIAGNOSIS — B34.9 VIRAL ILLNESS: ICD-10-CM

## 2021-09-03 DIAGNOSIS — J02.9 SORE THROAT: Primary | ICD-10-CM

## 2021-09-03 DIAGNOSIS — J02.9 SORE THROAT: ICD-10-CM

## 2021-09-03 PROCEDURE — U0003 INFECTIOUS AGENT DETECTION BY NUCLEIC ACID (DNA OR RNA); SEVERE ACUTE RESPIRATORY SYNDROME CORONAVIRUS 2 (SARS-COV-2) (CORONAVIRUS DISEASE [COVID-19]), AMPLIFIED PROBE TECHNIQUE, MAKING USE OF HIGH THROUGHPUT TECHNOLOGIES AS DESCRIBED BY CMS-2020-01-R: HCPCS

## 2021-09-03 PROCEDURE — U0005 INFEC AGEN DETEC AMPLI PROBE: HCPCS

## 2021-09-03 PROCEDURE — 99213 OFFICE O/P EST LOW 20 MIN: CPT | Mod: 95 | Performed by: NURSE PRACTITIONER

## 2021-09-03 NOTE — PROGRESS NOTES
Shantelle is a 76 year old who is being evaluated via a billable telephone visit.      What phone number would you like to be contacted at? [phone]  How would you like to obtain your AVS? MyChart    Assessment & Plan     Shantelle was seen today for fatigue.    Diagnoses and all orders for this visit:    Sore throat  Viral illness  Exposure to COVID-19 virus  Patient education completed regarding viral cause, typical course, symptomatic treatment, isolation and proceeding with COVID-19 testing.   -     Symptomatic COVID-19 Virus (Coronavirus) by PCR; Future      Return in about 1 week (around 9/10/2021) for No improvement or sooner with worsening symptoms.    Susan Up, NIKI CNP  [facility]   Shantelle is a 76 year old who presents for the following health issues:      HPI     Acute Illness  Brother text yesterday and let her know that he was postive. Was last with her brother 2 weeks ago.   Grandson was sick recently - negative COVID-19 test, but was in [place] with several COVID-19 positive people.    Reports feeling well on Monday and then on Tuesday night with scratchy throat, fever of 100.2 on Wednesday (which resolved).  +Fatigue, no appetite - nothing tastes good - foods have tasted excessively salty.      Acute illness concerns: Exposure to covid   Onset/Duration: x 3 days   Symptoms:  Fever: no- was 97.7 this morning, x 2 days ago had a fever over 100   Chills/Sweats: not currently but she did with her fever   Headache (location?): no  Sinus Pressure: no  Conjunctivitis:  no  Ear Pain: no  Rhinorrhea: YES  Congestion: no  Sore Throat: YES  Cough: no  Wheeze: no  Decreased Appetite: YES- loss of tatse   Nausea: YES  Vomiting: no  Diarrhea: no the opposite constipated - stomach cramps- pelvic   Dysuria/Freq.: Yes Frequency- issue, has been spotting but this is not new   Dysuria or Hematuria: no  Fatigue/Achiness: YES  Sick/Strep Exposure: YES- exposed to covid- was near the  person who tested covid x 2 weeks ago  Therapies tried and outcome: Tums, tylenol       Review of Systems   Constitutional, HEENT, cardiovascular, pulmonary, gi and gu systems are negative, except as otherwise noted.      Objective           Vitals:  No vitals were obtained today due to virtual visit.    Physical Exam   General:  healthy, alert and no distress  PSYCH: Alert and oriented times 3; coherent speech, normal   rate and volume, able to articulate logical thoughts, able   to abstract reason, no tangential thoughts, no hallucinations   or delusions  Her affect is normal  RESP: No cough, no audible wheezing, able to talk in full sentences  Remainder of exam unable to be completed due to telephone visits        Phone call duration: 7 minutes    9:55 a.m. to 10:02 a.m.

## 2021-09-03 NOTE — TELEPHONE ENCOUNTER
"Triage call:   Scratchy throat, drippy nose, and loss of taste, no appetite  Brother positive for COVID   Chantal traveled from [place]- Sunday  Patient symptoms started on Tuesday  Reports Monday she was feeling well and had a wellness check  Increased fatigue  Reports she was vaccinated  Reports she has had a fever 99.3 - 100+F     Advised to have a virtual visit today- reviewed additional care advice with patient and she verbalizes understanding. Assisted in transferring to scheduling.     Karly Martinez RN BSN 9/3/2021 8:19 AM    COVID 19 Nurse Triage Plan/Patient Instructions    Please be aware that novel coronavirus (COVID-19) may be circulating in the community. If you develop symptoms such as fever, cough, or SOB or if you have concerns about the presence of another infection including coronavirus (COVID-19), please contact your health care provider or visit [email or web address].     Disposition/Instructions    Virtual Visit with provider recommended. Reference Visit Selection Guide.  Additional COVID19 information to add for patients.   How can I protect others?  If you have symptoms (fever, cough, body aches or trouble breathing): Stay home and away from others (self-isolate) until:    At least 10 days have passed since your symptoms started, And     You ve had no fever--and no medicine that reduces fever--for 1 full day (24 hours), And      Your other symptoms have resolved (gotten better).     If you don t have symptoms, but a test showed that you have COVID-19 (you tested positive):    Stay home and away from others (self-isolate). Follow the tips under \"How do I self-isolate?\" below for 10 days (20 days if you have a weak immune system).    You don't need to be retested for COVID-19 before going back to school or work. As long as you're fever-free and feeling better, you can go back to school, work and other activities after waiting the 10 or 20 days.     How do I self-isolate?    Stay in " your own room, even for meals. Use your own bathroom if you can.     Stay away from others in your home. No hugging, kissing or shaking hands. No visitors.    Don t go to work, school or anywhere else.     Clean  high touch  surfaces often (doorknobs, counters, handles, etc.). Use a household cleaning spray or wipes. You ll find a full list on the EPA website:  www.epa.gov/pesticide-registration/list-n-disinfectants-use-against-sars-cov-2.    Cover your mouth and nose with a mask, tissue or washcloth to avoid spreading germs.    Wash your hands and face often. Use soap and water.    Caregivers in these groups are at risk for severe illness due to COVID-19:  o People 65 years and older  o People who live in a nursing home or long-term care facility  o People with chronic disease (lung, heart, cancer, diabetes, kidney, liver, immunologic)  o People who have a weakened immune system, including those who:  - Are in cancer treatment  - Take medicine that weakens the immune system, such as corticosteroids  - Had a bone marrow or organ transplant  - Have an immune deficiency  - Have poorly controlled HIV or AIDS  - Are obese (body mass index of 40 or higher)  - Smoke regularly    Caregivers should wear gloves while washing dishes, handling laundry and cleaning bedrooms and bathrooms.    Use caution when washing and drying laundry: Don t shake dirty laundry, and use the warmest water setting that you can.    For more tips, go to www.cdc.gov/coronavirus/2019-ncov/downloads/10Things.pdf.    How can I take care of myself?  1. Get lots of rest. Drink extra fluids (unless a doctor has told you not to).     2. Take Tylenol (acetaminophen) for fever or pain. If you have liver or kidney problems, ask your family doctor if it s okay to take Tylenol.     Adults can take either:     650 mg (two 325 mg pills) every 4 to 6 hours, or     1,000 mg (two 500 mg pills) every 8 hours as needed.     Note: Don t take more than 3,000 mg in one  day.   Acetaminophen is found in many medicines (both prescribed and over-the-counter medicines). Read all labels to be sure you don t take too much.     For children, check the Tylenol bottle for the right dose. The dose is based on the child s age or weight.    3. If you have other health problems (like cancer, heart failure, an organ transplant or severe kidney disease): Call your specialty clinic if you don t feel better in the next 2 days.    4. Know when to call 911: Emergency warning signs include:    Trouble breathing or shortness of breath    Pain or pressure in the chest that doesn t go away    Feeling confused like you haven t felt before, or not being able to wake up    Bluish-colored lips or face    What are the symptoms of COVID-19?     The most common symptoms are cough, fever and trouble breathing.     Less common symptoms include body aches, chills, diarrhea (loose, watery poops), fatigue (feeling very tired), headache, runny nose, sore throat and loss of smell.    COVID-19 can cause severe coughing (bronchitis) and lung infection (pneumonia).    How does it spread?     The virus may spread when a person coughs or sneezes into the air. The virus can travel about 6 feet this way, and it can live on surfaces.      Common  (household disinfectants) will kill the virus.    Who is at risk?  Anyone can catch COVID-19 if they re around someone who has the virus.    How can others protect themselves?     Stay away from people who have COVID-19 (or symptoms of COVID-19).    Wash hands often with soap and water. Or, use hand  with at least 60% alcohol.    Avoid touching the eyes, nose or mouth.     Wear a face mask when you go out in public, when sick or when caring for a sick person.    Where can I get more information?     [employer] [place]: About COVID-19: [email or web address]    CDC: What to Do If You re Sick: www.cdc.gov/coronavirus/2019-ncov/about/steps-when-sick.html    CDC:  Ending Home Isolation: www.cdc.gov/coronavirus/2019-ncov/hcp/disposition-in-home-patients.html     CDC: Caring for Someone: www.cdc.gov/coronavirus/2019-ncov/if-you-are-sick/care-for-someone.html     [facility]: Interim Guidance for Hospital Discharge to Home: www.[facility]/diseases/coronavirus/hcp/hospdischarge.pdf    [facility] clinical trials (COVID-19 research studies): clinicalaffairs.[facility]-clinical-trials     Below are the COVID-19 hotlines at the [place] Department of Health ([facility]). Interpreters are available.   o For health questions: Call 614-774-6605 or 1-730.326.9713 (7 a.m. to 7 p.m.)  o For questions about schools and childcare: Call 292-766-3444 or 1-513.282.6478 (7 a.m. to 7 p.m.)          Thank you for taking steps to prevent the spread of this virus.  o Limit your contact with others.  o Wear a simple mask to cover your cough.  o Wash your hands well and often.    Resources    [facility]: About COVID-19: [email or web address]    CDC: What to Do If You're Sick: www.cdc.gov/coronavirus/2019-ncov/about/steps-when-sick.html    CDC: Ending Home Isolation: www.cdc.gov/coronavirus/2019-ncov/hcp/disposition-in-home-patients.html     CDC: Caring for Someone: www.cdc.gov/coronavirus/2019-ncov/if-you-are-sick/care-for-someone.html     [facility]: Interim Guidance for Hospital Discharge to Home: www.[facility]./diseases/coronavirus/hcp/hospdischarge.pdf    [facility] clinical trials (COVID-19 research studies): clinicalaffairs.[facility]-clinical-trials     Below are the COVID-19 hotlines at the [place] Department [facility] ([facility]). Interpreters are available.   o For health questions: Call 465-038-7365 or 1-983.588.6904 (7 a.m. to 7 p.m.)  o For questions about schools and childcare: Call 727-473-3363 or 1-994.639.5451 (7 a.m. to 7 p.m.)                        Reason for Disposition    [1] HIGH RISK patient (e.g., age > 64 years, diabetes, heart or lung  disease, weak immune system) AND [2] new or worsening symptoms    Additional Information    Negative: SEVERE difficulty breathing (e.g., struggling for each breath, speaks in single words)    Negative: Difficult to awaken or acting confused (e.g., disoriented, slurred speech)    Negative: Bluish (or gray) lips or face now    Negative: Shock suspected (e.g., cold/pale/clammy skin, too weak to stand, low BP, rapid pulse)    Negative: Sounds like a life-threatening emergency to the triager    Negative: [1] COVID-19 exposure AND [2] has not completed COVID-19 vaccine series AND [3] no symptoms    Negative: [1] COVID-19 exposure AND [2] completed COVID-19 vaccine series (fully vaccinated) AND [3] no symptoms    Negative: COVID-19 vaccine reaction suspected (e.g., fever, headache, muscle aches) occurring during days 1-3 after getting vaccine    Negative: COVID-19 vaccine, questions about    Negative: [1] COVID-19 vaccine series completed (fully vaccinated) in past 3 months AND [2] new-onset of COVID-19 symptoms BUT [3] no known exposure    Negative: [1] Had lab test confirmed COVID-19 infection within last 3 monthsAND [2] new-onset of COVID-19 symptoms BUT [3] no known exposure    Negative: [1] Lives with someone known to have influenza (flu test positive) AND [2] flu-like symptoms (e.g., cough, runny nose, sore throat, SOB; with or without fever)    Negative: [1] Adult with possible COVID-19 symptoms AND [2] triager concerned about severity of symptoms or other causes    Negative: COVID-19 and breastfeeding, questions about    Negative: SEVERE or constant chest pain or pressure (Exception: mild central chest pain, present only when coughing)    Negative: MODERATE difficulty breathing (e.g., speaks in phrases, SOB even at rest, pulse 100-120)    Negative: [1] Headache AND [2] stiff neck (can't touch chin to chest)    Negative: MILD difficulty breathing (e.g., minimal/no SOB at rest, SOB with walking, pulse <100)     Negative: Chest pain or pressure    Negative: Patient sounds very sick or weak to the triager    Negative: Fever > 103 F (39.4 C)    Negative: [1] Fever > 101 F (38.3 C) AND [2] age > 60 years    Negative: [1] Fever > 100.0 F (37.8 C) AND [2] bedridden (e.g., nursing home patient, CVA, chronic illness, recovering from surgery)    Protocols used: CORONAVIRUS (COVID-19) DIAGNOSED OR [id number] 3.25

## 2021-09-04 LAB — SARS-COV-2 RNA RESP QL NAA+PROBE: NEGATIVE

## 2021-09-07 NOTE — RESULT ENCOUNTER NOTE
Dear Shantelle,     Negative COVID-19.      Please send a T[employer] message or call [phone]  if you have any questions.      NIKI Syed, CNP  [facility]    If you have further questions about the interpretation of your labs, [email or web address] is a good website to check out for further information.

## 2021-10-09 DIAGNOSIS — E78.5 HYPERLIPIDEMIA LDL GOAL <130: ICD-10-CM

## 2021-10-11 RX ORDER — ATORVASTATIN CALCIUM 40 MG/1
TABLET, FILM COATED ORAL
Qty: 90 TABLET | Refills: 1 | Status: SHIPPED | OUTPATIENT
Start: 2021-10-11 | End: 2022-04-12

## 2021-10-11 NOTE — TELEPHONE ENCOUNTER
Routing refill request to provider for review/approval because:  New PCP to prescribe.       Kaycee Palma RN  [facility]

## 2021-10-21 ENCOUNTER — OFFICE VISIT (OUTPATIENT)
Dept: OBGYN | Facility: CLINIC | Age: 76
End: 2021-10-21
Payer: COMMERCIAL

## 2021-10-21 VITALS — DIASTOLIC BLOOD PRESSURE: 72 MMHG | BODY MASS INDEX: 32.77 KG/M2 | WEIGHT: 165 LBS | SYSTOLIC BLOOD PRESSURE: 126 MMHG

## 2021-10-21 DIAGNOSIS — N95.0 POST-MENOPAUSAL BLEEDING: ICD-10-CM

## 2021-10-21 DIAGNOSIS — N81.10 CYSTOURETHROCELE: ICD-10-CM

## 2021-10-21 DIAGNOSIS — N81.6 RECTOCELE: ICD-10-CM

## 2021-10-21 DIAGNOSIS — K64.4 EXTERNAL HEMORRHOIDS: Primary | ICD-10-CM

## 2021-10-21 PROCEDURE — 99204 OFFICE O/P NEW MOD 45 MIN: CPT | Mod: 25 | Performed by: OBSTETRICS & GYNECOLOGY

## 2021-10-21 PROCEDURE — 58100 BIOPSY OF UTERUS LINING: CPT | Performed by: OBSTETRICS & GYNECOLOGY

## 2021-10-21 PROCEDURE — 88305 TISSUE EXAM BY PATHOLOGIST: CPT | Performed by: PATHOLOGY

## 2021-10-21 NOTE — NURSING NOTE
"Chief Complaint   Patient presents with     Vaginal Bleeding       Initial /72   Wt 74.8 kg (165 lb)   BMI 32.77 kg/m   Estimated body mass index is 32.77 kg/m  as calculated from the following:    Height as of 21: 1.511 m (4' 11.5\").    Weight as of this encounter: 74.8 kg (165 lb).  BP completed using cuff size: regular    Questioned patient about current smoking habits.  Pt. has never smoked.          The following HM Due: NONE      The following patient reported/Care Every where data was sent to:  P ABSTRACT QUALITY INITIATIVES [[id number]]        Rhina Melgar [facility]                 "

## 2021-10-21 NOTE — PROGRESS NOTES
HPI:  Shantelle Su is a 76 year old female  No LMP recorded. Patient is postmenopausal.  Not sexually active not on HRT  , who presents for evaluation of an approximate 1 year history of intermittent vaginal bleeding.  The patient states that she does wear a mini pad daily.  We can be old brown blood or even pink discharge.  She denies any pain or discomfort.  I reviewed the previous office visit notes of 2016 and 2016 for previous postmenopausal bleeding that she had.  At the time of that evaluation an initial attempt was made to perform a sonohysterogram and endometrial biopsy but the patient had increased discomfort as well as cervical stenosis and was unable to tolerate the procedure in the office.  The patient underwent hysteroscopy and fractional D&C on 10/4/2016 with benign findings.  The patient also states that she has a history of external hemorrhoids that can bleed from time to time and she like a referral for evaluation.  She denies that the bleeding is rectal and states it is vaginal.  She denies any urinary tract symptoms.  The patient's had a previous mesh augmented anterior and posterior repair and states that she has a recurrence of a distal rectocele and cystourethrocele.  She notices pelvic pressure but states her bladder control is still acceptable    Past Medical History:   Diagnosis Date     Acute upper respiratory infection 2002     Angina pectoris, unspecified (H) 2016     ASCVD (arteriosclerotic cardiovascular disease) 2016     CAD (coronary artery disease) 2016    sp proximal LAD stenting     Diabetes mellitus (H)      Enterocele 2011     Essential hypertension, benign      Fatty liver 2015    Based on CT scan done 5/23/15      GERD (gastroesophageal reflux disease) 10/13/2008     Heart palpitations      Hiatal hernia      History of pulmonary embolism      Hyperlipidemia LDL goal <70 2016     Metabolic syndrome 2011      Other pulmonary embolism and infarction 09/30/2002    after starting hormone therapy     PAC (premature atrial contraction)      Paroxysmal supraventricular tachycardia (H) 12/20/2016     Rectocele 10/15/2007     Past Surgical History:   Procedure Laterality Date     ABDOMEN SURGERY      Bladder sling     COLONOSCOPY      Scheduled October 2016     COLPORRHAPY POSTERIOR, CYSTOSCOPY, COMBINED  12/7/2011    Procedure:COMBINED COLPORRHAPY POSTERIOR, CYSTOSCOPY; POSTERIOR MESH AUGMENTED REPAIR WITH ELEVATE MESH , cystoscopy; Surgeon:ANDRE UP; Location:[facility] OR     DILATION AND CURETTAGE, HYSTEROSCOPY DIAGNOSTIC, COMBINED N/A 10/4/2016    Procedure: COMBINED DILATION AND CURETTAGE, HYSTEROSCOPY DIAGNOSTIC;  Surgeon: Andre Up MD;  Location: [facility] OR     HC DILATION/CURETTAGE DIAG/THER NON OB      after sab     HC LEFT HEART CATHETERIZATION  11/23/16    PCI with drug-eluting stent placement in the proximal LAD     HC TOOTH EXTRACTION W/FORCEP       ZZC NONSPECIFIC PROCEDURE  2009    [place] mesh augmented anterior colporrhaphy and vault suspension, tension-free vaginal tape sling with a transobturator approach usingthe Obtryx device and cystoscopy.       Family History   Problem Relation Age of Onset     Gynecology Daughter      Heart Disease Father         D AGE 50     Coronary Artery Disease Father      Hyperlipidemia Father      Heart Disease Mother         D AGE 70     Diabetes Mother      Coronary Artery Disease Mother      Hypertension Mother      Hyperlipidemia Mother      Heart Disease Brother         B AGE 52 HEART SURGERY     Family History Negative Brother         B AGE 47     Hypertension Brother      Cerebrovascular Disease Sister         B AGE 54 BLOOD CLOTS     Family History Negative Sister         B AGE 60     Depression Sister      Social History     Socioeconomic History     Marital status:      Spouse name: Not on file     Number of children: Not on file     Years of education: Not on  file     Highest education level: Not on file   Occupational History     Not on file   Tobacco Use     Smoking status: Former Smoker     Packs/day: 0.75     Years: 20.00     Pack years: 15.00     Quit date: 1987     Years since quittin.9     Smokeless tobacco: Never Used   Vaping Use     Vaping Use: Never used   Substance and Sexual Activity     Alcohol use: Yes     Comment: rare     Drug use: No     Sexual activity: Not Currently     Partners: Male     Birth control/protection: Post-menopausal   Other Topics Concern     Parent/sibling w/ CABG, MI or angioplasty before 65F 55M? Yes     Comment: Father, brother      Service Not Asked     Blood Transfusions Not Asked     Caffeine Concern No     Comment: decaf coffee      Occupational Exposure Not Asked     Hobby Hazards Not Asked     Sleep Concern Not Asked     Stress Concern Not Asked     Weight Concern Not Asked     Special Diet No     Back Care Not Asked     Exercise No     Comment: cardio rehab     Bike Helmet Not Asked     Seat Belt Not Asked     Self-Exams Not Asked   Social History Narrative     Not on file     Social Determinants of Health     Financial Resource Strain:      Difficulty of Paying Living Expenses:    Food Insecurity:      Worried About Running Out of Food in the Last Year:      Ran Out of Food in the Last Year:    Transportation Needs:      Lack of Transportation (Medical):      Lack of Transportation (Non-Medical):    Physical Activity:      Days of Exercise per Week:      Minutes of Exercise per Session:    Stress:      Feeling of Stress :    Social Connections:      Frequency of Communication with Friends and Family:      Frequency of Social Gatherings with Friends and Family:      Attends Orthodoxy Services:      Active Member of Clubs or Organizations:      Attends Club or Organization Meetings:      Marital Status:    Intimate Partner Violence:      Fear of Current or Ex-Partner:      Emotionally Abused:      Physically  Abused:      Sexually Abused:        Allergies:  Bactrim [sulfamethoxazole w/trimethoprim] and Lisinopril    Current Outpatient Medications   Medication Sig Dispense Refill     ACE/ARB/ARNI NOT PRESCRIBED (INTENTIONAL) Please choose reason not prescribed from choices below.       Acetaminophen (TYLENOL PO) Take 1,000 mg by mouth At Bedtime        alpha-d-galactosidase (BEANO) tablet Take by mouth as needed        amLODIPine (NORVASC) 5 MG tablet TAKE 1 TABLET BY MOUTH EVERY DAY 90 tablet 2     aspirin EC 81 MG EC tablet Take 1 tablet (81 mg) by mouth daily 90 tablet 3     atorvastatin (LIPITOR) 40 MG tablet TAKE 1 TABLET BY MOUTH EVERY DAY 90 tablet 1     metFORMIN (GLUCOPHAGE) 500 MG tablet TAKE 1 TABLET BY MOUTH EVERY DAY WITH DINNER 90 tablet 1     metoprolol succinate ER (TOPROL-XL) 50 MG 24 hr tablet TAKE 1 TABLET BY MOUTH EVERY DAY 90 tablet 2     Multiple Vitamins-Minerals (MULTIVITAMIN ADULTS PO) Take by mouth daily       naproxen sodium (ANAPROX) 220 MG tablet Take 1 tablet (220 mg) by mouth 2 times daily (with meals) 60 tablet 0     omeprazole (PRILOSEC) 20 MG DR capsule TAKE 1 CAPSULE BY MOUTH EVERY DAY 90 capsule 2     Wheat Dextrin (BENEFIBER DRINK MIX PO)        nitroGLYcerin (NITROSTAT) 0.4 MG sublingual tablet DISSOLVE 1 TAB UNDER TONGUE AS NEEDED FOR CHEST PAIN. MAXIMUM IS 3 DOSES EVERY 5 MIN OVER 15 MINUTES (Patient not taking: Reported on 9/3/2021) 25 tablet 3       Review Of Systems   ROS: 10 point ROS neg other than the symptoms noted above in the HPI.    Exam:  /72   Wt 74.8 kg (165 lb)   BMI 32.77 kg/m    {Constitutional: healthy, alert and mild distress  Cardiovascular: negative, PMI normal. No lifts, heaves, or thrills. RRR. No murmurs, clicks gallops or rub  Respiratory: negative, Percussion normal. Good diaphragmatic excursion. Lungs clear  Gastrointestinal: Abdomen soft, non-tender. BS normal. No masses, organomegaly  Genitourinary: Normal external genitalia without lesions and  with cough and Valsalva there is 30 degrees of UV angle hypermobility and a distal grade 1 cystourethrocele.  Speculum exam multipara appearing cervix no lesions seen good apical support, bimanual exam the uterus is parous mobile firm smooth adnexa without masses enlargement or tenderness rectovaginal exam reveals the previous posterior mesh to be palpably intact that there is a distal rectocele.  I can also feel the anterior [place] mesh with a defect distally leading to the cystourethrocele.  I have reviewed those operative notes.  After obtaining informed consent an endometrial biopsy was performed in the usual sterile fashion with a return of dark brown apparent old blood material.  The endometrial cavity sounded to 7 cm.  The patient tolerated the procedure well without complication      Assessment/Plan:  (K64.4) External hemorrhoids  (primary encounter diagnosis)  Comment: Pathophysiology discussed colorectal referral given  Plan: Colorectal Surgery Referral        Done    (N95.0) Post-menopausal bleeding  Comment: Risks, benefits, and alternative modes of therapy discussed at length. Pathophysiology of the disease process reviewed, all of the patients questions answered and informed consent obtained.    Plan: US Pelvic Complete with Transvaginal, Surgical         Pathology Exam, ENDOMETRIAL BIOPSY W/O CERVICAL        DILATION        I performed an endometrial biopsy today.  Have also asked for a pelvic ultrasound to further evaluate.  The patient would like to avoid a sonohysterogram if at all possible.  I will review the ultrasound and path report at that time and make an appropriate decision    (N81.6) Rectocele  Comment: I will further address pelvic floor relaxation with her when I reviewed the pathology report  Plan: I gave the patient a detailed written plan of our discussion today and answered all of her questions    (N81.10) Cystourethrocele  Comment: As above  Plan: As above      Andre Up  M.D.

## 2021-10-21 NOTE — PATIENT INSTRUCTIONS
You can reach your [facility] Care Team any time of the day by calling [phone]. This number will put you in touch with the 24 hour nurse line if the clinic is closed.    To contact your OB/GYN Station Coordinator/Surgery Scheduler please call [phone]. This is a direct number for your care team between 8 a.m. and 4 p.m. Monday through Friday.    [facility] is open for your convenience:  Monday through Friday 8 a.m. to 6 p.m.  Closed weekends and all major holidays.

## 2021-10-24 ENCOUNTER — HEALTH MAINTENANCE LETTER (OUTPATIENT)
Age: 76
End: 2021-10-24

## 2021-10-25 LAB
PATH REPORT.COMMENTS IMP SPEC: NORMAL
PATH REPORT.COMMENTS IMP SPEC: NORMAL
PATH REPORT.FINAL DX SPEC: NORMAL
PATH REPORT.GROSS SPEC: NORMAL
PATH REPORT.MICROSCOPIC SPEC OTHER STN: NORMAL
PATH REPORT.RELEVANT HX SPEC: NORMAL
PHOTO IMAGE: NORMAL

## 2021-10-26 PROBLEM — M25.551 HIP PAIN, RIGHT: Status: RESOLVED | Noted: 2021-07-26 | Resolved: 2021-10-26

## 2021-10-26 NOTE — PROGRESS NOTES
Subjective:  HPI  Physical Exam                    Objective:  System    Physical Exam    General     ROS    Assessment/Plan:    DISCHARGE REPORT    Progress reporting period is from 7/26/2021 to 8/4/2021.     SUBJECTIVE    Subjective: Significant improvement with decreased pain level, improved ROM and better                     sleep.        Changes in function: Yes, see goal flow sheet for change in function   Adverse reactions: None.    OBJECTIVE  Objective: Right adductor stretch WNL. Increased strength and endurance with hip exercises. Mild tenderness right greater trochanter      ASSESSMENT/PLAN    STG/LTGs have been met or progress has been made towards goals:  Yes (See Goal flow sheet completed today.)  Assessment of Progress: The patient's condition is improving.  The patient's condition has potential to improve.  Self Management Plans:  Patient is independent in a home treatment program.  Patient is independent in self management of symptoms.    Shantelle continues to require the following intervention to meet STG and LTG's: PT intervention is no longer required to meet STG/LTG.    Recommendations:    This patient is ready to be discharged from therapy and continue their home treatment program.    Please refer to the daily flowsheet for treatment today, total treatment time and time spent performing 1:1 timed codes.

## 2021-10-27 ENCOUNTER — ANCILLARY PROCEDURE (OUTPATIENT)
Dept: ULTRASOUND IMAGING | Facility: CLINIC | Age: 76
End: 2021-10-27
Attending: OBSTETRICS & GYNECOLOGY
Payer: COMMERCIAL

## 2021-10-27 DIAGNOSIS — N95.0 POST-MENOPAUSAL BLEEDING: ICD-10-CM

## 2021-10-27 PROCEDURE — 76856 US EXAM PELVIC COMPLETE: CPT | Performed by: OBSTETRICS & GYNECOLOGY

## 2021-10-27 PROCEDURE — 76830 TRANSVAGINAL US NON-OB: CPT | Performed by: OBSTETRICS & GYNECOLOGY

## 2021-10-28 NOTE — RESULT ENCOUNTER NOTE
I spoke with the patient today regarding the results of her pelvic ultrasound and endometrial biopsy done for evaluation of postmenopausal bleeding.  I also reviewed the findings from her most recent office visit notes with her.  She still does notice some vaginal introital irritation.  She does have an appointment to see a colorectal specialist for evaluation of her hemorrhoids.  She has a follow-up appointment with me next week on November 4.  I advised that she keep the appointment and we can review the findings and make an appropriate decision on management

## 2021-11-04 ENCOUNTER — OFFICE VISIT (OUTPATIENT)
Dept: OBGYN | Facility: CLINIC | Age: 76
End: 2021-11-04
Payer: COMMERCIAL

## 2021-11-04 VITALS — BODY MASS INDEX: 32.77 KG/M2 | SYSTOLIC BLOOD PRESSURE: 124 MMHG | WEIGHT: 165 LBS | DIASTOLIC BLOOD PRESSURE: 68 MMHG

## 2021-11-04 DIAGNOSIS — N95.0 POST-MENOPAUSAL BLEEDING: ICD-10-CM

## 2021-11-04 DIAGNOSIS — N89.8 VAGINAL IRRITATION: Primary | ICD-10-CM

## 2021-11-04 DIAGNOSIS — R68.89 OTHER GENERAL SYMPTOMS AND SIGNS: ICD-10-CM

## 2021-11-04 LAB
ALBUMIN UR-MCNC: NEGATIVE MG/DL
APPEARANCE UR: CLEAR
BILIRUB UR QL STRIP: NEGATIVE
CLUE CELLS: ABNORMAL
COLOR UR AUTO: YELLOW
GLUCOSE UR STRIP-MCNC: NEGATIVE MG/DL
HGB UR QL STRIP: NEGATIVE
KETONES UR STRIP-MCNC: NEGATIVE MG/DL
LEUKOCYTE ESTERASE UR QL STRIP: NEGATIVE
NITRATE UR QL: NEGATIVE
PH UR STRIP: 5.5 [PH] (ref 5–7)
RBC #/AREA URNS AUTO: NORMAL /HPF
SP GR UR STRIP: 1.02 (ref 1–1.03)
TRICHOMONAS, WET PREP: ABNORMAL
UROBILINOGEN UR STRIP-ACNC: 0.2 E.U./DL
WBC #/AREA URNS AUTO: NORMAL /HPF
WBC'S/HIGH POWER FIELD, WET PREP: ABNORMAL
YEAST, WET PREP: ABNORMAL

## 2021-11-04 PROCEDURE — 87210 SMEAR WET MOUNT SALINE/INK: CPT | Performed by: OBSTETRICS & GYNECOLOGY

## 2021-11-04 PROCEDURE — 81001 URINALYSIS AUTO W/SCOPE: CPT | Performed by: OBSTETRICS & GYNECOLOGY

## 2021-11-04 PROCEDURE — 99214 OFFICE O/P EST MOD 30 MIN: CPT | Performed by: OBSTETRICS & GYNECOLOGY

## 2021-11-04 RX ORDER — BETAMETHASONE VALERATE 1.2 MG/G
CREAM TOPICAL 2 TIMES DAILY
Qty: 15 G | Refills: 1 | Status: SHIPPED | OUTPATIENT
Start: 2021-11-04 | End: 2023-01-09

## 2021-11-04 NOTE — NURSING NOTE
"Chief Complaint   Patient presents with     Results       Initial /68   Wt 74.8 kg (165 lb)   BMI 32.77 kg/m   Estimated body mass index is 32.77 kg/m  as calculated from the following:    Height as of 21: 1.511 m (4' 11.5\").    Weight as of this encounter: 74.8 kg (165 lb).  BP completed using cuff size: regular    Questioned patient about current smoking habits.  Pt. has never smoked.          The following HM Due: NONE      The following patient reported/Care Every where data was sent to:  P ABSTRACT QUALITY INITIATIVES [[id number]]        Rhina Melgar [facility]                 "

## 2021-11-04 NOTE — PROGRESS NOTES
Shantelle Su is a 76 year old female  No LMP recorded. Patient is postmenopausal.  Not sexually active not on HRT  , who presents for follow-up of an approximate 1 year history of intermittent vaginal brown discharge without ramon red bleeding.  She denies any pain or discomfort.  I again reviewed the office visit notes from 2016, 2016 and 10/21/2021 for previous episode she had.  At that time and evaluation included an attempted sonohysterogram and endometrial biopsy but because of patient discomfort and cervical stenosis we were unable to proceed.  The patient subsequently underwent a hysteroscopy fractional D&C on 10/4/2016 with benign findings.  The patient recently saw colorectal surgery and had external hemorrhoids banded and feels that her discharge is better.  She denies any urinary tract symptoms or hematuria.  She has had a previous mesh augmented anterior and posterior repair and does have a recurrence of a distal rectocele and distal cystourethrocele.  She notices pelvic pressure but denies difficulty with bladder control.  The patient had a subsequent pelvic ultrasound done on 10/27/2021 showing an endometrial thickness of 2.9 mm with no other abnormality.  Normal-appearing bladder.    I reviewed these findings with the patient today at length with the patient and her daughter and discussed the risk benefits and alternative forms of therapy.  The patient does today have some vulvar irritation that she like to have looked at    Past Medical History:   Diagnosis Date     Acute upper respiratory infection 2002     Angina pectoris, unspecified (H) 2016     ASCVD (arteriosclerotic cardiovascular disease) 2016     CAD (coronary artery disease) 2016    sp proximal LAD stenting     Diabetes mellitus (H)      Enterocele 2011     Essential hypertension, benign      Fatty liver 2015    Based on CT scan done 5/23/15      GERD (gastroesophageal reflux  disease) 10/13/2008     Heart palpitations      Hiatal hernia      History of pulmonary embolism 2002     Hyperlipidemia LDL goal <70 12/20/2016     Metabolic syndrome 12/01/2011     Other pulmonary embolism and infarction 09/30/2002    after starting hormone therapy     PAC (premature atrial contraction)      Paroxysmal supraventricular tachycardia (H) 12/20/2016     Rectocele 10/15/2007     Current Outpatient Medications   Medication     ACE/ARB/ARNI NOT PRESCRIBED (INTENTIONAL)     Acetaminophen (TYLENOL PO)     alpha-d-galactosidase (BEANO) tablet     amLODIPine (NORVASC) 5 MG tablet     aspirin EC 81 MG EC tablet     atorvastatin (LIPITOR) 40 MG tablet     metFORMIN (GLUCOPHAGE) 500 MG tablet     metoprolol succinate ER (TOPROL-XL) 50 MG 24 hr tablet     Multiple Vitamins-Minerals (MULTIVITAMIN ADULTS PO)     naproxen sodium (ANAPROX) 220 MG tablet     omeprazole (PRILOSEC) 20 MG DR capsule     Wheat Dextrin (BENEFIBER DRINK MIX PO)     nitroGLYcerin (NITROSTAT) 0.4 MG sublingual tablet     Current Facility-Administered Medications   Medication     dexamethasone (DECADRON) injection 2 mL     lidocaine 1 % injection 4 mL     /68   Wt 74.8 kg (165 lb)   BMI 32.77 kg/m    Constitutional: healthy, alert and no distress  Genitourinary: External genitalia show whitened epithelium in the introitus the posterior fourchette and the left labia minora consistent with vulvar dystrophy.  I do not see any cracking of tissues.  Speculum exam mildly atrophic endometrium there is no evidence of any mesh erosion or mesh exposure.  I see no unusual discharge.  A wet prep was taken.  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  Wet prep was negative today the urinalysis is negative.  A culture is pending.  Plan: Urine Culture Aerobic Bacterial - lab  collect,         Wet prep - Clinic Collect, UA with Microscopic,        Urine Microscopic Exam, CANCELED: UA with         Microscopic        I do not see evidence of an infectious etiology.  I believe some of her brown discharge may have been related to atrophic changes in the vagina.  I do not see any ulceration or mesh exposure.  I reviewed these findings with the patient.  I will treat her with betamethasone external cream on a twice daily basis for 2 weeks I like to see her back in 2 weeks to reassess    (N95.0) Post-menopausal bleeding  Comment: A detailed written outline of our discussion was given.  I do not see any evidence of endometrial atypia.  I believe the brown discharge is related to atrophic change  Plan: Urine Culture Aerobic Bacterial - lab collect,         Wet prep - Clinic Collect, UA with Microscopic,        Urine Microscopic Exam, CANCELED: UA with         Microscopic        As above    (R68.89) Other general symptoms and signs   Comment: As above  Plan: Urine Culture Aerobic Bacterial - lab collect        As above

## 2021-11-04 NOTE — PATIENT INSTRUCTIONS
You can reach your [facility] Care Team any time of the day by calling [phone]. This number will put you in touch with the 24 hour nurse line if the clinic is closed.    To contact your OB/GYN Station Coordinator/Surgery Scheduler please call [phone]. This is a direct number for your care team between 8 a.m. and 4 p.m. Monday through Friday.    [facility] is open for your convenience:  Monday through Friday 8 a.m. to 6 p.m.  Closed weekends and all major holidays.

## 2021-11-16 ENCOUNTER — OFFICE VISIT (OUTPATIENT)
Dept: OBGYN | Facility: CLINIC | Age: 76
End: 2021-11-16
Payer: COMMERCIAL

## 2021-11-16 VITALS — DIASTOLIC BLOOD PRESSURE: 70 MMHG | BODY MASS INDEX: 32.57 KG/M2 | SYSTOLIC BLOOD PRESSURE: 128 MMHG | WEIGHT: 164 LBS

## 2021-11-16 DIAGNOSIS — N95.0 POSTMENOPAUSAL BLEEDING: Primary | ICD-10-CM

## 2021-11-16 DIAGNOSIS — N90.4 VULVAR DYSTROPHY: ICD-10-CM

## 2021-11-16 PROCEDURE — 58100 BIOPSY OF UTERUS LINING: CPT | Performed by: OBSTETRICS & GYNECOLOGY

## 2021-11-16 PROCEDURE — 99213 OFFICE O/P EST LOW 20 MIN: CPT | Mod: 25 | Performed by: OBSTETRICS & GYNECOLOGY

## 2021-11-16 NOTE — PATIENT INSTRUCTIONS
You can reach your [facility] Care Team any time of the day by calling [phone]. This number will put you in touch with the 24 hour nurse line if the clinic is closed.    To contact your OB/GYN Station Coordinator/Surgery Scheduler please call [phone]. This is a direct number for your care team between 8 a.m. and 4 p.m. Monday through Friday.    [facility] is open for your convenience:  Monday through Friday 8 a.m. to 6 p.m.  Closed weekends and all major holidays.

## 2021-11-16 NOTE — NURSING NOTE
"Chief Complaint   Patient presents with     RECHECK       Initial /70   Wt 74.4 kg (164 lb)   BMI 32.57 kg/m   Estimated body mass index is 32.57 kg/m  as calculated from the following:    Height as of 21: 1.511 m (4' 11.5\").    Weight as of this encounter: 74.4 kg (164 lb).  BP completed using cuff size: regular    Questioned patient about current smoking habits.  Pt. has never smoked.          The following HM Due:       The following patient reported/Care Every where data was sent to:  P ABSTRACT QUALITY INITIATIVES [[id number]]        Rhina Melgar CMA                 "

## 2021-11-18 NOTE — PROGRESS NOTES
Shantelle Su is a 76 year old female  No LMP recorded. Patient is postmenopausal.  Not sexually active not on HRT  , who presents for follow-up of an approximate 1 year history of intermittent vaginal brown discharge without ramon red bleeding.  She denies any pain or discomfort.  I again reviewed the office visit notes from 2016, 2016 and 10/21/2021 for previous episode she had.  At that time and evaluation included an attempted sonohysterogram and endometrial biopsy but because of patient discomfort and cervical stenosis we were unable to proceed.  The patient subsequently underwent a hysteroscopy fractional D&C on 10/4/2016 with benign findings.  The patient recently saw colorectal surgery and had external hemorrhoids banded and feels that her discharge is better.  She denies any urinary tract symptoms or hematuria.  She has had a previous mesh augmented anterior and posterior repair and does have a recurrence of a distal rectocele and distal cystourethrocele.  She notices pelvic pressure but denies difficulty with bladder control.  The patient had a subsequent pelvic ultrasound done on 10/27/2021 showing an endometrial thickness of 2.9 mm with the suggestion of a small amount of fluid within the endocervical endometrial canal.  The patient was also evaluated for introital irritation related to vulvar dystrophy.  She was treated with betamethasone.  And is now symptom-free .  I reviewed these findings with the patient at length discussed with her the risk benefits and alternative forms of therapy and after this discussion we made a decision to proceed with an endometrial biopsy to rule out atypia and further evaluate the fluid noted in the endometrial cavity.    Past Medical History:   Diagnosis Date     Acute upper respiratory infection 2002     Angina pectoris, unspecified (H) 2016     ASCVD (arteriosclerotic cardiovascular disease) 2016     CAD (coronary artery disease)  11/23/2016    sp proximal LAD stenting     Diabetes mellitus (H)      Enterocele 09/12/2011     Essential hypertension, benign      Fatty liver 06/04/2015    Based on CT scan done 5/23/15      GERD (gastroesophageal reflux disease) 10/13/2008     Heart palpitations      Hiatal hernia      History of pulmonary embolism 2002     Hyperlipidemia LDL goal <70 12/20/2016     Metabolic syndrome 12/01/2011     Other pulmonary embolism and infarction 09/30/2002    after starting hormone therapy     PAC (premature atrial contraction)      Paroxysmal supraventricular tachycardia (H) 12/20/2016     Rectocele 10/15/2007     Current Outpatient Medications   Medication     ACE/ARB/ARNI NOT PRESCRIBED (INTENTIONAL)     Acetaminophen (TYLENOL PO)     alpha-d-galactosidase (BEANO) tablet     amLODIPine (NORVASC) 5 MG tablet     aspirin EC 81 MG EC tablet     atorvastatin (LIPITOR) 40 MG tablet     betamethasone valerate (VALISONE) 0.1 % external cream     metFORMIN (GLUCOPHAGE) 500 MG tablet     metoprolol succinate ER (TOPROL-XL) 50 MG 24 hr tablet     Multiple Vitamins-Minerals (MULTIVITAMIN ADULTS PO)     naproxen sodium (ANAPROX) 220 MG tablet     omeprazole (PRILOSEC) 20 MG DR capsule     Wheat Dextrin (BENEFIBER DRINK MIX PO)     nitroGLYcerin (NITROSTAT) 0.4 MG sublingual tablet     Current Facility-Administered Medications   Medication     dexamethasone (DECADRON) injection 2 mL     lidocaine 1 % injection 4 mL     /70   Wt 74.4 kg (164 lb)   BMI 32.57 kg/m    Constitutional: healthy, alert and no distress  Genitourinary: External genitalia are markedly improved significantly better  Spec:  multiparous appearing cervix  after obtaining informed consent pt prepped in the usual sterile fashion and endometrial biopsy preformed w a pipelle type sampler without difficulty or complication w a thorough sampling and acceptable specimen and what appears to be old blood.  The uterus sounded to 6 cm.  the pt tolerated the  procedure well and was D/C'd in good condition. Signs and Sx's of complications disc, pt to call.  pt will call in 1 week to rev path report and develope an approp plan.    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 Pathophysiology of the disease process reviewed, all of the patients questions answered and informed consent obtained.    Plan: Care plan was reviewed with her at length.  A detailed plan of our discussion given.  She will use a short course of the betamethasone cream for acute flares and wean to a 1% hydrocortisone cream and discontinue when her symptoms are under control.  We will reevaluate in 3 to 6 months to reassess or sooner should concerns arise

## 2021-11-30 ENCOUNTER — APPOINTMENT (OUTPATIENT)
Dept: URGENT CARE | Facility: CLINIC | Age: 76
End: 2021-11-30
Payer: COMMERCIAL

## 2021-11-30 ENCOUNTER — NURSE TRIAGE (OUTPATIENT)
Dept: NURSING | Facility: CLINIC | Age: 76
End: 2021-11-30
Payer: COMMERCIAL

## 2021-11-30 NOTE — TELEPHONE ENCOUNTER
Exposed on thanksgiving to covid.    COVID 19 Nurse Triage Plan/Patient Instructions    Please be aware that novel coronavirus (COVID-19) may be circulating in the community. If you develop symptoms such as fever, cough, or SOB or if you have concerns about the presence of another infection including coronavirus (COVID-19), please contact your health care provider or visit [email or web address].     Disposition/Instructions    Virtual Visit with provider recommended. Reference Visit Selection Guide.    Thank you for taking steps to prevent the spread of this virus.  o Limit your contact with others.  o Wear a simple mask to cover your cough.  o Wash your hands well and often.    Resources    [facility]: About COVID-19: [email or web address]    CDC: What to Do If You're Sick: www.cdc.gov/coronavirus/2019-ncov/about/steps-when-sick.html    CDC: Ending Home Isolation: www.cdc.gov/coronavirus/2019-ncov/hcp/disposition-in-home-patients.html     CDC: Caring for Someone: www.cdc.gov/coronavirus/2019-ncov/if-you-are-sick/care-for-someone.html     [facility]: Interim Guidance for Hospital Discharge to Home: www.health.[facility].mn.us/diseases/coronavirus/hcp/hospdischarge.pdf    [facility] clinical trials (COVID-19 research studies): [email or web address]     Below are the COVID-19 hotlines at the [facility] ([facility]). Interpreters are available.   o For health questions: Call 667-215-6503 or 1-253.984.3409 (7 a.m. to 7 p.m.)  o For questions about schools and childcare: Call 705-408-3879 or 1-688.168.6976 (7 a.m. to 7 p.m.)                   Reason for Disposition    [1] CLOSE CONTACT COVID-19 EXPOSURE within last 14 days AND [2] needs COVID-19 lab test to return to work AND [3] NO symptoms    Additional Information    Negative: COVID-19 lab test positive    Negative: [1] Lives with someone known to have influenza (flu test positive) AND [2] flu-like symptoms  (e.g., cough, runny nose, sore throat, SOB; with or without fever)    Negative: [1] Symptoms of COVID-19 (e.g., cough, fever, SOB, or others) AND [2] HCP diagnosed COVID-19 based on symptoms    Negative: [1] Symptoms of COVID-19 (e.g., cough, fever, SOB, or others) AND [2] lives in an area with community spread    Negative: [1] Symptoms of COVID-19 (e.g., cough, fever, SOB, or others) AND [2] within 14 days of EXPOSURE (close contact) with diagnosed or suspected COVID-19 patient    Negative: [1] Symptoms of COVID-19 (e.g., cough, fever, SOB, or others) AND [2] within 14 days of travel from high-risk area for COVID-19 community spread (identified by [facility])    Negative: [1] Difficulty breathing (shortness of breath) occurs AND [2] onset > 14 days after COVID-19 EXPOSURE (Close Contact)    Negative: [1] Dry cough occurs AND [2] onset > 14 days after COVID-19 EXPOSURE    Negative: [1] Wet cough (i.e., white-yellow, yellow, green, or sridhar colored sputum) AND [2] onset > 14 days after COVID-19 EXPOSURE    Negative: [1] Common cold symptoms AND [2] onset > 14 days after COVID-19 EXPOSURE    Negative: COVID-19 vaccine reaction suspected (e.g., fever, headache, muscle aches) occurring during days 1-3 after getting vaccine    Negative: COVID-19 vaccine, questions about    Protocols used: CORONAVIRUS (COVID-19) EXPOSURE-A- 8.25.2021

## 2021-12-02 ENCOUNTER — VIRTUAL VISIT (OUTPATIENT)
Dept: FAMILY MEDICINE | Facility: CLINIC | Age: 76
End: 2021-12-02
Payer: COMMERCIAL

## 2021-12-02 ENCOUNTER — LAB (OUTPATIENT)
Dept: URGENT CARE | Facility: URGENT CARE | Age: 76
End: 2021-12-02
Attending: PHYSICIAN ASSISTANT
Payer: COMMERCIAL

## 2021-12-02 DIAGNOSIS — Z20.822 CONTACT WITH AND (SUSPECTED) EXPOSURE TO COVID-19: ICD-10-CM

## 2021-12-02 DIAGNOSIS — Z20.822 CONTACT WITH AND (SUSPECTED) EXPOSURE TO COVID-19: Primary | ICD-10-CM

## 2021-12-02 PROCEDURE — 99213 OFFICE O/P EST LOW 20 MIN: CPT | Mod: 95 | Performed by: PHYSICIAN ASSISTANT

## 2021-12-02 PROCEDURE — U0003 INFECTIOUS AGENT DETECTION BY NUCLEIC ACID (DNA OR RNA); SEVERE ACUTE RESPIRATORY SYNDROME CORONAVIRUS 2 (SARS-COV-2) (CORONAVIRUS DISEASE [COVID-19]), AMPLIFIED PROBE TECHNIQUE, MAKING USE OF HIGH THROUGHPUT TECHNOLOGIES AS DESCRIBED BY CMS-2020-01-R: HCPCS

## 2021-12-02 PROCEDURE — U0005 INFEC AGEN DETEC AMPLI PROBE: HCPCS

## 2021-12-02 NOTE — PROGRESS NOTES
Shantelle is a 76 year old who is being evaluated via a billable telephone visit.      What phone number would you like to be contacted at? [phone]  How would you like to obtain your AVS? MyChart    Assessment & Plan     Suspected COVID-19 virus infection  Await labs. Supportive cares.   - Symptomatic COVID-19 Virus (Coronavirus) by PCR; Future    [facility]    Subjective   Shantelle is a 76 year old who presents for the following health issues     HPI     Acute Illness  Acute illness concerns: covid exposure  Onset/Duration: 4 days  Symptoms:  Fever: no  Chills/Sweats: no  Headache (location?): no  Sinus Pressure: no  Conjunctivitis:  no  Ear Pain: no  Rhinorrhea:  YES  Congestion: no  Sore Throat: YES- scratchy throat  Cough: no but burning feeling in chest  Wheeze: no  Decreased Appetite: no  Nausea: no  Vomiting: no  Diarrhea: no  Dysuria/Freq.: no  Dysuria or Hematuria: no  Fatigue/Achiness: no  Sick/Strep Exposure: YES- COVID exposure 1 week ago  Therapies tried and outcome: None  Pfizer vaccine. Due for booster.      Review of Systems   Constitutional, HEENT, cardiovascular, pulmonary, gi and gu systems are negative, except as otherwise noted.      Objective           Vitals:  No vitals were obtained today due to virtual visit.    Physical Exam   healthy, alert and no distress  PSYCH: Alert and oriented times 3; coherent speech, normal   rate and volume, able to articulate logical thoughts, able   to abstract reason, no tangential thoughts, no hallucinations   or delusions  Her affect is normal and pleasant  RESP: No cough, no audible wheezing, able to talk in full sentences  Remainder of exam unable to be completed due to telephone visits                Phone call duration: 14 minutes

## 2021-12-03 LAB — SARS-COV-2 RNA RESP QL NAA+PROBE: NEGATIVE

## 2021-12-15 DIAGNOSIS — E11.9 TYPE 2 DIABETES MELLITUS WITHOUT COMPLICATION, WITHOUT LONG-TERM CURRENT USE OF INSULIN (H): ICD-10-CM

## 2021-12-15 DIAGNOSIS — I10 ESSENTIAL HYPERTENSION, BENIGN: ICD-10-CM

## 2021-12-15 DIAGNOSIS — I47.10 PAROXYSMAL SUPRAVENTRICULAR TACHYCARDIA (H): ICD-10-CM

## 2021-12-16 RX ORDER — METOPROLOL SUCCINATE 50 MG/1
TABLET, EXTENDED RELEASE ORAL
Qty: 90 TABLET | Refills: 1 | Status: SHIPPED | OUTPATIENT
Start: 2021-12-16 | End: 2022-06-07

## 2021-12-16 RX ORDER — AMLODIPINE BESYLATE 5 MG/1
TABLET ORAL
Qty: 90 TABLET | Refills: 1 | Status: SHIPPED | OUTPATIENT
Start: 2021-12-16 | End: 2022-06-07

## 2021-12-16 NOTE — TELEPHONE ENCOUNTER
Let's have her schedule follow-up in the next 6 months for a blood pressure and diabetes recheck    Thank you,    Burton Mejia MD

## 2022-01-13 ENCOUNTER — IMMUNIZATION (OUTPATIENT)
Dept: NURSING | Facility: CLINIC | Age: 77
End: 2022-01-13
Payer: COMMERCIAL

## 2022-01-13 PROCEDURE — 0054A COVID-19,PF,PFIZER (12+ YRS): CPT

## 2022-01-13 PROCEDURE — 91305 COVID-19,PF,PFIZER (12+ YRS): CPT

## 2022-01-25 ENCOUNTER — PREPPED CHART (OUTPATIENT)
Dept: URBAN - METROPOLITAN AREA CLINIC 101 | Facility: CLINIC | Age: 77
End: 2022-01-25

## 2022-01-25 PROBLEM — H35.033 HYPERTENSIVE RETINOPATHY: Noted: 2022-01-25

## 2022-01-25 PROBLEM — H43.813 POSTERIOR VITREOUS DETACHMENT: Noted: 2022-01-25

## 2022-01-25 PROBLEM — H34.12 CENTRAL RETINAL ARTERY OCCLUSION: Noted: 2022-01-25

## 2022-01-25 PROBLEM — H25.13 NS CATARACT: Noted: 2022-01-25

## 2022-01-25 PROBLEM — H47.022 OPTIC NERVE SHEATH HEMORRHAGE: Noted: 2022-01-25

## 2022-01-25 PROBLEM — H25.11 NS CATARACT: Noted: 2022-01-25

## 2022-01-25 PROBLEM — H43.12 VITREOUS HEMORRHAGE: Noted: 2022-01-25

## 2022-02-13 ENCOUNTER — HEALTH MAINTENANCE LETTER (OUTPATIENT)
Age: 77
End: 2022-02-13

## 2022-02-15 ENCOUNTER — HOSPITAL ENCOUNTER (OUTPATIENT)
Dept: CARDIOLOGY | Facility: CLINIC | Age: 77
Discharge: HOME | End: 2022-02-15
Attending: INTERNAL MEDICINE | Admitting: INTERNAL MEDICINE
Payer: COMMERCIAL

## 2022-02-15 DIAGNOSIS — I25.118 CORONARY ARTERY DISEASE OF NATIVE ARTERY OF NATIVE HEART WITH STABLE ANGINA PECTORIS (H): ICD-10-CM

## 2022-02-15 LAB — LVEF ECHO: NORMAL

## 2022-02-15 PROCEDURE — 93306 TTE W/DOPPLER COMPLETE: CPT | Mod: 26 | Performed by: INTERNAL MEDICINE

## 2022-02-15 PROCEDURE — 93306 TTE W/DOPPLER COMPLETE: CPT

## 2022-03-11 DIAGNOSIS — K21.9 GASTROESOPHAGEAL REFLUX DISEASE WITHOUT ESOPHAGITIS: ICD-10-CM

## 2022-03-14 NOTE — TELEPHONE ENCOUNTER
Routing refill request to provider for review/approval because:  Patient needs to be seen because:  no recent visit.  Pt changed PCP's. Medication never filled by new PCP.     Last OV with PCP on 8/30/21.    Jamie AWAD RN

## 2022-03-21 ASSESSMENT — TONOMETRY
OS_IOP_MMHG: 17
OD_IOP_MMHG: 16

## 2022-03-21 ASSESSMENT — VISUAL ACUITY
OS_CC: CF 7FT
OD_CC: 20/20-2

## 2022-03-22 ENCOUNTER — OFFICE VISIT (OUTPATIENT)
Dept: CARDIOLOGY | Facility: CLINIC | Age: 77
End: 2022-03-22
Payer: COMMERCIAL

## 2022-03-22 VITALS
BODY MASS INDEX: 32.38 KG/M2 | WEIGHT: 164.9 LBS | DIASTOLIC BLOOD PRESSURE: 66 MMHG | HEART RATE: 67 BPM | HEIGHT: 60 IN | SYSTOLIC BLOOD PRESSURE: 114 MMHG | OXYGEN SATURATION: 97 %

## 2022-03-22 DIAGNOSIS — I25.118 CORONARY ARTERY DISEASE OF NATIVE ARTERY OF NATIVE HEART WITH STABLE ANGINA PECTORIS (H): Primary | ICD-10-CM

## 2022-03-22 DIAGNOSIS — I47.10 PAROXYSMAL SUPRAVENTRICULAR TACHYCARDIA (H): ICD-10-CM

## 2022-03-22 DIAGNOSIS — I10 ESSENTIAL HYPERTENSION, BENIGN: ICD-10-CM

## 2022-03-22 PROCEDURE — 99214 OFFICE O/P EST MOD 30 MIN: CPT | Performed by: INTERNAL MEDICINE

## 2022-03-22 NOTE — LETTER
3/22/2022    Burton Mejia MD  [id number]  [facility] [address]    RE: Shantelle Su       Dear Colleague,     I had the pleasure of seeing Shantelle MARIE Georges in the [facility].  CARDIOLOGY CLINIC VISIT    REASON FOR VISIT : follow up CAD, HDL, HTN    PRIMARY CARE PHYSICIAN:  Burton Mejia    HISTORY OF PRESENT ILLNESS:  Last visit was 12/10/2020    Shantelle is a 77 year old woman here for routine follow up.  She has a history of of her LAD 11/23/2016 after an abnormal stress test.  Stress echocardiogram showed ischemia in the anteroseptal and apical wall accompanied by 4/10 chest burning.  On 11/23/2016, she was found to have a 50% LAD stenosis followed by an 80% stenosis and a mid vessel 40%-50% stenosis.  About 2 hrs after stent placement, there was evidence of an acute myocardial infarction.  She had a proximal stent edge dissection.  Thrombectomy was performed and an additional overlapping drug-eluting stent was placed near the ostium of the LAD.  She also has a history of symptomatic PACs.     From Nov through Jan she had a lot of stress. She bought a house and moved and there was a lot going on, but now they are settled in after 3 months there. Her new house has stairs and she can get up and down them just fine. She has no chest pain or shortness of breath and no palpitations.         PAST MEDICAL HISTORY:  Past Medical History:   Diagnosis Date     Acute upper respiratory infection 09/30/2002     Angina pectoris, unspecified (H) 11/23/2016     ASCVD (arteriosclerotic cardiovascular disease) 12/20/2016     CAD (coronary artery disease) 11/23/2016    sp proximal LAD stenting     Diabetes mellitus (H)      Enterocele 09/12/2011     Essential hypertension, benign      Fatty liver 06/04/2015    Based on CT scan done 5/23/15      GERD (gastroesophageal reflux disease) 10/13/2008     Heart palpitations      Hiatal hernia      History of pulmonary embolism 2002     Hyperlipidemia LDL goal <70  12/20/2016     Metabolic syndrome 12/01/2011     Other pulmonary embolism and infarction 09/30/2002    after starting hormone therapy     PAC (premature atrial contraction)      Paroxysmal supraventricular tachycardia (H) 12/20/2016     Rectocele 10/15/2007       MEDICATIONS:  Current Outpatient Medications   Medication     Acetaminophen (TYLENOL PO)     alpha-d-galactosidase (BEANO) tablet     amLODIPine (NORVASC) 5 MG tablet     aspirin EC 81 MG EC tablet     atorvastatin (LIPITOR) 40 MG tablet     metFORMIN (GLUCOPHAGE) 500 MG tablet     metoprolol succinate ER (TOPROL-XL) 50 MG 24 hr tablet     nitroGLYcerin (NITROSTAT) 0.4 MG sublingual tablet     omeprazole (PRILOSEC) 20 MG DR capsule     ACE/ARB/ARNI NOT PRESCRIBED (INTENTIONAL)     Multiple Vitamins-Minerals (MULTIVITAMIN ADULTS PO)     naproxen sodium (ANAPROX) 220 MG tablet     Wheat Dextrin (BENEFIBER DRINK MIX PO)     Current Facility-Administered Medications   Medication     dexamethasone (DECADRON) injection 2 mL     lidocaine 1 % injection 4 mL       ALLERGIES:  Allergies   Allergen Reactions     Bactrim [Sulfamethoxazole W/Trimethoprim] Nausea and Vomiting     Lisinopril      bp increased       SOCIAL HISTORY:    Distant smoking    FAMILY HISTORY:   w/heart issues and her father was reported to have CAD    REVIEW OF SYSTEMS:  Skin:  Negative     Eyes:  Positive for glasses  ENT:  Negative    Respiratory:  Negative    Cardiovascular:    left leg edema which she attributes to arthritis  Gastroenterology: Positive for reflux  Genitourinary:  Positive for urinary frequency;urgency  Musculoskeletal:  Positive for arthritis;joint swelling;nocturnal cramping  Neurologic:  Negative    Psychiatric:  Negative    Heme/Lymph/Imm:  Positive for easy bruising  Endocrine:  Positive for diabetes    PHYSICAL EXAM:      BP: 114/66 Pulse: 67     SpO2: 97 %      Vital Signs with Ranges  Pulse:  [67] 67  BP: (114)/(66) 114/66  SpO2:  [97 %] 97 %  164 lbs  14.4 oz    Constitutional: awake, alert, no distress  Eyes: sclera nonicteric  ENT: trachea midline  Respiratory: clear bilaterally  Cardiovascular: regular rate and rhythm no audible murmur, no rub or gallop  GI: nondistended, nontender, bowel sounds present  Skin: dry, no rash no edema  Musculoskeletal: grossly normal muscle bulk and tone   Neurologic: no focal deficits  Neuropsychiatric: normal affect      DATA:   Lab Results   Component Value Date    CHOL 163 12/10/2020     Lab Results   Component Value Date    HDL 73 12/10/2020     Lab Results   Component Value Date    LDL 50 12/10/2020     Lab Results   Component Value Date    TRIG 202 12/10/2020     Lab Results   Component Value Date    CHOLHDLRATIO 2.4 11/09/2015       Last Comprehensive Metabolic Panel:  Sodium   Date Value Ref Range Status   01/14/2021 137 133 - 144 mmol/L Final     Potassium   Date Value Ref Range Status   01/14/2021 4.3 3.4 - 5.3 mmol/L Final     Chloride   Date Value Ref Range Status   01/14/2021 107 94 - 109 mmol/L Final     Carbon Dioxide   Date Value Ref Range Status   01/14/2021 27 20 - 32 mmol/L Final     Anion Gap   Date Value Ref Range Status   01/14/2021 3 3 - 14 mmol/L Final     Glucose   Date Value Ref Range Status   01/14/2021 97 70 - 99 mg/dL Final     Urea Nitrogen   Date Value Ref Range Status   01/14/2021 15 7 - 30 mg/dL Final     Creatinine   Date Value Ref Range Status   01/14/2021 0.64 0.52 - 1.04 mg/dL Final     GFR Estimate   Date Value Ref Range Status   01/14/2021 87 >60 mL/min/[1.73_m2] Final     Comment:     Non  GFR Calc  Starting 12/18/2018, serum creatinine based estimated GFR (eGFR) will be   calculated using the Chronic Kidney Disease Epidemiology Collaboration   (CKD-EPI) equation.       Calcium   Date Value Ref Range Status   01/14/2021 9.0 8.5 - 10.1 mg/dL Final     CBC RESULTS: Recent Labs   Lab Test 11/19/20  1206 07/15/20  1149 05/29/18  0220   WBC  --   --  10.8   RBC  --   --  3.80    HGB 13.0   < > 11.8   HCT  --   --  36.6   MCV  --   --  96   MCH  --   --  31.1   MCHC  --   --  32.2   RDW  --   --  13.4      < > 238    < > = values in this interval not displayed.         Echo 2/15/22  The visual ejection fraction is 55-60%.  Left ventricular systolic function is normal.  There is trace aortic regurgitation.  The study was technically difficult.      Stress echo 3/22/17  The patient exercised 10:45 mm:ss on standard mod Asael protocol.  The patient did not exhibit any symptoms during exercise.  A moderately-high workload was achieved.  There was a normal BP response to exercise.  The EKG portion of this stress test was negative for inducible ischemia (see  echo results below).  Normal resting wall motion and no stress-induced wall motion abnormality.    Echo 12/1916  The visual ejection fraction is estimated at 48-52%.  Left ventricular systolic function is borderline reduced.  Frequent ectopic beats  Compared to prior study, there is no significant change.          ASSESSMENT:  1.  Shantelle Su is a delightful 77 year-old female, status PCI to LAD in 2016 complicated by acute thrombosis w/edge dissection and a second stent that same day.   She had back discomfort that prompted a stress echocardiogram 03/2017.  This was normal without evidence of ischemia.  CCS 0. NYHA 1, last EF was normal.   2.  Hyperlipidemia, LDL 50 5/10/20  3.  History of symptomatic premature atrial contractions, no symptoms in the past 2 yrs.  4.  HTN, well-controlled. H/o diastolic dysfunction.   5.  Social stressors, recent move etc.     RECOMMENDATIONS:  1. Due for labs including lipids  2. Continue current medications  3. Exercise/healthy diet as much as possible.  Exercise limited by arthritis.  4. Follow up in one year with EKG at visit.    Flor Hand MD [facility]  Text Page     Thank you for allowing me to participate in the care of your patient.      Sincerely,     MD DARWIN Torres  [facility]  cc:   No referring provider defined for this encounter.

## 2022-03-22 NOTE — PATIENT INSTRUCTIONS
1.  Have labs fasting labs drawn sometime soon.  2.  Follow up with me in one year. We will get an EKG prior to that visit

## 2022-03-22 NOTE — PROGRESS NOTES
CARDIOLOGY CLINIC VISIT    REASON FOR VISIT : follow up CAD, HDL, HTN    PRIMARY CARE PHYSICIAN:  Burton Mejia    HISTORY OF PRESENT ILLNESS:  Last visit was 12/10/2020    Shantelle is a 77 year old woman here for routine follow up.  She has a history of of her LAD 11/23/2016 after an abnormal stress test.  Stress echocardiogram showed ischemia in the anteroseptal and apical wall accompanied by 4/10 chest burning.  On 11/23/2016, she was found to have a 50% LAD stenosis followed by an 80% stenosis and a mid vessel 40%-50% stenosis.  About 2 hrs after stent placement, there was evidence of an acute myocardial infarction.  She had a proximal stent edge dissection.  Thrombectomy was performed and an additional overlapping drug-eluting stent was placed near the ostium of the LAD.  She also has a history of symptomatic PACs.     From Nov through Jan she had a lot of stress. She bought a house and moved and there was a lot going on, but now they are settled in after 3 months there. Her new house has stairs and she can get up and down them just fine. She has no chest pain or shortness of breath and no palpitations.         PAST MEDICAL HISTORY:  Past Medical History:   Diagnosis Date     Acute upper respiratory infection 09/30/2002     Angina pectoris, unspecified (H) 11/23/2016     ASCVD (arteriosclerotic cardiovascular disease) 12/20/2016     CAD (coronary artery disease) 11/23/2016    sp proximal LAD stenting     Diabetes mellitus (H)      Enterocele 09/12/2011     Essential hypertension, benign      Fatty liver 06/04/2015    Based on CT scan done 5/23/15      GERD (gastroesophageal reflux disease) 10/13/2008     Heart palpitations      Hiatal hernia      History of pulmonary embolism 2002     Hyperlipidemia LDL goal <70 12/20/2016     Metabolic syndrome 12/01/2011     Other pulmonary embolism and infarction 09/30/2002    after starting hormone therapy     PAC (premature atrial contraction)      Paroxysmal supraventricular  tachycardia (H) 12/20/2016     Rectocele 10/15/2007       MEDICATIONS:  Current Outpatient Medications   Medication     Acetaminophen (TYLENOL PO)     alpha-d-galactosidase (BEANO) tablet     amLODIPine (NORVASC) 5 MG tablet     aspirin EC 81 MG EC tablet     atorvastatin (LIPITOR) 40 MG tablet     metFORMIN (GLUCOPHAGE) 500 MG tablet     metoprolol succinate ER (TOPROL-XL) 50 MG 24 hr tablet     nitroGLYcerin (NITROSTAT) 0.4 MG sublingual tablet     omeprazole (PRILOSEC) 20 MG DR capsule     ACE/ARB/ARNI NOT PRESCRIBED (INTENTIONAL)     Multiple Vitamins-Minerals (MULTIVITAMIN ADULTS PO)     naproxen sodium (ANAPROX) 220 MG tablet     Wheat Dextrin (BENEFIBER DRINK MIX PO)     Current Facility-Administered Medications   Medication     dexamethasone (DECADRON) injection 2 mL     lidocaine 1 % injection 4 mL       ALLERGIES:  Allergies   Allergen Reactions     Bactrim [Sulfamethoxazole W/Trimethoprim] Nausea and Vomiting     Lisinopril      bp increased       SOCIAL HISTORY:    Distant smoking    FAMILY HISTORY:   w/heart issues and her father was reported to have CAD    REVIEW OF SYSTEMS:  Skin:  Negative     Eyes:  Positive for glasses  ENT:  Negative    Respiratory:  Negative    Cardiovascular:    left leg edema which she attributes to arthritis  Gastroenterology: Positive for reflux  Genitourinary:  Positive for urinary frequency;urgency  Musculoskeletal:  Positive for arthritis;joint swelling;nocturnal cramping  Neurologic:  Negative    Psychiatric:  Negative    Heme/Lymph/Imm:  Positive for easy bruising  Endocrine:  Positive for diabetes    PHYSICAL EXAM:      BP: 114/66 Pulse: 67     SpO2: 97 %      Vital Signs with Ranges  Pulse:  [67] 67  BP: (114)/(66) 114/66  SpO2:  [97 %] 97 %  164 lbs 14.4 oz    Constitutional: awake, alert, no distress  Eyes: sclera nonicteric  ENT: trachea midline  Respiratory: clear bilaterally  Cardiovascular: regular rate and rhythm no audible murmur, no rub or  gallop  GI: nondistended, nontender, bowel sounds present  Skin: dry, no rash no edema  Musculoskeletal: grossly normal muscle bulk and tone   Neurologic: no focal deficits  Neuropsychiatric: normal affect      DATA:   Lab Results   Component Value Date    CHOL 163 12/10/2020     Lab Results   Component Value Date    HDL 73 12/10/2020     Lab Results   Component Value Date    LDL 50 12/10/2020     Lab Results   Component Value Date    TRIG 202 12/10/2020     Lab Results   Component Value Date    CHOLHDLRATIO 2.4 11/09/2015       Last Comprehensive Metabolic Panel:  Sodium   Date Value Ref Range Status   01/14/2021 137 133 - 144 mmol/L Final     Potassium   Date Value Ref Range Status   01/14/2021 4.3 3.4 - 5.3 mmol/L Final     Chloride   Date Value Ref Range Status   01/14/2021 107 94 - 109 mmol/L Final     Carbon Dioxide   Date Value Ref Range Status   01/14/2021 27 20 - 32 mmol/L Final     Anion Gap   Date Value Ref Range Status   01/14/2021 3 3 - 14 mmol/L Final     Glucose   Date Value Ref Range Status   01/14/2021 97 70 - 99 mg/dL Final     Urea Nitrogen   Date Value Ref Range Status   01/14/2021 15 7 - 30 mg/dL Final     Creatinine   Date Value Ref Range Status   01/14/2021 0.64 0.52 - 1.04 mg/dL Final     GFR Estimate   Date Value Ref Range Status   01/14/2021 87 >60 mL/min/[1.73_m2] Final     Comment:     Non  GFR Calc  Starting 12/18/2018, serum creatinine based estimated GFR (eGFR) will be   calculated using the Chronic Kidney Disease Epidemiology Collaboration   (CKD-EPI) equation.       Calcium   Date Value Ref Range Status   01/14/2021 9.0 8.5 - 10.1 mg/dL Final     CBC RESULTS: Recent Labs   Lab Test 11/19/20  1206 07/15/20  1149 05/29/18  0220   WBC  --   --  10.8   RBC  --   --  3.80   HGB 13.0   < > 11.8   HCT  --   --  36.6   MCV  --   --  96   MCH  --   --  31.1   MCHC  --   --  32.2   RDW  --   --  13.4      < > 238    < > = values in this interval not displayed.          Echo 2/15/22  The visual ejection fraction is 55-60%.  Left ventricular systolic function is normal.  There is trace aortic regurgitation.  The study was technically difficult.      Stress echo 3/22/17  The patient exercised 10:45 mm:ss on standard mod Asael protocol.  The patient did not exhibit any symptoms during exercise.  A moderately-high workload was achieved.  There was a normal BP response to exercise.  The EKG portion of this stress test was negative for inducible ischemia (see  echo results below).  Normal resting wall motion and no stress-induced wall motion abnormality.    Echo 12/1916  The visual ejection fraction is estimated at 48-52%.  Left ventricular systolic function is borderline reduced.  Frequent ectopic beats  Compared to prior study, there is no significant change.          ASSESSMENT:  1.  Shantelle Su is a delightful 77 year-old female, status PCI to LAD in 2016 complicated by acute thrombosis w/edge dissection and a second stent that same day.   She had back discomfort that prompted a stress echocardiogram 03/2017.  This was normal without evidence of ischemia.  CCS 0. NYHA 1, last EF was normal.   2.  Hyperlipidemia, LDL 50 5/10/20  3.  History of symptomatic premature atrial contractions, no symptoms in the past 2 yrs.  4.  HTN, well-controlled. H/o diastolic dysfunction.   5.  Social stressors, recent move etc.     RECOMMENDATIONS:  1. Due for labs including lipids  2. Continue current medications  3. Exercise/healthy diet as much as possible.  Exercise limited by arthritis.  4. Follow up in one year with EKG at visit.    Flor Hand MD [facility]  Text Page

## 2022-03-23 ENCOUNTER — FOLLOW UP (OUTPATIENT)
Dept: URBAN - METROPOLITAN AREA CLINIC 101 | Facility: CLINIC | Age: 77
End: 2022-03-23

## 2022-03-23 DIAGNOSIS — H34.12: ICD-10-CM

## 2022-03-23 DIAGNOSIS — H43.813: ICD-10-CM

## 2022-03-23 DIAGNOSIS — H35.033: ICD-10-CM

## 2022-03-23 DIAGNOSIS — H43.12: ICD-10-CM

## 2022-03-23 DIAGNOSIS — H47.022: ICD-10-CM

## 2022-03-23 PROCEDURE — 92134 CPTRZ OPH DX IMG PST SGM RTA: CPT

## 2022-03-23 PROCEDURE — 99214 OFFICE O/P EST MOD 30 MIN: CPT

## 2022-03-23 PROCEDURE — 92202 OPSCPY EXTND ON/MAC DRAW: CPT

## 2022-03-23 PROCEDURE — 76512 OPH US DX B-SCAN: CPT

## 2022-03-23 ASSESSMENT — TONOMETRY
OD_IOP_MMHG: 15
OS_IOP_MMHG: 13

## 2022-03-23 ASSESSMENT — VISUAL ACUITY
OS_CC: 20/70+1
OD_CC: 20/20-1

## 2022-03-24 ENCOUNTER — HOSPITAL ENCOUNTER (OUTPATIENT)
Dept: MAMMOGRAPHY | Facility: CLINIC | Age: 77
Discharge: HOME | End: 2022-03-24
Attending: FAMILY MEDICINE | Admitting: FAMILY MEDICINE
Payer: COMMERCIAL

## 2022-03-24 DIAGNOSIS — Z12.31 VISIT FOR SCREENING MAMMOGRAM: ICD-10-CM

## 2022-03-24 PROCEDURE — 77067 SCR MAMMO BI INCL CAD: CPT

## 2022-03-25 ENCOUNTER — LAB (OUTPATIENT)
Dept: LAB | Facility: CLINIC | Age: 77
End: 2022-03-25
Payer: COMMERCIAL

## 2022-03-25 DIAGNOSIS — R68.89 OTHER GENERAL SYMPTOMS AND SIGNS: ICD-10-CM

## 2022-03-25 DIAGNOSIS — I25.118 CORONARY ARTERY DISEASE OF NATIVE ARTERY OF NATIVE HEART WITH STABLE ANGINA PECTORIS (H): ICD-10-CM

## 2022-03-25 DIAGNOSIS — I47.10 PAROXYSMAL SUPRAVENTRICULAR TACHYCARDIA (H): ICD-10-CM

## 2022-03-25 DIAGNOSIS — N89.8 VAGINAL IRRITATION: ICD-10-CM

## 2022-03-25 DIAGNOSIS — N95.0 POST-MENOPAUSAL BLEEDING: ICD-10-CM

## 2022-03-25 DIAGNOSIS — I10 ESSENTIAL HYPERTENSION, BENIGN: ICD-10-CM

## 2022-03-25 LAB
ALT SERPL W P-5'-P-CCNC: 30 U/L (ref 0–50)
CHOLEST SERPL-MCNC: 170 MG/DL
ERYTHROCYTE [DISTWIDTH] IN BLOOD BY AUTOMATED COUNT: 13.5 % (ref 10–15)
FASTING STATUS PATIENT QL REPORTED: YES
HCT VFR BLD AUTO: 41.5 % (ref 35–47)
HDLC SERPL-MCNC: 74 MG/DL
HGB BLD-MCNC: 12.9 G/DL (ref 11.7–15.7)
LDLC SERPL CALC-MCNC: 57 MG/DL
MCH RBC QN AUTO: 30.3 PG (ref 26.5–33)
MCHC RBC AUTO-ENTMCNC: 31.1 G/DL (ref 31.5–36.5)
MCV RBC AUTO: 97 FL (ref 78–100)
NONHDLC SERPL-MCNC: 96 MG/DL
PLATELET # BLD AUTO: 316 10E3/UL (ref 150–450)
RBC # BLD AUTO: 4.26 10E6/UL (ref 3.8–5.2)
TRIGL SERPL-MCNC: 195 MG/DL
WBC # BLD AUTO: 6.6 10E3/UL (ref 4–11)

## 2022-03-25 PROCEDURE — 84460 ALANINE AMINO (ALT) (SGPT): CPT

## 2022-03-25 PROCEDURE — 36415 COLL VENOUS BLD VENIPUNCTURE: CPT

## 2022-03-25 PROCEDURE — 80061 LIPID PANEL: CPT

## 2022-03-25 PROCEDURE — 85027 COMPLETE CBC AUTOMATED: CPT

## 2022-03-25 PROCEDURE — 87086 URINE CULTURE/COLONY COUNT: CPT

## 2022-03-26 LAB — BACTERIA UR CULT: NORMAL

## 2022-04-11 DIAGNOSIS — E78.5 HYPERLIPIDEMIA LDL GOAL <130: ICD-10-CM

## 2022-04-12 RX ORDER — ATORVASTATIN CALCIUM 40 MG/1
40 TABLET, FILM COATED ORAL DAILY
Qty: 90 TABLET | Refills: 0 | Status: SHIPPED | OUTPATIENT
Start: 2022-04-12 | End: 2022-06-28

## 2022-04-12 NOTE — TELEPHONE ENCOUNTER
Prescription approved per [facility] Refill Protocol.  Mansi Argueta RN

## 2022-04-19 ENCOUNTER — LAB (OUTPATIENT)
Dept: URGENT CARE | Facility: URGENT CARE | Age: 77
End: 2022-04-19
Payer: COMMERCIAL

## 2022-04-19 DIAGNOSIS — Z20.822 CLOSE EXPOSURE TO 2019 NOVEL CORONAVIRUS: ICD-10-CM

## 2022-04-19 PROCEDURE — U0005 INFEC AGEN DETEC AMPLI PROBE: HCPCS

## 2022-04-19 PROCEDURE — U0003 INFECTIOUS AGENT DETECTION BY NUCLEIC ACID (DNA OR RNA); SEVERE ACUTE RESPIRATORY SYNDROME CORONAVIRUS 2 (SARS-COV-2) (CORONAVIRUS DISEASE [COVID-19]), AMPLIFIED PROBE TECHNIQUE, MAKING USE OF HIGH THROUGHPUT TECHNOLOGIES AS DESCRIBED BY CMS-2020-01-R: HCPCS

## 2022-04-20 DIAGNOSIS — I10 ESSENTIAL HYPERTENSION, BENIGN: ICD-10-CM

## 2022-04-20 DIAGNOSIS — E11.9 TYPE 2 DIABETES MELLITUS WITHOUT COMPLICATION, WITHOUT LONG-TERM CURRENT USE OF INSULIN (H): ICD-10-CM

## 2022-04-20 DIAGNOSIS — I47.10 PAROXYSMAL SUPRAVENTRICULAR TACHYCARDIA (H): ICD-10-CM

## 2022-04-20 LAB — SARS-COV-2 RNA RESP QL NAA+PROBE: NEGATIVE

## 2022-04-20 RX ORDER — AMLODIPINE BESYLATE 5 MG/1
5 TABLET ORAL DAILY
Qty: 90 TABLET | Refills: 1 | OUTPATIENT
Start: 2022-04-20

## 2022-04-20 RX ORDER — METOPROLOL SUCCINATE 50 MG/1
50 TABLET, EXTENDED RELEASE ORAL DAILY
Qty: 90 TABLET | Refills: 1 | OUTPATIENT
Start: 2022-04-20

## 2022-06-06 ENCOUNTER — OFFICE VISIT (OUTPATIENT)
Dept: FAMILY MEDICINE | Facility: CLINIC | Age: 77
End: 2022-06-06
Payer: COMMERCIAL

## 2022-06-06 DIAGNOSIS — Z79.899 ENCOUNTER FOR LONG-TERM (CURRENT) USE OF MEDICATIONS: ICD-10-CM

## 2022-06-06 DIAGNOSIS — I47.10 PAROXYSMAL SUPRAVENTRICULAR TACHYCARDIA (H): ICD-10-CM

## 2022-06-06 DIAGNOSIS — I10 ESSENTIAL HYPERTENSION, BENIGN: ICD-10-CM

## 2022-06-06 DIAGNOSIS — E11.9 TYPE 2 DIABETES MELLITUS WITHOUT COMPLICATION, WITHOUT LONG-TERM CURRENT USE OF INSULIN (H): Primary | ICD-10-CM

## 2022-06-06 LAB
ANION GAP SERPL CALCULATED.3IONS-SCNC: 5 MMOL/L (ref 3–14)
BUN SERPL-MCNC: 15 MG/DL (ref 7–30)
CALCIUM SERPL-MCNC: 9.3 MG/DL (ref 8.5–10.1)
CHLORIDE BLD-SCNC: 106 MMOL/L (ref 94–109)
CO2 SERPL-SCNC: 27 MMOL/L (ref 20–32)
CREAT SERPL-MCNC: 0.62 MG/DL (ref 0.52–1.04)
CREAT UR-MCNC: 39 MG/DL
GFR SERPL CREATININE-BSD FRML MDRD: >90 ML/MIN/1.73M2
GLUCOSE BLD-MCNC: 109 MG/DL (ref 70–99)
HBA1C MFR BLD: 6.7 % (ref 0–5.6)
HOLD SPECIMEN: NORMAL
HOLD SPECIMEN: NORMAL
MICROALBUMIN UR-MCNC: 12 MG/L
MICROALBUMIN/CREAT UR: 30.77 MG/G CR (ref 0–25)
POTASSIUM BLD-SCNC: 4.3 MMOL/L (ref 3.4–5.3)
SODIUM SERPL-SCNC: 138 MMOL/L (ref 133–144)

## 2022-06-06 PROCEDURE — 82043 UR ALBUMIN QUANTITATIVE: CPT | Performed by: FAMILY MEDICINE

## 2022-06-06 PROCEDURE — 99214 OFFICE O/P EST MOD 30 MIN: CPT | Performed by: FAMILY MEDICINE

## 2022-06-06 PROCEDURE — 83036 HEMOGLOBIN GLYCOSYLATED A1C: CPT | Performed by: FAMILY MEDICINE

## 2022-06-06 PROCEDURE — 99207 PR FOOT EXAM NO CHARGE: CPT | Performed by: FAMILY MEDICINE

## 2022-06-06 PROCEDURE — 80048 BASIC METABOLIC PNL TOTAL CA: CPT | Performed by: FAMILY MEDICINE

## 2022-06-06 PROCEDURE — 36415 COLL VENOUS BLD VENIPUNCTURE: CPT | Performed by: FAMILY MEDICINE

## 2022-06-06 ASSESSMENT — PATIENT HEALTH QUESTIONNAIRE - PHQ9
10. IF YOU CHECKED OFF ANY PROBLEMS, HOW DIFFICULT HAVE THESE PROBLEMS MADE IT FOR YOU TO DO YOUR WORK, TAKE CARE OF THINGS AT HOME, OR GET ALONG WITH OTHER PEOPLE: NOT DIFFICULT AT ALL
SUM OF ALL RESPONSES TO PHQ QUESTIONS 1-9: 0
SUM OF ALL RESPONSES TO PHQ QUESTIONS 1-9: 0

## 2022-06-06 ASSESSMENT — ANXIETY QUESTIONNAIRES
8. IF YOU CHECKED OFF ANY PROBLEMS, HOW DIFFICULT HAVE THESE MADE IT FOR YOU TO DO YOUR WORK, TAKE CARE OF THINGS AT HOME, OR GET ALONG WITH OTHER PEOPLE?: NOT DIFFICULT AT ALL
3. WORRYING TOO MUCH ABOUT DIFFERENT THINGS: NOT AT ALL
5. BEING SO RESTLESS THAT IT IS HARD TO SIT STILL: NOT AT ALL
4. TROUBLE RELAXING: NOT AT ALL
1. FEELING NERVOUS, ANXIOUS, OR ON EDGE: NOT AT ALL
GAD7 TOTAL SCORE: 0
6. BECOMING EASILY ANNOYED OR IRRITABLE: NOT AT ALL
GAD7 TOTAL SCORE: 0
7. FEELING AFRAID AS IF SOMETHING AWFUL MIGHT HAPPEN: NOT AT ALL
7. FEELING AFRAID AS IF SOMETHING AWFUL MIGHT HAPPEN: NOT AT ALL
GAD7 TOTAL SCORE: 0
2. NOT BEING ABLE TO STOP OR CONTROL WORRYING: NOT AT ALL

## 2022-06-06 ASSESSMENT — ENCOUNTER SYMPTOMS
SHORTNESS OF BREATH: 0
DIARRHEA: 0
WOUND: 0

## 2022-06-06 NOTE — PROGRESS NOTES
Assessment & Plan     Type 2 diabetes mellitus without complication, without long-term current use of insulin (H)  Controlled, A1c is 6.7 today.  Continue metformin with dinner. Continue lipitor.  Follow-up in 6 months for recheck.  - Albumin Random Urine Quantitative with Creat Ratio  - **A1C FUTURE 3mo  - Extra Tube  - Albumin Random Urine Quantitative with Creat Ratio  - **A1C FUTURE 3mo  - Extra Tube  - FOOT EXAM  - metFORMIN (GLUCOPHAGE) 500 MG tablet  Dispense: 90 tablet; Refill: 3    Essential hypertension, benign  Uncontrolled.  Historical blood pressure reviewed, she will send in ambulatory readings, if greater than 140/90, will increase her amlodipine from 5 to 10 mg.  Continue metoprolol.  Follow-up in 6 months for recheck.  - Extra Tube  - Extra Tube  - BASIC METABOLIC PANEL    Encounter for long-term (current) use of medications        Return in about 6 months (around 12/6/2022) for diabetes visit. .    Burton Mejia MD  [facility] is a 77 year old who presents for the following health issues     History of Present Illness       Diabetes:   She presents for follow up of diabetes.  She is not checking blood glucose. She has no concerns regarding her diabetes at this time.  She is not experiencing numbness or burning in feet, excessive thirst, blurry vision, weight changes or redness, sores or blisters on feet.         Hyperlipidemia:  She presents for follow up of hyperlipidemia.  She is taking medication to lower cholesterol. She is not having myalgia or other side effects to statin medications.    Hypertension: She presents for follow up of hypertension.  She does not check blood pressure  regularly outside of the clinic. Outpatient blood pressures have not been over 140/90. She does not follow a low salt diet.     She eats 0-1 servings of fruits and vegetables daily.She consumes 0 sweetened beverage(s) daily.She exercises with enough effort to increase  "her heart rate 9 or less minutes per day.  She exercises with enough effort to increase her heart rate 3 or less days per week.   She is taking medications regularly.    Today's PHQ-9         PHQ-9 Total Score: 0    PHQ-9 Q9 Thoughts of better off dead/self-harm past 2 weeks :   Not at all    How difficult have these problems made it for you to do your work, take care of things at home, or get along with other people: Not difficult at all  Today's ARLENE-7 Score: 0       Patient is a pleasant 77-year-old female who presents for interval diabetes and hypertension follow-up.  No missed doses.  Recently had visit with cardiologist.  No other concerns.    Review of Systems   Respiratory: Negative for shortness of breath.    Cardiovascular: Negative for chest pain.   Gastrointestinal: Negative for diarrhea.   Skin: Negative for wound.            Objective    BP (!) 144/70   Pulse 72   Temp 97.5  F (36.4  C)   Resp 18   Ht 1.499 m (4' 11\")   Wt 75.3 kg (166 lb)   SpO2 98%   BMI 33.53 kg/m    Body mass index is 33.53 kg/m .  Physical Exam  Constitutional:       Appearance: She is not ill-appearing.   Cardiovascular:      Rate and Rhythm: Normal rate and regular rhythm.   Pulmonary:      Effort: Pulmonary effort is normal.   Skin:     Comments: No skin breakdown on feet.   Neurological:      General: No focal deficit present.      Mental Status: She is alert.        Last Comprehensive Metabolic Panel:  Sodium   Date Value Ref Range Status   01/14/2021 137 133 - 144 mmol/L Final     Potassium   Date Value Ref Range Status   01/14/2021 4.3 3.4 - 5.3 mmol/L Final     Chloride   Date Value Ref Range Status   01/14/2021 107 94 - 109 mmol/L Final     Carbon Dioxide   Date Value Ref Range Status   01/14/2021 27 20 - 32 mmol/L Final     Anion Gap   Date Value Ref Range Status   01/14/2021 3 3 - 14 mmol/L Final     Glucose   Date Value Ref Range Status   01/14/2021 97 70 - 99 mg/dL Final     Urea Nitrogen   Date Value Ref Range " Status   01/14/2021 15 7 - 30 mg/dL Final     Creatinine   Date Value Ref Range Status   01/14/2021 0.64 0.52 - 1.04 mg/dL Final     GFR Estimate   Date Value Ref Range Status   01/14/2021 87 >60 mL/min/[1.73_m2] Final     Comment:     Non  GFR Calc  Starting 12/18/2018, serum creatinine based estimated GFR (eGFR) will be   calculated using the Chronic Kidney Disease Epidemiology Collaboration   (CKD-EPI) equation.       Calcium   Date Value Ref Range Status   01/14/2021 9.0 8.5 - 10.1 mg/dL Final         Results for orders placed or performed in visit on 06/06/22 (from the past 24 hour(s))   **A1C FUTURE 3mo   Result Value Ref Range    Hemoglobin A1C 6.7 (H) 0.0 - 5.6 %   Extra Tube    Narrative    The following orders were created for panel order Extra Tube.  Procedure                               Abnormality         Status                     ---------                               -----------         ------                     Extra Red Top Tube[[id number]]                               In process                 Extra Green Top (Lithium...[[id number]]                      In process                   Please view results for these tests on the individual orders.

## 2022-06-07 ENCOUNTER — MYC MEDICAL ADVICE (OUTPATIENT)
Dept: FAMILY MEDICINE | Facility: CLINIC | Age: 77
End: 2022-06-07
Payer: COMMERCIAL

## 2022-06-07 VITALS
RESPIRATION RATE: 18 BRPM | WEIGHT: 166 LBS | SYSTOLIC BLOOD PRESSURE: 132 MMHG | HEART RATE: 72 BPM | BODY MASS INDEX: 33.47 KG/M2 | DIASTOLIC BLOOD PRESSURE: 70 MMHG | OXYGEN SATURATION: 98 % | TEMPERATURE: 97.5 F | HEIGHT: 59 IN

## 2022-06-07 RX ORDER — METOPROLOL SUCCINATE 50 MG/1
50 TABLET, EXTENDED RELEASE ORAL DAILY
Qty: 90 TABLET | Refills: 3 | Status: SHIPPED | OUTPATIENT
Start: 2022-06-07 | End: 2023-06-07

## 2022-06-07 RX ORDER — AMLODIPINE BESYLATE 5 MG/1
5 TABLET ORAL DAILY
Qty: 90 TABLET | Refills: 3 | Status: SHIPPED | OUTPATIENT
Start: 2022-06-07 | End: 2023-06-07

## 2022-06-07 NOTE — TELEPHONE ENCOUNTER
Please see patient MyChart message as update in BP. Patient seen for OV yesterday 6/6/22 w/ FELIXG., per notes: Essential hypertension, benign  Uncontrolled.  Historical blood pressure reviewed, she will send in ambulatory readings, if greater than 140/90, will increase her amlodipine from 5 to 10 mg.  Continue metoprolol.  Follow-up in 6 months for recheck.    Regino PLUMMER RN

## 2022-06-24 ENCOUNTER — FOLLOW UP (OUTPATIENT)
Dept: URBAN - METROPOLITAN AREA CLINIC 101 | Facility: CLINIC | Age: 77
End: 2022-06-24

## 2022-06-24 DIAGNOSIS — H34.12: ICD-10-CM

## 2022-06-24 DIAGNOSIS — H35.033: ICD-10-CM

## 2022-06-24 DIAGNOSIS — H43.12: ICD-10-CM

## 2022-06-24 DIAGNOSIS — H43.813: ICD-10-CM

## 2022-06-24 PROCEDURE — 92134 CPTRZ OPH DX IMG PST SGM RTA: CPT

## 2022-06-24 PROCEDURE — 92201 OPSCPY EXTND RTA DRAW UNI/BI: CPT

## 2022-06-24 PROCEDURE — 99214 OFFICE O/P EST MOD 30 MIN: CPT

## 2022-06-24 PROCEDURE — 76512 OPH US DX B-SCAN: CPT

## 2022-06-24 ASSESSMENT — VISUAL ACUITY
OS_CC: 20/50
OD_CC: 20/25+1

## 2022-06-24 ASSESSMENT — TONOMETRY
OS_IOP_MMHG: 13
OD_IOP_MMHG: 15

## 2022-06-27 DIAGNOSIS — E78.5 HYPERLIPIDEMIA LDL GOAL <130: ICD-10-CM

## 2022-06-28 RX ORDER — ATORVASTATIN CALCIUM 40 MG/1
40 TABLET, FILM COATED ORAL DAILY
Qty: 90 TABLET | Refills: 0 | Status: SHIPPED | OUTPATIENT
Start: 2022-06-28 | End: 2022-09-22

## 2022-06-28 NOTE — TELEPHONE ENCOUNTER
Routing refill request to provider for review/approval because:  Please review for RF    Mansi Argueta RN

## 2022-09-22 DIAGNOSIS — E78.5 HYPERLIPIDEMIA LDL GOAL <130: ICD-10-CM

## 2022-09-22 RX ORDER — ATORVASTATIN CALCIUM 40 MG/1
40 TABLET, FILM COATED ORAL DAILY
Qty: 90 TABLET | Refills: 0 | Status: SHIPPED | OUTPATIENT
Start: 2022-09-22 | End: 2022-12-06

## 2022-10-16 ENCOUNTER — HEALTH MAINTENANCE LETTER (OUTPATIENT)
Age: 77
End: 2022-10-16

## 2022-10-21 ENCOUNTER — FOLLOW UP (OUTPATIENT)
Dept: URBAN - METROPOLITAN AREA CLINIC 101 | Facility: CLINIC | Age: 77
End: 2022-10-21

## 2022-10-21 DIAGNOSIS — H47.022: ICD-10-CM

## 2022-10-21 DIAGNOSIS — H34.12: ICD-10-CM

## 2022-10-21 DIAGNOSIS — H43.12: ICD-10-CM

## 2022-10-21 DIAGNOSIS — H35.033: ICD-10-CM

## 2022-10-21 DIAGNOSIS — H43.813: ICD-10-CM

## 2022-10-21 DIAGNOSIS — H40.012: ICD-10-CM

## 2022-10-21 PROCEDURE — 76512 OPH US DX B-SCAN: CPT

## 2022-10-21 PROCEDURE — 92014 COMPRE OPH EXAM EST PT 1/>: CPT

## 2022-10-21 PROCEDURE — 92134 CPTRZ OPH DX IMG PST SGM RTA: CPT

## 2022-10-21 PROCEDURE — 92201 OPSCPY EXTND RTA DRAW UNI/BI: CPT

## 2022-10-21 PROCEDURE — 92235 FLUORESCEIN ANGRPH MLTIFRAME: CPT

## 2022-10-21 ASSESSMENT — VISUAL ACUITY
OS_CC: CF 3FT
OD_CC: 20/20-1

## 2022-10-21 ASSESSMENT — TONOMETRY
OS_IOP_MMHG: 17
OD_IOP_MMHG: 15

## 2022-12-06 ENCOUNTER — MYC MEDICAL ADVICE (OUTPATIENT)
Dept: FAMILY MEDICINE | Facility: CLINIC | Age: 77
End: 2022-12-06

## 2022-12-06 ENCOUNTER — OFFICE VISIT (OUTPATIENT)
Dept: FAMILY MEDICINE | Facility: CLINIC | Age: 77
End: 2022-12-06
Payer: COMMERCIAL

## 2022-12-06 DIAGNOSIS — E11.9 TYPE 2 DIABETES MELLITUS WITHOUT COMPLICATION, WITHOUT LONG-TERM CURRENT USE OF INSULIN (H): Primary | ICD-10-CM

## 2022-12-06 DIAGNOSIS — I10 ESSENTIAL HYPERTENSION, BENIGN: ICD-10-CM

## 2022-12-06 DIAGNOSIS — R25.2 MUSCLE CRAMPS: ICD-10-CM

## 2022-12-06 DIAGNOSIS — Z78.0 POST-MENOPAUSAL: ICD-10-CM

## 2022-12-06 LAB — HBA1C MFR BLD: 6.9 % (ref 0–5.6)

## 2022-12-06 PROCEDURE — 83036 HEMOGLOBIN GLYCOSYLATED A1C: CPT | Performed by: FAMILY MEDICINE

## 2022-12-06 PROCEDURE — 36415 COLL VENOUS BLD VENIPUNCTURE: CPT | Performed by: FAMILY MEDICINE

## 2022-12-06 PROCEDURE — 99214 OFFICE O/P EST MOD 30 MIN: CPT | Performed by: FAMILY MEDICINE

## 2022-12-06 RX ORDER — ATORVASTATIN CALCIUM 40 MG/1
40 TABLET, FILM COATED ORAL DAILY
Qty: 90 TABLET | Refills: 0 | Status: SHIPPED | OUTPATIENT
Start: 2022-12-06 | End: 2023-01-09

## 2022-12-06 RX ORDER — MAGNESIUM OXIDE 400 MG/1
400 TABLET ORAL DAILY
Qty: 30 TABLET | Refills: 2 | Status: SHIPPED | OUTPATIENT
Start: 2022-12-06 | End: 2023-06-06

## 2022-12-06 ASSESSMENT — ENCOUNTER SYMPTOMS
DIZZINESS: 0
ARTHRALGIAS: 1
SHORTNESS OF BREATH: 0
PALPITATIONS: 0

## 2022-12-06 NOTE — PROGRESS NOTES
Assessment & Plan     Type 2 diabetes mellitus without complication, without long-term current use of insulin (H)  Controlled, encouraged to get annual diabetic eye exam.  Continue current medical management.  - Hemoglobin A1c  - Hemoglobin A1c  - REVIEW OF HEALTH MAINTENANCE PROTOCOL ORDERS  - atorvastatin (LIPITOR) 40 MG tablet  Dispense: 90 tablet; Refill: 0    Post-menopausal  - DEXA HIP/PELVIS/SPINE - Future    Muscle cramps  Trial magnesium oxide, encouraged daily exercise, stretching, increase water intake.  - magnesium oxide (MAG-OX) 400 MG tablet  Dispense: 30 tablet; Refill: 2    Essential Hypertension  Borderline controlled, patient will send in home ambulatory readings prior to us adjusting her medications.    Return in about 6 months (around 6/6/2023) for medicare and diabetes.    Burton Mejia MD  [facility] is a 77 year old, presenting for the following health issues:    Diabetes (Follow up) and Hypertension      History of Present Illness       Diabetes:   She presents for follow up of diabetes.  She is not checking blood glucose. She has no concerns regarding her diabetes at this time.  She is having numbness in feet. The patient has not had a diabetic eye exam in the last 12 months.         Hypertension: She presents for follow up of hypertension.  She does not check blood pressure  regularly outside of the clinic. Outpatient blood pressures have not been over 140/90. She follows a low salt diet.         Patient is a pleasant 77-year-old female presents for interval diabetes and hypertension follow-up.    No new symptoms.  Home blood pressure readings have been better than what she is getting at the office.    Review of Systems   Respiratory: Negative for shortness of breath.    Cardiovascular: Negative for chest pain and palpitations.   Musculoskeletal: Positive for arthralgias.   Neurological: Negative for dizziness.            Objective    BP (!)  "142/82 (Cuff Size: Adult Regular)   Pulse 62   Temp 97.8  F (36.6  C) (Oral)   Resp 12   Ht 1.499 m (4' 11\")   Wt 76.7 kg (169 lb)   SpO2 97%   BMI 34.13 kg/m    Body mass index is 34.13 kg/m .  Physical Exam  Vitals reviewed.   Cardiovascular:      Rate and Rhythm: Normal rate and regular rhythm.   Pulmonary:      Effort: Pulmonary effort is normal.      Breath sounds: Normal breath sounds.   Neurological:      Mental Status: She is alert.   Psychiatric:         Behavior: Behavior normal.            Hemoglobin A1C   Date Value Ref Range Status   12/06/2022 6.9 (H) 0.0 - 5.6 % Final     Comment:     Normal <5.7%   Prediabetes 5.7-6.4%    Diabetes 6.5% or higher     Note: Adopted from ADA consensus guidelines.   01/14/2021 6.8 (H) 0 - 5.6 % Final     Comment:     Normal <5.7% Prediabetes 5.7-6.4%  Diabetes 6.5% or higher - adopted from ADA   consensus guidelines.                       "

## 2022-12-12 VITALS
HEIGHT: 59 IN | HEART RATE: 62 BPM | RESPIRATION RATE: 12 BRPM | DIASTOLIC BLOOD PRESSURE: 67 MMHG | OXYGEN SATURATION: 97 % | TEMPERATURE: 97.8 F | BODY MASS INDEX: 34.07 KG/M2 | SYSTOLIC BLOOD PRESSURE: 125 MMHG | WEIGHT: 169 LBS

## 2022-12-14 ENCOUNTER — TRANSFERRED RECORDS (OUTPATIENT)
Dept: HEALTH INFORMATION MANAGEMENT | Facility: CLINIC | Age: 77
End: 2022-12-14

## 2022-12-14 LAB — RETINOPATHY: NEGATIVE

## 2022-12-22 NOTE — TELEPHONE ENCOUNTER
Negative for retinopathy, report sent to abstraction    Karly Castellanos/[facility]---[facility]

## 2023-01-09 DIAGNOSIS — K21.9 GASTROESOPHAGEAL REFLUX DISEASE WITHOUT ESOPHAGITIS: ICD-10-CM

## 2023-01-09 DIAGNOSIS — E11.9 TYPE 2 DIABETES MELLITUS WITHOUT COMPLICATION, WITHOUT LONG-TERM CURRENT USE OF INSULIN (H): ICD-10-CM

## 2023-01-09 RX ORDER — ATORVASTATIN CALCIUM 40 MG/1
40 TABLET, FILM COATED ORAL DAILY
Qty: 90 TABLET | Refills: 0 | Status: SHIPPED | OUTPATIENT
Start: 2023-01-09 | End: 2023-03-02

## 2023-01-09 NOTE — TELEPHONE ENCOUNTER
Prescription approved per [facility] Refill Protocol. May be a bit early for atorvastatin.  Marian Caruso R.N.

## 2023-02-17 ENCOUNTER — FOLLOW UP (OUTPATIENT)
Dept: URBAN - METROPOLITAN AREA CLINIC 101 | Facility: CLINIC | Age: 78
End: 2023-02-17

## 2023-02-17 DIAGNOSIS — H47.022: ICD-10-CM

## 2023-02-17 DIAGNOSIS — H34.12: ICD-10-CM

## 2023-02-17 DIAGNOSIS — H43.12: ICD-10-CM

## 2023-02-17 DIAGNOSIS — H35.033: ICD-10-CM

## 2023-02-17 DIAGNOSIS — H43.813: ICD-10-CM

## 2023-02-17 PROCEDURE — 92201 OPSCPY EXTND RTA DRAW UNI/BI: CPT

## 2023-02-17 PROCEDURE — 76512 OPH US DX B-SCAN: CPT

## 2023-02-17 PROCEDURE — 92014 COMPRE OPH EXAM EST PT 1/>: CPT

## 2023-02-17 PROCEDURE — 92134 CPTRZ OPH DX IMG PST SGM RTA: CPT

## 2023-02-17 ASSESSMENT — VISUAL ACUITY
OS_CC: 20/50-2
OS_PH: 20/40-2
OD_CC: 20/25

## 2023-02-17 ASSESSMENT — TONOMETRY
OD_IOP_MMHG: 10
OS_IOP_MMHG: 10

## 2023-03-02 ENCOUNTER — OFFICE VISIT (OUTPATIENT)
Dept: CARDIOLOGY | Facility: CLINIC | Age: 78
End: 2023-03-02
Attending: INTERNAL MEDICINE
Payer: COMMERCIAL

## 2023-03-02 VITALS
SYSTOLIC BLOOD PRESSURE: 122 MMHG | WEIGHT: 170.2 LBS | HEIGHT: 59 IN | HEART RATE: 73 BPM | OXYGEN SATURATION: 95 % | DIASTOLIC BLOOD PRESSURE: 68 MMHG | BODY MASS INDEX: 34.31 KG/M2

## 2023-03-02 DIAGNOSIS — E11.9 TYPE 2 DIABETES MELLITUS WITHOUT COMPLICATION, WITHOUT LONG-TERM CURRENT USE OF INSULIN (H): ICD-10-CM

## 2023-03-02 DIAGNOSIS — I25.118 CORONARY ARTERY DISEASE OF NATIVE ARTERY OF NATIVE HEART WITH STABLE ANGINA PECTORIS (H): ICD-10-CM

## 2023-03-02 DIAGNOSIS — I10 ESSENTIAL HYPERTENSION, BENIGN: ICD-10-CM

## 2023-03-02 DIAGNOSIS — I47.10 PAROXYSMAL SUPRAVENTRICULAR TACHYCARDIA (H): ICD-10-CM

## 2023-03-02 PROCEDURE — 99214 OFFICE O/P EST MOD 30 MIN: CPT | Performed by: INTERNAL MEDICINE

## 2023-03-02 PROCEDURE — 93000 ELECTROCARDIOGRAM COMPLETE: CPT | Performed by: INTERNAL MEDICINE

## 2023-03-02 RX ORDER — ATORVASTATIN CALCIUM 40 MG/1
40 TABLET, FILM COATED ORAL DAILY
Qty: 90 TABLET | Refills: 3 | Status: SHIPPED | OUTPATIENT
Start: 2023-03-02 | End: 2024-06-14

## 2023-03-02 NOTE — LETTER
3/2/2023    Burton Mejia MD  [address]  [facility] [address]    RE: Shantelle Su       Dear Colleague,     I had the pleasure of seeing Shantelle Su in the [facility].  [facility] Progress note    Assessment:  1. CAD s/p LAD stent with acute stent edge dissection and thrombosis followed by repeat PCI in 2016.  2. Dyslipidemia.  Well-controlled on atorvastatin 40 mg p.o. daily.  She is due for repeat lipids soon and plans to have a lab panel drawn with her PCP towards the end of the this month.  3. Hypertension well-controlled  4. History of pulmonary embolism  5. DM2, most recent A1c in December was 6.9  6. History of symptomatic PACs  7. Intermittent left lower extremity edema  8. Left leg Baker's cyst  9. Fatigue  10. Nocturia    Plan:  1. Continue current medications  2. We reviewed recommendations for regular moderate exercise and heart healthy Mediterranean style diet in detail today.  She is quite sedentary and does not follow a cardiac diet and we discussed individualized recommendations for therapeutic lifestyle changes.  3. Follow-up in 1 year with cardiology PEDRO.  Follow-up sooner if needed.  Worsening fatigue is suspected to be a noncardiac symptom but we reviewed that should she develop chest discomfort, dyspnea, edema or worsening palpitations we would certainly want to see her back in clinic sooner.    HPI:     Shantelle Su is a very nice 78 year old woman wth history of CAD s/p anterior MI and LAD stents, hypertension, dyslipidemia, pulmonary embolism in the distant past not anticoagulated.  She also has a history of symptomatic PACs but has had no palpitations over the past 2 years and is maintained on metoprolol succinate.    In review, She has a history of of her LAD 11/23/2016 after an abnormal stress test.  Stress echocardiogram showed ischemia in the anteroseptal and apical wall accompanied by 4/10 chest burning.  On 11/23/2016, she was found to have a 50%  LAD stenosis followed by an 80% stenosis and a mid vessel 40%-50% stenosis.  About 2 hrs after stent placement, there was evidence of an acute myocardial infarction.  She had a proximal stent edge dissection.  Thrombectomy was performed and an additional overlapping drug-eluting stent was placed near the ostium of the LAD.  She also has a history of symptomatic PACs.     She is here today with her , Oliverio.  She reports she is doing well with no major changes in her health history other than fatigue.  She has been going to bed earlier, around 9:30 PM because she is more tired towards the end of the day.  She attributes this to an increase in nocturia.  She does not have chest pain, abnormal dyspnea, palpitations.  She sometimes gets swelling in her left leg and has a history of a Baker's cyst in that leg.  She has a history of pulmonary embolism and pulmonary embolism multiple family members but reports there has been negative testing for hypercoagulable disorder and all family members with history of PE.    She is quite sedentary.  She does not like to go outside and walk when it is cold and icy outside.  We discussed options for indoor activities which can include things like help with housework but also stretching and yoga walking inside or walking inside a mall or shopping center.  She does not typically eat a heart healthy diet and we discussed options for working more produce into her diet and decreasing animal fats.        EKG today  Sinus  Rhythm   Low voltage in precordial leads.    -Poor R-wave progression -may be secondary to pulmonary disease   consider old anterior infarct.    -  Nonspecific T-abnormality.     Last echo 2/15/22  The visual ejection fraction is 55-60%.  Left ventricular systolic function is normal.  There is trace aortic regurgitation.  The study was technically difficult.    Encounter Diagnoses   Name Primary?     Coronary artery disease of native artery of native heart with  stable angina pectoris (H)      Essential hypertension, benign      Paroxysmal supraventricular tachycardia (H)        CURRENT MEDICATIONS:  Current Outpatient Medications   Medication Sig Dispense Refill     ACE/ARB/ARNI NOT PRESCRIBED (INTENTIONAL) Please choose reason not prescribed from choices below.       Acetaminophen (TYLENOL PO) Take 500-1,000 mg by mouth At Bedtime        alpha-d-galactosidase (BEANO) tablet Take by mouth as needed        amLODIPine (NORVASC) 5 MG tablet Take 1 tablet (5 mg) by mouth daily 90 tablet 3     aspirin EC 81 MG EC tablet Take 1 tablet (81 mg) by mouth daily 90 tablet 3     atorvastatin (LIPITOR) 40 MG tablet Take 1 tablet (40 mg) by mouth daily 90 tablet 0     betamethasone valerate (VALISONE) 0.1 % external cream APPLY TO AFFECTED AREA TWICE A DAY FOR 14 DAYS 15 g 0     magnesium oxide (MAG-OX) 400 MG tablet Take 1 tablet (400 mg) by mouth daily 30 tablet 2     metFORMIN (GLUCOPHAGE) 500 MG tablet Take 1 tablet (500 mg) by mouth daily (with dinner) 90 tablet 3     metoprolol succinate ER (TOPROL XL) 50 MG 24 hr tablet Take 1 tablet (50 mg) by mouth daily 90 tablet 3     omeprazole (PRILOSEC) 20 MG DR capsule Take 1 capsule (20 mg) by mouth daily 90 capsule 0       ALLERGIES     Allergies   Allergen Reactions     Bactrim [Sulfamethoxazole W/Trimethoprim] Nausea and Vomiting     Lisinopril      bp increased       PAST MEDICAL, SURGICAL, FAMILY, SOCIAL HISTORY:  History was reviewed and updated as needed, see medical record.    REVIEW OF SYSTEMS:  Skin:        Eyes:       ENT:       Respiratory:  Negative    Cardiovascular:    Positive for;edema;fatigue  Gastroenterology:      Genitourinary:       Musculoskeletal:       Neurologic:       Psychiatric:       Heme/Lymph/Imm:       Endocrine:         PHYSICAL EXAM:      BP: 122/68 Pulse: 73     SpO2: 95 %      Vital Signs with Ranges  Pulse:  [73] 73  BP: (122)/(68) 122/68  SpO2:  [95 %] 95 %  170 lbs 3.2 oz    Constitutional: awake,  alert, no distress  Eyes: sclera nonicteric  ENT: trachea midline  Respiratory: Clear to auscultation bilaterally  Cardiovascular: Regular rate and rhythm no murmur rub or gallop  GI: nondistended, nontender, bowel sounds present  Lymph/Hematologic: no lymphadenopathy  Skin: dry, no rash no edema.  She has very slim ankles and no edema and normal PT pulses.  Musculoskeletal: grossly normal muscle bulk and tone  Neurologic: no gross focal deficits  Neuropsychiatric: Normal affect    Recent Lab Results:  LIPID RESULTS:  Lab Results   Component Value Date    CHOL 170 03/25/2022    CHOL 163 12/10/2020    HDL 74 03/25/2022    HDL 73 12/10/2020    LDL 57 03/25/2022    LDL 50 12/10/2020    TRIG 195 (H) 03/25/2022    TRIG 202 (H) 12/10/2020    CHOLHDLRATIO 2.4 11/09/2015       LIVER ENZYME RESULTS:  Lab Results   Component Value Date    AST 20 01/14/2021    ALT 30 03/25/2022    ALT 29 01/14/2021       CBC RESULTS:  Lab Results   Component Value Date    WBC 6.6 03/25/2022    WBC 10.8 05/29/2018    RBC 4.26 03/25/2022    RBC 3.80 05/29/2018    HGB 12.9 03/25/2022    HGB 13.0 11/19/2020    HCT 41.5 03/25/2022    HCT 36.6 05/29/2018    MCV 97 03/25/2022    MCV 96 05/29/2018    MCH 30.3 03/25/2022    MCH 31.1 05/29/2018    MCHC 31.1 (L) 03/25/2022    MCHC 32.2 05/29/2018    RDW 13.5 03/25/2022    RDW 13.4 05/29/2018     03/25/2022     11/19/2020       BMP RESULTS:  Lab Results   Component Value Date     06/06/2022     01/14/2021    POTASSIUM 4.3 06/06/2022    POTASSIUM 4.3 01/14/2021    CHLORIDE 106 06/06/2022    CHLORIDE 107 01/14/2021    CO2 27 06/06/2022    CO2 27 01/14/2021    ANIONGAP 5 06/06/2022    ANIONGAP 3 01/14/2021     (H) 06/06/2022    GLC 97 01/14/2021    BUN 15 06/06/2022    BUN 15 01/14/2021    CR 0.62 06/06/2022    CR 0.64 01/14/2021    GFRESTIMATED >90 06/06/2022    GFRESTIMATED 87 01/14/2021    GFRESTBLACK >90 01/14/2021    RAFAEL 9.3 06/06/2022    RAFAEL 9.0 01/14/2021        A1C  RESULTS:  Lab Results   Component Value Date    A1C 6.9 (H) 12/06/2022    A1C 6.8 (H) 01/14/2021       INR RESULTS:  Lab Results   Component Value Date    INR 0.90 04/23/2017    INR 0.93 11/23/2016           CC  Flor Hand MD  [id number]  [place] [id number]      Thank you for allowing me to participate in the care of your patient.      Sincerely,     Flor Hand MD     [facility]

## 2023-03-02 NOTE — PROGRESS NOTES
[facility] Progress note    Assessment:  1. CAD s/p LAD stent with acute stent edge dissection and thrombosis followed by repeat PCI in 2016.  2. Dyslipidemia.  Well-controlled on atorvastatin 40 mg p.o. daily.  She is due for repeat lipids soon and plans to have a lab panel drawn with her PCP towards the end of the this month.  3. Hypertension well-controlled  4. History of pulmonary embolism  5. DM2, most recent A1c in December was 6.9  6. History of symptomatic PACs  7. Intermittent left lower extremity edema  8. Left leg Baker's cyst  9. Fatigue  10. Nocturia    Plan:  1. Continue current medications  2. We reviewed recommendations for regular moderate exercise and heart healthy Mediterranean style diet in detail today.  She is quite sedentary and does not follow a cardiac diet and we discussed individualized recommendations for therapeutic lifestyle changes.  3. Follow-up in 1 year with cardiology PEDRO.  Follow-up sooner if needed.  Worsening fatigue is suspected to be a noncardiac symptom but we reviewed that should she develop chest discomfort, dyspnea, edema or worsening palpitations we would certainly want to see her back in clinic sooner.    HPI:     Shantelle Su is a very nice 78 year old woman wth history of CAD s/p anterior MI and LAD stents, hypertension, dyslipidemia, pulmonary embolism in the distant past not anticoagulated.  She also has a history of symptomatic PACs but has had no palpitations over the past 2 years and is maintained on metoprolol succinate.    In review, She has a history of of her LAD 11/23/2016 after an abnormal stress test.  Stress echocardiogram showed ischemia in the anteroseptal and apical wall accompanied by 4/10 chest burning.  On 11/23/2016, she was found to have a 50% LAD stenosis followed by an 80% stenosis and a mid vessel 40%-50% stenosis.  About 2 hrs after stent placement, there was evidence of an acute myocardial infarction.  She had a proximal stent edge  dissection.  Thrombectomy was performed and an additional overlapping drug-eluting stent was placed near the ostium of the LAD.  She also has a history of symptomatic PACs.     She is here today with her , Oliverio.  She reports she is doing well with no major changes in her health history other than fatigue.  She has been going to bed earlier, around 9:30 PM because she is more tired towards the end of the day.  She attributes this to an increase in nocturia.  She does not have chest pain, abnormal dyspnea, palpitations.  She sometimes gets swelling in her left leg and has a history of a Baker's cyst in that leg.  She has a history of pulmonary embolism and pulmonary embolism multiple family members but reports there has been negative testing for hypercoagulable disorder and all family members with history of PE.    She is quite sedentary.  She does not like to go outside and walk when it is cold and icy outside.  We discussed options for indoor activities which can include things like help with housework but also stretching and yoga walking inside or walking inside a mall or shopping center.  She does not typically eat a heart healthy diet and we discussed options for working more produce into her diet and decreasing animal fats.        EKG today  Sinus  Rhythm   Low voltage in precordial leads.    -Poor R-wave progression -may be secondary to pulmonary disease   consider old anterior infarct.    -  Nonspecific T-abnormality.     Last echo 2/15/22  The visual ejection fraction is 55-60%.  Left ventricular systolic function is normal.  There is trace aortic regurgitation.  The study was technically difficult.    Encounter Diagnoses   Name Primary?     Coronary artery disease of native artery of native heart with stable angina pectoris (H)      Essential hypertension, benign      Paroxysmal supraventricular tachycardia (H)        CURRENT MEDICATIONS:  Current Outpatient Medications   Medication Sig Dispense  Refill     ACE/ARB/ARNI NOT PRESCRIBED (INTENTIONAL) Please choose reason not prescribed from choices below.       Acetaminophen (TYLENOL PO) Take 500-1,000 mg by mouth At Bedtime        alpha-d-galactosidase (BEANO) tablet Take by mouth as needed        amLODIPine (NORVASC) 5 MG tablet Take 1 tablet (5 mg) by mouth daily 90 tablet 3     aspirin EC 81 MG EC tablet Take 1 tablet (81 mg) by mouth daily 90 tablet 3     atorvastatin (LIPITOR) 40 MG tablet Take 1 tablet (40 mg) by mouth daily 90 tablet 0     betamethasone valerate (VALISONE) 0.1 % external cream APPLY TO AFFECTED AREA TWICE A DAY FOR 14 DAYS 15 g 0     magnesium oxide (MAG-OX) 400 MG tablet Take 1 tablet (400 mg) by mouth daily 30 tablet 2     metFORMIN (GLUCOPHAGE) 500 MG tablet Take 1 tablet (500 mg) by mouth daily (with dinner) 90 tablet 3     metoprolol succinate ER (TOPROL XL) 50 MG 24 hr tablet Take 1 tablet (50 mg) by mouth daily 90 tablet 3     omeprazole (PRILOSEC) 20 MG DR capsule Take 1 capsule (20 mg) by mouth daily 90 capsule 0       ALLERGIES     Allergies   Allergen Reactions     Bactrim [Sulfamethoxazole W/Trimethoprim] Nausea and Vomiting     Lisinopril      bp increased       PAST MEDICAL, SURGICAL, FAMILY, SOCIAL HISTORY:  History was reviewed and updated as needed, see medical record.    REVIEW OF SYSTEMS:  Skin:        Eyes:       ENT:       Respiratory:  Negative    Cardiovascular:    Positive for;edema;fatigue  Gastroenterology:      Genitourinary:       Musculoskeletal:       Neurologic:       Psychiatric:       Heme/Lymph/Imm:       Endocrine:         PHYSICAL EXAM:      BP: 122/68 Pulse: 73     SpO2: 95 %      Vital Signs with Ranges  Pulse:  [73] 73  BP: (122)/(68) 122/68  SpO2:  [95 %] 95 %  170 lbs 3.2 oz    Constitutional: awake, alert, no distress  Eyes: sclera nonicteric  ENT: trachea midline  Respiratory: Clear to auscultation bilaterally  Cardiovascular: Regular rate and rhythm no murmur rub or gallop  GI: nondistended,  nontender, bowel sounds present  Lymph/Hematologic: no lymphadenopathy  Skin: dry, no rash no edema.  She has very slim ankles and no edema and normal PT pulses.  Musculoskeletal: grossly normal muscle bulk and tone  Neurologic: no gross focal deficits  Neuropsychiatric: Normal affect    Recent Lab Results:  LIPID RESULTS:  Lab Results   Component Value Date    CHOL 170 03/25/2022    CHOL 163 12/10/2020    HDL 74 03/25/2022    HDL 73 12/10/2020    LDL 57 03/25/2022    LDL 50 12/10/2020    TRIG 195 (H) 03/25/2022    TRIG 202 (H) 12/10/2020    CHOLHDLRATIO 2.4 11/09/2015       LIVER ENZYME RESULTS:  Lab Results   Component Value Date    AST 20 01/14/2021    ALT 30 03/25/2022    ALT 29 01/14/2021       CBC RESULTS:  Lab Results   Component Value Date    WBC 6.6 03/25/2022    WBC 10.8 05/29/2018    RBC 4.26 03/25/2022    RBC 3.80 05/29/2018    HGB 12.9 03/25/2022    HGB 13.0 11/19/2020    HCT 41.5 03/25/2022    HCT 36.6 05/29/2018    MCV 97 03/25/2022    MCV 96 05/29/2018    MCH 30.3 03/25/2022    MCH 31.1 05/29/2018    MCHC 31.1 (L) 03/25/2022    MCHC 32.2 05/29/2018    RDW 13.5 03/25/2022    RDW 13.4 05/29/2018     03/25/2022     11/19/2020       BMP RESULTS:  Lab Results   Component Value Date     06/06/2022     01/14/2021    POTASSIUM 4.3 06/06/2022    POTASSIUM 4.3 01/14/2021    CHLORIDE 106 06/06/2022    CHLORIDE 107 01/14/2021    CO2 27 06/06/2022    CO2 27 01/14/2021    ANIONGAP 5 06/06/2022    ANIONGAP 3 01/14/2021     (H) 06/06/2022    GLC 97 01/14/2021    BUN 15 06/06/2022    BUN 15 01/14/2021    CR 0.62 06/06/2022    CR 0.64 01/14/2021    GFRESTIMATED >90 06/06/2022    GFRESTIMATED 87 01/14/2021    GFRESTBLACK >90 01/14/2021    RAFAEL 9.3 06/06/2022    RAFAEL 9.0 01/14/2021        A1C RESULTS:  Lab Results   Component Value Date    A1C 6.9 (H) 12/06/2022    A1C 6.8 (H) 01/14/2021       INR RESULTS:  Lab Results   Component Value Date    INR 0.90 04/23/2017    INR 0.93 11/23/2016            CC  Flor Hand MD  [address] [place] [id number]

## 2023-03-03 DIAGNOSIS — K21.9 GASTROESOPHAGEAL REFLUX DISEASE WITHOUT ESOPHAGITIS: ICD-10-CM

## 2023-03-27 ENCOUNTER — HOSPITAL ENCOUNTER (OUTPATIENT)
Dept: MAMMOGRAPHY | Facility: CLINIC | Age: 78
Discharge: HOME | End: 2023-03-27
Attending: FAMILY MEDICINE | Admitting: FAMILY MEDICINE
Payer: COMMERCIAL

## 2023-03-27 DIAGNOSIS — Z12.31 ENCOUNTER FOR SCREENING MAMMOGRAM FOR MALIGNANT NEOPLASM OF BREAST: ICD-10-CM

## 2023-03-27 PROCEDURE — 77067 SCR MAMMO BI INCL CAD: CPT

## 2023-03-31 ENCOUNTER — ANCILLARY PROCEDURE (OUTPATIENT)
Dept: BONE DENSITY | Facility: CLINIC | Age: 78
End: 2023-03-31
Attending: FAMILY MEDICINE
Payer: COMMERCIAL

## 2023-03-31 DIAGNOSIS — Z78.0 POST-MENOPAUSAL: ICD-10-CM

## 2023-03-31 PROCEDURE — 77085 DXA BONE DENSITY AXL VRT FX: CPT | Performed by: INTERNAL MEDICINE

## 2023-05-16 ENCOUNTER — TELEPHONE (OUTPATIENT)
Dept: FAMILY MEDICINE | Facility: CLINIC | Age: 78
End: 2023-05-16
Payer: COMMERCIAL

## 2023-05-16 NOTE — TELEPHONE ENCOUNTER
Summary:    Patient is due/failing the following:   PHYSICAL    Reviewed:    [] CARE EVERYWHERE  [] LAST OV NOTE   [] FYI TAB  [] MYCHART ACTIVE?  [] LAST PANEL ENCOUNTER  [] FUTURE APPTS  [] IMMUNIZATIONS  [] Media Tab  Action needed:   Patient needs office visit for wellness.  Changed 6/6 appt to a wellness exam    Type of outreach:    none                                                                           Gabriella Alonso CMA

## 2023-06-04 SDOH — HEALTH STABILITY: PHYSICAL HEALTH: ON AVERAGE, HOW MANY MINUTES DO YOU ENGAGE IN EXERCISE AT THIS LEVEL?: 0 MIN

## 2023-06-04 SDOH — HEALTH STABILITY: PHYSICAL HEALTH: ON AVERAGE, HOW MANY DAYS PER WEEK DO YOU ENGAGE IN MODERATE TO STRENUOUS EXERCISE (LIKE A BRISK WALK)?: 0 DAYS

## 2023-06-04 SDOH — ECONOMIC STABILITY: FOOD INSECURITY: WITHIN THE PAST 12 MONTHS, YOU WORRIED THAT YOUR FOOD WOULD RUN OUT BEFORE YOU GOT MONEY TO BUY MORE.: NEVER TRUE

## 2023-06-04 SDOH — ECONOMIC STABILITY: FOOD INSECURITY: WITHIN THE PAST 12 MONTHS, THE FOOD YOU BOUGHT JUST DIDN'T LAST AND YOU DIDN'T HAVE MONEY TO GET MORE.: PATIENT DECLINED

## 2023-06-04 SDOH — ECONOMIC STABILITY: TRANSPORTATION INSECURITY
IN THE PAST 12 MONTHS, HAS LACK OF TRANSPORTATION KEPT YOU FROM MEETINGS, WORK, OR FROM GETTING THINGS NEEDED FOR DAILY LIVING?: PATIENT DECLINED

## 2023-06-04 SDOH — ECONOMIC STABILITY: INCOME INSECURITY: IN THE LAST 12 MONTHS, WAS THERE A TIME WHEN YOU WERE NOT ABLE TO PAY THE MORTGAGE OR RENT ON TIME?: NO

## 2023-06-04 SDOH — ECONOMIC STABILITY: INCOME INSECURITY: HOW HARD IS IT FOR YOU TO PAY FOR THE VERY BASICS LIKE FOOD, HOUSING, MEDICAL CARE, AND HEATING?: SOMEWHAT HARD

## 2023-06-04 SDOH — ECONOMIC STABILITY: TRANSPORTATION INSECURITY
IN THE PAST 12 MONTHS, HAS THE LACK OF TRANSPORTATION KEPT YOU FROM MEDICAL APPOINTMENTS OR FROM GETTING MEDICATIONS?: PATIENT DECLINED

## 2023-06-04 ASSESSMENT — LIFESTYLE VARIABLES
HOW OFTEN DO YOU HAVE A DRINK CONTAINING ALCOHOL: NEVER
AUDIT-C TOTAL SCORE: 0
SKIP TO QUESTIONS 9-10: 1
HOW OFTEN DO YOU HAVE SIX OR MORE DRINKS ON ONE OCCASION: NEVER
HOW MANY STANDARD DRINKS CONTAINING ALCOHOL DO YOU HAVE ON A TYPICAL DAY: PATIENT DOES NOT DRINK

## 2023-06-04 ASSESSMENT — SOCIAL DETERMINANTS OF HEALTH (SDOH)
HOW OFTEN DO YOU GET TOGETHER WITH FRIENDS OR RELATIVES?: ONCE A WEEK
HOW OFTEN DO YOU ATTEND CHURCH OR RELIGIOUS SERVICES?: NEVER
IN A TYPICAL WEEK, HOW MANY TIMES DO YOU TALK ON THE PHONE WITH FAMILY, FRIENDS, OR NEIGHBORS?: ONCE A WEEK
DO YOU BELONG TO ANY CLUBS OR ORGANIZATIONS SUCH AS CHURCH GROUPS UNIONS, FRATERNAL OR ATHLETIC GROUPS, OR SCHOOL GROUPS?: NO

## 2023-06-05 DIAGNOSIS — E11.9 TYPE 2 DIABETES MELLITUS WITHOUT COMPLICATION, WITHOUT LONG-TERM CURRENT USE OF INSULIN (H): ICD-10-CM

## 2023-06-05 DIAGNOSIS — I10 ESSENTIAL HYPERTENSION, BENIGN: ICD-10-CM

## 2023-06-05 DIAGNOSIS — I47.10 PAROXYSMAL SUPRAVENTRICULAR TACHYCARDIA (H): ICD-10-CM

## 2023-06-06 ENCOUNTER — OFFICE VISIT (OUTPATIENT)
Dept: FAMILY MEDICINE | Facility: CLINIC | Age: 78
End: 2023-06-06
Payer: COMMERCIAL

## 2023-06-06 ENCOUNTER — ANCILLARY PROCEDURE (OUTPATIENT)
Dept: GENERAL RADIOLOGY | Facility: CLINIC | Age: 78
End: 2023-06-06
Attending: FAMILY MEDICINE
Payer: COMMERCIAL

## 2023-06-06 VITALS
TEMPERATURE: 98.1 F | HEIGHT: 59 IN | BODY MASS INDEX: 34.07 KG/M2 | WEIGHT: 169 LBS | OXYGEN SATURATION: 96 % | HEART RATE: 76 BPM | RESPIRATION RATE: 12 BRPM | DIASTOLIC BLOOD PRESSURE: 74 MMHG | SYSTOLIC BLOOD PRESSURE: 144 MMHG

## 2023-06-06 DIAGNOSIS — I10 ESSENTIAL HYPERTENSION, BENIGN: ICD-10-CM

## 2023-06-06 DIAGNOSIS — R26.2 DIFFICULTY WALKING: ICD-10-CM

## 2023-06-06 DIAGNOSIS — E11.59 TYPE 2 DIABETES MELLITUS WITH OTHER CIRCULATORY COMPLICATIONS (H): Primary | ICD-10-CM

## 2023-06-06 DIAGNOSIS — L57.0 ACTINIC KERATOSIS: ICD-10-CM

## 2023-06-06 DIAGNOSIS — M25.50 MULTIPLE JOINT PAIN: ICD-10-CM

## 2023-06-06 DIAGNOSIS — I25.118 CORONARY ARTERY DISEASE OF NATIVE ARTERY OF NATIVE HEART WITH STABLE ANGINA PECTORIS (H): ICD-10-CM

## 2023-06-06 LAB
ANION GAP SERPL CALCULATED.3IONS-SCNC: 16 MMOL/L (ref 7–15)
BUN SERPL-MCNC: 12.6 MG/DL (ref 8–23)
CALCIUM SERPL-MCNC: 9.4 MG/DL (ref 8.8–10.2)
CHLORIDE SERPL-SCNC: 103 MMOL/L (ref 98–107)
CHOLEST SERPL-MCNC: 169 MG/DL
CREAT SERPL-MCNC: 0.61 MG/DL (ref 0.51–0.95)
CREAT UR-MCNC: 30.8 MG/DL
DEPRECATED HCO3 PLAS-SCNC: 21 MMOL/L (ref 22–29)
GFR SERPL CREATININE-BSD FRML MDRD: >90 ML/MIN/1.73M2
GLUCOSE SERPL-MCNC: 119 MG/DL (ref 70–99)
HBA1C MFR BLD: 6.9 % (ref 0–5.6)
HDLC SERPL-MCNC: 65 MG/DL
LDLC SERPL CALC-MCNC: 79 MG/DL
MICROALBUMIN UR-MCNC: <12 MG/L
MICROALBUMIN/CREAT UR: NORMAL MG/G{CREAT}
NONHDLC SERPL-MCNC: 104 MG/DL
POTASSIUM SERPL-SCNC: 4.5 MMOL/L (ref 3.4–5.3)
SODIUM SERPL-SCNC: 140 MMOL/L (ref 136–145)
TRIGL SERPL-MCNC: 127 MG/DL
TSH SERPL DL<=0.005 MIU/L-ACNC: 4.1 UIU/ML (ref 0.3–4.2)

## 2023-06-06 PROCEDURE — 73562 X-RAY EXAM OF KNEE 3: CPT | Mod: TC | Performed by: RADIOLOGY

## 2023-06-06 PROCEDURE — 99214 OFFICE O/P EST MOD 30 MIN: CPT | Performed by: FAMILY MEDICINE

## 2023-06-06 PROCEDURE — 80061 LIPID PANEL: CPT | Performed by: FAMILY MEDICINE

## 2023-06-06 PROCEDURE — 86431 RHEUMATOID FACTOR QUANT: CPT | Performed by: FAMILY MEDICINE

## 2023-06-06 PROCEDURE — 82043 UR ALBUMIN QUANTITATIVE: CPT | Performed by: FAMILY MEDICINE

## 2023-06-06 PROCEDURE — 86200 CCP ANTIBODY: CPT | Performed by: FAMILY MEDICINE

## 2023-06-06 PROCEDURE — 36415 COLL VENOUS BLD VENIPUNCTURE: CPT | Performed by: FAMILY MEDICINE

## 2023-06-06 PROCEDURE — 84443 ASSAY THYROID STIM HORMONE: CPT | Performed by: FAMILY MEDICINE

## 2023-06-06 PROCEDURE — 80048 BASIC METABOLIC PNL TOTAL CA: CPT | Performed by: FAMILY MEDICINE

## 2023-06-06 PROCEDURE — 83036 HEMOGLOBIN GLYCOSYLATED A1C: CPT | Performed by: FAMILY MEDICINE

## 2023-06-06 PROCEDURE — 82570 ASSAY OF URINE CREATININE: CPT | Performed by: FAMILY MEDICINE

## 2023-06-06 PROCEDURE — 72100 X-RAY EXAM L-S SPINE 2/3 VWS: CPT | Mod: TC | Performed by: RADIOLOGY

## 2023-06-06 PROCEDURE — 99207 PR FOOT EXAM NO CHARGE: CPT | Performed by: FAMILY MEDICINE

## 2023-06-06 PROCEDURE — 73522 X-RAY EXAM HIPS BI 3-4 VIEWS: CPT | Mod: TC | Performed by: RADIOLOGY

## 2023-06-06 RX ORDER — CALCIUM CARBONATE/VITAMIN D3 600 MG-10
1 TABLET ORAL DAILY
COMMUNITY

## 2023-06-06 ASSESSMENT — ENCOUNTER SYMPTOMS
FEVER: 0
WEAKNESS: 1
ARTHRALGIAS: 1

## 2023-06-06 NOTE — PROGRESS NOTES
Assessment & Plan     Type 2 diabetes mellitus with other circulatory complications (H)  Controlled, continue current medical management.  A1c 6.9 today  - US DOUG Doppler with Exercise Bilateral  - TSH with free T4 reflex  - TSH with free T4 reflex    Coronary artery disease of native artery of native heart with stable angina pectoris (H)  Continue current medical management  - Lipid panel reflex to direct LDL Non-fasting  - Lipid panel reflex to direct LDL Non-fasting    Essential hypertension, benign  Trolled, continue current medical management  - BASIC METABOLIC PANEL  - BASIC METABOLIC PANEL    Difficulty walking  Concern for multiple arthritic points, check imaging as per below which showed tricompartment compartmental arthritis in the left knee, degenerative endplate changes in the lumbar spine, and bilateral mild hip arthritis.  With reported coolness of her extremities recommend checking DOUG studies and rule out thyroid disorder. Start physical therapy for bilateral hip and left knee and leg strengthening  - XR Lumbar Spine 2/3 Views  - XR Pelvis and Hip Bilateral 2 Views  - XR Knee Right 3 Views  - US DOUG Doppler with Exercise Bilateral  - TSH with free T4 reflex  - Physical Therapy Referral  - TSH with free T4 reflex    Multiple joint pain  Screen for rheumatoid  - Rheumatoid factor  - Cyclic Citrullinated Peptide Antibody IgG  - Rheumatoid factor  - Cyclic Citrullinated Peptide Antibody IgG    Actinic keratosis  Patient of different options including monitoring or symptomatic offered cryotherapy which were performed at the next visit as she has multiple other concerns as per above.      Burton Mejia MD  [facility] Pepper is a 78 year old, presenting for the following health issues:  Leg Pain (Swelling, pain, numbness, and weakness in both legs.)         View : No data to display.              History of Present Illness       Symptom onset:  More than a  "month  Symptoms include:  Weakness legs and knees numbness  Symptom intensity:  Moderate  Symptom progression:  Worsening  Had these symptoms before:  Yes  Has tried/received treatment for these symptoms:  No  What makes it worse:  Sitting then standing and walking  What makes it better:  Bath    She eats 0-1 servings of fruits and vegetables daily.She consumes 0 sweetened beverage(s) daily.   She is taking medications regularly.       78-year-old female presents to clinic with concerns for multiple medical items.    Interval diabetes check, hypertension follow-up.    Difficulty walking the last few months with associated left lower leg weakness, weakness even cause her left leg to give out and she [place] the beach onto sand, no head injury.  Other associate symptoms of pressure coolness in her legs.  She is noticed swelling on the left knee and ankle.  No calf pain.  No recent travel.  No chest pain or difficulty breathing.    Review of Systems   Constitutional: Negative for fever.   Musculoskeletal: Positive for arthralgias and gait problem.   Neurological: Positive for weakness.            Objective    BP (!) 144/74 (Cuff Size: Adult Large)   Pulse 76   Temp 98.1  F (36.7  C) (Oral)   Resp 12   Ht 1.499 m (4' 11\")   Wt 76.7 kg (169 lb)   SpO2 96%   BMI 34.13 kg/m    Body mass index is 34.13 kg/m .  Physical Exam  Vitals reviewed.   Constitutional:       Appearance: She is not ill-appearing.   Cardiovascular:      Rate and Rhythm: Normal rate and regular rhythm.   Pulmonary:      Effort: Pulmonary effort is normal.      Breath sounds: Normal breath sounds.   Chest:       Musculoskeletal:      Comments: Bilateral hips and, positive Nazia and FADIR testing.    Left knee, no point tenderness, generalized edema, no overlying erythema, full range of motion.  Negative Paulie's, negative anterior posterior drawer sign, no popliteal mass.    Left ankle edema, nontender.  No calf edema       Skin:     " Comments: 0.5 cm scaly, firm skin nodule in the left chest.   Neurological:      Comments: Antalgic gait    Left lower leg 4-5 strength            Results for orders placed or performed in visit on 06/06/23 (from the past 24 hour(s))   HEMOGLOBIN A1C   Result Value Ref Range    Hemoglobin A1C 6.9 (H) 0.0 - 5.6 %

## 2023-06-07 LAB
CCP AB SER IA-ACNC: 0.7 U/ML
RHEUMATOID FACT SER NEPH-ACNC: <7 IU/ML

## 2023-06-07 RX ORDER — AMLODIPINE BESYLATE 5 MG/1
5 TABLET ORAL DAILY
Qty: 90 TABLET | Refills: 3 | Status: SHIPPED | OUTPATIENT
Start: 2023-06-07 | End: 2024-05-26

## 2023-06-07 RX ORDER — METOPROLOL SUCCINATE 50 MG/1
50 TABLET, EXTENDED RELEASE ORAL DAILY
Qty: 90 TABLET | Refills: 3 | Status: SHIPPED | OUTPATIENT
Start: 2023-06-07 | End: 2024-05-26

## 2023-06-07 NOTE — TELEPHONE ENCOUNTER
Routing refill request to provider for review/approval because:  Please review. Pt had appt 6/6/23.    Betina GALE RN

## 2023-06-15 ENCOUNTER — THERAPY VISIT (OUTPATIENT)
Dept: PHYSICAL THERAPY | Facility: CLINIC | Age: 78
End: 2023-06-15
Attending: FAMILY MEDICINE
Payer: COMMERCIAL

## 2023-06-15 DIAGNOSIS — R26.2 DIFFICULTY WALKING: ICD-10-CM

## 2023-06-15 PROCEDURE — 97161 PT EVAL LOW COMPLEX 20 MIN: CPT | Mod: GP | Performed by: PHYSICAL THERAPIST

## 2023-06-15 NOTE — PROGRESS NOTES
PHYSICAL THERAPY EVALUATION  Type of Visit: Evaluation    See electronic medical record for Abuse and Falls Screening details.    Subjective      Presenting condition or subjective complaint: Walking joint pain  Date of onset: 03/15/23 (pt reports difficulty with stairs over the last 2-3 months - weakness/ pain)    Relevant medical history: Diabetes; DVT (blood clot); Heart problems; High blood pressure; Overweight; Pain at night or rest   Dates & types of surgery: Heart stent 2016.    Prior diagnostic imaging/testing results: X-ray; Bone scan     Prior therapy history for the same diagnosis, illness or injury: Yes Left knee      Living Environment  Social support: With a significant other or spouse   Type of home: [facility]; 2-story   Stairs to enter the home: Yes 15 Is there a railing: Yes   Ramp: No   Stairs inside the home: Yes 15 Is there a railing: Yes   Help at home: None; Home management tasks (cooking, cleaning)  Equipment owned: Four-point cane     Employment: No    Hobbies/Interests: Shopping/great grandchild    Patient goals for therapy: Not be unstable    - Information provided above per pt's paperwork / Information below based on report to PT, chart review and assessment -      Shantelle is a 77 yo F who arrives for OP PT evaluation of difficulty walking after f/u with PCP, Dr Mejia, on 6/6/23.    Per chart review of Dr Mejia's visit on 6/6/23:     Difficulty walking: Concern for multiple arthritic points, check imaging as per below which showed tricompartment compartmental arthritis in the left knee, degenerative endplate changes in the lumbar spine, and bilateral mild hip arthritis.  With reported coolness of her extremities recommend checking DOUG studies and rule out thyroid disorder. Start physical therapy for bilateral hip and left knee and leg strengthening  - XR Lumbar Spine 2/3 Views  - XR Pelvis and Hip Bilateral 2 Views  - XR Knee Right 3 Views  - US DOUG Doppler with Exercise Bilateral  - TSH with  free T4 reflex  - Physical Therapy Referral  - TSH with free T4 reflex    Other dx: Type 2 diabetes mellitus with other circulatory complications (H) Controlled, continue current medical management.  Coronary artery disease of native artery of native heart with stable angina pectoris (H) Continue current medical management2. Essential hypertension, benign Controlled, continue current medical management.    Prior PT: 1) L knee pain, 2011. 2) R hip OA/bursitis, 2021.     Other txt: 05/2020, Orthopedic txt: Cortizone injection. Dx 'Chronic DJD mostly involving medial compartment. 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 Recently ruptured Baker's cyst.'  Pt reports today that this experience was significantly painful but thinks it may have helped her pain. She is not currently followed by ortho MD - has f/u with PCP, Dr Mejia, 7/6/23.    Weakness/Pain: L side has been worse than R historically     Swelling: L knee    Pain: 0/10 currently in sitting. Reports 'bone pain' in ankles/shins laying in bed. Reports she also gets herbert horse pain at night that went away with magnesium. Reports L  back of knee pain with bending, initial steps walking, sit <> stand transfers, 6/10 pain that resolves quickly when stopping activity. Pain eases with walking. Pain occurs daily. No pain 6 mo ago. Now feels 'tight' 'swollen'.    Ambulation: Owns 4 pt cane she keeps in her car to use at times when feeling less steady - hasn't used lately. Arrives to clinic without AD.  Uses cart for support in stores.    Stairs:  Flight of stairs in town home - uses support from railing, step-to gait with 2 feet on step leading L LE d/t R le weakness. Over the pst 2-3 mo has felt unsteady on R LE going upstairs.    Balance: Feels less steady for first few steps after getting up from sitting. Feels less steady when having more L knee pain and with R LE weakness on stairs.    Exercise: none - fearful of doing something  to make pain worse. In the past would join spouse for 20 min walks at times. Spouse continues to walk daily and has some interest in joining him for this activity. Feels more supported when pushing great grandchild in stroller.     Falls: 1 fall about a month ago - foot caught [place] - no injury from fall.      Objective     INTEGUMENTARY: L knee swelling  globally, No redness.   PALPATION: TTP behind L knee.   RANGE OF MOTION:  L knee lacking ~5 deg TKE, hard end feel, L hip pain with overpressure. L knee flexion: 110 deg, painful behind L knee. R knee flexion: 135 deg, painfree.   STRENGTH: R hip flexion: 3+/5, painful; L hip flexion: 4/5, no pain.  R HS and quads: 5/5. L HS and quads 4/5. L hip abduction < 3/5. R hip abd: 3+/5.  bilat ankle DF: 5/5, painfree.    BED MOBILITY: Independent    TRANSFERS: Independent    GAIT: Over short distances indoors without AD: First few steps decreased stability, no LOB. Guarded without armswing and speed.  Reciprocal gait slightly shortened step length L > R. Decreased hip and knee flexion in swing phase bilaterally.     STAIRS: reciprocal with 1 hand support on railing, slow, steady, reports she typically steps 2 feet to a step with inc L LE WBing, R LE follows ascending.     BALANCE: SLS: L LE: 2-3 sec x3 reps, R LE: 4-8 sec x3 reps. (< 5 sec = inc fall risk)    SPECIAL TESTS  Functional Gait Assessment (FGA)   TBA   10 Meter Walk Test (Comfortable)   .64 m/s without AD (< 1 m/s = inc fall risk)   10 Meter Walk Test (Fast)   .91 m/s without AD (no inc pain or instability with speed)   6 Minute Walk Test (6MWT)   TBA   Artis Balance Scale (BBS)     5 Times Sit-to-Stand (5TSTS)   Unable to complete 5th rep d/t L knee pain, decreased controled descent. 15.69 sec (< 12 sec = inc fall risk) Pain quickly resolves, tightness remains.        SENSATION:     REFLEXES:   MUSCLE TONE:         Assessment & Plan   CLINICAL IMPRESSIONS   Medical Diagnosis: Difficulty  walking (R26.2)    Treatment Diagnosis: force production deficit, limited gait stability with inc risk for falls   Impression/Assessment: Patient is a 78 year old female with behind L knee pain, R LE weakness, limited walking and balance.  The following significant findings have been identified: Pain, Decreased ROM/flexibility, Decreased joint mobility, Decreased strength, Impaired balance, Inflammation, Impaired gait, Impaired muscle performance and Instability. These impairments interfere with their ability to perform recreational activities, household chores, household mobility and community mobility as compared to previous level of function.     Clinical Decision Making (Complexity):   Clinical Presentation: Stable/Uncomplicated  Clinical Presentation Rationale: based on medical and personal factors listed in PT evaluation  Clinical Decision Making (Complexity): Low complexity    PLAN OF CARE  Treatment Interventions:  Interventions: Gait Training, Manual Therapy, Neuromuscular Re-education, Therapeutic Activity, Therapeutic Exercise    Continued assessments: FGA, 6MWT  L knee swelling/pain management: RICE, pacing/modifications prn. Monitoring L knee pain with activities.   Strengthening: target hip abduction (L>R weakness), hip flexion (R>L weakness/painful), L HS/quads.   Stretching/mobility: gentle ROM to gain L knee flexion (lacking 25 deg/painful compared to R - possible baker cyst).   Balance: target L SL balance activities > R.   Gait training: inc hip/knee flexion and multidirectional stepping, stairs.   Establish HEP (developed from walking with spouse).   Targeting pt's goals: stairs, amb with improved balance/strength    Long Term Goals     PT Goal 1  Goal Identifier: HEP  Goal Description: Pt to demonstrate (I) with HEP for stretching, strengthening, postural and gait stability exercises for progress towards all goals and continued self maintenance following d/c from therapy  Goal Progress: at [facility],  6/15/23: none - fearful of doing something to make pain worse. In the past would join spouse for 20 min walks at times. Spouse continues to walk daily and has some interest in joining him for this activity. Feels more supported when pushing great grandchild in stroller.  Target Date: 09/07/23  PT Goal 2  Goal Identifier: Pain  Goal Description: Pt to report reduction in L back of knee pain 'tightness/swollen' feeling to </= 2/10 with transitions to standing, walking, bending over, stairs.  Rationale: to maximize safety and independence with performance of ADLs and functional tasks  Goal Progress: at [facility], 6/15/23: Reports L  back of knee pain with bending, initial steps walking, sit <> stand transfers, 6/10 pain that resolves quickly when stopping activity. Pain eases with walking. Pain occurs daily. No pain 6 mo ago. Now feels 'tight' 'swollen'.  Target Date: 09/07/23  PT Goal 3  Goal Identifier: Stairs  Goal Description: Pt to demonstrate and report improved strength and balance on stairs, walking with reciprocal gait and support from railing in clinic and at home  Rationale: to maximize safety and independence within the home  Goal Progress: at [facility], 6/15/23: Flight of stairs in town home - uses support from railing, step-to gait with 2 feet on step leading L LE d/t R le weakness. Over the pst 2-3 mo has felt unsteady on R LE going upstairs.  Target Date: 09/07/23  PT Goal 4  Goal Identifier: Standing balance/SLS  Goal Description: Pt to maintain R and L SLS >/= 5 sec to demonstrate improved postural stability and be considered decreased risk for falls.  Rationale: to maximize safety and independence within the home;to maximize safety and independence with performance of ADLs and functional tasks  Goal Progress: at [facility], 6/15/23: SLS: L LE: 2-3 sec x3 reps, R LE: 4-8 sec x3 reps. (< 5 sec = inc fall risk)  Target Date: 09/07/23  PT Goal 5  Goal Identifier: Walking balance/FGA  Goal Description: Pt to score >  22/30 on FGA to be considered a decreased risk for falls and improved gait stability within the community  Goal Progress: at [facility], 6/15/23: TBA  Target Date: 09/07/23  PT Goal 6  Goal Identifier: Gait speed  Goal Description: Pt to demonstrate improved comfortable gait speed to >/= 1.0 m/s to be considered dec risk for falls  Rationale: to maximize safety and independence within the home;to maximize safety and independence within the community  Goal Progress: at [facility], 6/15/23: .64 m/s without AD (<1 m/s = inc fall risk)  Target Date: 09/07/23  PT Goal 7  Goal Identifier: Transfers/5x sit <> stand  Goal Description: Pt to complete 5x sit <>  < 12 sec to be considered dec risk for falls and demonstrate improved functional strength and balance with less pain with transitions  Goal Progress: at [facility], 6/15/23: Unable to complete 5th rep d/t L knee pain, decreased controled descent. 15.69 sec (< 12 sec = inc fall risk) Pain quickly resolves, tightness remains.      Frequency of Treatment: 2x/week decreased to 1x/week prn  Duration of Treatment: 10 weeks    Recommended Referrals to Other Professionals: possible baker cyst L knee (in context of hx of baker cyst) - should pt demonstrate limited progress in therapy, recommend f/u with MD to discuss return to orthopedic specialist, US assessment, consider cortisone injection prn  Education Assessment:   Learner/Method: Patient  Education Comments: results of assessment, PT POC    Risks and benefits of evaluation/treatment have been explained.   Patient/Family/caregiver agrees with Plan of Care.     Evaluation Time:     PT Eval, Low Complexity Minutes (04347): 47      Signing Clinician: Lotus Arambula PT      [facility]                                                                                   OUTPATIENT PHYSICAL THERAPY      PLAN OF TREATMENT FOR OUTPATIENT REHABILITATION   Patient's Last Name, First Name, Shantelle Mendoza  YOB: 1945   Provider's Name   [facility]   Medical Record No.  [id number]     Onset Date: 03/15/23 (pt reports difficulty with stairs over the last 2-3 months - weakness/ pain)  Start of Care Date: 06/15/23     Medical Diagnosis:  Difficulty walking (R26.2)      PT Treatment Diagnosis:  force production deficit, limited gait stability with inc risk for falls Plan of Treatment  Frequency/Duration: 2x/week decreased to 1x/week prn/ 10 weeks    Certification date from 06/15/23 to 09/07/23         See note for plan of treatment details and functional goals     Lotus Arambula, PT                         I CERTIFY THE NEED FOR THESE SERVICES FURNISHED UNDER        THIS PLAN OF TREATMENT AND WHILE UNDER MY CARE     (Physician attestation of this document indicates review and certification of the therapy plan).                  Referring Provider:  Burton Mejia      Initial Assessment  See Epic Evaluation- Start of Care Date: 06/15/23

## 2023-06-22 ENCOUNTER — THERAPY VISIT (OUTPATIENT)
Dept: PHYSICAL THERAPY | Facility: CLINIC | Age: 78
End: 2023-06-22
Attending: FAMILY MEDICINE
Payer: COMMERCIAL

## 2023-06-22 DIAGNOSIS — R26.2 DIFFICULTY WALKING: Primary | ICD-10-CM

## 2023-06-22 PROCEDURE — 97750 PHYSICAL PERFORMANCE TEST: CPT | Mod: GP | Performed by: PHYSICAL THERAPIST

## 2023-06-22 PROCEDURE — 97110 THERAPEUTIC EXERCISES: CPT | Mod: GP | Performed by: PHYSICAL THERAPIST

## 2023-06-23 ENCOUNTER — HOSPITAL ENCOUNTER (OUTPATIENT)
Dept: ULTRASOUND IMAGING | Facility: CLINIC | Age: 78
Discharge: HOME | End: 2023-06-23
Attending: FAMILY MEDICINE | Admitting: FAMILY MEDICINE
Payer: COMMERCIAL

## 2023-06-23 DIAGNOSIS — R26.2 DIFFICULTY WALKING: ICD-10-CM

## 2023-06-23 DIAGNOSIS — E11.59 TYPE 2 DIABETES MELLITUS WITH OTHER CIRCULATORY COMPLICATIONS (H): ICD-10-CM

## 2023-06-23 PROCEDURE — 93924 LWR XTR VASC STDY BILAT: CPT

## 2023-06-30 ENCOUNTER — FOLLOW UP (OUTPATIENT)
Dept: URBAN - METROPOLITAN AREA CLINIC 101 | Facility: CLINIC | Age: 78
End: 2023-06-30

## 2023-06-30 DIAGNOSIS — H34.12: ICD-10-CM

## 2023-06-30 DIAGNOSIS — H43.813: ICD-10-CM

## 2023-06-30 DIAGNOSIS — H43.12: ICD-10-CM

## 2023-06-30 DIAGNOSIS — H35.033: ICD-10-CM

## 2023-06-30 PROCEDURE — 76512 OPH US DX B-SCAN: CPT

## 2023-06-30 PROCEDURE — 92014 COMPRE OPH EXAM EST PT 1/>: CPT

## 2023-06-30 PROCEDURE — 92202 OPSCPY EXTND ON/MAC DRAW: CPT

## 2023-06-30 PROCEDURE — 92134 CPTRZ OPH DX IMG PST SGM RTA: CPT

## 2023-06-30 ASSESSMENT — TONOMETRY
OS_IOP_MMHG: 11
OD_IOP_MMHG: 12

## 2023-06-30 ASSESSMENT — VISUAL ACUITY
OS_PH: 20/60-1
OD_CC: 20/30+2
OS_CC: 20/150-1

## 2023-07-06 ENCOUNTER — OFFICE VISIT (OUTPATIENT)
Dept: FAMILY MEDICINE | Facility: CLINIC | Age: 78
End: 2023-07-06
Payer: COMMERCIAL

## 2023-07-06 VITALS
OXYGEN SATURATION: 96 % | HEIGHT: 59 IN | BODY MASS INDEX: 34.07 KG/M2 | RESPIRATION RATE: 14 BRPM | DIASTOLIC BLOOD PRESSURE: 76 MMHG | WEIGHT: 169 LBS | HEART RATE: 75 BPM | SYSTOLIC BLOOD PRESSURE: 139 MMHG | TEMPERATURE: 97.9 F

## 2023-07-06 DIAGNOSIS — L57.0 ACTINIC KERATOSIS: Primary | ICD-10-CM

## 2023-07-06 DIAGNOSIS — R26.2 DIFFICULTY WALKING: ICD-10-CM

## 2023-07-06 PROCEDURE — 99212 OFFICE O/P EST SF 10 MIN: CPT | Mod: 25 | Performed by: FAMILY MEDICINE

## 2023-07-06 PROCEDURE — 17000 DESTRUCT PREMALG LESION: CPT | Performed by: FAMILY MEDICINE

## 2023-07-06 ASSESSMENT — ENCOUNTER SYMPTOMS: FEVER: 0

## 2023-07-06 NOTE — PROGRESS NOTES
"  Assessment & Plan     Actinic keratosis  Discussed actinic keratosis and its potential for a precancerous lesion.  Patient would like to proceed with treatment.  The area was treated using 3 ask freeze thaw cycles with liquid nitrogen.  Patient tolerated procedure well.  Advised blistering is normal, return to clinic if having fever, pus like drainage or if symptoms not controlled.     - DESTRUCT BENIGN LESION, UP TO 14    Difficulty walking  Likely related to arthralgia physical deconditioning.  Significant improvement after starting physical therapy, continue current medical management.  Review normal ankle-brachial index studies.               BMI:   Estimated body mass index is 34.13 kg/m  as calculated from the following:    Height as of this encounter: 1.499 m (4' 11\").    Weight as of this encounter: 76.7 kg (169 lb).           Burton Mejia MD  [facility]CHARLA Pepper is a 78 year old, presenting for the following health issues:  No chief complaint on file.         No data to display              History of Present Illness       Reason for visit:  Follow up for test ultra sound on legsShe exercises with enough effort to increase her heart rate 9 or less minutes per day.  She exercises with enough effort to increase her heart rate 3 or less days per week.   She is taking medications regularly.       Patient is a pleasant 78-year-old female for treatment of a skin lesion for follow-up of difficulty walking.      Review of Systems   Constitutional: Negative for fever.            Objective    /76 (BP Location: Right arm, Patient Position: Chair, Cuff Size: Adult Regular)   Pulse 75   Temp 97.9  F (36.6  C) (Oral)   Resp 14   Ht 1.499 m (4' 11\")   Wt 76.7 kg (169 lb)   SpO2 96%   BMI 34.13 kg/m    Body mass index is 34.13 kg/m .  Physical Exam  Chest:       Skin:     Comments: 0.5 cm, left upper chest, scaly, firm hornlike lesion.                            "

## 2023-08-25 ENCOUNTER — HOSPITAL ENCOUNTER (EMERGENCY)
Facility: CLINIC | Age: 78
Discharge: HOME | End: 2023-08-25
Attending: STUDENT IN AN ORGANIZED HEALTH CARE EDUCATION/TRAINING PROGRAM | Admitting: STUDENT IN AN ORGANIZED HEALTH CARE EDUCATION/TRAINING PROGRAM
Payer: COMMERCIAL

## 2023-08-25 ENCOUNTER — NURSE TRIAGE (OUTPATIENT)
Dept: FAMILY MEDICINE | Facility: CLINIC | Age: 78
End: 2023-08-25
Payer: COMMERCIAL

## 2023-08-25 VITALS
DIASTOLIC BLOOD PRESSURE: 66 MMHG | WEIGHT: 169.75 LBS | OXYGEN SATURATION: 94 % | TEMPERATURE: 98.2 F | SYSTOLIC BLOOD PRESSURE: 150 MMHG | HEART RATE: 76 BPM | RESPIRATION RATE: 18 BRPM | HEIGHT: 59 IN | BODY MASS INDEX: 34.22 KG/M2

## 2023-08-25 DIAGNOSIS — L03.213 PERIORBITAL CELLULITIS OF RIGHT EYE: ICD-10-CM

## 2023-08-25 PROCEDURE — 99284 EMERGENCY DEPT VISIT MOD MDM: CPT

## 2023-08-25 RX ORDER — CEFDINIR 300 MG/1
300 CAPSULE ORAL 2 TIMES DAILY
Qty: 14 CAPSULE | Refills: 0 | Status: SHIPPED | OUTPATIENT
Start: 2023-08-25 | End: 2023-09-01

## 2023-08-25 RX ORDER — CLINDAMYCIN HCL 300 MG
300 CAPSULE ORAL 3 TIMES DAILY
Qty: 21 CAPSULE | Refills: 0 | Status: SHIPPED | OUTPATIENT
Start: 2023-08-25 | End: 2023-09-01

## 2023-08-25 RX ORDER — TETRACAINE HYDROCHLORIDE 5 MG/ML
SOLUTION OPHTHALMIC
Status: DISCONTINUED
Start: 2023-08-25 | End: 2023-08-25 | Stop reason: HOSPADM

## 2023-08-25 NOTE — ED TRIAGE NOTES
Pt presents with spouse with right eye swelling with clear liquid below eye which she took an antihistamine which helped, & swelling decreased.  Now there is redness below right eye with pain/pressure/numb on right side of head behind right ear that started yesterday 1000.  Shantelle reports not feeling well.     Triage Assessment       Row Name 08/25/23 0872       Triage Assessment (Adult)    Airway WDL WDL       Respiratory WDL    Respiratory WDL WDL       Cardiac WDL    Cardiac WDL WDL

## 2023-08-25 NOTE — TELEPHONE ENCOUNTER
"S-(situation): Pt is calling with sever eye swelling    B-(background): started yesterday    A-(assessment): right eye swelling with poor vision. Unsure of fever. Now temple and cheek have pressure. Unsure of fever.     R-(recommendations): Due to severe nature of swelling and vision involvement writer advised ED. Pt agreed.     Reason for Disposition   SEVERE eyelid swelling (i.e., shut or almost) and fever    Additional Information   Negative: Unresponsive, passed out or very weak   Negative: Difficulty breathing or wheezing   Negative: Difficulty swallowing or slurred speech and sudden onset   Negative: Sounds like a life-threatening emergency to the triager   Negative: Chemical got in the eye   Negative: Recent injury to the eye   Negative: Entire face is swollen   Negative: Sacs of clear fluid (blisters) on whites of eyes (allergic cysts)   Negative: Contact with pollen, other allergic substance or eyedrops   Negative: Bee sting and within last 24 hours   Negative: Insect bite suspected   Negative: Yellow or green discharge (pus) in the eye   Negative: Redness of white area (sclera) of eye(s)    Answer Assessment - Initial Assessment Questions  1. ONSET: \"When did the swelling start?\" (e.g., minutes, hours, days)      Yesterday   2. LOCATION: \"What part of the eyelids is swollen?\"      right  3. SEVERITY: \"How swollen is it?\"      very  4. ITCHING: \"Is there any itching?\" If Yes, ask: \"How much?\"   (Scale 1-10; mild, moderate or severe)      no  5. PAIN: \"Is the swelling painful to touch?\" If Yes, ask: \"How painful is it?\"   (Scale 1-10; mild, moderate or severe)      pressure  6. FEVER: \"Do you have a fever?\" If Yes, ask: \"What is it, how was it measured, and when did it start?\"       no  7. CAUSE: \"What do you think is causing the swelling?\"      unsure  8. RECURRENT SYMPTOM: \"Have you had eyelid swelling before?\" If Yes, ask: \"When was the last time?\" \"What happened that time?\"      no  9. OTHER SYMPTOMS: " "\"Do you have any other symptoms?\" (e.g., blurred vision, eye discharge, rash, runny nose)      Confucianism pressure  10. PREGNANCY: \"Is there any chance you are pregnant?\" \"When was your last menstrual period?\"        N/A    Protocols used: Eye - Swelling-A-OH    "

## 2023-08-25 NOTE — ED PROVIDER NOTES
"  History     Chief Complaint:  Eye Problem       HPI   Shantelle Su is a 78 year old female with past medical history including CAD, prediabetes, and hypertension, who presents for evaluation of right eye redness and swelling.  Patient reports onset of symptoms yesterday morning as they were in the car driving to [place] to visit family.  She reports that her right upper and lower eyelid were erythematous and \"translucent\" and tight.  She was able to get through the day playing with her grandkids, and when she got home she took a dose of Benadryl.  This morning, she noted improvement in the swelling, however she was noticing a tight pressure sensation, which she also described as numbness.  She also reports blurry vision in both eyes.  On careful questioning, she states sensation is not different, from side to side per se, however it feels like pressure and tightness.  She denies any pain.  Nursing note expresses \"pain/pressure/numb,\" however patient repeatedly denies pain on my history.  She further denies any weakness, slurred speech, facial droop, vertigo, double vision, confusion, or new unsteadiness on her feet.  She provides a photo from yesterday of her right eye, and it reveals swelling and erythema to the right upper and lower eyelid.  She denies any pain with eye movements.  She denies fevers or vomiting.      Independent Historian:   None - Patient Only    Review of External Notes:   None       Medications:    cefdinir (OMNICEF) 300 MG capsule  clindamycin (CLEOCIN) 300 MG capsule  ACE/ARB/ARNI NOT PRESCRIBED (INTENTIONAL)  Acetaminophen (TYLENOL PO)  alpha-d-galactosidase (BEANO) tablet  amLODIPine (NORVASC) 5 MG tablet  aspirin EC 81 MG EC tablet  atorvastatin (LIPITOR) 40 MG tablet  betamethasone valerate (VALISONE) 0.1 % external cream  calcium carbonate-vitamin D (CALTRATE) 600-10 MG-MCG per tablet  metFORMIN (GLUCOPHAGE) 500 MG tablet  metoprolol succinate ER (TOPROL XL) 50 MG 24 hr " tablet  omeprazole (PRILOSEC) 20 MG DR capsule        Past Medical History:    Past Medical History:   Diagnosis Date    Acute upper respiratory infection 09/30/2002    Angina pectoris, unspecified (H) 11/23/2016    ASCVD (arteriosclerotic cardiovascular disease) 12/20/2016    CAD (coronary artery disease) 11/23/2016    Diabetes mellitus (H)     Enterocele 09/12/2011    Essential hypertension, benign     Fatty liver 06/04/2015    GERD (gastroesophageal reflux disease) 10/13/2008    Heart palpitations     Hiatal hernia     History of pulmonary embolism 2002    Hyperlipidemia LDL goal <70 12/20/2016    Metabolic syndrome 12/01/2011    Other pulmonary embolism and infarction 09/30/2002    PAC (premature atrial contraction)     Paroxysmal supraventricular tachycardia (H) 12/20/2016    Rectocele 10/15/2007       Past Surgical History:    Past Surgical History:   Procedure Laterality Date    ABDOMEN SURGERY      Bladder sling    COLONOSCOPY      Scheduled October 2016    COLPORRHAPY POSTERIOR, CYSTOSCOPY, COMBINED  12/7/2011    Procedure:COMBINED COLPORRHAPY POSTERIOR, CYSTOSCOPY; POSTERIOR MESH AUGMENTED REPAIR WITH ELEVATE MESH , cystoscopy; Surgeon:ANDRE UP; Location:[facility] OR    DILATION AND CURETTAGE, HYSTEROSCOPY DIAGNOSTIC, COMBINED N/A 10/4/2016    Procedure: COMBINED DILATION AND CURETTAGE, HYSTEROSCOPY DIAGNOSTIC;  Surgeon: Andre Up MD;  Location: [facility] OR    HC DILATION/CURETTAGE DIAG/THER NON OB      after sab    HC LEFT HEART CATHETERIZATION  11/23/16    PCI with drug-eluting stent placement in the proximal LAD    HC TOOTH EXTRACTION W/FORCEP      ZZC NONSPECIFIC PROCEDURE  2009    Geddes mesh augmented anterior colporrhaphy and vault suspension, tension-free vaginal tape sling with a transobturator approach usingthe Obtryx device and cystoscopy.          Physical Exam   Patient Vitals for the past 24 hrs:   BP Temp Temp src Pulse Resp SpO2 Height Weight   08/25/23 1026 (!) 150/66 -- -- -- -- 94 % --  "--   08/25/23 0812 (!) 163/82 98.2  F (36.8  C) Temporal 76 18 95 % 1.499 m (4' 11\") 77 kg (169 lb 12.1 oz)        Physical Exam  Vitals: Reviewed, as above.  Notable for hypertension.  General: Alert and oriented, in mild distress. Resting on bed.  Skin: Warm and well-perfused.  Erythema to right eyelid, as below.  HEENT:   Head: Normocephalic, atraumatic. Facial features symmetric.   Eyes: Conjunctiva pink, sclera white.  Erythema and edema to right upper and lower eyelid.  No tenderness to palpation.  EOMs intact with no pain.   No fluorescein uptake on Woods lamp examination.  No foreign body included with searching under the right eyelid.  Visual acuity (without corrective lenses): Left eye: 20/70, Right eye: 20/40  Intraocular pressures: Left eye: 13, Right eye: 15  Ears: Auricles without lesion, erythema, or edema.   Nose: Symmetric with no discharge.  Mouth and throat: Lips are moist. Buccal mucosa is pink and moist without lesions. Oropharyngeal mucosa is pink and moist with no erythema, edema, or exudate. Uvula is midline.  Neck: Supple with no lymphadenopathy. Full ROM.   Pulmonary: Chest wall expansion symmetric with no increased work of breathing. Lungs clear to auscultation bilaterally.   Cardiovascular: Heart RRR with no murmurs.   Musculoskeletal: Moves all extremities spontaneously.  Neuro: Patient is alert and oriented to person place time.  Speech fluent with normal cognition.  Cranial nerves II through XII intact:   PERRL, EOMI, symmetric smile, equal eye squeeze and forehead raise, normal sensation in the V1 V2 V3 distributions to light touch and to pinprick, grossly equal hearing, midline tongue protrusion with normal side to side movement, full strength with head turn, normal shoulder shrug.  RUE strength 5/5: , elbow flexion/extension, wrist flexion/extension  LUE strength 5/5: , elbow flexion/extension, wrist flexion/extension  RLE strength 5/5: Ankle flexion/extension, knee " "flexion/extension, hip flexion/extension  LLE strength 5/5: Ankle flexion/extension, knee flexion/extension, hip flexion/extension  No pronator drift, normal rapid alternating movements normal finger-to-nose normal heel-to-shin.   Sensation intact and symmetric to bilateral upper and lower extremities.. Steady gait.  Psych: Affect appropriate.  Answers questions appropriately. Patient appears calm.      Emergency Department Course     Emergency Department Course & Assessments:       Interventions:  Medications - No data to display       Independent Interpretation (X-rays, CTs, rhythm strip):  None    Assessments/Consultations/Discussion of Management or Tests:  ED Course as of 08/25/23 1243   Fri Aug 25, 2023   0955 I evaluated the patient, as noted above.       Social Determinants of Health affecting care:   None    Disposition:  The patient was discharged to home.     Impression & Plan      Medical Decision Making:  Shantelle Su is a 78 year old female with past medical history including CAD, prediabetes, and hypertension, who presents for evaluation of right eye redness and swelling.  Please see HPI and exam for details.  Differential includes periorbital/orbital cellulitis, conjunctivitis, allergic phenomenon, CVA, glaucoma, among others.  Patient has a reassuring physical exam with stable vitals and no focal neurologic deficit.  She was noted to have hypertension, and she has a known diagnosis and follows up with her primary care provider regarding antihypertensive therapy.  There was initial report of numbness, however patient clarifies that there is no actual sensory deficit, just a \"tight\" feeling.  She has intact symmetric sensation to light touch and to pinprick sensation to the face.  She noted blurry vision, however her vision is unchanged from her baseline, and actually better on the affected eye compared to the unaffected eye.  No evidence of corneal abrasion or infection on Woods lamp examination " and normal IOPs.  Presentation is most consistent with periorbital cellulitis, and I recommended treatment with antibiotics, as below.  Very close return precautions were discussed, including pain with eye movements, fevers, vision changes, numbness, tingling, weakness, slurred speech, confusion, or other strokelike symptoms.  She will otherwise follow-up with her primary care provider or ophthalmologist for recheck in 2 days.  She is comfortable with this plan.    Critical Care time:  was 0 minutes for this patient excluding procedures.    Diagnosis:    ICD-10-CM    1. Periorbital cellulitis of right eye  L03.213            Discharge Medications:  Discharge Medication List as of 8/25/2023 10:27 AM        START taking these medications    Details   cefdinir (OMNICEF) 300 MG capsule Take 1 capsule (300 mg) by mouth 2 times daily for 7 days, Disp-14 capsule, R-0, E-Prescribe      clindamycin (CLEOCIN) 300 MG capsule Take 1 capsule (300 mg) by mouth 3 times daily for 7 days, Disp-21 capsule, R-0, E-Prescribe                JACOB WOOTEN PA-C  8/25/2023   Jacob Wooten PA-C Sells, Jenna, PA-C  08/25/23 1249

## 2023-08-29 ENCOUNTER — OFFICE VISIT (OUTPATIENT)
Dept: FAMILY MEDICINE | Facility: CLINIC | Age: 78
End: 2023-08-29
Payer: COMMERCIAL

## 2023-08-29 VITALS
DIASTOLIC BLOOD PRESSURE: 68 MMHG | OXYGEN SATURATION: 95 % | HEART RATE: 76 BPM | WEIGHT: 168 LBS | SYSTOLIC BLOOD PRESSURE: 112 MMHG | HEIGHT: 59 IN | TEMPERATURE: 98.1 F | BODY MASS INDEX: 33.87 KG/M2 | RESPIRATION RATE: 16 BRPM

## 2023-08-29 DIAGNOSIS — L03.213 PRESEPTAL CELLULITIS OF RIGHT EYE: ICD-10-CM

## 2023-08-29 DIAGNOSIS — Z00.00 ENCOUNTER FOR MEDICARE ANNUAL WELLNESS EXAM: Primary | ICD-10-CM

## 2023-08-29 DIAGNOSIS — R25.2 LEG CRAMPS: ICD-10-CM

## 2023-08-29 DIAGNOSIS — L57.0 ACTINIC KERATOSIS: ICD-10-CM

## 2023-08-29 PROCEDURE — 99213 OFFICE O/P EST LOW 20 MIN: CPT | Mod: 25 | Performed by: FAMILY MEDICINE

## 2023-08-29 PROCEDURE — G0439 PPPS, SUBSEQ VISIT: HCPCS | Mod: 25 | Performed by: FAMILY MEDICINE

## 2023-08-29 RX ORDER — MAGNESIUM OXIDE 400 MG/1
400 TABLET ORAL DAILY
Qty: 90 TABLET | Refills: 3 | Status: SHIPPED | OUTPATIENT
Start: 2023-08-29

## 2023-08-29 ASSESSMENT — ENCOUNTER SYMPTOMS
DYSURIA: 0
HEMATURIA: 0
CONSTIPATION: 0
FEVER: 0
PALPITATIONS: 0
COUGH: 0
DIARRHEA: 0
CHILLS: 0
WEAKNESS: 0
SHORTNESS OF BREATH: 0
SORE THROAT: 0
JOINT SWELLING: 0
NERVOUS/ANXIOUS: 0
FREQUENCY: 0
BREAST MASS: 0
ABDOMINAL PAIN: 1
HEARTBURN: 0
NAUSEA: 0
DIZZINESS: 0
HEADACHES: 0
ARTHRALGIAS: 0
PARESTHESIAS: 0
HEMATOCHEZIA: 0

## 2023-08-29 ASSESSMENT — ANXIETY QUESTIONNAIRES
1. FEELING NERVOUS, ANXIOUS, OR ON EDGE: NOT AT ALL
5. BEING SO RESTLESS THAT IT IS HARD TO SIT STILL: NOT AT ALL
7. FEELING AFRAID AS IF SOMETHING AWFUL MIGHT HAPPEN: NOT AT ALL
2. NOT BEING ABLE TO STOP OR CONTROL WORRYING: NOT AT ALL
6. BECOMING EASILY ANNOYED OR IRRITABLE: NOT AT ALL
GAD7 TOTAL SCORE: 0
4. TROUBLE RELAXING: NOT AT ALL
3. WORRYING TOO MUCH ABOUT DIFFERENT THINGS: NOT AT ALL
GAD7 TOTAL SCORE: 0
IF YOU CHECKED OFF ANY PROBLEMS ON THIS QUESTIONNAIRE, HOW DIFFICULT HAVE THESE PROBLEMS MADE IT FOR YOU TO DO YOUR WORK, TAKE CARE OF THINGS AT HOME, OR GET ALONG WITH OTHER PEOPLE: NOT DIFFICULT AT ALL

## 2023-08-29 ASSESSMENT — PATIENT HEALTH QUESTIONNAIRE - PHQ9
SUM OF ALL RESPONSES TO PHQ QUESTIONS 1-9: 1
10. IF YOU CHECKED OFF ANY PROBLEMS, HOW DIFFICULT HAVE THESE PROBLEMS MADE IT FOR YOU TO DO YOUR WORK, TAKE CARE OF THINGS AT HOME, OR GET ALONG WITH OTHER PEOPLE: NOT DIFFICULT AT ALL
SUM OF ALL RESPONSES TO PHQ QUESTIONS 1-9: 1

## 2023-08-29 ASSESSMENT — ACTIVITIES OF DAILY LIVING (ADL): CURRENT_FUNCTION: NO ASSISTANCE NEEDED

## 2023-08-29 NOTE — PROGRESS NOTES
"  Assessment & Plan     Encounter for Medicare annual wellness exam    Preseptal cellulitis of right eye  Resolved, complete antibiotics.  Monitor for C. difficile colitis    Leg cramps  Okay to resume magnesium for leg cramps as its been helpful.  - magnesium oxide (MAG-OX) 400 MG tablet  Dispense: 90 tablet; Refill: 3    Actinic keratosis  Refractory to cryotherapy, recommend dermatology evaluation  - Adult Dermatology Referral; Future      Appropriate preventive services were discussed with this patient, including applicable screening as appropriate for cardiovascular disease, diabetes, osteopenia/osteoporosis, and glaucoma.  As appropriate for age/gender, discussed screening for colorectal cancer, prostate cancer, breast cancer, and cervical cancer. Checklist reviewing preventive services available has been given to the patient.    Reviewed patients plan of care and provided an AVS. The Basic Care Plan (routine screening as documented in Health Maintenance) for Shantelle meets the Care Plan requirement. This Care Plan has been established and reviewed with the Patient.     MED REC REQUIRED  Post Medication Reconciliation Status:  Discharge medications reconciled and changed, see notes/orders      BMI:   Estimated body mass index is 33.93 kg/m  as calculated from the following:    Height as of this encounter: 1.499 m (4' 11\").    Weight as of this encounter: 76.2 kg (168 lb).   Weight management plan: Discussed healthy diet and exercise guidelines      Burton Mejia MD  [facility]    Ronny Pepper is a 78 year old, presenting for the following health issues:  ER F/U      ED/UC Followup:    Facility:  [facility]  Date of visit: 8/25/23  Reason for visit: Cellulitis of RIGHT eye  Current Status: Swelling has gone down.    Patient presents for ER follow-up for preseptal cellulitis of the right eye, also has concerns for leg cramps which is improved on magnesium and would like a refill.  She " "would like a dermatology evaluation for her skin lesion which did not improve after cryotherapy.    Annual Wellness Visit  Patient has been advised of split billing requirements and indicates understanding: Yes     Are you in the first 12 months of your Medicare Part B coverage?  No    Healthy Habits:     In general, how would you rate your overall health?  Good    Frequency of exercise:  2-3 days/week    Duration of exercise:  Less than 15 minutes    Do you usually eat at least 4 servings of fruit and vegetables a day, include whole grains    & fiber and avoid regularly eating high fat or \"junk\" foods?  No    Taking medications regularly:  Yes    Medication side effects:  Not applicable    Ability to successfully perform activities of daily living:  No assistance needed    Home Safety:  No safety concerns identified    Hearing Impairment:  No hearing concerns    In the past 6 months, have you been bothered by leaking of urine?  No    In general, how would you rate your overall mental or emotional health?  Good    Additional concerns today:  No         Do you feel safe in your environment? Yes    Have you ever done Advance Care Planning? (For example, a Health Directive, POLST, or a discussion with a medical provider or your loved ones about your wishes)? Yes, advance care planning is on file.    Fall risk: Low risk.      click delete button to remove this line now  Cognitive Screenin) Repeat 3 items (Leader, Season, Table)    2) Clock draw: NORMAL  3) 3 item recall: Recalls 3 objects  Results: 3 items recalled: COGNITIVE IMPAIRMENT LESS LIKELY    Mini-CogTM Copyright TONJA Malone. Licensed by the author for use in [facility]; reprinted with permission (catie@.[facility]). All rights reserved.      Social History     Tobacco Use    Smoking status: Former     Packs/day: 0.75     Years: 20.00     Pack years: 15.00     Types: Cigarettes     Quit date: 1987     Years since quittin.8    Smokeless " tobacco: Never   Substance Use Topics    Alcohol use: Yes     Comment: rare           8/29/2023    10:37 AM   Alcohol Use   Prescreen: >3 drinks/day or >7 drinks/week? No     Do you have a current opioid prescription? No  Do you use any other controlled substances or medications that are not prescribed by a provider? None    Current providers sharing in care for this patient include:   Patient Care Team:  Burton Mejia MD as PCP - General (Family Medicine)  Flor Hand MD as Assigned Heart and Vascular Provider  Burton Mejia MD as Assigned PCP    The following health maintenance items are reviewed in Epic and correct as of today:  Health Maintenance   Topic Date Due    ZOSTER IMMUNIZATION (1 of 2) Never done    COVID-19 Vaccine (4 - Pfizer series) 03/10/2022    MEDICARE ANNUAL WELLNESS VISIT  08/30/2022    INFLUENZA VACCINE (1) 09/01/2023    A1C  12/06/2023    ANNUAL REVIEW OF HM ORDERS  12/06/2023    EYE EXAM  12/14/2023    BMP  06/06/2024    LIPID  06/06/2024    MICROALBUMIN  06/06/2024    DIABETIC FOOT EXAM  06/06/2024    FALL RISK ASSESSMENT  06/06/2024    ADVANCE CARE PLANNING  08/29/2028    COLORECTAL CANCER SCREENING  01/23/2029    DTAP/TDAP/TD IMMUNIZATION (3 - Td or Tdap) 08/30/2031    DEXA  03/31/2038    PHQ-2 (once per calendar year)  Completed    Pneumococcal Vaccine: 65+ Years  Completed    IPV IMMUNIZATION  Aged Out    MENINGITIS IMMUNIZATION  Aged Out    URINE DRUG SCREEN  Discontinued    HEPATITIS C SCREENING  Discontinued    MAMMO SCREENING  Discontinued       Patient has been advised of split billing requirements and indicates understanding: Yes    Appropriate preventive services were discussed with this patient, including applicable screening as appropriate for fall prevention, nutrition, physical activity, Tobacco-use cessation, weight loss and cognition.  Checklist reviewing preventive services available has been given to the patient.      Review of Systems   Constitutional:  Negative for  "chills and fever.   HENT:  Negative for congestion, ear pain, hearing loss and sore throat.    Eyes:  Negative for visual disturbance.   Respiratory:  Negative for cough and shortness of breath.    Cardiovascular:  Negative for chest pain, palpitations and peripheral edema.   Gastrointestinal:  Positive for abdominal pain. Negative for constipation, diarrhea, heartburn, hematochezia and nausea.   Breasts:  Negative for tenderness, breast mass and discharge.   Genitourinary:  Negative for dysuria, frequency, genital sores, hematuria, pelvic pain, urgency, vaginal bleeding and vaginal discharge.   Musculoskeletal:  Negative for arthralgias and joint swelling.   Skin:  Negative for rash.   Neurological:  Negative for dizziness, weakness, headaches and paresthesias.   Psychiatric/Behavioral:  Negative for mood changes. The patient is not nervous/anxious.             Objective    /68   Pulse 76   Temp 98.1  F (36.7  C) (Oral)   Resp 16   Ht 1.499 m (4' 11\")   Wt 76.2 kg (168 lb)   SpO2 95%   BMI 33.93 kg/m    Body mass index is 33.93 kg/m .  Physical Exam  Eyes:      Extraocular Movements: Extraocular movements intact.      Pupils: Pupils are equal, round, and reactive to light.   Cardiovascular:      Rate and Rhythm: Normal rate and regular rhythm.   Pulmonary:      Effort: Pulmonary effort is normal.      Breath sounds: Normal breath sounds.   Skin:     Comments: Left upper chest 0.5 cm scaly raised firm lesion                            Answers submitted by the patient for this visit:  Patient Health Questionnaire (Submitted on 8/29/2023)  If you checked off any problems, how difficult have these problems made it for you to do your work, take care of things at home, or get along with other people?: Not difficult at all  PHQ9 TOTAL SCORE: 1  ARLENE-7 (Submitted on 8/29/2023)  ARLENE 7 TOTAL SCORE: 0    "

## 2023-08-29 NOTE — PATIENT INSTRUCTIONS
Patient Education   Personalized Prevention Plan  You are due for the preventive services outlined below.  Your care team is available to assist you in scheduling these services.  If you have already completed any of these items, please share that information with your care team to update in your medical record.  Health Maintenance Due   Topic Date Due     Zoster (Shingles) Vaccine (1 of 2) Never done     COVID-19 Vaccine (4 - Pfizer series) 03/10/2022     Annual Wellness Visit  08/30/2022     Flu Vaccine (1) 09/01/2023

## 2023-09-28 ENCOUNTER — APPOINTMENT (OUTPATIENT)
Dept: URBAN - METROPOLITAN AREA CLINIC 256 | Age: 78
Setting detail: DERMATOLOGY
End: 2023-09-28

## 2023-09-28 VITALS — WEIGHT: 165 LBS | HEIGHT: 61 IN

## 2023-09-28 DIAGNOSIS — L57.8 OTHER SKIN CHANGES DUE TO CHRONIC EXPOSURE TO NONIONIZING RADIATION: ICD-10-CM

## 2023-09-28 DIAGNOSIS — L91.8 OTHER HYPERTROPHIC DISORDERS OF THE SKIN: ICD-10-CM

## 2023-09-28 DIAGNOSIS — D485 NEOPLASM OF UNCERTAIN BEHAVIOR OF SKIN: ICD-10-CM

## 2023-09-28 DIAGNOSIS — L82.1 OTHER SEBORRHEIC KERATOSIS: ICD-10-CM

## 2023-09-28 PROBLEM — D48.5 NEOPLASM OF UNCERTAIN BEHAVIOR OF SKIN: Status: ACTIVE | Noted: 2023-09-28

## 2023-09-28 PROCEDURE — OTHER BIOPSY BY SHAVE METHOD: OTHER

## 2023-09-28 PROCEDURE — OTHER MIPS QUALITY: OTHER

## 2023-09-28 PROCEDURE — OTHER COUNSELING: OTHER

## 2023-09-28 PROCEDURE — 99203 OFFICE O/P NEW LOW 30 MIN: CPT | Mod: 25

## 2023-09-28 PROCEDURE — 11102 TANGNTL BX SKIN SINGLE LES: CPT

## 2023-09-28 ASSESSMENT — LOCATION ZONE DERM
LOCATION ZONE: TRUNK
LOCATION ZONE: NECK

## 2023-09-28 ASSESSMENT — LOCATION DETAILED DESCRIPTION DERM
LOCATION DETAILED: LEFT INFERIOR ANTERIOR NECK
LOCATION DETAILED: STERNAL NOTCH
LOCATION DETAILED: LEFT LATERAL SUPERIOR CHEST

## 2023-09-28 ASSESSMENT — LOCATION SIMPLE DESCRIPTION DERM
LOCATION SIMPLE: CHEST
LOCATION SIMPLE: LEFT ANTERIOR NECK

## 2023-09-28 NOTE — PROCEDURE: MIPS QUALITY
Quality 111:Pneumonia Vaccination Status For Older Adults: Patient received any pneumococcal conjugate or polysaccharide vaccine on or after their 60th birthday and before the end of the measurement period
Quality 130: Documentation Of Current Medications In The Medical Record: Current Medications Documented
Detail Level: Detailed
Quality 431: Preventive Care And Screening: Unhealthy Alcohol Use - Screening: Patient not identified as an unhealthy alcohol user when screened for unhealthy alcohol use using a systematic screening method
Quality 47: Advance Care Plan: Advance Care Planning discussed and documented in the medical record; patient did not wish or was not able to name a surrogate decision maker or provide an advance care plan.
Quality 110: Preventive Care And Screening: Influenza Immunization: Influenza Immunization previously received during influenza season
Quality 226: Preventive Care And Screening: Tobacco Use: Screening And Cessation Intervention: Patient screened for tobacco use and is an ex/non-smoker

## 2023-09-28 NOTE — PROCEDURE: BIOPSY BY SHAVE METHOD
Notification Instructions: Patient will be notified of biopsy results. However, patient instructed to call the office if not contacted within 2 weeks. Relaxed

## 2023-11-22 ENCOUNTER — FOLLOW UP (OUTPATIENT)
Dept: URBAN - METROPOLITAN AREA CLINIC 101 | Facility: CLINIC | Age: 78
End: 2023-11-22

## 2023-11-22 DIAGNOSIS — H43.12: ICD-10-CM

## 2023-11-22 DIAGNOSIS — H43.813: ICD-10-CM

## 2023-11-22 DIAGNOSIS — H34.12: ICD-10-CM

## 2023-11-22 DIAGNOSIS — H35.033: ICD-10-CM

## 2023-11-22 PROCEDURE — 92235 FLUORESCEIN ANGRPH MLTIFRAME: CPT

## 2023-11-22 PROCEDURE — 92134 CPTRZ OPH DX IMG PST SGM RTA: CPT

## 2023-11-22 PROCEDURE — 92014 COMPRE OPH EXAM EST PT 1/>: CPT

## 2023-11-22 PROCEDURE — 92201 OPSCPY EXTND RTA DRAW UNI/BI: CPT

## 2023-11-22 ASSESSMENT — VISUAL ACUITY
OS_CC: 20/70-1
OD_CC: 20/20

## 2023-11-22 ASSESSMENT — TONOMETRY
OS_IOP_MMHG: 13
OD_IOP_MMHG: 13

## 2023-12-05 ENCOUNTER — OFFICE VISIT (OUTPATIENT)
Dept: FAMILY MEDICINE | Facility: CLINIC | Age: 78
End: 2023-12-05
Payer: COMMERCIAL

## 2023-12-05 VITALS
HEIGHT: 59 IN | TEMPERATURE: 97.7 F | SYSTOLIC BLOOD PRESSURE: 130 MMHG | WEIGHT: 169 LBS | HEART RATE: 75 BPM | RESPIRATION RATE: 20 BRPM | OXYGEN SATURATION: 97 % | DIASTOLIC BLOOD PRESSURE: 64 MMHG | BODY MASS INDEX: 34.07 KG/M2

## 2023-12-05 DIAGNOSIS — I25.10 CORONARY ARTERY DISEASE DUE TO LIPID RICH PLAQUE: ICD-10-CM

## 2023-12-05 DIAGNOSIS — H61.22 IMPACTED CERUMEN OF LEFT EAR: Primary | ICD-10-CM

## 2023-12-05 DIAGNOSIS — I25.83 CORONARY ARTERY DISEASE DUE TO LIPID RICH PLAQUE: ICD-10-CM

## 2023-12-05 DIAGNOSIS — E11.9 TYPE 2 DIABETES MELLITUS WITHOUT COMPLICATION, WITHOUT LONG-TERM CURRENT USE OF INSULIN (H): ICD-10-CM

## 2023-12-05 LAB — HBA1C MFR BLD: 6.8 % (ref 0–5.6)

## 2023-12-05 PROCEDURE — G0008 ADMIN INFLUENZA VIRUS VAC: HCPCS | Performed by: FAMILY MEDICINE

## 2023-12-05 PROCEDURE — 90662 IIV NO PRSV INCREASED AG IM: CPT | Performed by: FAMILY MEDICINE

## 2023-12-05 PROCEDURE — 99213 OFFICE O/P EST LOW 20 MIN: CPT | Mod: 25 | Performed by: FAMILY MEDICINE

## 2023-12-05 PROCEDURE — 69210 REMOVE IMPACTED EAR WAX UNI: CPT | Mod: LT | Performed by: FAMILY MEDICINE

## 2023-12-05 PROCEDURE — 83036 HEMOGLOBIN GLYCOSYLATED A1C: CPT | Performed by: FAMILY MEDICINE

## 2023-12-05 PROCEDURE — 36415 COLL VENOUS BLD VENIPUNCTURE: CPT | Performed by: FAMILY MEDICINE

## 2023-12-05 RX ORDER — RESPIRATORY SYNCYTIAL VIRUS VACCINE 120MCG/0.5
0.5 KIT INTRAMUSCULAR ONCE
Qty: 1 EACH | Refills: 0 | Status: CANCELLED | OUTPATIENT
Start: 2023-12-05 | End: 2023-12-05

## 2023-12-05 NOTE — PROGRESS NOTES
"  Assessment & Plan     Impacted cerumen of left ear  Initially was irrigated unsuccessfully, using a lighted curette was manually removed by me.  Visible tympanic membrane without evidence of infection.    - REMOVE IMPACTED CERUMEN    Type 2 diabetes mellitus without complication, without long-term current use of insulin (H)  - HEMOGLOBIN A1C  - HEMOGLOBIN A1C    Coronary artery disease due to lipid rich plaque   Parking permit renewed.    Burton Mejia MD  [facility] SOLE Pepper is a 78 year old, presenting for the following health issues:    Ear Problem and Forms (For handicap parking)        12/5/2023     1:01 PM   Additional Questions   Roomed by Regi SCHILLING       History of Present Illness       Reason for visit:  Remove ear wax. Congestion lungs.  Flu shot    She eats 0-1 servings of fruits and vegetables daily.She consumes 0 sweetened beverage(s) daily.She exercises with enough effort to increase her heart rate 9 or less minutes per day.  She exercises with enough effort to increase her heart rate 3 or less days per week.   She is taking medications regularly.       Patient presents for concerns of impacted cerumen of her left ear.  She is currently getting over some bronchitis.  He would like to have a renewal of her handicap parking form having difficulty walking more than 200 feet due to her history of coronary artery disease and chronic left knee pain          Review of Systems   HENT:  Positive for ear pain.             Objective    /64 (Cuff Size: Adult Regular)   Pulse 75   Temp 97.7  F (36.5  C) (Oral)   Resp 20   Ht 1.499 m (4' 11\")   Wt 76.7 kg (169 lb)   SpO2 97%   BMI 34.13 kg/m    Body mass index is 34.13 kg/m .  Physical Exam  HENT:      Right Ear: Tympanic membrane normal. There is no impacted cerumen.      Left Ear: Tympanic membrane normal. There is impacted cerumen.   Cardiovascular:      Rate and Rhythm: Normal rate and regular rhythm. "   Pulmonary:      Effort: Pulmonary effort is normal.      Breath sounds: Normal breath sounds.

## 2024-01-02 DIAGNOSIS — K21.9 GASTROESOPHAGEAL REFLUX DISEASE WITHOUT ESOPHAGITIS: ICD-10-CM

## 2024-03-22 NOTE — PROGRESS NOTES
CARDIOLOGY CLINIC NOTE    PRIMARY CARDIOLOGIST  Dr. Hand     PRIMARY CARE PHYSICIAN:  Burton Mejia    HISTORY OF PRESENT ILLNESS:  Shantelle Su is a very pleasant 79-year-old female with a past medical history significant for anterior MI status post MAGNUS to the LAD in 2016 complicated by proximal stent edge dissection and additional MAGNUS at the ostial LAD, hypertension, dyslipidemia, remote PE, and symptomatic PACs.     Patient's cardiac history dates back to 2016 after having an abnormal stress test showing ischemia in the anterior septal and apical wall.  She subsequently underwent a coronary angiogram that showed 80% stenosis in the mid LAD status post PCI.  Postprocedure, she showed evidence of an acute MI and was found to have a proximal stent edge dissection.  She underwent a thrombectomy followed by overlapping drug-eluting stents at the ostial LAD.  She has since done well.  A follow-up stress echocardiogram in 2017 was negative for inducible ischemia.    Echocardiogram in 2022 showed a normal ejection fraction estimated at 55 to 60%, grade 1 diastolic dysfunction, normal RV size and function, no significant valvular disease.    She returns to the office today for an annual follow-up.  She has been doing well up until 2 weeks ago when she developed COVID while vacationing in [place].  Unfortunately, she had significant chest congestion, GI upset, and fever.  She did describe a localized chest pressure just above her left breast, nonradiating and not similar to prior MI.  Symptoms have since resolved and she has had no further recurrence of chest pain.  She denies shortness of breath, palpitations, PND, orthopnea, presyncope, or syncope.  Upon exam, lungs are clear bilaterally, heart rate and rhythm regular, and no evidence of fluid overload.    Blood pressure is well-controlled at 118/64  She is due for annual labs in June.  Last lipid panel showed a total cholesterol 169, HDL 65, LDL 79, and  triglycerides 127.    She does not engage in any routine exercise  Attempts to follow a low-sodium diet.  Compliant with all medications.    PAST MEDICAL HISTORY:  Past Medical History:   Diagnosis Date    Acute upper respiratory infection 09/30/2002    Angina pectoris, unspecified (H24) 11/23/2016    ASCVD (arteriosclerotic cardiovascular disease) 12/20/2016    CAD (coronary artery disease) 11/23/2016    sp proximal LAD stenting    Diabetes mellitus (H)     Enterocele 09/12/2011    Essential hypertension, benign     Fatty liver 06/04/2015    Based on CT scan done 5/23/15     GERD (gastroesophageal reflux disease) 10/13/2008    Heart palpitations     Hiatal hernia     History of pulmonary embolism 2002    Hyperlipidemia LDL goal <70 12/20/2016    Metabolic syndrome 12/01/2011    Other pulmonary embolism and infarction 09/30/2002    after starting hormone therapy    PAC (premature atrial contraction)     Paroxysmal supraventricular tachycardia 12/20/2016    Rectocele 10/15/2007       MEDICATIONS:  Current Outpatient Medications   Medication    ACE/ARB/ARNI NOT PRESCRIBED (INTENTIONAL)    Acetaminophen (TYLENOL PO)    alpha-d-galactosidase (BEANO) tablet    amLODIPine (NORVASC) 5 MG tablet    aspirin EC 81 MG EC tablet    atorvastatin (LIPITOR) 40 MG tablet    calcium carbonate-vitamin D (CALTRATE) 600-10 MG-MCG per tablet    magnesium oxide (MAG-OX) 400 MG tablet    metFORMIN (GLUCOPHAGE) 500 MG tablet    metoprolol succinate ER (TOPROL XL) 50 MG 24 hr tablet    nitroGLYcerin (NITROSTAT) 0.4 MG sublingual tablet    omeprazole (PRILOSEC) 20 MG DR capsule     Current Facility-Administered Medications   Medication    dexamethasone (DECADRON) injection 2 mL    lidocaine 1 % injection 4 mL       SOCIAL HISTORY:  I have reviewed this patient's social history and updated it with pertinent information if needed. Shantelle Su  reports that she quit smoking about 36 years ago. She has a 15 pack-year smoking history. She has  never used smokeless tobacco. She reports current alcohol use. She reports that she does not use drugs.    PHYSICAL EXAM:  Pulse:  [74] 74  BP: (118)/(64) 118/64  SpO2:  [96 %] 96 %  167 lbs 0 oz    Constitutional: alert, no distress  Respiratory: Good bilateral air entry  Cardiovascular: Regular rate and rhythm  GI: nondistended  Neuropsychiatric: appropriate affact    ASSESSMENT/PLAN:  Pertinent issues addressed/ reviewed during this cardiology visit  Coronary artery disease -status post MAGNUS to the LAD in 2016.  No symptoms of ischemia.  Echocardiogram in 2022 normal.  Continue current medical therapy.  Encourage exercise and weight loss.  Hypertension -well-controlled, continue amlodipine and metoprolol.  Hyperlipidemia -due for fasting lipid panel in June.  Continue atorvastatin.  PACs -resolved.  Continue metoprolol    Follow-up in 1 year with PEDRO or sooner if needed.      It was a pleasure seeing this patient in clinic today. Please do not hesitate to contact me with any future questions.     NIKI Rehman, CNP  Cardiology - [facility]  03/25/2024    Review of the result(s) of each unique test - Last echocardiogram, CBC, BMP and lipid panel.     The level of medical decision making during this visit was of moderate complexity.    This note was completed in part using dictation via the Dragon voice recognition software. Some word and grammatical errors may occur and must be interpreted in the appropriate clinical context.  If there are any questions pertaining to this issue, please contact me for further clarification.

## 2024-03-25 ENCOUNTER — OFFICE VISIT (OUTPATIENT)
Dept: CARDIOLOGY | Facility: CLINIC | Age: 79
End: 2024-03-25
Payer: COMMERCIAL

## 2024-03-25 VITALS
HEIGHT: 59 IN | WEIGHT: 167 LBS | SYSTOLIC BLOOD PRESSURE: 118 MMHG | OXYGEN SATURATION: 96 % | HEART RATE: 74 BPM | BODY MASS INDEX: 33.67 KG/M2 | DIASTOLIC BLOOD PRESSURE: 64 MMHG

## 2024-03-25 DIAGNOSIS — I10 ESSENTIAL HYPERTENSION, BENIGN: ICD-10-CM

## 2024-03-25 DIAGNOSIS — E78.5 HYPERLIPIDEMIA WITH TARGET LDL LESS THAN 70: ICD-10-CM

## 2024-03-25 DIAGNOSIS — I25.10 CORONARY ARTERY DISEASE INVOLVING NATIVE CORONARY ARTERY OF NATIVE HEART WITHOUT ANGINA PECTORIS: Primary | ICD-10-CM

## 2024-03-25 PROCEDURE — 99214 OFFICE O/P EST MOD 30 MIN: CPT | Performed by: NURSE PRACTITIONER

## 2024-03-25 RX ORDER — NITROGLYCERIN 0.4 MG/1
TABLET SUBLINGUAL
Qty: 25 TABLET | Refills: 1 | Status: SHIPPED | OUTPATIENT
Start: 2024-03-25

## 2024-03-25 NOTE — PATIENT INSTRUCTIONS
Thanks for participating in a office visit with the [facility] clinic today.    Doing well on a cardiac standpoint   Reviewed last echocardiogram - stable.   Blood pressures well controlled   Due for fasting lipid panel in June.   Continue current medical therapy  Encourage exercise, weight loss, and low sodium diet.     Follow up in 1 year with PEDRO     Please call my nurse at  [phone] with any questions or concerns.    Scheduling phone number: [phone]  Reminder: Please bring in all current medications, over the counter supplements and vitamin bottles to your next appointment.

## 2024-03-25 NOTE — LETTER
3/25/2024    Burton Mejia MD  [address]  [facility] [address]    RE: Shantelle Su       Dear Colleague,     I had the pleasure of seeing Shantelle Su in the [facility].  CARDIOLOGY CLINIC NOTE    PRIMARY CARDIOLOGIST  Dr. Hand     PRIMARY CARE PHYSICIAN:  Burton Mejia    HISTORY OF PRESENT ILLNESS:  Shantelle Su is a very pleasant 79-year-old female with a past medical history significant for anterior MI status post MAGNUS to the LAD in 2016 complicated by proximal stent edge dissection and additional MAGNUS at the ostial LAD, hypertension, dyslipidemia, remote PE, and symptomatic PACs.     Patient's cardiac history dates back to 2016 after having an abnormal stress test showing ischemia in the anterior septal and apical wall.  She subsequently underwent a coronary angiogram that showed 80% stenosis in the mid LAD status post PCI.  Postprocedure, she showed evidence of an acute MI and was found to have a proximal stent edge dissection.  She underwent a thrombectomy followed by overlapping drug-eluting stents at the ostial LAD.  She has since done well.  A follow-up stress echocardiogram in 2017 was negative for inducible ischemia.    Echocardiogram in 2022 showed a normal ejection fraction estimated at 55 to 60%, grade 1 diastolic dysfunction, normal RV size and function, no significant valvular disease.    She returns to the office today for an annual follow-up.  She has been doing well up until 2 weeks ago when she developed COVID while vacationing in [place].  Unfortunately, she had significant chest congestion, GI upset, and fever.  She did describe a localized chest pressure just above her left breast, nonradiating and not similar to prior MI.  Symptoms have since resolved and she has had no further recurrence of chest pain.  She denies shortness of breath, palpitations, PND, orthopnea, presyncope, or syncope.  Upon exam, lungs are clear bilaterally, heart rate and rhythm regular,  and no evidence of fluid overload.    Blood pressure is well-controlled at 118/64  She is due for annual labs in June.  Last lipid panel showed a total cholesterol 169, HDL 65, LDL 79, and triglycerides 127.    She does not engage in any routine exercise  Attempts to follow a low-sodium diet.  Compliant with all medications.    PAST MEDICAL HISTORY:  Past Medical History:   Diagnosis Date    Acute upper respiratory infection 09/30/2002    Angina pectoris, unspecified (H24) 11/23/2016    ASCVD (arteriosclerotic cardiovascular disease) 12/20/2016    CAD (coronary artery disease) 11/23/2016    sp proximal LAD stenting    Diabetes mellitus (H)     Enterocele 09/12/2011    Essential hypertension, benign     Fatty liver 06/04/2015    Based on CT scan done 5/23/15     GERD (gastroesophageal reflux disease) 10/13/2008    Heart palpitations     Hiatal hernia     History of pulmonary embolism 2002    Hyperlipidemia LDL goal <70 12/20/2016    Metabolic syndrome 12/01/2011    Other pulmonary embolism and infarction 09/30/2002    after starting hormone therapy    PAC (premature atrial contraction)     Paroxysmal supraventricular tachycardia 12/20/2016    Rectocele 10/15/2007       MEDICATIONS:  Current Outpatient Medications   Medication    ACE/ARB/ARNI NOT PRESCRIBED (INTENTIONAL)    Acetaminophen (TYLENOL PO)    alpha-d-galactosidase (BEANO) tablet    amLODIPine (NORVASC) 5 MG tablet    aspirin EC 81 MG EC tablet    atorvastatin (LIPITOR) 40 MG tablet    calcium carbonate-vitamin D (CALTRATE) 600-10 MG-MCG per tablet    magnesium oxide (MAG-OX) 400 MG tablet    metFORMIN (GLUCOPHAGE) 500 MG tablet    metoprolol succinate ER (TOPROL XL) 50 MG 24 hr tablet    nitroGLYcerin (NITROSTAT) 0.4 MG sublingual tablet    omeprazole (PRILOSEC) 20 MG DR capsule     Current Facility-Administered Medications   Medication    dexamethasone (DECADRON) injection 2 mL    lidocaine 1 % injection 4 mL       SOCIAL HISTORY:  I have reviewed this  patient's social history and updated it with pertinent information if needed. Shantelle Su  reports that she quit smoking about 36 years ago. She has a 15 pack-year smoking history. She has never used smokeless tobacco. She reports current alcohol use. She reports that she does not use drugs.    PHYSICAL EXAM:  Pulse:  [74] 74  BP: (118)/(64) 118/64  SpO2:  [96 %] 96 %  167 lbs 0 oz    Constitutional: alert, no distress  Respiratory: Good bilateral air entry  Cardiovascular: Regular rate and rhythm  GI: nondistended  Neuropsychiatric: appropriate affact    ASSESSMENT/PLAN:  Pertinent issues addressed/ reviewed during this cardiology visit  Coronary artery disease -status post MAGNUS to the LAD in 2016.  No symptoms of ischemia.  Echocardiogram in 2022 normal.  Continue current medical therapy.  Encourage exercise and weight loss.  Hypertension -well-controlled, continue amlodipine and metoprolol.  Hyperlipidemia -due for fasting lipid panel in June.  Continue atorvastatin.  PACs -resolved.  Continue metoprolol    Follow-up in 1 year with PEDRO or sooner if needed.      It was a pleasure seeing this patient in clinic today. Please do not hesitate to contact me with any future questions.     NIKI Rehman, CNP  Cardiology - [facility]  03/25/2024    Review of the result(s) of each unique test - Last echocardiogram, CBC, BMP and lipid panel.     The level of medical decision making during this visit was of moderate complexity.    This note was completed in part using dictation via the Dragon voice recognition software. Some word and grammatical errors may occur and must be interpreted in the appropriate clinical context.  If there are any questions pertaining to this issue, please contact me for further clarification.      Thank you for allowing me to participate in the care of your patient.      Sincerely,     NIKI Rehman CNP     [facility]  cc:    Mayito Lawson MD  [address] [place] [address]

## 2024-04-09 ENCOUNTER — HOSPITAL ENCOUNTER (OUTPATIENT)
Dept: MAMMOGRAPHY | Facility: CLINIC | Age: 79
Discharge: HOME | End: 2024-04-09
Attending: FAMILY MEDICINE | Admitting: FAMILY MEDICINE
Payer: COMMERCIAL

## 2024-04-09 DIAGNOSIS — Z12.31 VISIT FOR SCREENING MAMMOGRAM: ICD-10-CM

## 2024-04-09 PROCEDURE — 77063 BREAST TOMOSYNTHESIS BI: CPT

## 2024-04-16 ENCOUNTER — TRANSFERRED RECORDS (OUTPATIENT)
Dept: HEALTH INFORMATION MANAGEMENT | Facility: CLINIC | Age: 79
End: 2024-04-16
Payer: COMMERCIAL

## 2024-04-16 LAB — RETINOPATHY: NEGATIVE

## 2024-05-26 DIAGNOSIS — I10 ESSENTIAL HYPERTENSION, BENIGN: ICD-10-CM

## 2024-05-26 DIAGNOSIS — E11.9 TYPE 2 DIABETES MELLITUS WITHOUT COMPLICATION, WITHOUT LONG-TERM CURRENT USE OF INSULIN (H): ICD-10-CM

## 2024-05-26 DIAGNOSIS — K21.9 GASTROESOPHAGEAL REFLUX DISEASE WITHOUT ESOPHAGITIS: ICD-10-CM

## 2024-05-26 DIAGNOSIS — I47.10 PAROXYSMAL SUPRAVENTRICULAR TACHYCARDIA (H): ICD-10-CM

## 2024-05-29 RX ORDER — METOPROLOL SUCCINATE 50 MG/1
50 TABLET, EXTENDED RELEASE ORAL DAILY
Qty: 90 TABLET | Refills: 1 | Status: SHIPPED | OUTPATIENT
Start: 2024-05-29

## 2024-05-29 RX ORDER — AMLODIPINE BESYLATE 5 MG/1
5 TABLET ORAL DAILY
Qty: 90 TABLET | Refills: 1 | Status: SHIPPED | OUTPATIENT
Start: 2024-05-29

## 2024-06-14 ENCOUNTER — TELEPHONE (OUTPATIENT)
Dept: CARDIOLOGY | Facility: CLINIC | Age: 79
End: 2024-06-14
Payer: COMMERCIAL

## 2024-06-14 DIAGNOSIS — E78.5 HYPERLIPIDEMIA WITH TARGET LDL LESS THAN 70: Primary | ICD-10-CM

## 2024-06-14 DIAGNOSIS — E11.9 TYPE 2 DIABETES MELLITUS WITHOUT COMPLICATION, WITHOUT LONG-TERM CURRENT USE OF INSULIN (H): ICD-10-CM

## 2024-06-14 RX ORDER — ATORVASTATIN CALCIUM 40 MG/1
40 TABLET, FILM COATED ORAL DAILY
Qty: 90 TABLET | Refills: 3 | Status: SHIPPED | OUTPATIENT
Start: 2024-06-14

## 2024-06-14 NOTE — TELEPHONE ENCOUNTER
Health Call Center    Phone Message    May a detailed message be left on voicemail: yes     Reason for Call: Medication Refill Request    Has the patient contacted the pharmacy for the refill? Yes   Name of medication being requested: atorvastatin (LIPITOR) 40 MG tablet   Provider who prescribed the medication: Kristie Abdi  Pharmacy:    [facility] #[id number] - [place], [place] - [address]    Date medication is needed: 06/14/24   Patient will be out of medication on Sunday and pharmacy has not heard back     Action Taken: Other: cardiology    Travel Screening: Not Applicable  Thank you!  [facility]       Date of Service:

## 2024-08-10 ENCOUNTER — HEALTH MAINTENANCE LETTER (OUTPATIENT)
Age: 79
End: 2024-08-10

## 2024-08-13 ENCOUNTER — TELEPHONE (OUTPATIENT)
Dept: FAMILY MEDICINE | Facility: CLINIC | Age: 79
End: 2024-08-13
Payer: COMMERCIAL

## 2024-08-13 NOTE — TELEPHONE ENCOUNTER
Patient requesting lab orders ahead of appointment. Last A1C 12/5/23 and last annual labs 6/6/23.     Appointments in Next Year      Sep 09, 2024 8:30 AM  LAB with [facility]  [facility] ([facility] ) [phone]     Sep 17, 2024 10:00 AM  (Arrive by 9:40 AM)  MEDICARE ANNUAL WELLNESS VISIT with Burton Mejia MD  [facility] [phone]           Mita RN 9:50 AM August 13, 2024   [facility]

## 2024-08-13 NOTE — TELEPHONE ENCOUNTER
I believe her labs can be ordered at the time of her lab visit, her health maintenance orders have been signed     SIRIA

## 2024-08-13 NOTE — TELEPHONE ENCOUNTER
Patient calls back. Informed of message regarding lab results. Patient will keep the lab appointment for 9/9 as she has labs ordered by Cardiology for Lipid panel and ALT - appointment note updated. Patient will wait for appointment with Dr. Mejia for any additional labs needed.    Ayah Fuchs RN  [facility]

## 2024-08-13 NOTE — TELEPHONE ENCOUNTER
Call to Shantelle, no answer, left message to call back. Patient  needs to know RN can order HM due but patient will then get a lab charge for that lab only visit and also may need additional labs at the time of the provider visit based on exam that day.     Marian Caruso R.N.

## 2024-08-23 DIAGNOSIS — E11.9 TYPE 2 DIABETES MELLITUS WITHOUT COMPLICATION, WITHOUT LONG-TERM CURRENT USE OF INSULIN (H): ICD-10-CM

## 2024-09-09 ENCOUNTER — LAB (OUTPATIENT)
Dept: LAB | Facility: CLINIC | Age: 79
End: 2024-09-09
Payer: COMMERCIAL

## 2024-09-09 DIAGNOSIS — E11.9 TYPE 2 DIABETES MELLITUS WITHOUT COMPLICATION, WITHOUT LONG-TERM CURRENT USE OF INSULIN (H): Primary | ICD-10-CM

## 2024-09-09 DIAGNOSIS — E78.5 HYPERLIPIDEMIA WITH TARGET LDL LESS THAN 70: ICD-10-CM

## 2024-09-09 DIAGNOSIS — I10 ESSENTIAL HYPERTENSION, BENIGN: ICD-10-CM

## 2024-09-09 LAB
ALT SERPL W P-5'-P-CCNC: 29 U/L (ref 0–50)
ANION GAP SERPL CALCULATED.3IONS-SCNC: 11 MMOL/L (ref 7–15)
BUN SERPL-MCNC: 13.9 MG/DL (ref 8–23)
CALCIUM SERPL-MCNC: 9.4 MG/DL (ref 8.8–10.4)
CHLORIDE SERPL-SCNC: 100 MMOL/L (ref 98–107)
CHOLEST SERPL-MCNC: 167 MG/DL
CREAT SERPL-MCNC: 0.75 MG/DL (ref 0.51–0.95)
CREAT UR-MCNC: 84.7 MG/DL
EGFRCR SERPLBLD CKD-EPI 2021: 81 ML/MIN/1.73M2
FASTING STATUS PATIENT QL REPORTED: YES
FASTING STATUS PATIENT QL REPORTED: YES
GLUCOSE SERPL-MCNC: 136 MG/DL (ref 70–99)
HBA1C MFR BLD: 7.1 % (ref 0–5.6)
HCO3 SERPL-SCNC: 27 MMOL/L (ref 22–29)
HDLC SERPL-MCNC: 63 MG/DL
LDLC SERPL CALC-MCNC: 72 MG/DL
MICROALBUMIN UR-MCNC: 24.8 MG/L
MICROALBUMIN/CREAT UR: 29.28 MG/G CR (ref 0–25)
NONHDLC SERPL-MCNC: 104 MG/DL
POTASSIUM SERPL-SCNC: 4.9 MMOL/L (ref 3.4–5.3)
SODIUM SERPL-SCNC: 138 MMOL/L (ref 135–145)
TRIGL SERPL-MCNC: 160 MG/DL

## 2024-09-09 PROCEDURE — 84460 ALANINE AMINO (ALT) (SGPT): CPT

## 2024-09-09 PROCEDURE — 82570 ASSAY OF URINE CREATININE: CPT

## 2024-09-09 PROCEDURE — 83036 HEMOGLOBIN GLYCOSYLATED A1C: CPT

## 2024-09-09 PROCEDURE — 80048 BASIC METABOLIC PNL TOTAL CA: CPT

## 2024-09-09 PROCEDURE — 80061 LIPID PANEL: CPT

## 2024-09-09 PROCEDURE — 36415 COLL VENOUS BLD VENIPUNCTURE: CPT

## 2024-09-09 PROCEDURE — 82043 UR ALBUMIN QUANTITATIVE: CPT

## 2024-09-17 ENCOUNTER — OFFICE VISIT (OUTPATIENT)
Dept: FAMILY MEDICINE | Facility: CLINIC | Age: 79
End: 2024-09-17
Attending: FAMILY MEDICINE
Payer: COMMERCIAL

## 2024-09-17 VITALS
TEMPERATURE: 98 F | HEART RATE: 80 BPM | DIASTOLIC BLOOD PRESSURE: 70 MMHG | BODY MASS INDEX: 33.06 KG/M2 | WEIGHT: 164 LBS | HEIGHT: 59 IN | RESPIRATION RATE: 16 BRPM | OXYGEN SATURATION: 96 % | SYSTOLIC BLOOD PRESSURE: 124 MMHG

## 2024-09-17 DIAGNOSIS — I25.118 CORONARY ARTERY DISEASE OF NATIVE ARTERY OF NATIVE HEART WITH STABLE ANGINA PECTORIS (H): ICD-10-CM

## 2024-09-17 DIAGNOSIS — I96: ICD-10-CM

## 2024-09-17 DIAGNOSIS — Z00.00 ENCOUNTER FOR MEDICARE ANNUAL WELLNESS EXAM: Primary | ICD-10-CM

## 2024-09-17 DIAGNOSIS — E11.9 TYPE 2 DIABETES MELLITUS WITHOUT COMPLICATION, WITHOUT LONG-TERM CURRENT USE OF INSULIN (H): ICD-10-CM

## 2024-09-17 DIAGNOSIS — I47.10 PAROXYSMAL SUPRAVENTRICULAR TACHYCARDIA (H): ICD-10-CM

## 2024-09-17 PROBLEM — E11.59 TYPE 2 DIABETES MELLITUS WITH OTHER CIRCULATORY COMPLICATIONS (H): Status: ACTIVE | Noted: 2019-05-02

## 2024-09-17 PROBLEM — E11.59 TYPE 2 DIABETES MELLITUS WITH OTHER CIRCULATORY COMPLICATIONS (H): Status: RESOLVED | Noted: 2019-05-02 | Resolved: 2024-09-17

## 2024-09-17 PROCEDURE — 99207 PR FOOT EXAM NO CHARGE: CPT | Performed by: FAMILY MEDICINE

## 2024-09-17 PROCEDURE — G0439 PPPS, SUBSEQ VISIT: HCPCS | Performed by: FAMILY MEDICINE

## 2024-09-17 PROCEDURE — 99214 OFFICE O/P EST MOD 30 MIN: CPT | Mod: 25 | Performed by: FAMILY MEDICINE

## 2024-09-17 NOTE — PROGRESS NOTES
"Preventive Care Visit  [facility]  Burton Mejia MD, Family Medicine  Sep 17, 2024      Assessment & Plan     Encounter for Medicare annual wellness exam  - PRIMARY CARE FOLLOW-UP SCHEDULING    Type 2 diabetes mellitus without complication, without long-term current use of insulin (H)  Stable, controlled.  Patient prefers to have a tighter glycemic control and is interested in further weight management with a goal of 150, increase metformin to twice daily, recheck A1c in 6 months.  She does have some periodic joint pain and has been using naproxen, monitor kidney functions.  - FOOT EXAM  - metFORMIN (GLUCOPHAGE) 500 MG tablet  Dispense: 180 tablet; Refill: 3  - Hemoglobin A1c  - Basic metabolic panel  (Ca, Cl, CO2, Creat, Gluc, K, Na, BUN)    Sloughing of skin (H)  From recent work, no cellulitis    Paroxysmal supraventricular tachycardia (H24)  Asymptomatic, continue to monitor    Coronary artery disease of native artery of native heart with stable angina pectoris (H24)  Follow-up with cardiology, patient continues 40 mg Lipitor, they prefer to have her at a tight LDL control of less than 55.  Discussion with her regarding this and she would like to hold the course for now.                BMI  Estimated body mass index is 33.12 kg/m  as calculated from the following:    Height as of this encounter: 1.499 m (4' 11\").    Weight as of this encounter: 74.4 kg (164 lb).   Weight management plan: Discussed healthy diet and exercise guidelines    Counseling  Appropriate preventive services were addressed with this patient via screening, questionnaire, or discussion as appropriate for fall prevention, nutrition, physical activity, Tobacco-use cessation, social engagement, weight loss and cognition.  Checklist reviewing preventive services available has been given to the patient.  Reviewed patient's diet, addressing concerns and/or questions.   Discussed possible causes of fatigue.         Subjective "   Shantelle is a 79 year old, presenting for the following:          Physical        9/17/2024     9:33 AM   Additional Questions   Roomed by Regi SCHILLING         Health Care Directive  Patient does not have a Health Care Directive or Living Will: Discussed advance care planning with patient; however, patient declined at this time.    HPI    Patient reports she originally made appointment for knee and hip pain however improved after rest, she believes this is associated with redoing the latasha in her home.  He also has noticed some sloughing of the skin on her fingertips which are not painful, no numbness.  She is trying to lose more weight and was frustrated that her A1c is up to 7.1.  She has questions regarding her statin dose as her cardiologist would like to increased to 80 mg daily.  Discontinuing ibuprofen and rotating with Aleve for pain which has been helpful however she wants to make sure it is not causing her any harm.  She denies any bleeding.      9/12/2024   General Health   How would you rate your overall physical health? Good   Feel stress (tense, anxious, or unable to sleep) Not at all            9/12/2024   Nutrition   Diet: Low salt            9/12/2024   Exercise   Days per week of moderate/strenous exercise Patient declined   Average minutes spent exercising at this level Patient declined            9/12/2024   Social Factors   Frequency of gathering with friends or relatives Once a week   Worry food won't last until get money to buy more No   Food not last or not have enough money for food? No   Do you have housing? (Housing is defined as stable permanent housing and does not include staying ouside in a car, in a tent, in an abandoned building, in an overnight shelter, or couch-surfing.) Yes   Are you worried about losing your housing? No   Lack of transportation? No   Unable to get utilities (heat,electricity)? No            9/17/2024   Fall Risk   Gait Speed Test (Document in seconds) 3.2   Gait  Speed Test Interpretation Less than or equal to 5.00 seconds - PASS               2024   Activities of Daily Living- Home Safety   Needs help with the following daily activites None of the above   Safety concerns in the home None of the above            2024   Dental   Dentist two times every year? Yes            2024   Hearing Screening   Hearing concerns? None of the above            2024   Driving Risk Screening   Patient/family members have concerns about driving No            2024   General Alertness/Fatigue Screening   Have you been more tired than usual lately? (!) YES            2024   Urinary Incontinence Screening   Bothered by leaking urine in past 6 months No            2024   TB Screening   Were you born outside of the [place]? No            Today's PHQ-2 Score:       2024     9:30 AM   PHQ-2 (  [employer])   Q1: Little interest or pleasure in doing things 0   Q2: Feeling down, depressed or hopeless 0   PHQ-2 Score 0   Q1: Little interest or pleasure in doing things Not at all   Q2: Feeling down, depressed or hopeless Not at all   PHQ-2 Score 0           2024   Substance Use   Alcohol more than 3/day or more than 7/wk No   Do you have a current opioid prescription? No   How severe/bad is pain from 1 to 10? 6/10   Do you use any other substances recreationally? No        Social History     Tobacco Use    Smoking status: Former     Current packs/day: 0.00     Average packs/day: 0.8 packs/day for 20.0 years (15.0 ttl pk-yrs)     Types: Cigarettes     Start date: 1967     Quit date: 1987     Years since quittin.8    Smokeless tobacco: Never   Vaping Use    Vaping status: Never Used   Substance Use Topics    Alcohol use: Yes     Comment: 2 per month    Drug use: No           2024   LAST FHS-7 RESULTS   1st degree relative breast or ovarian cancer No   Any relative bilateral breast cancer No   Any male have breast cancer No   Any ONE woman have BOTH  breast AND ovarian cancer No   Any woman with breast cancer before 50yrs No   2 or more relatives with breast AND/OR ovarian cancer No   2 or more relatives with breast AND/OR bowel cancer No               ASCVD Risk   The 10-year ASCVD risk score (Memo SMITH, et al., 2019) is: 46.1%    Values used to calculate the score:      Age: 79 years      Sex: Female      Is Non- : No      Diabetic: Yes      Tobacco smoker: No      Systolic Blood Pressure: 124 mmHg      Is BP treated: Yes      HDL Cholesterol: 63 mg/dL      Total Cholesterol: 167 mg/dL            Reviewed and updated as needed this visit by Provider                      Current providers sharing in care for this patient include:  Patient Care Team:  Burton Mejia MD as PCP - General (Family Medicine)  Burton Mejia MD as Assigned PCP  Kristie Abdi APRN CNP as Assigned Heart and Vascular Provider    The following health maintenance items are reviewed in Epic and correct as of today:  Health Maintenance   Topic Date Due    ZOSTER IMMUNIZATION (1 of 2) Never done    RSV VACCINE (1 - 1-dose 75+ series) Never done    DIABETIC FOOT EXAM  06/06/2024    INFLUENZA VACCINE (1) 09/01/2024    COVID-19 Vaccine (4 - 2024-25 season) 09/01/2024    MEDICARE ANNUAL WELLNESS VISIT  08/29/2024    ANNUAL REVIEW OF HM ORDERS  12/05/2024    A1C  03/09/2025    EYE EXAM  04/16/2025    BMP  09/09/2025    LIPID  09/09/2025    MICROALBUMIN  09/09/2025    FALL RISK ASSESSMENT  09/17/2025    ADVANCE CARE PLANNING  08/29/2028    COLORECTAL CANCER SCREENING  01/23/2029    DTAP/TDAP/TD IMMUNIZATION (3 - Td or Tdap) 08/30/2031    DEXA  03/31/2038    PHQ-2 (once per calendar year)  Completed    Pneumococcal Vaccine: 65+ Years  Completed    HPV IMMUNIZATION  Aged Out    MENINGITIS IMMUNIZATION  Aged Out    RSV MONOCLONAL ANTIBODY  Aged Out    URINE DRUG SCREEN  Discontinued    HEPATITIS C SCREENING  Discontinued    MAMMO SCREENING  Discontinued           "  Objective    Exam  /70 (Cuff Size: Adult Regular)   Pulse 80   Temp 98  F (36.7  C) (Oral)   Resp 16   Ht 1.499 m (4' 11\")   Wt 74.4 kg (164 lb)   SpO2 96%   BMI 33.12 kg/m     Estimated body mass index is 33.12 kg/m  as calculated from the following:    Height as of this encounter: 1.499 m (4' 11\").    Weight as of this encounter: 74.4 kg (164 lb).    Physical Exam  Vitals reviewed.   Constitutional:       Appearance: Normal appearance. She is not ill-appearing.   HENT:      Head: Normocephalic.      Right Ear: Tympanic membrane normal.      Left Ear: Tympanic membrane normal.      Nose: No congestion or rhinorrhea.   Cardiovascular:      Rate and Rhythm: Normal rate and regular rhythm.   Pulmonary:      Effort: Pulmonary effort is normal.   Abdominal:      General: Abdomen is flat. There is no distension.      Palpations: Abdomen is soft.      Tenderness: There is no abdominal tenderness.   Lymphadenopathy:      Cervical: No cervical adenopathy.   Skin:     Capillary Refill: Capillary refill takes less than 2 seconds.      Findings: No lesion.      Comments: Superficial sloughing of skin on her fingertips, no erythema or tenderness.   Neurological:      General: No focal deficit present.      Mental Status: She is alert.   Psychiatric:         Thought Content: Thought content normal.         Judgment: Judgment normal.       Hemoglobin A1C   Date Value Ref Range Status   09/09/2024 7.1 (H) 0.0 - 5.6 % Final     Comment:     Normal <5.7%   Prediabetes 5.7-6.4%    Diabetes 6.5% or higher     Note: Adopted from ADA consensus guidelines.   01/14/2021 6.8 (H) 0 - 5.6 % Final     Comment:     Normal <5.7% Prediabetes 5.7-6.4%  Diabetes 6.5% or higher - adopted from ADA   consensus guidelines.                  No data to display                       Signed Electronically by: Burton Mejia MD    "

## 2024-09-30 NOTE — PROCEDURE: BIOPSY BY SHAVE METHOD
Medication: alendronate (FOSAMAX) 70 MG tablet  passed protocol.   Last office visit date: 07/02/2024  Next appointment scheduled?: Yes   Number of refills given: 1     Cryotherapy Text: The wound bed was treated with cryotherapy after the biopsy was performed.

## 2024-10-01 NOTE — RESULT ENCOUNTER NOTE
Patient saw PCP on 9-17-24  Coronary artery disease of native artery of native heart with stable angina pectoris (H24)  Follow-up with cardiology, patient continues 40 mg Lipitor, they prefer to have her at a tight LDL control of less than 55.  Discussion with her regarding this and she would like to hold the course for now.    Would like to continue on atorvastatin 40mg.  Per PCP OV Sofia Clayton RN on 10/1/2024 at 3:42 PM

## 2024-12-06 NOTE — PROGRESS NOTES
HPI and Plan:   See dictation:[id number]    Orders Placed This Encounter   Procedures     Follow-Up with Cardiologist     Exercise Stress Echocardiogram       No orders of the defined types were placed in this encounter.      There are no discontinued medications.      Encounter Diagnoses   Name Primary?     Coronary artery disease of native artery of native heart with stable angina pectoris (H) Yes     ASCVD (arteriosclerotic cardiovascular disease)      Postsurgical percutaneous transluminal coronary angioplasty status      Paroxysmal supraventricular tachycardia (H)        CURRENT MEDICATIONS:  Current Outpatient Prescriptions   Medication Sig Dispense Refill     ticagrelor (BRILINTA) 90 MG tablet Take 1 tablet (90 mg) by mouth every 12 hours 180 tablet 3     metoprolol (TOPROL XL) 50 MG 24 hr tablet Take 1 tablet (50 mg) by mouth daily 90 tablet 3     atorvastatin (LIPITOR) 40 MG tablet Take 1 tablet (40 mg) by mouth daily 90 tablet 3     nitroglycerin (NITROSTAT) 0.4 MG SL tablet As needed for anginal chest pain. Maximum is 3 doses in 15 minutes (one every 5 minutes). 25 tablet 3     aspirin EC 81 MG EC tablet Take 1 tablet (81 mg) by mouth daily 90 tablet 3     amLODIPine (NORVASC) 5 MG tablet Take 5 mg by mouth daily       acetaminophen (TYLENOL) 325 MG tablet Take 2 tablets (650 mg) by mouth every 4 hours as needed for other (mild pain) 100 tablet 0     metFORMIN (GLUCOPHAGE) 500 MG tablet Take 1 tablet (500 mg) by mouth daily (with dinner) 90 tablet 3     omeprazole (PRILOSEC) 20 MG capsule Take 1 capsule (20 mg) by mouth daily 90 capsule 3     Docusate Calcium (STOOL SOFTENER PO) Take 1 capsule by mouth 2 times daily        glucose blood VI test strips strip Accucheck Kristine Plus  3 times daily (Patient taking differently: daily Accucheck Kristine Plus  3 times daily) 300 strip 11     ACCU-CHEK MULTICLIX LANCETS MISC 3 times daily. BG testing 3 times a day (Patient taking differently: daily 3 times daily. BG  testing 3 times a day) 100 each 11     Blood Glucose Calibration (ACCU-CHEK VI) SOLN Use as directed 3 Bottle 11     Blood Glucose Monitoring Suppl (ACCU-CHEK VI) KIT 2 times daily. With routine lancets as well as alcohol swabs, dispense 1 month (Patient taking differently: daily With routine lancets as well as alcohol swabs, dispense 1 month) 1 kit 0       ALLERGIES     Allergies   Allergen Reactions     Lisinopril      bp increased       PAST MEDICAL HISTORY:  Past Medical History   Diagnosis Date     Acute upper respiratory infection 9/30/2002     CAD (coronary artery disease) 11/23/2016     sp proximal LAD stenting     Diabetes mellitus (H)      Enterocele 9/12/2011     Essential hypertension, benign      GERD (gastroesophageal reflux disease) 10/13/2008     Hiatal hernia      Hyperlipidemia LDL goal <130 10/31/2010     Other pulmonary embolism and infarction 9/30/2002     after starting hormone therapy     PAC (premature atrial contraction)      Rectocele 10/15/2007       PAST SURGICAL HISTORY:  Past Surgical History   Procedure Laterality Date     Hc dilation/curettage diag/ther non ob       after sab     C nonspecific procedure  2009     [place] mesh augmented anterior colporrhaphy and vault suspension, tension-free vaginal tape sling with a transobturator approach usingthe Obtryx device and cystoscopy.       Hc tooth extraction w/forcep       Colporrhapy posterior, cystoscopy, combined  12/7/2011     Procedure:COMBINED COLPORRHAPY POSTERIOR, CYSTOSCOPY; POSTERIOR MESH AUGMENTED REPAIR WITH ELEVATE MESH , cystoscopy; Surgeon:HEDY UP; Location:[facility] OR     Abdomen surgery       Bladder sling     Colonoscopy       Scheduled October 2016     Dilation and curettage, hysteroscopy diagnostic, combined N/A 10/4/2016     Procedure: COMBINED DILATION AND CURETTAGE, HYSTEROSCOPY DIAGNOSTIC;  Surgeon: Hedy Up MD;  Location:  OR      left heart catheterization  11/23/16     PCI with drug-eluting  stent placement in the proximal LAD       FAMILY HISTORY:  Family History   Problem Relation Age of Onset     HEART DISEASE Father      D AGE 50     HEART DISEASE Mother      D AGE 70     HEART DISEASE Brother      B AGE 52 HEART SURGERY     Family History Negative Brother      B AGE 47     CEREBROVASCULAR DISEASE Sister      B AGE 54 BLOOD CLOTS     Family History Negative Sister      B AGE 60     Gynecology Daughter      DIABETES Mother      Coronary Artery Disease Mother      Coronary Artery Disease Father      Hypertension Mother      Hypertension Brother      Hyperlipidemia Mother      Hyperlipidemia Father      CEREBROVASCULAR DISEASE Sister      Depression Sister        SOCIAL HISTORY:  Social History     Social History     Marital status:      Spouse name: N/A     Number of children: N/A     Years of education: N/A     Social History Main Topics     Smoking status: Former Smoker     Quit date: 11/5/1987     Smokeless tobacco: Never Used     Alcohol use Yes      Comment: 2 per month     Drug use: No     Sexual activity: No     Other Topics Concern     Parent/Sibling W/ Cabg, Mi Or Angioplasty Before 65f 55m? Yes     Father, brother     Caffeine Concern No     decaf coffee      Special Diet No     Exercise No     cardio rehab     Social History Narrative       Review of Systems:  Skin:  Negative       Eyes:  Positive for glasses    ENT:  Negative      Respiratory:  Positive for dyspnea on exertion     Cardiovascular:    Positive for;fatigue (heaviness in chest with activity)    Gastroenterology: Positive for heartburn;reflux (has taken more tums)    Genitourinary:  not assessed      Musculoskeletal:  Positive for back pain    Neurologic:  Negative      Psychiatric:  Negative      Heme/Lymph/Imm:  Negative      Endocrine:  Positive for diabetes pre- diabetic     Physical Exam:  Vitals: /75 (BP Location: Right arm, Cuff Size: Adult Large)  Pulse 80  Ht 1.524 m (5')  Wt 74.8 kg (165 lb)  SpO2 96%   BMI 32.22 kg/m2    Constitutional:  cooperative, alert and oriented, well developed, well nourished, in no acute distress overweight      Skin:  warm and dry to the touch, no apparent skin lesions or masses noted        Head:  normocephalic, no masses or lesions        Eyes:  pupils equal and round, conjunctivae and lids unremarkable, sclera white, no xanthalasma, EOMS intact, no nystagmus        ENT:  no pallor or cyanosis, dentition good        Neck:  carotid pulses are full and equal bilaterally, JVP normal, no carotid bruit, no thyromegaly        Chest:  normal breath sounds, clear to auscultation, normal A-P diameter, normal symmetry, normal respiratory excursion, no use of accessory muscles          Cardiac: regular rhythm, normal S1/S2, no S3 or S4, apical impulse not displaced, no murmurs, gallops or rubs                  Abdomen:  abdomen soft, non-tender, BS normoactive, no mass, no HSM, no bruits        Vascular: pulses full and equal, no bruits auscultated                                        Extremities and Back:  no deformities, clubbing, cyanosis, erythema observed;no edema              Neurological:  affect appropriate, oriented to time, person and place;no gross motor deficits              CC  Gee Lozada MD   [facility]  [address]  [place] [id number]               none

## 2024-12-17 DIAGNOSIS — K21.9 GASTROESOPHAGEAL REFLUX DISEASE WITHOUT ESOPHAGITIS: ICD-10-CM

## 2024-12-17 DIAGNOSIS — I47.10 PAROXYSMAL SUPRAVENTRICULAR TACHYCARDIA (H): ICD-10-CM

## 2024-12-17 RX ORDER — METOPROLOL SUCCINATE 50 MG/1
50 TABLET, EXTENDED RELEASE ORAL DAILY
Qty: 90 TABLET | Refills: 2 | Status: SHIPPED | OUTPATIENT
Start: 2024-12-17

## 2024-12-31 DIAGNOSIS — I10 ESSENTIAL HYPERTENSION, BENIGN: ICD-10-CM

## 2024-12-31 RX ORDER — AMLODIPINE BESYLATE 5 MG/1
5 TABLET ORAL DAILY
Qty: 90 TABLET | Refills: 0 | Status: SHIPPED | OUTPATIENT
Start: 2024-12-31

## 2024-12-31 NOTE — TELEPHONE ENCOUNTER
Patient requesting amlodipine refill. New pharmacy desired attached. Please send as able. Pt running out

## 2025-01-27 ENCOUNTER — OFFICE VISIT (OUTPATIENT)
Dept: URGENT CARE | Facility: URGENT CARE | Age: 80
End: 2025-01-27
Payer: COMMERCIAL

## 2025-01-27 VITALS
BODY MASS INDEX: 33.06 KG/M2 | RESPIRATION RATE: 18 BRPM | SYSTOLIC BLOOD PRESSURE: 149 MMHG | HEIGHT: 59 IN | WEIGHT: 164 LBS | DIASTOLIC BLOOD PRESSURE: 84 MMHG | HEART RATE: 84 BPM | OXYGEN SATURATION: 96 % | TEMPERATURE: 98.7 F

## 2025-01-27 DIAGNOSIS — J20.9 ACUTE BRONCHITIS, UNSPECIFIED ORGANISM: Primary | ICD-10-CM

## 2025-01-27 LAB
FLUAV AG SPEC QL IA: NEGATIVE
FLUBV AG SPEC QL IA: NEGATIVE

## 2025-01-27 PROCEDURE — 87804 INFLUENZA ASSAY W/OPTIC: CPT | Performed by: PHYSICIAN ASSISTANT

## 2025-01-27 PROCEDURE — 99214 OFFICE O/P EST MOD 30 MIN: CPT | Performed by: PHYSICIAN ASSISTANT

## 2025-01-27 PROCEDURE — 87635 SARS-COV-2 COVID-19 AMP PRB: CPT | Performed by: PHYSICIAN ASSISTANT

## 2025-01-27 RX ORDER — ALBUTEROL SULFATE 90 UG/1
1-2 INHALANT RESPIRATORY (INHALATION) EVERY 6 HOURS
Qty: 8.5 G | Refills: 0 | Status: SHIPPED | OUTPATIENT
Start: 2025-01-27 | End: 2025-02-01

## 2025-01-27 NOTE — PROGRESS NOTES
URGENT CARE VISIT:    SUBJECTIVE:   Shantelle Su is a 79 year old female presenting with a chief complaint of cough - productive and wheezing.  Onset was 4 day(s) ago.   She denies the following symptoms: stuffy nose and sore throat  Course of illness is same.    Treatment measures tried include Coricidin with some relief of symptoms.  Predisposing factors include None.    PMH:   Past Medical History:   Diagnosis Date    Acute upper respiratory infection 09/30/2002    Angina pectoris, unspecified 11/23/2016    ASCVD (arteriosclerotic cardiovascular disease) 12/20/2016    CAD (coronary artery disease) 11/23/2016    sp proximal LAD stenting    Diabetes mellitus (H)     Enterocele 09/12/2011    Essential hypertension, benign     Fatty liver 06/04/2015    Based on CT scan done 5/23/15     GERD (gastroesophageal reflux disease) 10/13/2008    Heart palpitations     Hiatal hernia     History of pulmonary embolism 2002    Hyperlipidemia LDL goal <70 12/20/2016    Metabolic syndrome 12/01/2011    Other pulmonary embolism and infarction 09/30/2002    after starting hormone therapy    PAC (premature atrial contraction)     Paroxysmal supraventricular tachycardia 12/20/2016    Rectocele 10/15/2007     Allergies: Bactrim [sulfamethoxazole-trimethoprim] and Lisinopril   Medications:   Current Outpatient Medications   Medication Sig Dispense Refill    Acetaminophen (TYLENOL PO) Take 500-1,000 mg by mouth At Bedtime       albuterol (PROAIR HFA/PROVENTIL HFA/VENTOLIN HFA) 108 (90 Base) MCG/ACT inhaler Inhale 1-2 puffs into the lungs every 6 hours for 5 days. 8.5 g 0    alpha-d-galactosidase (BEANO) tablet Take by mouth as needed       amLODIPine (NORVASC) 5 MG tablet Take 1 tablet (5 mg) by mouth daily. 90 tablet 0    aspirin EC 81 MG EC tablet Take 1 tablet (81 mg) by mouth daily 90 tablet 3    atorvastatin (LIPITOR) 40 MG tablet Take 1 tablet (40 mg) by mouth daily 90 tablet 3    metFORMIN (GLUCOPHAGE) 500 MG tablet Take 1  "tablet (500 mg) by mouth 2 times daily (with meals). 180 tablet 3    metoprolol succinate ER (TOPROL XL) 50 MG 24 hr tablet Take 1 tablet (50 mg) by mouth daily. 90 tablet 2    nitroGLYcerin (NITROSTAT) 0.4 MG sublingual tablet For chest pain place 1 tablet under the tongue every 5 minutes for 3 doses. If symptoms persist 5 minutes after 1st dose call 911. 25 tablet 1    omeprazole (PRILOSEC) 20 MG DR capsule Take 1 capsule (20 mg) by mouth daily. 90 capsule 2    ACE/ARB/ARNI NOT PRESCRIBED (INTENTIONAL) Please choose reason not prescribed from choices below. (Patient not taking: Reported on 2025)      calcium carbonate-vitamin D (CALTRATE) 600-10 MG-MCG per tablet Take 1 tablet by mouth daily (Patient not taking: Reported on 2025)      magnesium oxide (MAG-OX) 400 MG tablet Take 1 tablet (400 mg) by mouth daily (Patient not taking: Reported on 2025) 90 tablet 3     Social History:   Social History     Tobacco Use    Smoking status: Former     Current packs/day: 0.00     Average packs/day: 0.8 packs/day for 20.0 years (15.0 ttl pk-yrs)     Types: Cigarettes     Start date: 1967     Quit date: 1987     Years since quittin.2    Smokeless tobacco: Never   Substance Use Topics    Alcohol use: Yes     Comment: 2 per month       ROS:  General: negative  Skin: negative  Eyes: negative  Ears/Nose/Throat: negative  Respiratory: Cough  Cardiovascular: negative  Gastrointestinal: negative  Genitourinary: negative  Musculoskeletal: negative  Neurologic: negative      OBJECTIVE:  BP (!) 149/84   Pulse 84   Temp 98.7  F (37.1  C) (Oral)   Resp 18   Ht 1.499 m (4' 11\")   Wt 74.4 kg (164 lb)   SpO2 96%   BMI 33.12 kg/m    GENERAL APPEARANCE: healthy, alert and no distress  EYES: EOMI,  PERRL, conjunctiva clear  HENT: ear canals and TM's normal.  Nose and mouth without ulcers, erythema or lesions  NECK: supple, nontender, no lymphadenopathy  RESP: lungs clear to auscultation - no rales, rhonchi " or wheezes  CV: regular rates and rhythm, normal S1 S2, no murmur noted  SKIN: no suspicious lesions or rashes    Labs:    Results for orders placed or performed in visit on 01/27/25   Influenza A & B Antigen - Clinic Collect     Status: Normal    Specimen: Nose; Swab   Result Value Ref Range    Influenza A antigen Negative Negative    Influenza B antigen Negative Negative    Narrative    Test results must be correlated with clinical data. If necessary, results should be confirmed by a molecular assay or viral culture.       ASSESSMENT:    ICD-10-CM    1. Acute bronchitis, unspecified organism  J20.9 Influenza A & B Antigen - Clinic Collect     COVID-19 Virus (Coronavirus) by PCR Nose     albuterol (PROAIR HFA/PROVENTIL HFA/VENTOLIN HFA) 108 (90 Base) MCG/ACT inhaler          PLAN:  Patient Instructions   Patient was educated on the natural course of condition. Take medications as directed. Side effects discussed. Conservative measures discussed including rest, increased fluids, humidifier, and teaspoon of honey. See your primary care provider if symptoms do not improve in 7 days. Seek emergency care if you develop shortness of breath or fever over 103.    Patient verbalized understanding and is agreeable to plan. The patient was discharged ambulatory and in stable condition.    NANCIE Moreira...................  1/27/2025   11:22 AM

## 2025-01-28 LAB — SARS-COV-2 RNA RESP QL NAA+PROBE: NEGATIVE

## 2025-02-26 DIAGNOSIS — I10 ESSENTIAL HYPERTENSION, BENIGN: ICD-10-CM

## 2025-02-26 RX ORDER — AMLODIPINE BESYLATE 5 MG/1
5 TABLET ORAL DAILY
Qty: 90 TABLET | Refills: 0 | Status: SHIPPED | OUTPATIENT
Start: 2025-02-26

## 2025-03-22 ENCOUNTER — HEALTH MAINTENANCE LETTER (OUTPATIENT)
Age: 80
End: 2025-03-22

## 2025-03-24 ENCOUNTER — LAB (OUTPATIENT)
Dept: LAB | Facility: CLINIC | Age: 80
End: 2025-03-24
Payer: COMMERCIAL

## 2025-03-24 DIAGNOSIS — E11.9 TYPE 2 DIABETES MELLITUS WITHOUT COMPLICATION, WITHOUT LONG-TERM CURRENT USE OF INSULIN (H): ICD-10-CM

## 2025-03-24 LAB
ANION GAP SERPL CALCULATED.3IONS-SCNC: 13 MMOL/L (ref 7–15)
BUN SERPL-MCNC: 13.1 MG/DL (ref 8–23)
CALCIUM SERPL-MCNC: 9.5 MG/DL (ref 8.8–10.4)
CHLORIDE SERPL-SCNC: 101 MMOL/L (ref 98–107)
CREAT SERPL-MCNC: 0.62 MG/DL (ref 0.51–0.95)
EGFRCR SERPLBLD CKD-EPI 2021: 90 ML/MIN/1.73M2
EST. AVERAGE GLUCOSE BLD GHB EST-MCNC: 137 MG/DL
GLUCOSE SERPL-MCNC: 112 MG/DL (ref 70–99)
HBA1C MFR BLD: 6.4 % (ref 0–5.6)
HCO3 SERPL-SCNC: 25 MMOL/L (ref 22–29)
POTASSIUM SERPL-SCNC: 4.4 MMOL/L (ref 3.4–5.3)
SODIUM SERPL-SCNC: 139 MMOL/L (ref 135–145)

## 2025-03-24 PROCEDURE — 80048 BASIC METABOLIC PNL TOTAL CA: CPT

## 2025-03-24 PROCEDURE — 83036 HEMOGLOBIN GLYCOSYLATED A1C: CPT

## 2025-03-24 PROCEDURE — 36415 COLL VENOUS BLD VENIPUNCTURE: CPT

## 2025-04-28 ENCOUNTER — TRANSFERRED RECORDS (OUTPATIENT)
Dept: HEALTH INFORMATION MANAGEMENT | Facility: CLINIC | Age: 80
End: 2025-04-28
Payer: COMMERCIAL

## 2025-04-28 LAB — RETINOPATHY: NEGATIVE

## 2025-05-28 DIAGNOSIS — E11.9 TYPE 2 DIABETES MELLITUS WITHOUT COMPLICATION, WITHOUT LONG-TERM CURRENT USE OF INSULIN (H): ICD-10-CM

## 2025-05-28 RX ORDER — ATORVASTATIN CALCIUM 40 MG/1
40 TABLET, FILM COATED ORAL DAILY
Qty: 90 TABLET | Refills: 0 | Status: SHIPPED | OUTPATIENT
Start: 2025-05-28

## 2025-05-28 NOTE — LETTER
May 28, 2025       TO: Shantelle Su  [id number]  [place] [address]       Dear Shantelle Su,    We recently received a call from your pharmacy requesting a refill of your medication(s).    Our records indicate that you are due for follow-up with your Heart Care Provider. We will refill your medications for 3 months which will allow you enough time to be seen.    Please call [phone] to schedule your appointment.    Thank you for allowing [facility] to be a part of your health care team and we look forward to seeing you soon.    Thank you,    [facility]

## 2025-06-16 ENCOUNTER — OFFICE VISIT (OUTPATIENT)
Dept: CARDIOLOGY | Facility: CLINIC | Age: 80
End: 2025-06-16
Payer: COMMERCIAL

## 2025-06-16 VITALS
OXYGEN SATURATION: 98 % | HEART RATE: 68 BPM | BODY MASS INDEX: 32.94 KG/M2 | WEIGHT: 163.4 LBS | HEIGHT: 59 IN | SYSTOLIC BLOOD PRESSURE: 138 MMHG | DIASTOLIC BLOOD PRESSURE: 70 MMHG

## 2025-06-16 DIAGNOSIS — I47.10 PAROXYSMAL SUPRAVENTRICULAR TACHYCARDIA: ICD-10-CM

## 2025-06-16 DIAGNOSIS — I25.118 CORONARY ARTERY DISEASE OF NATIVE ARTERY OF NATIVE HEART WITH STABLE ANGINA PECTORIS: Primary | ICD-10-CM

## 2025-06-16 DIAGNOSIS — E78.5 HYPERLIPIDEMIA WITH TARGET LDL LESS THAN 70: ICD-10-CM

## 2025-06-16 DIAGNOSIS — E11.9 TYPE 2 DIABETES MELLITUS WITHOUT COMPLICATION, WITHOUT LONG-TERM CURRENT USE OF INSULIN (H): ICD-10-CM

## 2025-06-16 DIAGNOSIS — I10 ESSENTIAL HYPERTENSION, BENIGN: ICD-10-CM

## 2025-06-16 RX ORDER — METOPROLOL SUCCINATE 50 MG/1
50 TABLET, EXTENDED RELEASE ORAL DAILY
Qty: 90 TABLET | Refills: 3 | Status: SHIPPED | OUTPATIENT
Start: 2025-06-16

## 2025-06-16 RX ORDER — AMLODIPINE BESYLATE 5 MG/1
5 TABLET ORAL DAILY
Qty: 90 TABLET | Refills: 3 | Status: SHIPPED | OUTPATIENT
Start: 2025-06-16

## 2025-06-16 RX ORDER — ATORVASTATIN CALCIUM 40 MG/1
40 TABLET, FILM COATED ORAL DAILY
Qty: 90 TABLET | Refills: 3 | Status: SHIPPED | OUTPATIENT
Start: 2025-06-16

## 2025-06-16 NOTE — PROGRESS NOTES
CARDIOLOGY CLINIC NOTE    PRIMARY CARDIOLOGIST  Dr. Hand     PRIMARY CARE PHYSICIAN:  Burton Mejia    HISTORY OF PRESENT ILLNESS:  Shantelle Su is a very pleasant 80-year-old female with a past medical history significant for anterior MI status post MAGNUS to the LAD in 2016 complicated by proximal stent edge dissection and additional MAGNUS at the ostial LAD, hypertension, dyslipidemia, remote PE, and symptomatic PACs.     Patient's cardiac history dates back to 2016 after having an abnormal stress test showing ischemia in the anterior septal and apical wall.  She subsequently underwent a coronary angiogram that showed 80% stenosis in the mid LAD status post PCI.  Postprocedure, she showed evidence of an acute MI and was found to have a proximal stent edge dissection.  She underwent a thrombectomy followed by overlapping drug-eluting stents at the ostial LAD.  She has since done well.  A follow-up stress echocardiogram in 2017 was negative for inducible ischemia.    Echocardiogram in 2022 showed a normal ejection fraction estimated at 55 to 60%, grade 1 diastolic dysfunction, normal RV size and function, no significant valvular disease.    She returns to the office today for an annual follow-up.  She does not endorse any cardiac symptoms including chest pain, shortness of breath, palpitations, PND, orthopnea, presyncope, syncope or edema.  Upon exam, lungs are clear bilaterally, heart rate rhythm regular, no evidence of fluid overload.     Blood pressure is excellent at 138/70, managed on amlodipine and metoprolol.  Last blood work was reviewed, BMP is unremarkable.  A1c is 6.4.  Lipid panel showed a total cholesterol 167, HDL 63, LDL 72 and triglycerides 168.    She walks regularly.  Follows a strict low-sodium diet.  Compliant with all medication.    PAST MEDICAL HISTORY:  Past Medical History:   Diagnosis Date    Acute upper respiratory infection 09/30/2002    Angina pectoris, unspecified 11/23/2016    ASCVD  (arteriosclerotic cardiovascular disease) 12/20/2016    CAD (coronary artery disease) 11/23/2016    sp proximal LAD stenting    Diabetes mellitus (H)     Enterocele 09/12/2011    Essential hypertension, benign     Fatty liver 06/04/2015    Based on CT scan done 5/23/15     GERD (gastroesophageal reflux disease) 10/13/2008    Heart palpitations     Hiatal hernia     History of pulmonary embolism 2002    Hyperlipidemia LDL goal <70 12/20/2016    Metabolic syndrome 12/01/2011    Other pulmonary embolism and infarction 09/30/2002    after starting hormone therapy    PAC (premature atrial contraction)     Paroxysmal supraventricular tachycardia 12/20/2016    Rectocele 10/15/2007       MEDICATIONS:  Current Outpatient Medications   Medication Sig Dispense Refill    Acetaminophen (TYLENOL PO) Take 500-1,000 mg by mouth At Bedtime       albuterol (PROAIR HFA/PROVENTIL HFA/VENTOLIN HFA) 108 (90 Base) MCG/ACT inhaler Inhale 1-2 puffs into the lungs every 6 hours for 5 days. 8.5 g 0    alpha-d-galactosidase (BEANO) tablet Take by mouth as needed       amLODIPine (NORVASC) 5 MG tablet Take 1 tablet (5 mg) by mouth daily. 90 tablet 3    aspirin EC 81 MG EC tablet Take 1 tablet (81 mg) by mouth daily 90 tablet 3    atorvastatin (LIPITOR) 40 MG tablet Take 1 tablet (40 mg) by mouth daily. Appointment required for further refills 90 tablet 3    metFORMIN (GLUCOPHAGE) 500 MG tablet Take 1 tablet (500 mg) by mouth 2 times daily (with meals). 180 tablet 3    metoprolol succinate ER (TOPROL XL) 50 MG 24 hr tablet Take 1 tablet (50 mg) by mouth daily. 90 tablet 3    nitroGLYcerin (NITROSTAT) 0.4 MG sublingual tablet For chest pain place 1 tablet under the tongue every 5 minutes for 3 doses. If symptoms persist 5 minutes after 1st dose call 911. 25 tablet 1    omeprazole (PRILOSEC) 20 MG DR capsule Take 1 capsule (20 mg) by mouth daily. 90 capsule 2    ACE/ARB/ARNI NOT PRESCRIBED (INTENTIONAL) Please choose reason not prescribed from  choices below. (Patient not taking: Reported on 1/27/2025)       No current facility-administered medications for this visit.       SOCIAL HISTORY:  I have reviewed this patient's social history and updated it with pertinent information if needed. Shantelle Su  reports that she quit smoking about 37 years ago. Her smoking use included cigarettes. She started smoking about 57 years ago. She has a 15 pack-year smoking history. She has never used smokeless tobacco. She reports current alcohol use. She reports that she does not use drugs.    PHYSICAL EXAM:  Pulse:  [68] 68  BP: (138)/(70) 138/70  SpO2:  [98 %] 98 %  163 lbs 6.4 oz    Constitutional: alert, no distress  Respiratory: Good bilateral air entry  Cardiovascular: Regular rate and rhythm  GI: nondistended  Neuropsychiatric: appropriate affact    ASSESSMENT/PLAN:  Pertinent issues addressed/ reviewed during this cardiology visit  Coronary artery disease -status post MAGNUS to the LAD in 2016.  Has any ischemic symptoms.  Continue aspirin, amlodipine, metoprolol, and statin.  Encourage continued exercise, weight loss and heart healthy diet.  Hypertension -well controlled  Hyperlipidemia -LDL 72, due for fasting lipid panel in September.  Continue atorvastatin.  PACs -well-controlled on metoprolol  Type 2 diabetes - A1c 6.4, on metformin.     Follow-up in 1 year with PEDRO or sooner if needed.    It was a pleasure seeing this patient in clinic today. Please do not hesitate to contact me with any future questions.     NIKI Rehman, CNP  Cardiology - [facility]  06/16/2025    The level of medical decision making during this visit was of moderate complexity.    This note was completed in part using dictation via the Dragon voice recognition software. Some word and grammatical errors may occur and must be interpreted in the appropriate clinical context.  If there are any questions pertaining to this issue, please contact me for further clarification.

## 2025-06-16 NOTE — LETTER
6/16/2025    Burton Mejia MD  [address]  [facility] [address]    RE: Shantelle Su       Dear Colleague,     I had the pleasure of seeing Shantelle Su in the [facility].  CARDIOLOGY CLINIC NOTE    PRIMARY CARDIOLOGIST  Dr. Hand     PRIMARY CARE PHYSICIAN:  Burton Mejia    HISTORY OF PRESENT ILLNESS:  Shantelle Su is a very pleasant 80-year-old female with a past medical history significant for anterior MI status post MAGNUS to the LAD in 2016 complicated by proximal stent edge dissection and additional MAGNUS at the ostial LAD, hypertension, dyslipidemia, remote PE, and symptomatic PACs.     Patient's cardiac history dates back to 2016 after having an abnormal stress test showing ischemia in the anterior septal and apical wall.  She subsequently underwent a coronary angiogram that showed 80% stenosis in the mid LAD status post PCI.  Postprocedure, she showed evidence of an acute MI and was found to have a proximal stent edge dissection.  She underwent a thrombectomy followed by overlapping drug-eluting stents at the ostial LAD.  She has since done well.  A follow-up stress echocardiogram in 2017 was negative for inducible ischemia.    Echocardiogram in 2022 showed a normal ejection fraction estimated at 55 to 60%, grade 1 diastolic dysfunction, normal RV size and function, no significant valvular disease.    She returns to the office today for an annual follow-up.  She does not endorse any cardiac symptoms including chest pain, shortness of breath, palpitations, PND, orthopnea, presyncope, syncope or edema.  Upon exam, lungs are clear bilaterally, heart rate rhythm regular, no evidence of fluid overload.     Blood pressure is excellent at 138/70, managed on amlodipine and metoprolol.  Last blood work was reviewed, BMP is unremarkable.  A1c is 6.4.  Lipid panel showed a total cholesterol 167, HDL 63, LDL 72 and triglycerides 168.    She walks regularly.  Follows a strict low-sodium  diet.  Compliant with all medication.    PAST MEDICAL HISTORY:  Past Medical History:   Diagnosis Date     Acute upper respiratory infection 09/30/2002     Angina pectoris, unspecified 11/23/2016     ASCVD (arteriosclerotic cardiovascular disease) 12/20/2016     CAD (coronary artery disease) 11/23/2016    sp proximal LAD stenting     Diabetes mellitus (H)      Enterocele 09/12/2011     Essential hypertension, benign      Fatty liver 06/04/2015    Based on CT scan done 5/23/15      GERD (gastroesophageal reflux disease) 10/13/2008     Heart palpitations      Hiatal hernia      History of pulmonary embolism 2002     Hyperlipidemia LDL goal <70 12/20/2016     Metabolic syndrome 12/01/2011     Other pulmonary embolism and infarction 09/30/2002    after starting hormone therapy     PAC (premature atrial contraction)      Paroxysmal supraventricular tachycardia 12/20/2016     Rectocele 10/15/2007       MEDICATIONS:  Current Outpatient Medications   Medication Sig Dispense Refill     Acetaminophen (TYLENOL PO) Take 500-1,000 mg by mouth At Bedtime        albuterol (PROAIR HFA/PROVENTIL HFA/VENTOLIN HFA) 108 (90 Base) MCG/ACT inhaler Inhale 1-2 puffs into the lungs every 6 hours for 5 days. 8.5 g 0     alpha-d-galactosidase (BEANO) tablet Take by mouth as needed        amLODIPine (NORVASC) 5 MG tablet Take 1 tablet (5 mg) by mouth daily. 90 tablet 3     aspirin EC 81 MG EC tablet Take 1 tablet (81 mg) by mouth daily 90 tablet 3     atorvastatin (LIPITOR) 40 MG tablet Take 1 tablet (40 mg) by mouth daily. Appointment required for further refills 90 tablet 3     metFORMIN (GLUCOPHAGE) 500 MG tablet Take 1 tablet (500 mg) by mouth 2 times daily (with meals). 180 tablet 3     metoprolol succinate ER (TOPROL XL) 50 MG 24 hr tablet Take 1 tablet (50 mg) by mouth daily. 90 tablet 3     nitroGLYcerin (NITROSTAT) 0.4 MG sublingual tablet For chest pain place 1 tablet under the tongue every 5 minutes for 3 doses. If symptoms persist  5 minutes after 1st dose call 911. 25 tablet 1     omeprazole (PRILOSEC) 20 MG DR capsule Take 1 capsule (20 mg) by mouth daily. 90 capsule 2     ACE/ARB/ARNI NOT PRESCRIBED (INTENTIONAL) Please choose reason not prescribed from choices below. (Patient not taking: Reported on 1/27/2025)       No current facility-administered medications for this visit.       SOCIAL HISTORY:  I have reviewed this patient's social history and updated it with pertinent information if needed. Shantelle Su  reports that she quit smoking about 37 years ago. Her smoking use included cigarettes. She started smoking about 57 years ago. She has a 15 pack-year smoking history. She has never used smokeless tobacco. She reports current alcohol use. She reports that she does not use drugs.    PHYSICAL EXAM:  Pulse:  [68] 68  BP: (138)/(70) 138/70  SpO2:  [98 %] 98 %  163 lbs 6.4 oz    Constitutional: alert, no distress  Respiratory: Good bilateral air entry  Cardiovascular: Regular rate and rhythm  GI: nondistended  Neuropsychiatric: appropriate affact    ASSESSMENT/PLAN:  Pertinent issues addressed/ reviewed during this cardiology visit  Coronary artery disease -status post MAGNUS to the LAD in 2016.  Has any ischemic symptoms.  Continue aspirin, amlodipine, metoprolol, and statin.  Encourage continued exercise, weight loss and heart healthy diet.  Hypertension -well controlled  Hyperlipidemia -LDL 72, due for fasting lipid panel in September.  Continue atorvastatin.  PACs -well-controlled on metoprolol  Type 2 diabetes - A1c 6.4, on metformin.     Follow-up in 1 year with PEDRO or sooner if needed.    It was a pleasure seeing this patient in clinic today. Please do not hesitate to contact me with any future questions.     NIKI Rehman, CNP  Cardiology - [facility]  06/16/2025    The level of medical decision making during this visit was of moderate complexity.    This note was completed in part using dictation via the Dragon voice  recognition software. Some word and grammatical errors may occur and must be interpreted in the appropriate clinical context.  If there are any questions pertaining to this issue, please contact me for further clarification.    Thank you for allowing me to participate in the care of your patient.      Sincerely,     NIKI Rehman CNP     [facility]  cc:   NIKI Rehman CNP  [address] [place] [address]

## 2025-06-16 NOTE — PATIENT INSTRUCTIONS
Thanks for participating in a office visit with the [facility] today.    Doing well on a cardiac standpoint. No recurrent chest pain or shortness of breath.   Blood pressures well controlled.   Due for fasting lipid panel in September.   Encourage exercise and heart healthy diet.   Continue current medical therapy.     Follow up in 1 year with PEDRO     Please call my nurse at   [phone]. Call with any questions or concerns.    Scheduling phone number: [phone].   Reminder: Please bring in all current medications, over the counter supplements and vitamin bottles to your next appointment.

## 2025-08-18 ENCOUNTER — PATIENT OUTREACH (OUTPATIENT)
Dept: CARE COORDINATION | Facility: CLINIC | Age: 80
End: 2025-08-18
Payer: COMMERCIAL

## 2025-09-01 ENCOUNTER — PATIENT OUTREACH (OUTPATIENT)
Dept: CARE COORDINATION | Facility: CLINIC | Age: 80
End: 2025-09-01
Payer: COMMERCIAL